# Patient Record
Sex: MALE | Race: WHITE | NOT HISPANIC OR LATINO | Employment: STUDENT | URBAN - METROPOLITAN AREA
[De-identification: names, ages, dates, MRNs, and addresses within clinical notes are randomized per-mention and may not be internally consistent; named-entity substitution may affect disease eponyms.]

---

## 2018-07-09 ENCOUNTER — TRANSFERRED RECORDS (OUTPATIENT)
Dept: HEALTH INFORMATION MANAGEMENT | Facility: CLINIC | Age: 23
End: 2018-07-09

## 2018-07-09 ENCOUNTER — OFFICE VISIT (OUTPATIENT)
Dept: URGENT CARE | Facility: URGENT CARE | Age: 23
End: 2018-07-09
Payer: COMMERCIAL

## 2018-07-09 VITALS
HEART RATE: 63 BPM | TEMPERATURE: 98.3 F | DIASTOLIC BLOOD PRESSURE: 56 MMHG | SYSTOLIC BLOOD PRESSURE: 108 MMHG | OXYGEN SATURATION: 98 %

## 2018-07-09 DIAGNOSIS — T30.0 FRICTION BURN OF SKIN: Primary | ICD-10-CM

## 2018-07-09 PROCEDURE — 99203 OFFICE O/P NEW LOW 30 MIN: CPT | Performed by: FAMILY MEDICINE

## 2018-07-09 NOTE — MR AVS SNAPSHOT
After Visit Summary   7/9/2018    Luther Wood    MRN: [id number]           Patient Information     Date Of Birth          1995        Visit Information        Provider Department      7/9/2018 7:20 PM Yanna Carr MD [facility]        Today's Diagnoses     Friction burn of skin    -  1       Follow-ups after your visit        Follow-up notes from your care team     Return if symptoms worsen or fail to improve.      Who to contact     If you have questions or need follow up information about today's clinic visit or your schedule please contact [facility] directly at [phone].  Normal or non-critical lab and imaging results will be communicated to you by MyChart, letter or phone within 4 business days after the clinic has received the results. If you do not hear from us within 7 days, please contact the clinic through MyChart or phone. If you have a critical or abnormal lab result, we will notify you by phone as soon as possible.  Submit refill requests through [employer] or call your pharmacy and they will forward the refill request to us. Please allow 3 business days for your refill to be completed.          Additional Information About Your Visit        Care EveryWhere ID     This is your Care EveryWhere ID. This could be used by other organizations to access your [place] medical records  [id number]        Your Vitals Were     Pulse Temperature Pulse Oximetry             63 98.3  F (36.8  C) (Oral) 98%          Blood Pressure from Last 3 Encounters:   07/09/18 108/56   01/17/13 102/60    Weight from Last 3 Encounters:   01/17/13 115 lb (52.2 kg) (27 %)*   10/21/07 73 lb (33.1 kg) (56 %)*     * Growth percentiles are based on CDC 2-20 Years data.              Today, you had the following     No orders found for display       Primary Care Provider Office Phone # Fax #    Sean Mercedes [phone]       KATIE Ohlman  PEDIATRICS [address]  [facility] [address]        Equal Access to Services     EVAJUS DAY : Hadii aad ku hadstevemicki Socasandra, wamerrittda luvladimiradaha, qaybta juanitayulissanatali mora, christine cruzfidencioyuridia tracy. So [facility] [phone].    ATENCIÓN: Si habla español, tiene a narayan disposición servicios gratuitos de asistencia lingüística. Llame al [phone].    We comply with applicable federal civil rights laws and [place] laws. We do not discriminate on the basis of race, color, national origin, age, disability, sex, sexual orientation, or gender identity.            Thank you!     Thank you for choosing [facility]  for your care. Our goal is always to provide you with excellent care. Hearing back from our patients is one way we can continue to improve our services. Please take a few minutes to complete the written survey that you may receive in the mail after your visit with us. Thank you!             Your Updated Medication List - Protect others around you: Learn how to safely use, store and throw away your medicines at www.disposemymeds.org.          This list is accurate as of 7/9/18  8:49 PM.  Always use your most recent med list.                   Brand Name Dispense Instructions for use Diagnosis    ADDERALL XR 10 MG per 24 hr capsule   Generic drug:  amphetamine-dextroamphetamine     30 capsule    Take 1 capsule by mouth daily.        cefuroxime 250 MG/5ML suspension    CEFTIN     Take by mouth 2 times daily

## 2018-07-10 NOTE — NURSING NOTE
Chief Complaint   Patient presents with     Urgent Care     Derm Problem     rash on both hands        S RYDER, CMA

## 2018-07-10 NOTE — PROGRESS NOTES
SUBJECTIVE:  Luther Wood is a 22 year old male who presents to the clinic today for a rash.  Onset of rash was today.  Rash is sudden onset.  Location of the rash: palms.  Symptoms are mild, red and rash seems to be worsening.  Previous history of a similar rash? No  Recent exposure history: was pulling weeds and mowing the lawn.    Associated symptoms include: nothing.  Not itching.  No pain.      No past medical history on file.  Current Outpatient Prescriptions   Medication Sig Dispense Refill     amphetamine-dextroamphetamine (ADDERALL XR) 10 MG per capsule Take 1 capsule by mouth daily. 30 capsule 0     cefuroxime (CEFTIN) 250 MG/5ML suspension Take by mouth 2 times daily       Social History   Substance Use Topics     Smoking status: Never Smoker     Smokeless tobacco: Never Used     Alcohol use Not on file       ROS:  Review of systems negative except as stated above.    EXAM:   /56 (Cuff Size: Adult Regular)  Pulse 63  Temp 98.3  F (36.8  C) (Oral)  SpO2 98%  GENERAL: alert, no acute distress.  SKIN: Rash description:    Distribution: localized  Location: palms    Color: red,  Lesion type: macular, maculopapular, linear, round with no other findings    ASSESSMENT:  Encounter Diagnosis   Name Primary?     Friction burn of skin Yes        PLAN:  1) Recommend hydrocortisone 1% TID.  Use gloves to protect the hands when weeding or working outside.    2) Follow-up with primary clinic if not improving   Yanna Bates MD

## 2018-08-20 ENCOUNTER — TRANSFERRED RECORDS (OUTPATIENT)
Dept: HEALTH INFORMATION MANAGEMENT | Facility: CLINIC | Age: 23
End: 2018-08-20

## 2019-04-21 ENCOUNTER — OFFICE VISIT (OUTPATIENT)
Dept: URGENT CARE | Facility: URGENT CARE | Age: 24
End: 2019-04-21
Payer: COMMERCIAL

## 2019-04-21 VITALS
OXYGEN SATURATION: 100 % | HEART RATE: 91 BPM | DIASTOLIC BLOOD PRESSURE: 74 MMHG | WEIGHT: 164 LBS | TEMPERATURE: 98.3 F | SYSTOLIC BLOOD PRESSURE: 110 MMHG

## 2019-04-21 DIAGNOSIS — R19.5 LOOSE STOOLS: Primary | ICD-10-CM

## 2019-04-21 PROCEDURE — 87506 IADNA-DNA/RNA PROBE TQ 6-11: CPT | Performed by: FAMILY MEDICINE

## 2019-04-21 PROCEDURE — 99214 OFFICE O/P EST MOD 30 MIN: CPT | Performed by: FAMILY MEDICINE

## 2019-04-21 RX ORDER — BUDESONIDE 0.5 MG/2ML
INHALANT ORAL
Refills: 0 | COMMUNITY
Start: 2018-09-26 | End: 2021-09-02

## 2019-04-21 NOTE — PROGRESS NOTES
Subjective:   Luther Wood is a 23 year old male who presents for   Chief Complaint   Patient presents with     Urgent Care     Abdominal Pain     stomach cramping and pain- began last Tuesday- loose priya, bad gas      Started Tuesday with stomach ache - continued into weds and had loose stools with bad gas. Thursday his symptoms came down although still felt bloated. Friday watery loose stools continue -had about 3 or 4 episodes. Saturday (yesterday) he developed a sore throat.   He denies any fevers. Approximately 3 loose stools although had 1 formed bowel movement in the morning.   No recent travel/camping. No antibiotics in the last 2 months. Roommate 1-2 weeks ago had flu-like symptoms (chills, stomach discomfort).     Cefuroxime he takes 250mg daily for his acne (has been taking for 5-6 years).     Medications taken for this illness: none    No current job right now - is currently in school    There are no active problems to display for this patient.      Current Outpatient Medications   Medication     amphetamine-dextroamphetamine (ADDERALL XR) 10 MG per capsule     cefuroxime (CEFTIN) 250 MG/5ML suspension     budesonide (PULMICORT) 0.5 MG/2ML neb solution     No current facility-administered medications for this visit.      ROS:  As above per HPI    Objective:   /74 (BP Location: Right arm)   Pulse 91   Temp 98.3  F (36.8  C) (Oral)   Wt 74.4 kg (164 lb)   SpO2 100% , There is no height or weight on file to calculate BMI.  Gen:  NAD, well-nourished, sitting in chair comfortably  HEENT: EOMI, sclera anicteric, Head normocephalic, ; nares patent; moist mucous membranes  Neck: trachea midline, no thyromegaly  CV:  Hemodynamically stable  Pulm:  no increased work of breathing  Extrem: no cyanosis, edema or clubbing  Skin: no obvious rashes or abnormalities  Gait: normal  Psych: Euthymic, linear thoughts, normal rate of speech      Assessment & Plan:   Luther Wood, 23 year old male who presents  with:    Loose stools  Patient on long term antibiotics which increases possibility of C.Diff - his foul smelling stools and ongoing symptoms for 5-6 days could also be from gastroenteritis. Appears well hydrated. Fortunately, no fevers. Stool testing will be done today as he can provide a sample. I've recommended loperamide as he is having non-bloody loose stools that are intermittent. No drinking of outdoor water to suggest giardia. No significant abdominal discomfort.   - Enteric Bacteria and Virus Panel by RENA Stool  - Clostridium difficile Toxin B PCR  - Enteric Bacteria and Virus Panel by RENA Stool      Anand Underwood MD   [facility]    The use of Dragon/[employer] dictation services may have been used to construct the content in this note; any grammatical or spelling errors are non-intentional. Please contact the author of this note directly if you are in need of any clarification.

## 2019-04-21 NOTE — PATIENT INSTRUCTIONS
If your symptoms get worse please call the clinic    We will call you in the next 2-3 days with your results    Drink 4-6 water bottles a day to stay hydrated    If your diarrhea comes on take imodium/loperamide one pill 15 minutes before a meal (up to 4 times a day for up to 1 week)

## 2019-04-23 ENCOUNTER — TRANSFERRED RECORDS (OUTPATIENT)
Dept: HEALTH INFORMATION MANAGEMENT | Facility: CLINIC | Age: 24
End: 2019-04-23
Payer: COMMERCIAL

## 2019-04-23 LAB
ALT SERPL-CCNC: 17 IU/L (ref 7–52)
AST SERPL-CCNC: 20 IU/L (ref 13–39)
CREATININE (EXTERNAL): 1.07 MG/DL (ref 0.7–1.3)
GLUCOSE (EXTERNAL): 74 MG/DL (ref 70–99)
POTASSIUM (EXTERNAL): 3.8 MEQ/L (ref 3.5–5.1)

## 2019-05-10 ENCOUNTER — TRANSFERRED RECORDS (OUTPATIENT)
Dept: HEALTH INFORMATION MANAGEMENT | Facility: CLINIC | Age: 24
End: 2019-05-10

## 2019-05-22 ENCOUNTER — TRANSFERRED RECORDS (OUTPATIENT)
Dept: HEALTH INFORMATION MANAGEMENT | Facility: CLINIC | Age: 24
End: 2019-05-22

## 2019-05-31 ENCOUNTER — TRANSFERRED RECORDS (OUTPATIENT)
Dept: HEALTH INFORMATION MANAGEMENT | Facility: CLINIC | Age: 24
End: 2019-05-31

## 2019-06-21 ENCOUNTER — TRANSFERRED RECORDS (OUTPATIENT)
Dept: HEALTH INFORMATION MANAGEMENT | Facility: CLINIC | Age: 24
End: 2019-06-21

## 2019-07-03 ENCOUNTER — OFFICE VISIT (OUTPATIENT)
Dept: FAMILY MEDICINE | Facility: CLINIC | Age: 24
End: 2019-07-03

## 2019-07-03 VITALS
BODY MASS INDEX: 22.08 KG/M2 | HEART RATE: 74 BPM | HEIGHT: 72 IN | DIASTOLIC BLOOD PRESSURE: 76 MMHG | OXYGEN SATURATION: 99 % | SYSTOLIC BLOOD PRESSURE: 120 MMHG | WEIGHT: 163 LBS

## 2019-07-03 DIAGNOSIS — L70.0 ACNE VULGARIS: ICD-10-CM

## 2019-07-03 DIAGNOSIS — F98.8 ATTENTION DEFICIT DISORDER, UNSPECIFIED HYPERACTIVITY PRESENCE: ICD-10-CM

## 2019-07-03 DIAGNOSIS — H10.32 ACUTE CONJUNCTIVITIS OF LEFT EYE, UNSPECIFIED ACUTE CONJUNCTIVITIS TYPE: Primary | ICD-10-CM

## 2019-07-03 DIAGNOSIS — S73.191S TEAR OF RIGHT ACETABULAR LABRUM, SEQUELA: ICD-10-CM

## 2019-07-03 PROCEDURE — 99203 OFFICE O/P NEW LOW 30 MIN: CPT | Performed by: FAMILY MEDICINE

## 2019-07-03 RX ORDER — POLYMYXIN B SULFATE AND TRIMETHOPRIM 1; 10000 MG/ML; [USP'U]/ML
1-2 SOLUTION OPHTHALMIC EVERY 6 HOURS
Qty: 10 ML | Refills: 0 | Status: SHIPPED | OUTPATIENT
Start: 2019-07-03 | End: 2019-07-03

## 2019-07-03 RX ORDER — VANCOMYCIN HYDROCHLORIDE 125 MG/1
CAPSULE ORAL
Refills: 0 | COMMUNITY
Start: 2019-05-24 | End: 2021-09-02

## 2019-07-03 RX ORDER — SULFACETAMIDE SODIUM 100 MG/ML
LOTION TOPICAL
Refills: 11 | COMMUNITY
Start: 2019-06-12 | End: 2021-09-02

## 2019-07-03 RX ORDER — POLYMYXIN B SULFATE AND TRIMETHOPRIM 1; 10000 MG/ML; [USP'U]/ML
1-2 SOLUTION OPHTHALMIC EVERY 6 HOURS
Qty: 10 ML | Refills: 0 | Status: SHIPPED | OUTPATIENT
Start: 2019-07-03 | End: 2021-09-02

## 2019-07-03 RX ORDER — MESALAMINE 1.2 G/1
TABLET, DELAYED RELEASE ORAL
Refills: 2 | COMMUNITY
Start: 2019-06-21 | End: 2021-09-02

## 2019-07-03 RX ORDER — PREDNISONE 10 MG/1
TABLET ORAL
Refills: 0 | Status: ON HOLD | COMMUNITY
Start: 2019-06-03 | End: 2021-09-09

## 2019-07-03 ASSESSMENT — ANXIETY QUESTIONNAIRES

## 2019-07-03 ASSESSMENT — PATIENT HEALTH QUESTIONNAIRE - PHQ9
5. POOR APPETITE OR OVEREATING: NOT AT ALL
SUM OF ALL RESPONSES TO PHQ QUESTIONS 1-9: 0

## 2019-07-03 ASSESSMENT — MIFFLIN-ST. JEOR: SCORE: 1782.36

## 2019-07-03 NOTE — NURSING NOTE
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 has never smoked.  Ok to leave detailed message on voice mail for today's visit only Yes, phone # [phone]

## 2019-07-03 NOTE — PROGRESS NOTES
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    Normal vision  No eye pain  No upper respiratory symptoms  No fever     Patient Active Problem List   Diagnosis     Acne vulgaris     Tear of right acetabular labrum     Attention deficit disorder     ? Inflammatory bowel disease- on prednisone    History reviewed. No pertinent surgical history.    Current Outpatient Medications   Medication     amphetamine-dextroamphetamine (ADDERALL XR) 10 MG per capsule     LIALDA 1.2 g EC tablet     predniSONE (DELTASONE) 10 MG tablet     sulfacetamide sodium, Acne, 10 % lotion     trimethoprim-polymyxin b (POLYTRIM) 08349-4.1 UNIT/ML-% ophthalmic solution     vancomycin (VANCOCIN) 125 MG capsule     budesonide (PULMICORT) 0.5 MG/2ML neb solution     No current facility-administered medications for this visit.      OBJECTIVE:  /76 (BP Location: Left arm, Patient Position: Sitting, Cuff Size: Adult Regular)   Pulse 74   Ht 1.829 m (6')   Wt 73.9 kg (163 lb)   SpO2 99%   BMI 22.11 kg/m    No acute distress  PERRL  Copious drainage left eye  Erythema of conjunctiva  EOM is full    External ears  and canals clear bilaterally. TM's normal bilaterally. Nose normal without lesions or discharge. Oropharynx normal. Neck supple without palpable adenopathy.    Assessment   (H10.32) Acute conjunctivitis of left eye, unspecified acute conjunctivitis type  (primary encounter diagnosis)  Comment:    Plan: trimethoprim-polymyxin b (POLYTRIM) 53842-3.1         UNIT/ML-% ophthalmic solution, DISCONTINUED:         trimethoprim-polymyxin b (POLYTRIM) 69206-1.1         UNIT/ML-% ophthalmic solution          Discussed contagious nature of his eye infection  Hand washing, no shared towels  Works with patients, consider  no patient care until returns from July 4 holiday (about five days from onset of symptoms)  Start eye drops  Call or return to clinic prn if these symtoms worsen, fail to improve as anticipated, or if new symptoms develop.

## 2019-07-04 ASSESSMENT — ANXIETY QUESTIONNAIRES: GAD7 TOTAL SCORE: 0

## 2019-07-07 PROBLEM — F98.8 ATTENTION DEFICIT DISORDER: Status: ACTIVE | Noted: 2019-07-07

## 2019-07-07 PROBLEM — S73.191A TEAR OF RIGHT ACETABULAR LABRUM: Status: ACTIVE | Noted: 2019-07-07

## 2019-07-07 PROBLEM — L70.0 ACNE VULGARIS: Status: ACTIVE | Noted: 2019-07-07

## 2019-10-31 ENCOUNTER — TELEPHONE (OUTPATIENT)
Dept: DERMATOLOGY | Facility: CLINIC | Age: 24
End: 2019-10-31

## 2019-10-31 NOTE — TELEPHONE ENCOUNTER
----- Message from Khanh Ribera sent at 10/31/2019 11:56 AM CDT -----  Regarding: Dr. Mercedes from [facility] trying to reach Dr. Bell  Is an  Needed: no  Callers Name: Trisha @ [facility]  Callers Phone Number: [phone]  Relationship to Patient: primary clinic   Best time of day to call: any  Is it ok to leave a detailed voicemail on this number: yes  Reason for Call: Trisha @ [facility] is trying to reach Dr. Bell on behalf of Dr. Mercedes in regards to this patient. She had tried the [id number] line several times and just kept getting routing to the call center. The provider has questions about inflammatory bowel disease and acne in relation to this patient. I explained that I didn't think we'd be able to assist since the patient is an adult and not established with the provider here. I offered to relay the message due to the difficulties she had trying to directly contact the provider. I informed her of Dr. Bell's private practice so that she might try to reach the provider there.

## 2019-10-31 NOTE — TELEPHONE ENCOUNTER
RN spoke with Shahrzad at [facility]. RN explained that because the patient is well over 18 years of age, is not seeing other pediatric specialists here, we would generally refer him to our adult clinic. RN explained that Dr. Bell is only at our clinic one time per month. RN provided other clinic location to which they could contact Dr. Bell. RN explained to Shahrzad that should Dr. Bell approve seeing this patient at the [facility] location, they should call back and RN will assist in scheduling. Shahrzad denies further needs.

## 2019-12-17 ENCOUNTER — TRANSFERRED RECORDS (OUTPATIENT)
Dept: HEALTH INFORMATION MANAGEMENT | Facility: CLINIC | Age: 24
End: 2019-12-17

## 2020-01-06 ENCOUNTER — TRANSFERRED RECORDS (OUTPATIENT)
Dept: HEALTH INFORMATION MANAGEMENT | Facility: CLINIC | Age: 25
End: 2020-01-06

## 2021-08-31 ENCOUNTER — TRANSFERRED RECORDS (OUTPATIENT)
Dept: HEALTH INFORMATION MANAGEMENT | Facility: CLINIC | Age: 26
End: 2021-08-31
Payer: COMMERCIAL

## 2021-08-31 LAB
ALT SERPL-CCNC: 20 IU/L (ref 0–44)
AST SERPL-CCNC: 19 IU/L (ref 0–40)

## 2021-09-02 ENCOUNTER — HOSPITAL ENCOUNTER (INPATIENT)
Facility: CLINIC | Age: 26
LOS: 7 days | Discharge: HOME | DRG: 386 | End: 2021-09-09
Attending: EMERGENCY MEDICINE | Admitting: INTERNAL MEDICINE
Payer: COMMERCIAL

## 2021-09-02 DIAGNOSIS — K51.919 ULCERATIVE COLITIS WITH COMPLICATION, UNSPECIFIED LOCATION (H): ICD-10-CM

## 2021-09-02 LAB
ALBUMIN SERPL-MCNC: 2.8 G/DL (ref 3.4–5)
ALP SERPL-CCNC: 74 U/L (ref 40–150)
ALT SERPL W P-5'-P-CCNC: 21 U/L (ref 0–70)
ANION GAP SERPL CALCULATED.3IONS-SCNC: 5 MMOL/L (ref 3–14)
AST SERPL W P-5'-P-CCNC: 11 U/L (ref 0–45)
BASOPHILS # BLD AUTO: 0.1 10E3/UL (ref 0–0.2)
BASOPHILS NFR BLD AUTO: 1 %
BILIRUB SERPL-MCNC: 0.5 MG/DL (ref 0.2–1.3)
BUN SERPL-MCNC: 8 MG/DL (ref 7–30)
CALCIUM SERPL-MCNC: 8.4 MG/DL (ref 8.5–10.1)
CHLORIDE BLD-SCNC: 102 MMOL/L (ref 94–109)
CO2 SERPL-SCNC: 30 MMOL/L (ref 20–32)
CREAT SERPL-MCNC: 1.12 MG/DL (ref 0.66–1.25)
EOSINOPHIL # BLD AUTO: 0.1 10E3/UL (ref 0–0.7)
EOSINOPHIL NFR BLD AUTO: 1 %
ERYTHROCYTE [DISTWIDTH] IN BLOOD BY AUTOMATED COUNT: 12.5 % (ref 10–15)
GFR SERPL CREATININE-BSD FRML MDRD: >90 ML/MIN/1.73M2
GLUCOSE BLD-MCNC: 85 MG/DL (ref 70–99)
HCT VFR BLD AUTO: 36.2 % (ref 40–53)
HGB BLD-MCNC: 11.7 G/DL (ref 13.3–17.7)
IMM GRANULOCYTES # BLD: 0.1 10E3/UL
IMM GRANULOCYTES NFR BLD: 1 %
LIPASE SERPL-CCNC: 79 U/L (ref 73–393)
LYMPHOCYTES # BLD AUTO: 1.9 10E3/UL (ref 0.8–5.3)
LYMPHOCYTES NFR BLD AUTO: 11 %
MCH RBC QN AUTO: 29.3 PG (ref 26.5–33)
MCHC RBC AUTO-ENTMCNC: 32.3 G/DL (ref 31.5–36.5)
MCV RBC AUTO: 91 FL (ref 78–100)
MONOCYTES # BLD AUTO: 1.9 10E3/UL (ref 0–1.3)
MONOCYTES NFR BLD AUTO: 11 %
NEUTROPHILS # BLD AUTO: 12.6 10E3/UL (ref 1.6–8.3)
NEUTROPHILS NFR BLD AUTO: 75 %
NRBC # BLD AUTO: 0 10E3/UL
NRBC BLD AUTO-RTO: 0 /100
PLATELET # BLD AUTO: 336 10E3/UL (ref 150–450)
POTASSIUM BLD-SCNC: 3.4 MMOL/L (ref 3.4–5.3)
PROT SERPL-MCNC: 6.2 G/DL (ref 6.8–8.8)
RBC # BLD AUTO: 3.99 10E6/UL (ref 4.4–5.9)
SARS-COV-2 RNA RESP QL NAA+PROBE: NEGATIVE
SODIUM SERPL-SCNC: 137 MMOL/L (ref 133–144)
WBC # BLD AUTO: 16.6 10E3/UL (ref 4–11)

## 2021-09-02 PROCEDURE — 85041 AUTOMATED RBC COUNT: CPT | Performed by: EMERGENCY MEDICINE

## 2021-09-02 PROCEDURE — 83690 ASSAY OF LIPASE: CPT | Performed by: EMERGENCY MEDICINE

## 2021-09-02 PROCEDURE — 99285 EMERGENCY DEPT VISIT HI MDM: CPT

## 2021-09-02 PROCEDURE — C9803 HOPD COVID-19 SPEC COLLECT: HCPCS

## 2021-09-02 PROCEDURE — 99223 1ST HOSP IP/OBS HIGH 75: CPT | Mod: AI | Performed by: PHYSICIAN ASSISTANT

## 2021-09-02 PROCEDURE — 87493 C DIFF AMPLIFIED PROBE: CPT | Performed by: PHYSICIAN ASSISTANT

## 2021-09-02 PROCEDURE — 80053 COMPREHEN METABOLIC PANEL: CPT | Performed by: EMERGENCY MEDICINE

## 2021-09-02 PROCEDURE — 120N000004 HC R&B MS OVERFLOW

## 2021-09-02 PROCEDURE — 87506 IADNA-DNA/RNA PROBE TQ 6-11: CPT | Performed by: PHYSICIAN ASSISTANT

## 2021-09-02 PROCEDURE — 258N000003 HC RX IP 258 OP 636: Performed by: EMERGENCY MEDICINE

## 2021-09-02 PROCEDURE — 96374 THER/PROPH/DIAG INJ IV PUSH: CPT

## 2021-09-02 PROCEDURE — 87635 SARS-COV-2 COVID-19 AMP PRB: CPT | Performed by: EMERGENCY MEDICINE

## 2021-09-02 PROCEDURE — 36415 COLL VENOUS BLD VENIPUNCTURE: CPT | Performed by: EMERGENCY MEDICINE

## 2021-09-02 PROCEDURE — 258N000003 HC RX IP 258 OP 636: Performed by: PHYSICIAN ASSISTANT

## 2021-09-02 PROCEDURE — 250N000011 HC RX IP 250 OP 636: Performed by: INTERNAL MEDICINE

## 2021-09-02 PROCEDURE — 96361 HYDRATE IV INFUSION ADD-ON: CPT

## 2021-09-02 PROCEDURE — 250N000011 HC RX IP 250 OP 636: Performed by: PHYSICIAN ASSISTANT

## 2021-09-02 PROCEDURE — 96376 TX/PRO/DX INJ SAME DRUG ADON: CPT

## 2021-09-02 RX ORDER — NALOXONE HYDROCHLORIDE 0.4 MG/ML
0.2 INJECTION, SOLUTION INTRAMUSCULAR; INTRAVENOUS; SUBCUTANEOUS
Status: DISCONTINUED | OUTPATIENT
Start: 2021-09-02 | End: 2021-09-09 | Stop reason: HOSPADM

## 2021-09-02 RX ORDER — ONDANSETRON 4 MG/1
4 TABLET, ORALLY DISINTEGRATING ORAL EVERY 6 HOURS PRN
Status: DISCONTINUED | OUTPATIENT
Start: 2021-09-02 | End: 2021-09-09 | Stop reason: HOSPADM

## 2021-09-02 RX ORDER — HYDROMORPHONE HCL IN WATER/PF 6 MG/30 ML
.1-.2 PATIENT CONTROLLED ANALGESIA SYRINGE INTRAVENOUS
Status: DISCONTINUED | OUTPATIENT
Start: 2021-09-02 | End: 2021-09-09 | Stop reason: HOSPADM

## 2021-09-02 RX ORDER — NALOXONE HYDROCHLORIDE 0.4 MG/ML
0.4 INJECTION, SOLUTION INTRAMUSCULAR; INTRAVENOUS; SUBCUTANEOUS
Status: DISCONTINUED | OUTPATIENT
Start: 2021-09-02 | End: 2021-09-09 | Stop reason: HOSPADM

## 2021-09-02 RX ORDER — LIDOCAINE 40 MG/G
CREAM TOPICAL
Status: DISCONTINUED | OUTPATIENT
Start: 2021-09-02 | End: 2021-09-09 | Stop reason: HOSPADM

## 2021-09-02 RX ORDER — ONDANSETRON 2 MG/ML
4 INJECTION INTRAMUSCULAR; INTRAVENOUS EVERY 6 HOURS PRN
Status: DISCONTINUED | OUTPATIENT
Start: 2021-09-02 | End: 2021-09-09 | Stop reason: HOSPADM

## 2021-09-02 RX ORDER — METHYLPREDNISOLONE SODIUM SUCCINATE 40 MG/ML
20 INJECTION, POWDER, LYOPHILIZED, FOR SOLUTION INTRAMUSCULAR; INTRAVENOUS EVERY 8 HOURS
Status: DISCONTINUED | OUTPATIENT
Start: 2021-09-02 | End: 2021-09-08

## 2021-09-02 RX ORDER — SODIUM CHLORIDE 9 MG/ML
INJECTION, SOLUTION INTRAVENOUS CONTINUOUS
Status: DISCONTINUED | OUTPATIENT
Start: 2021-09-02 | End: 2021-09-02

## 2021-09-02 RX ORDER — ISOTRETINOIN 20 MG/1
20 CAPSULE, LIQUID FILLED ORAL
Status: ON HOLD | COMMUNITY
Start: 2021-08-03 | End: 2021-09-24

## 2021-09-02 RX ORDER — BALSALAZIDE DISODIUM 750 MG/1
2250 CAPSULE ORAL 2 TIMES DAILY
Status: ON HOLD | COMMUNITY
End: 2021-09-24

## 2021-09-02 RX ORDER — SODIUM CHLORIDE 9 MG/ML
INJECTION, SOLUTION INTRAVENOUS CONTINUOUS
Status: DISCONTINUED | OUTPATIENT
Start: 2021-09-02 | End: 2021-09-03

## 2021-09-02 RX ORDER — ACETAMINOPHEN 500 MG
1000 TABLET ORAL EVERY 6 HOURS PRN
Status: DISCONTINUED | OUTPATIENT
Start: 2021-09-02 | End: 2021-09-09 | Stop reason: HOSPADM

## 2021-09-02 RX ADMIN — METHYLPREDNISOLONE SODIUM SUCCINATE 20 MG: 40 INJECTION, POWDER, FOR SOLUTION INTRAMUSCULAR; INTRAVENOUS at 14:23

## 2021-09-02 RX ADMIN — METHYLPREDNISOLONE SODIUM SUCCINATE 40 MG: 40 INJECTION, POWDER, FOR SOLUTION INTRAMUSCULAR; INTRAVENOUS at 21:41

## 2021-09-02 RX ADMIN — SODIUM CHLORIDE, PRESERVATIVE FREE: 5 INJECTION INTRAVENOUS at 16:03

## 2021-09-02 RX ADMIN — SODIUM CHLORIDE 1000 ML: 9 INJECTION, SOLUTION INTRAVENOUS at 11:51

## 2021-09-02 ASSESSMENT — ENCOUNTER SYMPTOMS
BLOOD IN STOOL: 1
ABDOMINAL PAIN: 1
DIARRHEA: 1

## 2021-09-02 NOTE — ED NOTES
[facility]  ED Nurse Handoff Report    Luther Wood is a 25 year old male   ED Chief complaint: No chief complaint on file.  . ED Diagnosis:   Final diagnoses:   Ulcerative colitis with complication, unspecified location (H)     Allergies: No Known Allergies    Code Status: Full Code  Activity level - Baseline/Home:  Independent. Activity Level - Current:   Independent. Lift room needed: No. Bariatric: No   Needed: No   Isolation: No. Infection: Not Applicable.     Vital Signs:   Vitals:    09/02/21 1049   BP: 129/75   Pulse: 95   Resp: 16   Temp: 98.1  F (36.7  C)   TempSrc: Temporal   SpO2: 100%       Cardiac Rhythm:  ,      Pain level:    Patient confused: No. Patient Falls Risk: Yes.   Elimination Status: Has voided   Patient Report - Initial Complaint: abdominal pain. Focused Assessment:   Pt having UC flare up and increased bleeding/stools despite being on steroid medication.    Tests Performed:   Labs Ordered and Resulted from Time of ED Arrival Up to the Time of Departure from the ED   COMPREHENSIVE METABOLIC PANEL - Abnormal; Notable for the following components:       Result Value    Calcium 8.4 (*)     Protein Total 6.2 (*)     Albumin 2.8 (*)     All other components within normal limits   CBC WITH PLATELETS AND DIFFERENTIAL - Abnormal; Notable for the following components:    WBC Count 16.6 (*)     RBC Count 3.99 (*)     Hemoglobin 11.7 (*)     Hematocrit 36.2 (*)     Absolute Neutrophils 12.6 (*)     Absolute Monocytes 1.9 (*)     Absolute Immature Granulocytes 0.1 (*)     All other components within normal limits   LIPASE - Normal   CBC WITH PLATELETS & DIFFERENTIAL    Narrative:     The following orders were created for panel order CBC with platelets differential.  Procedure                               Abnormality         Status                     ---------                               -----------         ------                     CBC with platelets and d...[[id number]]   Abnormal            Final result                 Please view results for these tests on the individual orders.   COVID-19 VIRUS (CORONAVIRUS) BY PCR   CARDIAC CONTINUOUS MONITORING   ENTERIC BACTERIA AND VIRUS PANEL BY RENA STOOL   CLOSTRIDIUM DIFFICILE TOXIN B     Treatments provided: monitoring, fluids  Family Comments: father present  OBS brochure/video discussed/provided to patient:  Yes  ED Medications:   Medications   0.9% sodium chloride BOLUS (0 mLs Intravenous Stopped 9/2/21 1252)     Followed by   sodium chloride 0.9% infusion (has no administration in time range)     Drips infusing:  No  For the majority of the shift, the patient's behavior Green. Interventions performed were rounding.    Sepsis treatment initiated: No     Patient tested for COVID 19 prior to admission: YES    ED Nurse Name/Phone Number: Lynn Alexandra RN,   1:29 PM    RECEIVING UNIT ED HANDOFF REVIEW    Above ED Nurse Handoff Report was reviewed: Yes  Reviewed by: Cece Fofana RN on September 2, 2021 at 2:12 PM

## 2021-09-02 NOTE — PLAN OF CARE
Vital signs: Stable; B/P: 114/63, Temp: 99.7, HR: 92, RR: 24  Pain Control: Rating pain 2/10. Declining pain medications at this time.   Diet: Tolerating Low fiber diet.  Output: Voiding adequately. Currently having loose bloody stools.   Activity: Up independently in room.   Updates: BS active. Abdominal tightness present. Stool samples sent and pending.   Plan: Monitor and assess VS/pain. Continue IV steroids as ordered.

## 2021-09-02 NOTE — ED TRIAGE NOTES
Pt has ulcerative colitis. Pt having a flare up. Pt has been on prednisone for 7 days with no releif. Continues to have bloody diarrhea. Was told he needs a colonoscopy and possibly IV steroids.

## 2021-09-02 NOTE — PHARMACY-ADMISSION MEDICATION HISTORY
Admission medication history interview status for this patient is complete. See [facility] admission navigator for allergy information, prior to admission medications and immunization status.     Medication history interview done, indicate source(s): Patient  Medication history resources (including written lists, pill bottles, clinic record):None  Pharmacy: [facility]/pharmacy #[id number] - [place], [place] - [address]    Changes made to PTA medication list:  Added: isotretinoin, balsalazide  Changed: prednisone taper (added current dose of 40 mg daily)  Reported as Not Taking: -  Removed: adderall, budesonide, lialda, sulfacetamide lotion, polytrim opth, PO vancomycin    Actions taken by pharmacist (provider contacted, etc):None   Additional medication history information:None  Medication reconciliation/reorder completed by provider prior to medication history?  no     Prior to Admission medications    Medication Sig Last Dose Taking? Auth Provider   balsalazide (COLAZAL) 750 MG capsule Take 2,250 mg by mouth 2 times daily 9/1/2021 at x 2 Yes Unknown, Entered By History   CLARAVIS 20 MG capsule Take 20 mg by mouth daily with food 9/1/2021 at am Yes Unknown, Entered By History   predniSONE (DELTASONE) 10 MG tablet 60 day taper; currently taking 40 mg daily 9/1/2021 at am Yes Reported, Patient

## 2021-09-02 NOTE — CONSULTS
GASTROENTEROLOGY CONSULTATION     Luther Wood  [address] [id number]  25 year old male    Admission Date/Time: 9/2/2021  Primary Care Provider: No Ref-Primary, Physician    We were asked to see the patient in consultation by Dr. Castellano for evaluation of UC flare.        HPI:  Luther Wood is a 25 year old male who was diagnosed with ulcerative pancolitis in 2019 and had been in remission on balsalazide 2250 gm BID until recently.  On July 27 of 2021 he called his GI provider  Dr. Krzysztof CALIXTO with flare symptoms of abdominal discomfort and loose stools.  A C. difficile was checked and that came back negative (he has a history of C. Difficile).  He continued to have symptoms, therefore more stool studies were ordered which included a fecal calprotectin.  These additional stool test included Salmonella, E. coli, Campylobacter, ova and parasite x1 which were all negative.  The fecal calprotectin came back elevated at 433.  The patient continued to have symptoms, and therefore on August 9 he was started on budesonide and was told to hold the balsalazide while he was doing the budesonide (this is confirmed on a telephone communication with Dr. Blankenship).  Unfortunately his symptoms continued despite being on the budesonide, and on August 24 another C. difficile was checked which returned negative and then he was switched from budesonide to prednisone 40 mg once a day.  He had a follow-up clinic visit with Melissa MAZARIEGOS at [facility] on August 31 for ongoing symptoms of his ulcerative colitis and not responding to oral prednisone.  He was advised at that time to restart the balsalazide and continue the prednisone with the hope that the prednisone would start working soon.  It was also discussed with him at that clinic, that he might need to step up his treatment to immunosuppressants.  In anticipation of this, extra blood work was drawn which included a hepatitis A antibody that came back  positive meaning he is immune to hep A.   Hepatitis B surface antibody was negative and hepatitis B core antibody was negative; He is not immune to hepatitis B and will need to have a vaccine series for that.  He also had TPMT and gold QuantiFERON checked which are currently pending.    The patient reports that he has continued to have his flare symptoms.  He can have anywhere from 3-6 bloody bowel movements per day.  He does get up about once at night to also have a bowel movement.  He has abdominal cramping.        ROS: A comprehensive ten point review of systems was negative aside from those in mentioned in the HPI.      MEDICATIONS: No current facility-administered medications on file prior to encounter.  amphetamine-dextroamphetamine (ADDERALL XR) 10 MG per capsule, Take 1 capsule by mouth daily.  budesonide (PULMICORT) 0.5 MG/2ML neb solution, INHALE ONE VIAL NEBULIZED 2 TIMES DAILY.  LIALDA 1.2 g EC tablet, TAKE 4 TABLETS BY MOUTH EVERY DAY WITH A MEAL  predniSONE (DELTASONE) 10 MG tablet, PLEASE SEE ATTACHED FOR DETAILED DIRECTIONS  sulfacetamide sodium, Acne, 10 % lotion, APPLY TOPICALLY TO FACE 2 TIMES DAILY.  trimethoprim-polymyxin b (POLYTRIM) 32855-4.1 UNIT/ML-% ophthalmic solution, Place 1-2 drops into both eyes every 6 hours  vancomycin (VANCOCIN) 125 MG capsule, TAKE 2 CAPSULE BY ORAL ROUTE EVERY 6 HOURS FOR TWO WEEKS, THEN TAPER AS INSTRUCTED.        ALLERGIES: No Known Allergies    Past Medical History:   Diagnosis Date     Colitis 2019       No past surgical history on file.      SOCIAL HISTORY:  Social History     Tobacco Use     Smoking status: Never Smoker     Smokeless tobacco: Never Used   Substance Use Topics     Alcohol use: Not on file     Drug use: Not on file       FAMILY HISTORY:  Reviewed in his chart.    PHYSICAL EXAM:   /75   Pulse 95   Temp 98.1  F (36.7  C) (Temporal)   Resp 16   SpO2 100%     Constitutional: NAD, comfortable  Cardiovascular: RRR  Respiratory:  CTAB  Psychiatric: mentation appears normal and affect normal/bright  Head: Normocephalic. Atraumatic.    Neck: Neck supple.   Eyes:  no icterus  ENT:  hearing adequate  Abdomen:   Diffuse tenderness to palpation, soft, normoactive bowel sounds.  NEURO: grossly negative  SKIN: no suspicious lesions or rashes  LYMPH:   anterior cervical: no adenopathy  posterior cervical: no adenopathy  supraclavicular: no adenopathy          ADDITIONAL COMMENTS:   I reviewed the patient's new clinical lab test results.   Recent Labs   Lab Test 09/02/21  1146   WBC 16.6*   HGB 11.7*   MCV 91        Recent Labs   Lab Test 09/02/21  1146      POTASSIUM 3.4   CHLORIDE 102   CO2 30   BUN 8   CR 1.12   ANIONGAP 5   MADIE 8.4*   GLC 85     Recent Labs   Lab Test 09/02/21  1146   ALBUMIN 2.8*   BILITOTAL 0.5   ALT 21   AST 11   ALKPHOS 74   LIPASE 79             .    CONSULTATION ASSESSMENT AND PLAN:    Ulcerative pancolitis, currently with a flare.  Has failed outpatient treatment with prednisone 40 mg once a day.  He has been on this dose since August 24, and prior to that he was on budesonide which did not help with his flare symptoms.  Fecal calprotectin was elevated at 433.  Stool cultures done as an outpatient have been negative including two C. difficile test with the most recent one done on August 24.    At this time he will be admitted for IV steroids.  We will give him methylprednisolone 20 mg every 8 hours.  We will also check another C. difficile (he has had C. difficile in the past).  He might need to step up his treatment to immunosuppressants, but this does not need to occur at this time unless he does not improve with IV steroids and we would then be discussing Remicade as an inpatient.  There is no need for a flexible sigmoidoscopy at this time, however if he is not responding to IV steroids that would need to be done as an inpatient.    We will continue to follow.  For now he can have a bland diet.    Nice  Desirae Vincent MD  [facility]

## 2021-09-02 NOTE — ED PROVIDER NOTES
History   Chief Complaint:  No chief complaint on file.       HPI   Luther Wood is a 25 year old male with history of ulcerative colitis (diagnosed in 2019) who presents with abdominal pain. The patient reports that he noticed a flair-up of his ulcerative colitis about 3-4 weeks ago. He noticed diarrhea and blood in his stool at this time. His care coordinator put him on budesonide at this time and mistakenly told him that he should stop his balsalazide disodium. Budesonide did not seem to help with symptoms, so he was put on a 40mg/day regimen of prednisone 7 days ago. This also did not seem to help with symptoms. He then met with his  GI physician two days ago, who noticed that he was not taking his balsalazide disodium. She recommended that he resume balsalazide disodium and continue prednisone. This morning, the patient woke up with cramping and decided that he did not want to fight through another day of work with his symptoms. Pain has not been improving since initial onset about a month ago, so he presents to the ED for evaluation. He states that pain is not severe, but it is highly irritating, especially considering that it has been ongoing for over a month.     Review of Systems   Gastrointestinal: Positive for abdominal pain, blood in stool and diarrhea.   All other systems reviewed and are negative.    Allergies:  No known allergies    Medications:  Adderall  Budesonide  Lialda  Prednisone  Vancomycin  Balsalazide disodium     Past Medical History:    Colitis  Acne vulgaris  ADHD  Tear of right acetabular labrum    Past Surgical History:    No known past surgical history    Family History:    Type I diabetes, father    Social History:  Arrives via car  Accompanied by father  Visits GI specialist at [facility]    Physical Exam     Patient Vitals for the past 24 hrs:   BP Temp Temp src Pulse Resp SpO2   09/02/21 1451 114/63 100  F (37.8  C) Oral 92 24 98 %   09/02/21 1441 120/63 -- -- -- 16 --   09/02/21  1049 129/75 98.1  F (36.7  C) Temporal 95 16 100 %       Physical Exam  VS: Reviewed per above  HENT: normal speech  EYES: sclera anicteric  CV: Rate as noted, regular rhythm.   RESP: Effort normal. Breath sounds are normal bilaterally.  GI: no tenderness/rebound/guarding, not distended.  NEURO: Alert, moving all extremities  MSK: No deformity of the extremities  SKIN: Warm and dry    Emergency Department Course     Laboratory:    CBC: WBC 16.6 (H), HGB 11.7 (L),     Lipase: 79    CMP: calcium 8.4 (L), protein total 6.2 (L), albumin 2.8 (L) o/w WNL (Creatinine 1.12)     Asymptomatic COVID19 Virus PCR by nasopharyngeal swab pending     Emergency Department Course:    Reviewed:  I reviewed nursing notes, vitals, past medical history and care everywhere    Assessments:  1119 I obtained history and examined the patient as noted above.    I rechecked the patient and explained findings.       Consults:   1214 I discussed the patient with Dr. Vincent of [facility], who will visit with the patient here in the ED.   1317 I discussed the patient with the hospitalist service     Interventions:  Normal Saline 1000 mL IV    Disposition:  The patient was admitted to the hospital under the care of Dr. Abraham DO.     Impression & Plan     Medical Decision Making:  Patient presents to the ER for evaluation of progressive bloody stool and abdominal cramping over the past 6 weeks.  On arrival vital signs are reassuring.  On exam patient does not have reproducible abdominal tenderness nor peritoneal signs.  He has leukocytosis of 16 but has also been on steroids for the past 5 days.  Based on reassuring abdominal exam, I have low suspicion for sinister intra-abdominal process such as perforated viscus, intra-abdominal abscess.  Stool studies were ordered but not obtained while patient was in the ER.  Hemoglobin is stable.  I spoke with [facility] recommend admission for IV steroids.  Patient remained stable in the ER  prior to admission.    Covid-19  Luther Wood was evaluated during a global COVID-19 pandemic, which necessitated consideration that the patient might be at risk for infection with the SARS-CoV-2 virus that causes COVID-19.   Applicable protocols for evaluation were followed during the patient's care.   COVID-19 was considered as part of the patient's evaluation. The plan for testing is:  a test was obtained during this visit.    Diagnosis:    ICD-10-CM    1. Ulcerative colitis with complication, unspecified location (H)  K51.919        Discharge Medications:  Current Discharge Medication List          Scribe Disclosure:  Zen GOMEZ, am serving as a scribe at 11:19 AM on 9/2/2021 to document services personally performed by Ever Castellano MD based on my observations and the provider's statements to me.              Ever Castellano MD  09/02/21 1535

## 2021-09-02 NOTE — H&P
Patient seen and examined.  Chart reviewed.  Case discussed with Barbara Barnes PA-C.  Please see her full admission H&P for details.    Briefly, 25-year-old male with ulcerative colitis flare.  Continue IV methylprednisolone.  Appreciate gastroenterology input.

## 2021-09-02 NOTE — H&P
[facility]    Hospitalist History and Physical    Name: Luther Wood    MRN: [id number]  YOB: 1995    Age: 25 year old  Date of Admission:  9/2/2021  Date of Service (when I saw the patient): 09/02/21    Assessment & Plan   Luther Wood is a 25 year old male with PMH significant for ulcerative pancolitis (diagnosed in 2019), clostridium difficile, and ADD presents to the ED on 9/2/21 for evaluation of abdominal pain.    ED workup reveals: VSS, BMP unremarkable except for calcium of 8.4, albumin of 2.8, LFTs WNL, leukocytosis of 16.6, hemoglobin of 11.7, and hematocrit of 36.2.    #Acute ulcerative colitis flare: about 6 weeks of looser stools 3-7 times per day with progression to blood with every stool for the last 2.5 weeks and lower abdominal cramping for the last 1.5-2 weeks. Initially seen on 7/27/2021 by his GI provider Dr. Blankenship at [facility] with symptoms of abdominal discomfort and loose stools. C. difficile was checked and negative at that time.  Patient had enteric stool work-up which was negative.  Fecal calprotectin was elevated at 433.  On 8/9 patient was started on budesonide and told to hold balsalazide.  Even with this intervention the patient continued to have worsening symptoms.  On 8/24 C. difficile was again checked and negative and the patient was switched from budesonide to prednisone 40 mg daily.  Patient was seen in clinic on 8/31 due to ongoing symptoms and not responding to PO prednisone.  At this time the patient was restarted on his balsalazide and to continue on his oral prednisone. It has been discussed with patient he may need immunosuppressants in the future to control his symptoms. Dr. Vincent of [facility] contacted in the ED and recommended admission for IV steroids.  -IV methylprednisolone 20 mg every 8 hours  -Check C. Difficile and enteric stool panel  -IVFs with NS at 100 ml/hour  -Diet: ADAT  -Monitor I&Os  -GI consulted and  following    #Leukocytosis: WBC of 16.6, likely 2/2 to taking oral steroids the last three days. Expect this to continue to be elevated if rechecked while receiving additional IV steroids at this time. Low suspicion for current infection at this time but will continue to monitor for fevers.   - recheck CBC in AM    #Anemia: mild with Hgb of 11.7, likely 2/2 reported blood in stools. Will continue to monitor.     #H/o C. Difficile: diagnosed in 2019 and treated with oral vancomycin course without recurrence.  No reported fevers with current symptoms and two negative C. difficile tesst on 7/27 and again on 8/24. Low suspicion for current infection     DVT Prophylaxis: Pneumatic Compression Devices and Ambulate every shift  Code Status: Full Code, discussed with patient   Disposition: Expected stay >2 midnights, will admit to inpatient     Primary Care Physician   Physician No Ref-Primary    Chief Complaint   Abdominal pain     History obtained from discussion with ED provider, Dr. Castellano, chart review, and interview with patient     History of Present Illness   Luther Wood is a 25 year old male who presents with abdominal pain. Patient states that he started noticing looser stools 7/23 or 7/24. A couple days later the patient started having abdominal pain that would be intermittent below his bellybutton. The patient contacted his GI provider Dr. Blankenship of [facility] where he had a C. difficile study checked and this came back negative. The patient states he was initially diagnosed with C. difficile in 2019 and completed a course of oral vancomycin but due to having persistent loose stools had a colonoscopy which revealed ulcerative colitis. The patient's GI provider also checked more stool studies as well as a fecal calprotectin. The remainder of the patient's stool studies including ova and parasite were all negative. The patient's fecal calprotectin came back elevated. The patient continued to have symptoms and to  do this on 8/9 he was started on budesonide and was told to hold his balsalazide. Despite being on the budesonide the patient continued to have symptoms. The patient states that for the last 1.5-2 weeks he has had more lower abdominal cramping below his bellybutton as well increased blood in the stools for the last 2.5 weeks. He states that he pretty much has blood in every bowel movement and will have at least 3 up to 7 episodes per day. The patient was again seen on 8/24 where he had another C. difficile check which returned negative and he was switched from budesonide to oral prednisone 40 mg once daily. He had a follow-up clinic visit on 8/31 with Melissa MAZARIEGOS at [facility] due to ongoing symptoms where he was instructed to restart his balsalazide and continue the oral prednisone. It was discussed that the patient may need treatment with immunosuppressants. Due to this the patient had hepatitis A antibody drawn which was positive as well hepatitis B surface antibody which was negative and hepatitis B core antibody that was negative. Before admission the patient reports that he has been having to get up about once a night to have a bowel movement. He denies any associated fever, chills, nausea, vomiting, chest pain, or shortness of breath. The patient is COVID vaccinated and otherwise healthy. He does state that with his symptoms he has had a decreased appetite of recent.    Past Medical History    Past Medical History:   Diagnosis Date     Colitis 2019     Past Surgical History   Surgical history reviewed and noncontributory.     Prior to Admission Medications   Prior to Admission Medications   Prescriptions Last Dose Informant Patient Reported? Taking?   LIALDA 1.2 g EC tablet   Yes No   Sig: TAKE 4 TABLETS BY MOUTH EVERY DAY WITH A MEAL   amphetamine-dextroamphetamine (ADDERALL XR) 10 MG per capsule   Yes No   Sig: Take 1 capsule by mouth daily.   budesonide (PULMICORT) 0.5 MG/2ML neb solution   Yes No   Sig:  INHALE ONE VIAL NEBULIZED 2 TIMES DAILY.   predniSONE (DELTASONE) 10 MG tablet   Yes No   Sig: PLEASE SEE ATTACHED FOR DETAILED DIRECTIONS   sulfacetamide sodium, Acne, 10 % lotion   Yes No   Sig: APPLY TOPICALLY TO FACE 2 TIMES DAILY.   trimethoprim-polymyxin b (POLYTRIM) 76063-4.1 UNIT/ML-% ophthalmic solution   No No   Sig: Place 1-2 drops into both eyes every 6 hours   vancomycin (VANCOCIN) 125 MG capsule   Yes No   Sig: TAKE 2 CAPSULE BY ORAL ROUTE EVERY 6 HOURS FOR TWO WEEKS, THEN TAPER AS INSTRUCTED.      Facility-Administered Medications: None     Allergies   No Known Allergies    Social History   Social History     Tobacco Use     Smoking status: Never Smoker     Smokeless tobacco: Never Used   Substance Use Topics     Alcohol use: Not on file     Social History     Social History Narrative     Not on file     Family History   Family history reviewed with patient and significant with mother with celiacs disease.     Review of Systems   A Comprehensive greater than 10 system review of systems was carried out.  Pertinent positives and negatives are noted above.  Otherwise negative for contributory information.    Physical Exam   Temp: 98.1  F (36.7  C) Temp src: Temporal BP: 129/75 Pulse: 95   Resp: 16 SpO2: 100 %      Vital Signs with Ranges  Temp:  [98.1  F (36.7  C)] 98.1  F (36.7  C)  Pulse:  [95] 95  Resp:  [16] 16  BP: (129)/(75) 129/75  SpO2:  [100 %] 100 %  0 lbs 0 oz    GEN:  Alert, oriented x 3, appears comfortable, no overt distress  HEENT:  Normocephalic/atraumatic, no scleral icterus, no nasal discharge, mouth moist.  CV:  Regular rate and rhythm, no murmur or JVD.  S1 + S2 noted, no S3 or S4.  LUNGS:  Clear to auscultation bilaterally without rales/rhonchi/wheezing/retractions.  Symmetric chest rise on inhalation noted.  ABD:  Active bowel sounds, soft, mild lower abdominal tenderness with palpation, non-distended.  No rebound/guarding/rigidity.  EXT:  No edema.  No cyanosis.  No acute joint  synovitis noted.  SKIN:  Dry to touch, no exanthems noted in the visualized areas.  NEURO:  Symmetric muscle strength, sensation to touch grossly intact.  Coordination symmetric on general exam.  No new focal deficits appreciated.    Data   Data reviewed today:  I personally reviewed all labs and imaging from this visit.     Results for orders placed or performed during the hospital encounter of 09/02/21   CBC with platelets differential     Status: Abnormal    Narrative    The following orders were created for panel order CBC with platelets differential.  Procedure                               Abnormality         Status                     ---------                               -----------         ------                     CBC with platelets and d...[[id number]]  Abnormal            Final result                 Please view results for these tests on the individual orders.   Comprehensive metabolic panel     Status: Abnormal   Result Value Ref Range    Sodium 137 133 - 144 mmol/L    Potassium 3.4 3.4 - 5.3 mmol/L    Chloride 102 94 - 109 mmol/L    Carbon Dioxide (CO2) 30 20 - 32 mmol/L    Anion Gap 5 3 - 14 mmol/L    Urea Nitrogen 8 7 - 30 mg/dL    Creatinine 1.12 0.66 - 1.25 mg/dL    Calcium 8.4 (L) 8.5 - 10.1 mg/dL    Glucose 85 70 - 99 mg/dL    Alkaline Phosphatase 74 40 - 150 U/L    AST 11 0 - 45 U/L    ALT 21 0 - 70 U/L    Protein Total 6.2 (L) 6.8 - 8.8 g/dL    Albumin 2.8 (L) 3.4 - 5.0 g/dL    Bilirubin Total 0.5 0.2 - 1.3 mg/dL    GFR Estimate >90 >60 mL/min/1.73m2   Lipase     Status: Normal   Result Value Ref Range    Lipase 79 73 - 393 U/L   CBC with platelets and differential     Status: Abnormal   Result Value Ref Range    WBC Count 16.6 (H) 4.0 - 11.0 10e3/uL    RBC Count 3.99 (L) 4.40 - 5.90 10e6/uL    Hemoglobin 11.7 (L) 13.3 - 17.7 g/dL    Hematocrit 36.2 (L) 40.0 - 53.0 %    MCV 91 78 - 100 fL    MCH 29.3 26.5 - 33.0 pg    MCHC 32.3 31.5 - 36.5 g/dL    RDW 12.5 10.0 - 15.0 %    Platelet Count 336  150 - 450 10e3/uL    % Neutrophils 75 %    % Lymphocytes 11 %    % Monocytes 11 %    % Eosinophils 1 %    % Basophils 1 %    % Immature Granulocytes 1 %    NRBCs per 100 WBC 0 <1 /100    Absolute Neutrophils 12.6 (H) 1.6 - 8.3 10e3/uL    Absolute Lymphocytes 1.9 0.8 - 5.3 10e3/uL    Absolute Monocytes 1.9 (H) 0.0 - 1.3 10e3/uL    Absolute Eosinophils 0.1 0.0 - 0.7 10e3/uL    Absolute Basophils 0.1 0.0 - 0.2 10e3/uL    Absolute Immature Granulocytes 0.1 (H) <=0.0 10e3/uL    Absolute NRBCs 0.0 10e3/uL     Barbara SILVEIRA I discussed the patient with Dr. Rosario and he agrees with the above plan.

## 2021-09-03 ENCOUNTER — APPOINTMENT (OUTPATIENT)
Dept: CT IMAGING | Facility: CLINIC | Age: 26
DRG: 386 | End: 2021-09-03
Attending: INTERNAL MEDICINE
Payer: COMMERCIAL

## 2021-09-03 LAB
ALBUMIN SERPL-MCNC: 2.7 G/DL (ref 3.4–5)
ALP SERPL-CCNC: 71 U/L (ref 40–150)
ALT SERPL W P-5'-P-CCNC: 18 U/L (ref 0–70)
ANION GAP SERPL CALCULATED.3IONS-SCNC: 3 MMOL/L (ref 3–14)
AST SERPL W P-5'-P-CCNC: 8 U/L (ref 0–45)
BASOPHILS # BLD AUTO: 0.1 10E3/UL (ref 0–0.2)
BASOPHILS NFR BLD AUTO: 0 %
BILIRUB SERPL-MCNC: 0.6 MG/DL (ref 0.2–1.3)
BUN SERPL-MCNC: 9 MG/DL (ref 7–30)
C COLI+JEJUNI+LARI FUSA STL QL NAA+PROBE: NOT DETECTED
C DIFF TOX B STL QL: NEGATIVE
CALCIUM SERPL-MCNC: 8.4 MG/DL (ref 8.5–10.1)
CHLORIDE BLD-SCNC: 107 MMOL/L (ref 94–109)
CO2 SERPL-SCNC: 29 MMOL/L (ref 20–32)
CREAT SERPL-MCNC: 1.04 MG/DL (ref 0.66–1.25)
EC STX1 GENE STL QL NAA+PROBE: NOT DETECTED
EC STX2 GENE STL QL NAA+PROBE: NOT DETECTED
EOSINOPHIL # BLD AUTO: 0 10E3/UL (ref 0–0.7)
EOSINOPHIL NFR BLD AUTO: 0 %
ERYTHROCYTE [DISTWIDTH] IN BLOOD BY AUTOMATED COUNT: 12.5 % (ref 10–15)
GFR SERPL CREATININE-BSD FRML MDRD: >90 ML/MIN/1.73M2
GLUCOSE BLD-MCNC: 104 MG/DL (ref 70–99)
HCT VFR BLD AUTO: 34.9 % (ref 40–53)
HGB BLD-MCNC: 11.2 G/DL (ref 13.3–17.7)
IMM GRANULOCYTES # BLD: 0.1 10E3/UL
IMM GRANULOCYTES NFR BLD: 1 %
LYMPHOCYTES # BLD AUTO: 1.2 10E3/UL (ref 0.8–5.3)
LYMPHOCYTES NFR BLD AUTO: 5 %
MCH RBC QN AUTO: 29.6 PG (ref 26.5–33)
MCHC RBC AUTO-ENTMCNC: 32.1 G/DL (ref 31.5–36.5)
MCV RBC AUTO: 92 FL (ref 78–100)
MONOCYTES # BLD AUTO: 1.7 10E3/UL (ref 0–1.3)
MONOCYTES NFR BLD AUTO: 8 %
NEUTROPHILS # BLD AUTO: 18.4 10E3/UL (ref 1.6–8.3)
NEUTROPHILS NFR BLD AUTO: 86 %
NOROV GI+II ORF1-ORF2 JNC STL QL NAA+PR: NOT DETECTED
NRBC # BLD AUTO: 0 10E3/UL
NRBC BLD AUTO-RTO: 0 /100
PLATELET # BLD AUTO: 387 10E3/UL (ref 150–450)
POTASSIUM BLD-SCNC: 4.5 MMOL/L (ref 3.4–5.3)
PROT SERPL-MCNC: 6.1 G/DL (ref 6.8–8.8)
RBC # BLD AUTO: 3.79 10E6/UL (ref 4.4–5.9)
RVA NSP5 STL QL NAA+PROBE: NOT DETECTED
SALMONELLA SP RPOD STL QL NAA+PROBE: NOT DETECTED
SHIGELLA SP+EIEC IPAH STL QL NAA+PROBE: NOT DETECTED
SODIUM SERPL-SCNC: 139 MMOL/L (ref 133–144)
V CHOL+PARA RFBL+TRKH+TNAA STL QL NAA+PR: NOT DETECTED
WBC # BLD AUTO: 21.4 10E3/UL (ref 4–11)
Y ENTERO RECN STL QL NAA+PROBE: NOT DETECTED

## 2021-09-03 PROCEDURE — 99232 SBSQ HOSP IP/OBS MODERATE 35: CPT | Performed by: INTERNAL MEDICINE

## 2021-09-03 PROCEDURE — 85004 AUTOMATED DIFF WBC COUNT: CPT | Performed by: PHYSICIAN ASSISTANT

## 2021-09-03 PROCEDURE — 250N000011 HC RX IP 250 OP 636: Performed by: PHYSICIAN ASSISTANT

## 2021-09-03 PROCEDURE — 258N000003 HC RX IP 258 OP 636: Performed by: PHYSICIAN ASSISTANT

## 2021-09-03 PROCEDURE — 74177 CT ABD & PELVIS W/CONTRAST: CPT

## 2021-09-03 PROCEDURE — 36415 COLL VENOUS BLD VENIPUNCTURE: CPT | Performed by: PHYSICIAN ASSISTANT

## 2021-09-03 PROCEDURE — 120N000004 HC R&B MS OVERFLOW

## 2021-09-03 PROCEDURE — 250N000011 HC RX IP 250 OP 636: Performed by: INTERNAL MEDICINE

## 2021-09-03 PROCEDURE — 250N000009 HC RX 250: Performed by: INTERNAL MEDICINE

## 2021-09-03 PROCEDURE — 82040 ASSAY OF SERUM ALBUMIN: CPT | Performed by: PHYSICIAN ASSISTANT

## 2021-09-03 RX ORDER — IOPAMIDOL 755 MG/ML
500 INJECTION, SOLUTION INTRAVASCULAR ONCE
Status: COMPLETED | OUTPATIENT
Start: 2021-09-03 | End: 2021-09-03

## 2021-09-03 RX ADMIN — METHYLPREDNISOLONE SODIUM SUCCINATE 20 MG: 40 INJECTION, POWDER, FOR SOLUTION INTRAMUSCULAR; INTRAVENOUS at 21:53

## 2021-09-03 RX ADMIN — METHYLPREDNISOLONE SODIUM SUCCINATE 20 MG: 40 INJECTION, POWDER, FOR SOLUTION INTRAMUSCULAR; INTRAVENOUS at 05:49

## 2021-09-03 RX ADMIN — METHYLPREDNISOLONE SODIUM SUCCINATE 20 MG: 40 INJECTION, POWDER, FOR SOLUTION INTRAMUSCULAR; INTRAVENOUS at 13:45

## 2021-09-03 RX ADMIN — SODIUM CHLORIDE 60 ML: 9 INJECTION, SOLUTION INTRAVENOUS at 16:43

## 2021-09-03 RX ADMIN — IOPAMIDOL 82 ML: 755 INJECTION, SOLUTION INTRAVENOUS at 16:42

## 2021-09-03 RX ADMIN — SODIUM CHLORIDE, PRESERVATIVE FREE: 5 INJECTION INTRAVENOUS at 10:58

## 2021-09-03 RX ADMIN — SODIUM CHLORIDE, PRESERVATIVE FREE: 5 INJECTION INTRAVENOUS at 02:17

## 2021-09-03 NOTE — PLAN OF CARE
VSS. PIV continues to infuse. Bowel sounds active, patient reports minimal abdominal pain and declines pain interventions. Reports bloody stool x1 this shift. Will continue to monitor.

## 2021-09-03 NOTE — PLAN OF CARE
Pt up ad freeman in room and to bathroom. /hr. Fair appetite. 3 loose dark stools today and some abdominal cramping. Heating pad given to try for relief of discomfort. Pleasant and cooperative. IV steroids. WIll monitor.

## 2021-09-03 NOTE — PROGRESS NOTES
Patient wheeled down, via wheelchair, to CT at 1630 for ordered CT of abdomen/pelvis. Patient returned to floor at 1700.

## 2021-09-03 NOTE — PROGRESS NOTES
[facility]    Medicine Progress Note - Hospitalist Service       Date of Admission:  9/2/2021    Assessment & Plan           Luther Wood is a 25 year old male with PMH significant for ulcerative pancolitis (diagnosed in 2019), clostridium difficile, and ADD.  He presented to the hospital with abdominal pain and diarrhea.  Having blood in his stools.  Diagnosed with acute ulcerative colitis flare.    1.  Acute ulcerative colitis flare.  Appreciate gastroenterology input.  Infectious work-up negative to this point.  Started on IV methylprednisolone.  -Continue IV methylprednisolone.  -IV fluids.    2.  Acute blood loss anemia.  Hemoglobin mildly lower today at 11.2.  Likely due to ongoing hematochezia from ulcerative colitis flare.  Gastroenterology following.  -Recheck CBC tomorrow.  -Treat ulcerative colitis as noted above.    3.  Leukocytosis.  Steroid-induced.       Diet: Low Fiber Diet    DVT Prophylaxis: Pneumatic compression devices.  Jacob Catheter: Not present  Central Lines: None  Code Status: Full Code          Stewart Rosario DO  Hospitalist Service  [facility]  Securely message with the Vocera Web Console (learn more here)  Text page via [employer] Paging/Directory      Clinically Significant Risk Factors Present on Admission               ______________________________________________________________________    Interval History   Continues to have some abdominal pain.  Loose stools with continued blood.  Denies chest pain, shortness of breath, fevers, chills, nausea, or vomiting.    Data reviewed today: I reviewed all medications, new labs and imaging results over the last 24 hours.    Physical Exam   Vital Signs: Temp: 98.4  F (36.9  C) Temp src: Oral BP: 106/66 Pulse: 74   Resp: 18 SpO2: 99 % O2 Device: None (Room air)    Weight: 0 lbs 0 oz  Gen:  NAD, A&Ox3.  Eyes:  PERRL, sclera anicteric.  OP:  MMM, no lesions.  Neck:  Supple.  CV:  Regular, no  murmurs.  Lung:  CTA b/l, normal effort.  Ab:  +BS, soft.  Skin:  Warm, dry to touch.  No rash.  Ext:  No pitting edema LE b/l.      Data   Recent Labs   Lab 09/03/21  0637 09/02/21  1146   WBC 21.4* 16.6*   HGB 11.2* 11.7*   MCV 92 91    336    137   POTASSIUM 4.5 3.4   CHLORIDE 107 102   CO2 29 30   BUN 9 8   CR 1.04 1.12   ANIONGAP 3 5   MADIE 8.4* 8.4*   * 85   ALBUMIN 2.7* 2.8*   PROTTOTAL 6.1* 6.2*   BILITOTAL 0.6 0.5   ALKPHOS 71 74   ALT 18 21   AST 8 11   LIPASE  --  79

## 2021-09-03 NOTE — PROGRESS NOTES
GASTROENTEROLOGY PROGRESS NOTE    SUBJECTIVE:  Having abd cramps and 3 bloody liquid BMs today.  Does not think steroids have made a big difference yet.    OBJECTIVE:    /66 (BP Location: Left arm)   Pulse 74   Temp 98.4  F (36.9  C) (Oral)   Resp 18   SpO2 99%   Temp (24hrs), Av.2  F (37.3  C), Min:98.4  F (36.9  C), Max:100  F (37.8  C)    No data found.    Intake/Output Summary (Last 24 hours) at 9/3/2021 1427  Last data filed at 9/3/2021 0217  Gross per 24 hour   Intake 1083.33 ml   Output --   Net 1083.33 ml         PHYSICAL EXAM    Constitutional: NAD, comfortable  Respiratory: non labored breathing  Abdomen: soft nondistended, mild tenderness to palpation, no rebound tenderness.        Additional Comments:  ROS, FH, SH: See initial GI consult for details.    I have reviewed the patient's new clinical lab results:    Recent Labs   Lab Test 21  0637 21  1146   WBC 21.4* 16.6*   HGB 11.2* 11.7*   MCV 92 91    336     Recent Labs   Lab Test 21  0637 21  1146    137   POTASSIUM 4.5 3.4   CHLORIDE 107 102   CO2 29 30   BUN 9 8   CR 1.04 1.12   ANIONGAP 3 5   MADIE 8.4* 8.4*     Recent Labs   Lab Test 21  0637 21  1146   ALBUMIN 2.7* 2.8*   BILITOTAL 0.6 0.5   ALT 18 21   AST 8 11   ALKPHOS 71 74   LIPASE  --  79         A/P:  Ulcerative pancolitis, admitted with flare.  Currently on IV methylprednisolone 20 mg q 8 hrs.  Patient does not think he has had improvement in symptoms yet, however is just under 24 hours of getting IV steroids.  His WBC is increasing, likely from steroids, but I am going to order CT abd/pelvis to exclude intraabdominal abscess or other process. C.diff is negative.  Also needs DVT prophylaxis, sent message to Dr. Rosario on this.    If no improvement might need flex sig and step up in treatment with remicade while in the hospital.  We will continue to follow.    MD DURGA Gonzaleziz, MD  [facility]  Office: [phone]

## 2021-09-04 LAB
ERYTHROCYTE [DISTWIDTH] IN BLOOD BY AUTOMATED COUNT: 12.4 % (ref 10–15)
HCT VFR BLD AUTO: 35.3 % (ref 40–53)
HGB BLD-MCNC: 11 G/DL (ref 13.3–17.7)
MCH RBC QN AUTO: 27.8 PG (ref 26.5–33)
MCHC RBC AUTO-ENTMCNC: 31.2 G/DL (ref 31.5–36.5)
MCV RBC AUTO: 89 FL (ref 78–100)
PLATELET # BLD AUTO: 433 10E3/UL (ref 150–450)
RBC # BLD AUTO: 3.95 10E6/UL (ref 4.4–5.9)
WBC # BLD AUTO: 18.5 10E3/UL (ref 4–11)

## 2021-09-04 PROCEDURE — 250N000011 HC RX IP 250 OP 636: Performed by: PHYSICIAN ASSISTANT

## 2021-09-04 PROCEDURE — 99207 PR CDG-MDM COMPONENT: MEETS MODERATE - UP CODED: CPT | Performed by: INTERNAL MEDICINE

## 2021-09-04 PROCEDURE — 99232 SBSQ HOSP IP/OBS MODERATE 35: CPT | Performed by: INTERNAL MEDICINE

## 2021-09-04 PROCEDURE — 250N000013 HC RX MED GY IP 250 OP 250 PS 637: Performed by: PHYSICIAN ASSISTANT

## 2021-09-04 PROCEDURE — 36415 COLL VENOUS BLD VENIPUNCTURE: CPT | Performed by: INTERNAL MEDICINE

## 2021-09-04 PROCEDURE — 120N000004 HC R&B MS OVERFLOW

## 2021-09-04 PROCEDURE — 85027 COMPLETE CBC AUTOMATED: CPT | Performed by: INTERNAL MEDICINE

## 2021-09-04 RX ADMIN — METHYLPREDNISOLONE SODIUM SUCCINATE 20 MG: 40 INJECTION, POWDER, FOR SOLUTION INTRAMUSCULAR; INTRAVENOUS at 21:30

## 2021-09-04 RX ADMIN — METHYLPREDNISOLONE SODIUM SUCCINATE 20 MG: 40 INJECTION, POWDER, FOR SOLUTION INTRAMUSCULAR; INTRAVENOUS at 06:20

## 2021-09-04 RX ADMIN — ACETAMINOPHEN 1000 MG: 500 TABLET, FILM COATED ORAL at 06:29

## 2021-09-04 RX ADMIN — ACETAMINOPHEN 1000 MG: 500 TABLET, FILM COATED ORAL at 20:38

## 2021-09-04 RX ADMIN — METHYLPREDNISOLONE SODIUM SUCCINATE 20 MG: 40 INJECTION, POWDER, FOR SOLUTION INTRAMUSCULAR; INTRAVENOUS at 14:08

## 2021-09-04 NOTE — PLAN OF CARE
"Shift Summary [id number]-  Patient vital sign stable and afebrile. Patient drinking in good amounts. Voiding. 1 loose stool this shift. Patient requested new PIV, as the PIV in right AC is bothering him. New PIV started in left hand, and saline locked between medications. Parents in room to visit from [id number]. Patient states abdomen remains \"tight\", but declines pain medication at this time. Please refer to flowsheets for further information/data. Plan to continue to monitor patients comfort level, intake and output, and provide interventions as needed.   "

## 2021-09-04 NOTE — PLAN OF CARE
Vital Signs: VSS. Afebrile.  Pain/Comfort: Denies pain  Assessment: Bowel sounds active.  Diet: Tolerating diet.  Output: Voiding well. Having 2 loose bloody stools  Activity/Ambulation: Up independently. Showered.  Social: Dad here to visit.

## 2021-09-04 NOTE — PROGRESS NOTES
GASTROENTEROLOGY PROGRESS NOTE     SUBJECTIVE:  He states his symptoms are unchanged.  He continues to have bloody diarrhea, 7-8 episodes yesterday, and 3 last night.  Still with mild abdominal pain.    GI ROS: Denies nausea, vomiting, or constipation.    OBJECTIVE:  /63 (BP Location: Left arm, Cuff Size: Adult Regular)   Pulse 73   Temp 98.7  F (37.1  C) (Oral)   Resp 16   SpO2 98%   Temp (24hrs), Av.7  F (37.1  C), Min:98.5  F (36.9  C), Max:99  F (37.2  C)    No data found.    Intake/Output Summary (Last 24 hours) at 2021 1018  Last data filed at 9/3/2021 2200  Gross per 24 hour   Intake 840 ml   Output --   Net 840 ml        General Appearance: alert, oriented x3, no acute distress.  Eyes: PERRL, sclera anicteric.  ENT: oropharynx clear, no thrush.  GI: Soft, NABS, mild diffuse tenderness without rebound.      Labs:     Recent Labs   Lab Test 21  0659 21  0637 21  1146   WBC 18.5* 21.4* 16.6*   HGB 11.0* 11.2* 11.7*   MCV 89 92 91    387 336     Recent Labs   Lab Test 21  0637 21  1146   POTASSIUM 4.5 3.4   CHLORIDE 107 102   CO2 29 30   BUN 9 8   ANIONGAP 3 5     Recent Labs   Lab Test 21  0637 21  1146   ALBUMIN 2.7* 2.8*   BILITOTAL 0.6 0.5   ALT 18 21   AST 8 11   LIPASE  --  79           Assessment and Plan: 25 year old male with ulcerative colitis, no change on day 2 of steroids.  CT scan with abscess or megacolon.  No changes to recommend at this time.  I did explain that if no improvement at day 5, would recommend remicade.  Prior to this, would recommend flex sig with biopsies to rule out CMV.  He stated understanding.    Micheal Hinojosa MD

## 2021-09-04 NOTE — PROGRESS NOTES
Parents called at this time, requesting an update on patients comfort level. This RN received verbal consent from patient to update parents. Parents verbalized concern that current dosing of IV steroids has not make any difference up to this point. Informed parents patient verbalized he will notify his nurse if he is feeling increasingly more uncomfortable and/or if discomfort is interfering with his ability to fall asleep. Parents verbalized understanding.

## 2021-09-04 NOTE — PROGRESS NOTES
[facility]    Medicine Progress Note - Hospitalist Service       Date of Admission:  9/2/2021    Assessment & Plan           Luther Wood is a 25 year old male with PMH significant for ulcerative pancolitis (diagnosed in 2019), clostridium difficile, and ADD.  He presented to the hospital with abdominal pain and diarrhea.  Having blood in his stools.  Diagnosed with acute ulcerative colitis flare.     1.  Acute ulcerative colitis flare.  Appreciate gastroenterology input.  Infectious work-up negative to this point.  Started on IV methylprednisolone.  -Continue IV methylprednisolone.  -IV fluids.     2.  Acute blood loss anemia.  Hemoglobin mildly lower today at 11.  Likely due to ongoing hematochezia from ulcerative colitis flare.  Gastroenterology following.  -Recheck CBC tomorrow.  -Treat ulcerative colitis as noted above.     3.  Leukocytosis.  Steroid-induced.       Diet: Low Fiber Diet    DVT Prophylaxis: Pneumatic Compression Devices  Jacob Catheter: Not present  Central Lines: None  Code Status: Full Code      Stewart Rosario DO  Hospitalist Service  [facility]  Securely message with the Vocera Web Console (learn more here)  Text page via [employer] Paging/Directory      Clinically Significant Risk Factors Present on Admission               ______________________________________________________________________    Interval History   Continues having diarrhea with blood. Some abdominal pain and cramping. Denies chest pain, shortness of breath, fevers, chills, nausea, or vomiting.    Data reviewed today: I reviewed all medications, new labs and imaging results over the last 24 hours.     Physical Exam   Vital Signs: Temp: 98.7  F (37.1  C) Temp src: Oral BP: 107/63 Pulse: 73   Resp: 16 SpO2: 98 % O2 Device: None (Room air)    Weight: 0 lbs 0 oz  Gen:  NAD, A&Ox3.  Eyes:  PERRL, sclera anicteric.  OP:  MMM, no lesions.  Neck:  Supple.  CV:  Regular, no  murmurs.  Lung:  CTA b/l, normal effort.  Ab:  +BS, soft.  Skin:  Warm, dry to touch.  No rash.  Ext:  No pitting edema LE b/l.      Data   Recent Labs   Lab 09/04/21  0659 09/03/21  0637 09/02/21  1146   WBC 18.5* 21.4* 16.6*   HGB 11.0* 11.2* 11.7*   MCV 89 92 91    387 336   NA  --  139 137   POTASSIUM  --  4.5 3.4   CHLORIDE  --  107 102   CO2  --  29 30   BUN  --  9 8   CR  --  1.04 1.12   ANIONGAP  --  3 5   MADIE  --  8.4* 8.4*   GLC  --  104* 85   ALBUMIN  --  2.7* 2.8*   PROTTOTAL  --  6.1* 6.2*   BILITOTAL  --  0.6 0.5   ALKPHOS  --  71 74   ALT  --  18 21   AST  --  8 11   LIPASE  --   --  79

## 2021-09-04 NOTE — PROGRESS NOTES
/58 (BP Location: Left arm)   Pulse 77   Temp 99  F (37.2  C) (Oral)   Resp 16   SpO2 97%   Patient alert and oriented. Up in the room independently. Patient denies pain at this time. Denies cramping. Using heat at this time.  Saline locked. Sleeping comfortably in between cares.3 small loose bloody BM's since 0330.  Tolerating low fiber diet. Will continue to monitor and provide supportive cares.

## 2021-09-04 NOTE — CONSULTS
"CLINICAL NUTRITION SERVICES  -  ASSESSMENT NOTE      MALNUTRITION:  % Weight Loss:  > 5% in 1 month (severe malnutrition) --> need to confirm accuracy of admit wt throughout admit  % Intake:  </= 75% for >/= 1 month (severe malnutrition) --> malabsorption component  Subcutaneous Fat Loss:  None observed   Muscle Loss:  Clavicle bone region mild depletion, Acromion bone region mild depletion, Anterior thigh region mild depletion and Posterior calf region mild depletion --> overall low reserves  Fluid Retention:  None noted     Malnutrition Diagnosis: Non-Severe malnutrition  In Context of:  Acute illness or injury with underlying chronic illness or disease  **will meet severe malnutrition criteria, when more wt trending obtained during admit to confirm admit wt of 141#**        REASON FOR ASSESSMENT  Luther Wood is a 25 year old male seen by Registered Dietitian for Admission Nutrition Risk Screen for positive.    PMH of: Ulcerative pancolitis, C. Diff.    Admit 2/2: UC flare.    NUTRITION HISTORY  - Information obtained from patient, dad in room, chart.  - Diet at home: Gluten free and mostly dairy free.  He lives with his parents and sister - mom and sister are strictly gluten and dairy free, so it is easier to eat this way as a household.  Patient also describes he prefers and feels better when \"eating clean\" and verbalizes multiple times this is very important to him.     - Usual intakes: Meals TID is normal, has been eating smaller/more frequent amounts (closer to 4 meals/day) over the past ~1 month.    - Barriers to PO intakes: Decreased appetite, overall feelings of fullness, diarrhea.    - Use of oral supplements: Has tried, no brand loyalty or consistency.  Chooses gluten free and dairy free.  - Allergies: NKFA.      CURRENT NUTRITION ORDERS  Diet Order:     Low fiber    Current Intake/Tolerance:  Advanced to above 9/02.  Still feeling full when eating, overall GI discomfort, diarrhea.  Is feeling hungry " d/t steroids.  Really wants to be eating and wants to know if family can bring in low fiber food as he feels limited by our menu (self-selecting dairy and gluten free options) and is not necessarily fond of the quality.      NUTRITION FOCUSED PHYSICAL ASSESSMENT FOR DIAGNOSING MALNUTRITION)  Yes    Obtained from Chart/Interdisciplinary Team:  - GI team following during admit --> currently on IV steroids with abdominal CT completed yesterday --> possible need for flex sig + Remicade, if no improvement  - No documentation of PI  - Stooling patterns reviewed    ANTHROPOMETRICS  Height: 6'  Weight: 141 lbs 15.62 oz  Body mass index is 19.26 kg/m .  Weight Status:  Unable to determine  Weight History:  Wt Readings from Last 10 Encounters:   07/03/19 73.9 kg (163 lb)   04/21/19 74.4 kg (164 lb)   01/17/13 52.2 kg (115 lb) (27 %, Z= -0.61)*   10/21/07 33.1 kg (73 lb) (56 %, Z= 0.14)*     * Growth percentiles are based on CDC (Boys, 2-20 Years) data.     - Patient reports UBW of 160-165# and feels he has lost 10# in the past month.  - Admit wt indicates 12% wt loss w/in that timeframe, if accurate.  Will need ongoing trends to determine.      LABS  Labs reviewed:  Electrolytes  Potassium (mmol/L)   Date Value   09/03/2021 4.5   09/02/2021 3.4   12/07/2009 3.6    Blood Glucose  Glucose (mg/dL)   Date Value   09/03/2021 104 (H)   09/02/2021 85   12/07/2009 97    Inflammatory Markers  WBC (10e9/L)   Date Value   12/07/2009 10.4     WBC Count (10e3/uL)   Date Value   09/04/2021 18.5 (H)   09/03/2021 21.4 (H)   09/02/2021 16.6 (H)     Albumin (g/dL)   Date Value   09/03/2021 2.7 (L)   09/02/2021 2.8 (L)      Sodium (mmol/L)   Date Value   09/03/2021 139   09/02/2021 137   12/07/2009 138    Renal  Urea Nitrogen (mg/dL)   Date Value   09/03/2021 9   09/02/2021 8   12/07/2009 14     Creatinine (mg/dL)   Date Value   09/03/2021 1.04   09/02/2021 1.12   12/07/2009 0.64     Additional  Ketones Urine (mg/dL)   Date Value   12/07/2009  Negative        B/P: 107/63, T: 98.7, P: 73, R: 16      MEDICATIONS  Medications reviewed:    methylPREDNISolone  20 mg Intravenous Q8H     sodium chloride (PF)  3 mL Intracatheter Q8H             ASSESSED NUTRITION NEEDS PER APPROVED PRACTICE GUIDELINES:    Dosing Weight 64 kg - accuracy?  Estimated Energy Needs: 30-35 Kcal/Kg  Justification: repletion, malabsorption  Estimated Protein Needs: 1.2-1.5 g pro/Kg  Justification: preservation of lean body mass, malabsorption  Estimated Fluid Needs: per MD      NUTRITION DIAGNOSIS:  Inadequate oral intake related to decreased appetite, early satiety, malabsorption with UC flare as evidenced by meeting <75% needs over the past month with e/o muscle loss leading to malnutrition coding, reported and admit wt indicating >5% wt loss.    NUTRITION INTERVENTIONS  Recommendations / Nutrition Prescription  Diet per GI team.  Self-selection of dairy and gluten free options.  Ok for food from home (based on low fiber handout provided), if approved by GI team.  Small/frequent meals as needed, self-selection of high protein as able.    Melissa Maritza at 10 am.      Admit wt ordered (and received, thanks).       Implementation  Nutrition education: Provided education on above.  Provided low fiber handout for home (per dad's request) and possibly self-selection of low fiber foods for parents to bring into hospital, if approved by MD/GI team.  Discussed scheduled [employer] and to have RN page RD if no longer wishes to receive.  Also discussed that RD follows direction of MD/GI team in regard to overall nutrition-related POC and based on clinical progression.      Medical Food Supplement: As above.     Collaboration and Referral of Nutrition care: Discussed POC with RN.    Nutrition Goals  Patient to consume at least 50-75% of meals or supplements TID minimum with <5 BMs daily.        MONITORING AND EVALUATION:  Progress towards goals will be monitored and evaluated per protocol and  Practice Guidelines          Rvaen Madrigal RDN, LD  Clinical Dietitian  3rd floor/ICU: [phone]  All other floors: [phone]  Weekend/holiday: [phone]

## 2021-09-05 LAB
ERYTHROCYTE [DISTWIDTH] IN BLOOD BY AUTOMATED COUNT: 12.5 % (ref 10–15)
HCT VFR BLD AUTO: 35.1 % (ref 40–53)
HGB BLD-MCNC: 11 G/DL (ref 13.3–17.7)
MCH RBC QN AUTO: 28.4 PG (ref 26.5–33)
MCHC RBC AUTO-ENTMCNC: 31.3 G/DL (ref 31.5–36.5)
MCV RBC AUTO: 91 FL (ref 78–100)
PLATELET # BLD AUTO: 442 10E3/UL (ref 150–450)
RBC # BLD AUTO: 3.87 10E6/UL (ref 4.4–5.9)
WBC # BLD AUTO: 22.7 10E3/UL (ref 4–11)

## 2021-09-05 PROCEDURE — 36415 COLL VENOUS BLD VENIPUNCTURE: CPT | Performed by: INTERNAL MEDICINE

## 2021-09-05 PROCEDURE — 250N000011 HC RX IP 250 OP 636: Performed by: PHYSICIAN ASSISTANT

## 2021-09-05 PROCEDURE — 99232 SBSQ HOSP IP/OBS MODERATE 35: CPT | Performed by: INTERNAL MEDICINE

## 2021-09-05 PROCEDURE — 120N000004 HC R&B MS OVERFLOW

## 2021-09-05 PROCEDURE — 85027 COMPLETE CBC AUTOMATED: CPT | Performed by: INTERNAL MEDICINE

## 2021-09-05 PROCEDURE — 250N000013 HC RX MED GY IP 250 OP 250 PS 637: Performed by: PHYSICIAN ASSISTANT

## 2021-09-05 RX ADMIN — ACETAMINOPHEN 1000 MG: 500 TABLET, FILM COATED ORAL at 23:58

## 2021-09-05 RX ADMIN — ACETAMINOPHEN 1000 MG: 500 TABLET, FILM COATED ORAL at 09:51

## 2021-09-05 RX ADMIN — ACETAMINOPHEN 1000 MG: 500 TABLET, FILM COATED ORAL at 03:06

## 2021-09-05 RX ADMIN — METHYLPREDNISOLONE SODIUM SUCCINATE 20 MG: 40 INJECTION, POWDER, FOR SOLUTION INTRAMUSCULAR; INTRAVENOUS at 14:19

## 2021-09-05 RX ADMIN — ACETAMINOPHEN 1000 MG: 500 TABLET, FILM COATED ORAL at 17:15

## 2021-09-05 RX ADMIN — METHYLPREDNISOLONE SODIUM SUCCINATE 20 MG: 40 INJECTION, POWDER, FOR SOLUTION INTRAMUSCULAR; INTRAVENOUS at 21:39

## 2021-09-05 RX ADMIN — METHYLPREDNISOLONE SODIUM SUCCINATE 20 MG: 40 INJECTION, POWDER, FOR SOLUTION INTRAMUSCULAR; INTRAVENOUS at 06:04

## 2021-09-05 NOTE — PLAN OF CARE
Shift Summary [id number]-  Patient vital sign stable and afebrile. Patient rating abdominal discomfort 2-4 (0-10 scale) this shift. Patient received tylenol x1 this shift. Patient reports one loose stool this shift. Please refer to flowsheets for further information/data. Plan to continue to monitor patients comfort level, intake and output, and provide intervention as needed.

## 2021-09-05 NOTE — PROGRESS NOTES
GASTROENTEROLOGY PROGRESS NOTE     SUBJECTIVE:  He notes some improvement today, no BM for past 4 hours.  He is eating more and having less pain.    GI ROS: Denies nausea, vomiting, abdominal pain, or constipation.     OBJECTIVE:  /64   Pulse 68   Temp 98.4  F (36.9  C) (Oral)   Resp 14   Wt 64.4 kg (141 lb 15.6 oz)   SpO2 99%   BMI 19.26 kg/m    Temp (24hrs), Av.5  F (36.9  C), Min:97.9  F (36.6  C), Max:99.1  F (37.3  C)    Patient Vitals for the past 72 hrs:   Weight   21 1400 64.4 kg (141 lb 15.6 oz)       Intake/Output Summary (Last 24 hours) at 2021 1306  Last data filed at 2021 0830  Gross per 24 hour   Intake 1280 ml   Output --   Net 1280 ml        General Appearance: alert, oriented x3, no acute distress.  Eyes: PERRL, sclera anicteric.  ENT: oropharynx clear, no thrush.  GI: Soft, NABS, mild diffuse tenderness without rebound.      Labs:     Recent Labs   Lab Test 21  0652 21  0659 21  0637   WBC 22.7* 18.5* 21.4*   HGB 11.0* 11.0* 11.2*   MCV 91 89 92    433 387     Recent Labs   Lab Test 21  0637 21  1146   POTASSIUM 4.5 3.4   CHLORIDE 107 102   CO2 29 30   BUN 9 8   ANIONGAP 3 5     Recent Labs   Lab Test 21  0637 21  1146   ALBUMIN 2.7* 2.8*   BILITOTAL 0.6 0.5   ALT 18 21   AST 8 11   LIPASE  --  79           Assessment and Plan: 25 year old male with ulcerative colitis, on day 3 of solumdrol with some improvement noted.  No changes to recommend at this time.  Will make NPO after midnight, in case worsening occurs can do flex sig with biopsies tomorrow. If no improvement at day 5, would recommend remicade.  Answered a number of questions from him and his father, he stated understanding.    Micheal Hinojosa MD

## 2021-09-05 NOTE — PLAN OF CARE
[id number]    Vital signs: Stable; afebrile.   Pain Control: Pain controlled with PO Tylenol x 2 and heating pad.   Diet: Low fiber diet; tolerated well; minimal cramping per patient.   Output: Voiding regularly. Last BM this AM at 0830. BM was loose, bloody stool.                Activity: Frequently walking throughout room and outside. Independent.  Updates: Patient initially reporting frustration with progress. After tolerating breakfast and walking with father, patient began to report feeling improvement in symptoms. Minimal reports of abdominal cramping. Improvement in frequency of stools. Patient's appetite increasing and tolerating more food today.   Plan: Continue with IV steroids. Patient NPO at midnight for possible Flex Sig tomorrow.     Patient in food spirits with minimal pain.    Will continue to monitor and provide for cares.

## 2021-09-05 NOTE — PLAN OF CARE
VSS. WOKE 3 TIMES TO USE TOILET. PATIENT STATES STILL HAVING LOOSE RED HUED STOOL RATED LOWER ABDOMINAL PAIN 2 OR 1 AND DESCRIBED IT CONSTANT CRAMPING. TOOK SIPS OF WATER OR GATORAIDE. VOICED  FRUSTRATION. IV IS SALINE LOCKED. GI FOLLOWING.

## 2021-09-05 NOTE — PROGRESS NOTES
[facility]    Medicine Progress Note - Hospitalist Service       Date of Admission:  9/2/2021    Assessment & Plan             Luther Wood is a 25 year old male with PMH significant for ulcerative pancolitis (diagnosed in 2019), clostridium difficile, and ADD.  He presented to the hospital with abdominal pain and diarrhea, blood in his stools.  He was diagnosed with acute ulcerative colitis flare. He was started on IV Solu-Medrol. Gastroenterology following     1.  Acute ulcerative colitis flare.    -Currently on  methylprednisolone 20 mg IV every 8 hours.  Gastroenterology recommending to keep him n.p.o after midnight, in case worsening symptoms to do flex sig with biopsies tomorrow.  Appreciate GI input.  -IV fluids.     2.  Acute blood loss anemia.  Likely due to ongoing hematochezia from ulcerative colitis flare.  -Hemoglobin mildly lower today at 11.0, unchanged from 9/4.     Gastroenterology following..  -Treat ulcerative colitis as noted above.  -We will monitor CBC     3.  Leukocytosis.  Steroid-induced.  No evidence of infection at this time.  Will monitor CBC     4. Non-Severe Malnutrition, POA: based on Weight loss;Reduced intake;Muscle loss (09/04/21 1423)   Dietitian following.  Appreciate input.  We will continue low fiber diet       Diet: Low Fiber Diet    DVT Prophylaxis: Pneumatic Compression Devices  Jacob Catheter: Not present  Central Lines: None  Code Status: Full Code      Chirag Turk MD, MD  Hospitalist Service  [facility]    ______________________________________________________________________    Interval History   Continues having diarrhea with blood. Some abdominal pain and cramping. Denies chest pain, shortness of breath, fevers, chills, nausea, or vomiting.    Data reviewed today: I reviewed all medications, new labs and imaging results over the last 24 hours.     Physical Exam   Vital Signs: Temp: 98.4  F (36.9  C) Temp src:  Oral BP: 112/64 Pulse: 68   Resp: 14 SpO2: 99 % O2 Device: None (Room air)    Weight: 141 lbs 15.62 oz  Gen:  NAD, A&Ox3.  Eyes:  PERRL, sclera anicteric.  OP:  MMM, no lesions.  Neck:  Supple.  CV:  Regular, no murmurs.  Lung:  CTA b/l, normal effort.  Ab:  +BS, soft.  Skin:  Warm, dry to touch.  No rash.  Ext:  No pitting edema LE b/l.      Data   Recent Labs   Lab 09/05/21  0652 09/04/21  0659 09/03/21  0637 09/02/21  1146   WBC 22.7* 18.5* 21.4* 16.6*   HGB 11.0* 11.0* 11.2* 11.7*   MCV 91 89 92 91    433 387 336   NA  --   --  139 137   POTASSIUM  --   --  4.5 3.4   CHLORIDE  --   --  107 102   CO2  --   --  29 30   BUN  --   --  9 8   CR  --   --  1.04 1.12   ANIONGAP  --   --  3 5   MADIE  --   --  8.4* 8.4*   GLC  --   --  104* 85   ALBUMIN  --   --  2.7* 2.8*   PROTTOTAL  --   --  6.1* 6.2*   BILITOTAL  --   --  0.6 0.5   ALKPHOS  --   --  71 74   ALT  --   --  18 21   AST  --   --  8 11   LIPASE  --   --   --  79

## 2021-09-06 LAB
ANION GAP SERPL CALCULATED.3IONS-SCNC: 1 MMOL/L (ref 3–14)
BASOPHILS # BLD AUTO: 0.1 10E3/UL (ref 0–0.2)
BASOPHILS NFR BLD AUTO: 0 %
BUN SERPL-MCNC: 16 MG/DL (ref 7–30)
CALCIUM SERPL-MCNC: 8.5 MG/DL (ref 8.5–10.1)
CHLORIDE BLD-SCNC: 101 MMOL/L (ref 94–109)
CO2 SERPL-SCNC: 33 MMOL/L (ref 20–32)
CREAT SERPL-MCNC: 1.04 MG/DL (ref 0.66–1.25)
EOSINOPHIL # BLD AUTO: 0 10E3/UL (ref 0–0.7)
EOSINOPHIL NFR BLD AUTO: 0 %
ERYTHROCYTE [DISTWIDTH] IN BLOOD BY AUTOMATED COUNT: 12.5 % (ref 10–15)
FLEXIBLE SIGMOIDOSCOPY: NORMAL
GFR SERPL CREATININE-BSD FRML MDRD: >90 ML/MIN/1.73M2
GLUCOSE BLD-MCNC: 124 MG/DL (ref 70–99)
HCT VFR BLD AUTO: 33.6 % (ref 40–53)
HGB BLD-MCNC: 10.5 G/DL (ref 13.3–17.7)
IMM GRANULOCYTES # BLD: 0.4 10E3/UL
IMM GRANULOCYTES NFR BLD: 2 %
LYMPHOCYTES # BLD AUTO: 1.4 10E3/UL (ref 0.8–5.3)
LYMPHOCYTES NFR BLD AUTO: 7 %
MCH RBC QN AUTO: 28.1 PG (ref 26.5–33)
MCHC RBC AUTO-ENTMCNC: 31.3 G/DL (ref 31.5–36.5)
MCV RBC AUTO: 90 FL (ref 78–100)
MONOCYTES # BLD AUTO: 2.4 10E3/UL (ref 0–1.3)
MONOCYTES NFR BLD AUTO: 12 %
NEUTROPHILS # BLD AUTO: 16.5 10E3/UL (ref 1.6–8.3)
NEUTROPHILS NFR BLD AUTO: 79 %
NRBC # BLD AUTO: 0 10E3/UL
NRBC BLD AUTO-RTO: 0 /100
PLATELET # BLD AUTO: 451 10E3/UL (ref 150–450)
POTASSIUM BLD-SCNC: 4 MMOL/L (ref 3.4–5.3)
RBC # BLD AUTO: 3.74 10E6/UL (ref 4.4–5.9)
SODIUM SERPL-SCNC: 135 MMOL/L (ref 133–144)
WBC # BLD AUTO: 20.6 10E3/UL (ref 4–11)

## 2021-09-06 PROCEDURE — 0DBN8ZX EXCISION OF SIGMOID COLON, VIA NATURAL OR ARTIFICIAL OPENING ENDOSCOPIC, DIAGNOSTIC: ICD-10-PCS | Performed by: INTERNAL MEDICINE

## 2021-09-06 PROCEDURE — 36415 COLL VENOUS BLD VENIPUNCTURE: CPT | Performed by: INTERNAL MEDICINE

## 2021-09-06 PROCEDURE — 250N000013 HC RX MED GY IP 250 OP 250 PS 637: Performed by: PHYSICIAN ASSISTANT

## 2021-09-06 PROCEDURE — 99232 SBSQ HOSP IP/OBS MODERATE 35: CPT | Performed by: INTERNAL MEDICINE

## 2021-09-06 PROCEDURE — 250N000011 HC RX IP 250 OP 636: Performed by: PHYSICIAN ASSISTANT

## 2021-09-06 PROCEDURE — 80048 BASIC METABOLIC PNL TOTAL CA: CPT | Performed by: INTERNAL MEDICINE

## 2021-09-06 PROCEDURE — 88342 IMHCHEM/IMCYTCHM 1ST ANTB: CPT | Mod: TC | Performed by: INTERNAL MEDICINE

## 2021-09-06 PROCEDURE — 88342 IMHCHEM/IMCYTCHM 1ST ANTB: CPT | Mod: 26 | Performed by: PATHOLOGY

## 2021-09-06 PROCEDURE — 99207 PR CDG-MDM COMPONENT: MEETS LOW - DOWN CODED: CPT | Performed by: INTERNAL MEDICINE

## 2021-09-06 PROCEDURE — 120N000004 HC R&B MS OVERFLOW

## 2021-09-06 PROCEDURE — 88365 INSITU HYBRIDIZATION (FISH): CPT | Mod: 26 | Performed by: PATHOLOGY

## 2021-09-06 PROCEDURE — 250N000013 HC RX MED GY IP 250 OP 250 PS 637: Performed by: INTERNAL MEDICINE

## 2021-09-06 PROCEDURE — 45331 SIGMOIDOSCOPY AND BIOPSY: CPT | Performed by: INTERNAL MEDICINE

## 2021-09-06 PROCEDURE — 250N000011 HC RX IP 250 OP 636: Performed by: INTERNAL MEDICINE

## 2021-09-06 PROCEDURE — 999N000099 HC STATISTIC MODERATE SEDATION < 10 MIN: Performed by: INTERNAL MEDICINE

## 2021-09-06 PROCEDURE — 88305 TISSUE EXAM BY PATHOLOGIST: CPT | Mod: 26 | Performed by: PATHOLOGY

## 2021-09-06 PROCEDURE — 258N000003 HC RX IP 258 OP 636: Performed by: INTERNAL MEDICINE

## 2021-09-06 PROCEDURE — 85025 COMPLETE CBC W/AUTO DIFF WBC: CPT | Performed by: INTERNAL MEDICINE

## 2021-09-06 PROCEDURE — 88341 IMHCHEM/IMCYTCHM EA ADD ANTB: CPT | Mod: 26 | Performed by: PATHOLOGY

## 2021-09-06 RX ORDER — ATROPINE SULFATE 0.4 MG/ML
0.4 AMPUL (ML) INJECTION
Status: DISCONTINUED | OUTPATIENT
Start: 2021-09-06 | End: 2021-09-06 | Stop reason: HOSPADM

## 2021-09-06 RX ORDER — LIDOCAINE 40 MG/G
CREAM TOPICAL
Status: DISCONTINUED | OUTPATIENT
Start: 2021-09-06 | End: 2021-09-06 | Stop reason: HOSPADM

## 2021-09-06 RX ORDER — FLUMAZENIL 0.1 MG/ML
0.2 INJECTION, SOLUTION INTRAVENOUS
Status: DISCONTINUED | OUTPATIENT
Start: 2021-09-06 | End: 2021-09-06 | Stop reason: HOSPADM

## 2021-09-06 RX ORDER — EPINEPHRINE 1 MG/ML
0.1 INJECTION, SOLUTION, CONCENTRATE INTRAVENOUS
Status: DISCONTINUED | OUTPATIENT
Start: 2021-09-06 | End: 2021-09-06 | Stop reason: HOSPADM

## 2021-09-06 RX ORDER — SODIUM CHLORIDE 9 MG/ML
INJECTION, SOLUTION INTRAVENOUS CONTINUOUS
Status: DISCONTINUED | OUTPATIENT
Start: 2021-09-06 | End: 2021-09-07

## 2021-09-06 RX ORDER — FLUMAZENIL 0.1 MG/ML
0.2 INJECTION, SOLUTION INTRAVENOUS
Status: ACTIVE | OUTPATIENT
Start: 2021-09-06 | End: 2021-09-06

## 2021-09-06 RX ORDER — FENTANYL CITRATE 50 UG/ML
50-100 INJECTION, SOLUTION INTRAMUSCULAR; INTRAVENOUS
Status: COMPLETED | OUTPATIENT
Start: 2021-09-06 | End: 2021-09-06

## 2021-09-06 RX ORDER — FENTANYL CITRATE 50 UG/ML
25-50 INJECTION, SOLUTION INTRAMUSCULAR; INTRAVENOUS
Status: DISCONTINUED | OUTPATIENT
Start: 2021-09-06 | End: 2021-09-06 | Stop reason: HOSPADM

## 2021-09-06 RX ORDER — SIMETHICONE 40MG/0.6ML
133 SUSPENSION, DROPS(FINAL DOSAGE FORM)(ML) ORAL
Status: DISCONTINUED | OUTPATIENT
Start: 2021-09-06 | End: 2021-09-06 | Stop reason: HOSPADM

## 2021-09-06 RX ADMIN — METHYLPREDNISOLONE SODIUM SUCCINATE 20 MG: 40 INJECTION, POWDER, FOR SOLUTION INTRAMUSCULAR; INTRAVENOUS at 06:04

## 2021-09-06 RX ADMIN — MIDAZOLAM 2 MG: 1 INJECTION INTRAMUSCULAR; INTRAVENOUS at 09:56

## 2021-09-06 RX ADMIN — FENTANYL CITRATE 100 MCG: 50 INJECTION, SOLUTION INTRAMUSCULAR; INTRAVENOUS at 09:56

## 2021-09-06 RX ADMIN — METHYLPREDNISOLONE SODIUM SUCCINATE 40 MG: 40 INJECTION, POWDER, FOR SOLUTION INTRAMUSCULAR; INTRAVENOUS at 13:57

## 2021-09-06 RX ADMIN — ACETAMINOPHEN 1000 MG: 500 TABLET, FILM COATED ORAL at 21:34

## 2021-09-06 RX ADMIN — METHYLPREDNISOLONE SODIUM SUCCINATE 20 MG: 40 INJECTION, POWDER, FOR SOLUTION INTRAMUSCULAR; INTRAVENOUS at 21:34

## 2021-09-06 RX ADMIN — SODIUM CHLORIDE: 9 INJECTION, SOLUTION INTRAVENOUS at 08:24

## 2021-09-06 RX ADMIN — SODIUM PHOSPHATE, DIBASIC AND SODIUM PHOSPHATE, MONOBASIC 1 ENEMA: 7; 19 ENEMA RECTAL at 07:52

## 2021-09-06 NOTE — PLAN OF CARE
Shift Summary [id number]-  Patient able to rest throughout shift. Vital sign stable and afebrile. Decreased appetite for supper, but tolerated a bit of low fiber diet.  Patient had 1 loose/bloody stool this shift. Patient received tyelnol dose x1 this shift for abdominal discomfort/tightness. Patient NPO at midnight for possible flex sigmoid procedure this AM. Parents in room to visit for a bit in the evening. Please refer to flowsheets for further information/data. Plan to continue to monitor patients comfort level, intake and output, and provide intervention as needed.

## 2021-09-06 NOTE — PLAN OF CARE
Loose stool x 1 post enema.  Down to endoscopy for flex sig at 0930 returning at 1045.  Marie reg diet.  VSS.  Denies pain.  IV saline locked between meds.  Plan to start Remicade tomorrow.  Cont with plan of care.

## 2021-09-06 NOTE — PROCEDURES
INPATIENT PRE-PROCEDURE NOTE    CHIEF COMPLAINT / REASON FOR PROCEDURE: colitis    Admission History and Physical Reviewed, including past medical history, social history family history, ROS, and interval progress notes: on chart-<30 days old; updated within 7 days.    PRE-SEDATION ASSESSMENT:  Lung Exam: normal  Heart Exam: normal  Airway Exam: Normal  Previous reaction to anesthesia/sedation: NO  Sedation plan based on assessment:Moderate (conscious) sedation  ASA Classification: 2 - Mild systemic disease       IMPRESSION: colitis    PLAN: Flex sig    Micheal Hinojosa MD

## 2021-09-06 NOTE — PROGRESS NOTES
[facility]    Medicine Progress Note - Hospitalist Service       Date of Admission:  9/2/2021    Assessment & Plan             Luther Wood is a 25 year old male with PMH significant for ulcerative pancolitis (diagnosed in 2019), clostridium difficile, and ADD.  He presented to the hospital with abdominal pain and diarrhea, blood in his stools.  He was diagnosed with acute ulcerative colitis flare. He was started on IV Solu-Medrol. Gastroenterology following     1.  Acute ulcerative colitis flare.    -Currently on  methylprednisolone 20 mg IV every 8 hours.   -underwent flex sig today and found to have severe ulcerative colitis which was biopsied.   -GI recommended to continue steroids for now.   -Appreciate GI input.  -Will continue IV fluids.     2.  Acute blood loss anemia. Likely due to ongoing hematochezia from ulcerative colitis flare.  -Hemoglobin mildly lower today at 11.0, unchanged from 9/4.   -Gastroenterology following..  -Treat ulcerative colitis as noted above.  -We will monitor CBC     3.  Leukocytosis. Steroid-induced.  No evidence of infection at this time.  Will monitor CBC     4. Non-Severe Malnutrition, POA: based on Weight loss;Reduced intake;Muscle loss (09/04/21 1423)   Dietitian following.  Appreciate input. Will continue low fiber diet     Diet: Advance Diet as Tolerated: Regular Diet Adult    DVT Prophylaxis: Pneumatic Compression Devices  Jacob Catheter: Not present  Central Lines: None  Code Status: Full Code      Chirag Turk MD, MD  Hospitalist Service  [facility]  ____________________________________________________________________    Interval History   Patient seen and examined. He stated that he is feeling a little better. He stated that he is feeling better.     Data reviewed today: I reviewed all medications, new labs and imaging results over the last 24 hours.     Physical Exam   Vital Signs: Temp: 97.9  F (36.6  C) Temp  src: Oral BP: 121/67 Pulse: 50   Resp: 20 SpO2: 100 % O2 Device: None (Room air)    Weight: 141 lbs 15.62 oz  Gen:  NAD, A&Ox3.  Eyes:  PERRL, sclera anicteric.  OP:  MMM, no lesions.  Neck:  Supple.  CV:  Regular, no murmurs.  Lung:  CTA b/l, normal effort.  Ab:  +BS, soft.  Skin:  Warm, dry to touch.  No rash.  Ext:  No pitting edema LE b/l.      Data   Recent Labs   Lab 09/06/21  0621 09/05/21  0652 09/04/21  0659 09/03/21  0637 09/02/21  1146   WBC 20.6* 22.7* 18.5* 21.4* 16.6*   HGB 10.5* 11.0* 11.0* 11.2* 11.7*   MCV 90 91 89 92 91   * 442 433 387 336     --   --  139 137   POTASSIUM 4.0  --   --  4.5 3.4   CHLORIDE 101  --   --  107 102   CO2 33*  --   --  29 30   BUN 16  --   --  9 8   CR 1.04  --   --  1.04 1.12   ANIONGAP 1*  --   --  3 5   MADIE 8.5  --   --  8.4* 8.4*   *  --   --  104* 85   ALBUMIN  --   --   --  2.7* 2.8*   PROTTOTAL  --   --   --  6.1* 6.2*   BILITOTAL  --   --   --  0.6 0.5   ALKPHOS  --   --   --  71 74   ALT  --   --   --  18 21   AST  --   --   --  8 11   LIPASE  --   --   --   --  79

## 2021-09-07 LAB — GLUCOSE BLDC GLUCOMTR-MCNC: 139 MG/DL (ref 70–99)

## 2021-09-07 PROCEDURE — 250N000011 HC RX IP 250 OP 636: Performed by: PHYSICIAN ASSISTANT

## 2021-09-07 PROCEDURE — 99232 SBSQ HOSP IP/OBS MODERATE 35: CPT | Performed by: INTERNAL MEDICINE

## 2021-09-07 PROCEDURE — 250N000013 HC RX MED GY IP 250 OP 250 PS 637: Performed by: PHYSICIAN ASSISTANT

## 2021-09-07 PROCEDURE — 120N000004 HC R&B MS OVERFLOW

## 2021-09-07 RX ADMIN — METHYLPREDNISOLONE SODIUM SUCCINATE 20 MG: 40 INJECTION, POWDER, FOR SOLUTION INTRAMUSCULAR; INTRAVENOUS at 22:07

## 2021-09-07 RX ADMIN — METHYLPREDNISOLONE SODIUM SUCCINATE 20 MG: 40 INJECTION, POWDER, FOR SOLUTION INTRAMUSCULAR; INTRAVENOUS at 06:28

## 2021-09-07 RX ADMIN — METHYLPREDNISOLONE SODIUM SUCCINATE 40 MG: 40 INJECTION, POWDER, FOR SOLUTION INTRAMUSCULAR; INTRAVENOUS at 15:08

## 2021-09-07 RX ADMIN — ACETAMINOPHEN 1000 MG: 500 TABLET, FILM COATED ORAL at 20:18

## 2021-09-07 NOTE — PROGRESS NOTES
"Care Management Follow Up    Length of Stay (days): 5    Expected Discharge Date: 09/07/2021     Concerns to be Addressed: care coordination/care conferences     Patient plan of care discussed at interdisciplinary rounds: Yes    Anticipated Discharge Disposition: Home     Anticipated Discharge Services: None  Anticipated Discharge DME: None    Referrals Placed by CM/SW: Outpatient Infusion  Private pay costs discussed: Not applicable    Additional Information:  CM consulted to assist with checking insurance coverage for Remicade.  Contacted  - Infusion Therapy Phone [phone] / [phone] via e-mail.  Received the following response regarding coverage:   \"The preferred product for his insurance is [employer].  It will still require a prior authorization.  Also,  based on his medical policy the medication administration must occur at a clinic office or home-infusion setting unless medically necessary.  But that would be determined when the prior authorization is done.  If you have any more questions let us know, or once an order is entered we can start a PA (prior authorization).\"  Provider would need to enter order under Discharge \"Therapy Plan/ Blood Plan Schedulable Orders.\"    This is for informational purposes only. This information has not been discussed with the patient at this time.     Sheri Soliz, RN      Sheri Soliz RN Case Manager  Inpatient Care Coordination  [facility]   [phone]    "

## 2021-09-07 NOTE — PROGRESS NOTES
[facility]    Medicine Progress Note - Hospitalist Service       Date of Admission:  9/2/2021    Assessment & Plan             Luther Wood is a 25 year old male with PMH significant for ulcerative pancolitis (diagnosed in 2019), clostridium difficile, and ADD.  He presented to the hospital with abdominal pain and diarrhea, blood in his stools.  He was diagnosed with acute ulcerative colitis flare. He was started on IV Solu-Medrol. Gastroenterology following.     1.  Acute ulcerative colitis flare.    -Currently on  methylprednisolone 20 mg IV every 8 hours.   -Underwent flex sig on 9/6  and found to have severe ulcerative colitis which was biopsied.  Pathology results pending  -We will continue IV Solu-Medrol per gastroenterology commendations     2.  Acute blood loss anemia. Likely due to ongoing hematochezia from ulcerative colitis flare.  -Hemoglobin 10.5 on 9/6  -Gastroenterology following.  -Treat ulcerative colitis as noted above.  -We will monitor CBC     3.  Leukocytosis. Steroid-induced.  No evidence of infection at this time.  Will monitor CBC     4. Non-Severe Malnutrition, POA: based on Weight loss;Reduced intake;Muscle loss (09/04/21 1423)   Dietitian following.  Appreciate input. Will continue low fiber diet     Diet: Regular Diet Adult    DVT Prophylaxis: Pneumatic Compression Devices  Jacob Catheter: Not present  Central Lines: None  Code Status: Full Code      Chirag Turk MD, MD  Hospitalist Service  [facility]  ____________________________________________________________________    Interval History   Patient seen and examined today. Abdominal pain slightly better.  He has no fever.  Also denies diarrhea, dysuria, urgency or frequency.  Data reviewed today: I reviewed all medications, new labs and imaging results over the last 24 hours.       Physical Exam   Vital Signs: Temp: 98.6  F (37  C) Temp src: Oral BP: 126/74 Pulse: 74   Resp: 18  SpO2: 100 % O2 Device: None (Room air)    Weight: 141 lbs 15.62 oz  Gen:  NAD, A&Ox3.  Eyes:  PERRL, sclera anicteric.  OP:  MMM, no lesions.  Neck:  Supple.  CV:  Regular, no murmurs.  Lung:  CTA b/l, normal effort.  Ab:  +BS, soft.  Skin:  Warm, dry to touch.  No rash.  Ext:  No pitting edema LE b/l.      Data   Recent Labs   Lab 09/06/21  0621 09/05/21  0652 09/04/21  0659 09/03/21  0637 09/02/21  1146   WBC 20.6* 22.7* 18.5* 21.4* 16.6*   HGB 10.5* 11.0* 11.0* 11.2* 11.7*   MCV 90 91 89 92 91   * 442 433 387 336     --   --  139 137   POTASSIUM 4.0  --   --  4.5 3.4   CHLORIDE 101  --   --  107 102   CO2 33*  --   --  29 30   BUN 16  --   --  9 8   CR 1.04  --   --  1.04 1.12   ANIONGAP 1*  --   --  3 5   MADIE 8.5  --   --  8.4* 8.4*   *  --   --  104* 85   ALBUMIN  --   --   --  2.7* 2.8*   PROTTOTAL  --   --   --  6.1* 6.2*   BILITOTAL  --   --   --  0.6 0.5   ALKPHOS  --   --   --  71 74   ALT  --   --   --  18 21   AST  --   --   --  8 11   LIPASE  --   --   --   --  79

## 2021-09-07 NOTE — PROGRESS NOTES
GASTROENTEROLOGY PROGRESS NOTE     SUBJECTIVE:  minimal improvement overall.  4 BMs yesterday (as opposed to 5 the day before) all with blood, still with significant pain at times.    GI ROS: Denies nausea, vomiting, or constipation.     OBJECTIVE:  /74 (BP Location: Left arm)   Pulse 74   Temp 98.6  F (37  C) (Oral)   Resp 18   Wt 64.4 kg (141 lb 15.6 oz)   SpO2 100%   BMI 19.26 kg/m    Temp (24hrs), Av.3  F (36.8  C), Min:98.2  F (36.8  C), Max:98.6  F (37  C)    Patient Vitals for the past 72 hrs:   Weight   21 1400 64.4 kg (141 lb 15.6 oz)       Intake/Output Summary (Last 24 hours) at 2021 1253  Last data filed at 2021 0955  Gross per 24 hour   Intake 990 ml   Output --   Net 990 ml        General Appearance: alert, oriented x3, no acute distress.  Eyes: PERRL, sclera anicteric.  ENT: oropharynx clear, no thrush.     Labs:     Recent Labs   Lab Test 21  0621 21  0652 21  0659   WBC 20.6* 22.7* 18.5*   HGB 10.5* 11.0* 11.0*   MCV 90 91 89   * 442 433     Recent Labs   Lab Test 21  0621 21  0637 21  1146   POTASSIUM 4.0 4.5 3.4   CHLORIDE 101 107 102   CO2 33* 29 30   BUN 16 9 8   ANIONGAP 1* 3 5     Recent Labs   Lab Test 21  0637 21  1146   ALBUMIN 2.7* 2.8*   BILITOTAL 0.6 0.5   ALT 18 21   AST 8 11   LIPASE  --  79           Assessment and Plan: 25 year old male with ulcerative colitis, on day 5 of IV steroids with incomplete response.  Discussed with Luther and his mother options in detail, including Remicade vs one more day of IV steroids.  Discussed risks of delaying treatment including possible colectomy. My recommendation is to start Remicade, discussed the risks and benefits in detail.  I am hoping to get pathology reading on biopsies today.    After discussion, Luther is interested in starting remicade today if possible.  I will follow up this afternoon, hopefully after pathology results have returned.    Micheal Hinojosa,  MD    Addendum: Spoke to pathologist, will not have H and E and immunostain slides until tomorrow morning.  Discussed with Luther and his parents.  I recommend holding on Remicade until we get the results tomorrow, they were in agreement.  Continue IV steroids for now.    Micheal Hinojosa MD

## 2021-09-07 NOTE — PLAN OF CARE
Shift Summary [id number]-  Patient has slept well throughout shift, waking only for assessments and for the bathroom. Vital sign stable and afebrile. Patient reports one loose/bloody stool this shift. Patient rating abdominal pain 1-2 (0-10 scale), but declines medication. Please refer to flowsheets for further information/data. Plan to continue to monitor patients comfort level, intake and output, and provide interventions as needed.

## 2021-09-07 NOTE — PROGRESS NOTES
Care Management Follow Up    Length of Stay (days): 5    Expected Discharge Date: 09/07/2021     Concerns to be Addressed: care coordination/care conferences       Additional Information:  CM met with patient at bedside. Noted no PCP listed in patient chart.  Patient plans to follow up with [facility] as recommended.   Patient declined offer of PCP resources or scheduling at this time.      Sheri Soliz, RN      Sheri Soliz RN Case Manager  Inpatient Care Coordination  [facility]   [phone]

## 2021-09-07 NOTE — CONSULTS
Discharge Pharmacy Test Claim    Consult received regarding remicade. Pharmacy liaison is unable to check coverage for intravenous drugs. Please contact [facility].    Sheri Sweeney  [facility] Pharmacy Liaison  Ph: [phone] Pager: [phone]

## 2021-09-07 NOTE — PLAN OF CARE
VSS, afebrile.  Mild abdominal pain reported, tylenol given.  Still reporting loose stool x2 this shift.  Ambulating in room and hallway.  Adequate intake, regular bland diet.  Mom and dad visiting.  Stable, will continue to monitor.

## 2021-09-07 NOTE — PLAN OF CARE
"Vital Signs: WDL  Pain/Comfort: slight abd pain, denies need for meds this shift.   Diet/po intake: taking liquids well, poor appetite for food.   Output: voiding adequate amounts  Activity/Ambulation: walks in halls independently  Pertinent assessments: 1 small bloody stool this morning. Did shower today. At 1455 pt was walking in halls & passed out, mom helped him to the ground. Pt states he was outside & got hot, came inside where it's cool & \"needed to sit down\". Assisted into wheelchair & pt able to get back to bed. VSS, cool washcloth applied to head. Dr Turk in to assess pt, talked with parents & patient.     Plan (Upcoming Events): solumedrol, GI wanting to start remicade after biopsy results back.   Discharge/Transfer Needs: pt will need to have decreased symptoms & medical clearance prior to discharge.       Will continue with POC.       "

## 2021-09-08 LAB
ERYTHROCYTE [DISTWIDTH] IN BLOOD BY AUTOMATED COUNT: 12.5 % (ref 10–15)
HCT VFR BLD AUTO: 29.3 % (ref 40–53)
HGB BLD-MCNC: 9.1 G/DL (ref 13.3–17.7)
MCH RBC QN AUTO: 27.7 PG (ref 26.5–33)
MCHC RBC AUTO-ENTMCNC: 31.1 G/DL (ref 31.5–36.5)
MCV RBC AUTO: 89 FL (ref 78–100)
PLATELET # BLD AUTO: 433 10E3/UL (ref 150–450)
RBC # BLD AUTO: 3.28 10E6/UL (ref 4.4–5.9)
WBC # BLD AUTO: 19.3 10E3/UL (ref 4–11)

## 2021-09-08 PROCEDURE — 120N000004 HC R&B MS OVERFLOW

## 2021-09-08 PROCEDURE — 258N000003 HC RX IP 258 OP 636: Performed by: INTERNAL MEDICINE

## 2021-09-08 PROCEDURE — 85027 COMPLETE CBC AUTOMATED: CPT | Performed by: INTERNAL MEDICINE

## 2021-09-08 PROCEDURE — 250N000012 HC RX MED GY IP 250 OP 636 PS 637: Performed by: INTERNAL MEDICINE

## 2021-09-08 PROCEDURE — 250N000013 HC RX MED GY IP 250 OP 250 PS 637: Performed by: PHYSICIAN ASSISTANT

## 2021-09-08 PROCEDURE — 36415 COLL VENOUS BLD VENIPUNCTURE: CPT | Performed by: INTERNAL MEDICINE

## 2021-09-08 PROCEDURE — 250N000011 HC RX IP 250 OP 636: Performed by: INTERNAL MEDICINE

## 2021-09-08 PROCEDURE — 99232 SBSQ HOSP IP/OBS MODERATE 35: CPT | Performed by: INTERNAL MEDICINE

## 2021-09-08 PROCEDURE — 250N000011 HC RX IP 250 OP 636: Performed by: PHYSICIAN ASSISTANT

## 2021-09-08 RX ORDER — PREDNISONE 20 MG/1
40 TABLET ORAL DAILY
Status: DISCONTINUED | OUTPATIENT
Start: 2021-09-08 | End: 2021-09-09 | Stop reason: HOSPADM

## 2021-09-08 RX ADMIN — METHYLPREDNISOLONE SODIUM SUCCINATE 20 MG: 40 INJECTION, POWDER, FOR SOLUTION INTRAMUSCULAR; INTRAVENOUS at 06:14

## 2021-09-08 RX ADMIN — ACETAMINOPHEN 1000 MG: 500 TABLET, FILM COATED ORAL at 06:15

## 2021-09-08 RX ADMIN — SODIUM CHLORIDE 300 MG: 9 INJECTION, SOLUTION INTRAVENOUS at 10:34

## 2021-09-08 RX ADMIN — PREDNISONE 40 MG: 20 TABLET ORAL at 14:22

## 2021-09-08 NOTE — PLAN OF CARE
"Shift Summary [id number]-  Vital sign stable and afebrile. Patient has been resting in bed majority of shift, ambulating to the bathroom x2 with standby assist. PCDs tolerated to bilateral legs. Tylenol given x1 for abdominal discomfort. Loose/bloody stools x2 this shift, though patient reports \"there was more brown than red on the toilet paper\" with last bowel movement. Parents at bedside until 2030. Pt tolerated small amounts of food (yogurt, white bread, ice cream), but otherwise verbalizes continued decreased appetite. Please refer to flowsheets for further information/data. Plan to continue to monitor patients comfort level, intake/output, and provide intervention as needed.     "

## 2021-09-08 NOTE — PLAN OF CARE
Tolerating low fiber diet - appetite is fair. First dose of Remicade given today without incident. Started on oral steroids. Weight 62.1 kg today (down 2.3 kg). Encouraging PO intake as tolerated.

## 2021-09-08 NOTE — PLAN OF CARE

## 2021-09-08 NOTE — PROGRESS NOTES
[facility]    Medicine Progress Note - Hospitalist Service       Date of Admission:  9/2/2021    Assessment & Plan             Luther Wood is a 25 year old male with PMH significant for ulcerative pancolitis (diagnosed in 2019), clostridium difficile, and ADD.  He presented to the hospital with abdominal pain and diarrhea, blood in his stools.  He was diagnosed with acute ulcerative colitis flare. He was started on IV Solu-Medrol. Gastroenterology following.     1.  Acute ulcerative colitis flare.    -Currently on  methylprednisolone 20 mg IV every 8 hours.   -Underwent flex sig on 9/6  and found to have severe ulcerative colitis which was biopsied.   -Started on Inflectra today per GI. Steroids changed to prednisone 40 mg daily.  -Appreciate GI input     2.  Acute blood loss anemia. Likely due to ongoing hematochezia from ulcerative colitis flare.  -Hemoglobin 9.1 on 9/6  -Gastroenterology following.  -Treat ulcerative colitis as noted above.  -We will monitor CBC     3.  Leukocytosis. Steroid-induced.  No evidence of infection at this time.  Will monitor CBC     4. Non-Severe Malnutrition, POA: based on Weight loss;Reduced intake;Muscle loss (09/04/21 1423)   Dietitian following.  Appreciate input. Will continue low fiber diet     Diet: Regular Diet Adult    DVT Prophylaxis: Pneumatic Compression Devices  Jacob Catheter: Not present  Central Lines: None  Code Status: Full Code      Chirag Turk MD, MD  Hospitalist Service  [facility]  ____________________________________________________________________    Interval History   Patient seen and examined today. He stated that he is feeling better. Denies nausea or vomiting. Abdominal pain improving. Has no fever. Also denies dizziness or lightheadedness.     Data reviewed today: I reviewed all medications, new labs and imaging results over the last 24 hours.       Physical Exam   Vital Signs: Temp: 98.4  F  (36.9  C) Temp src: Oral BP: 121/68 Pulse: 73   Resp: 20 SpO2: 97 % O2 Device: None (Room air)    Weight: 136 lbs 14.49 oz  Gen:  NAD, A&Ox3.  Eyes:  PERRL, sclera anicteric.  OP:  MMM, no lesions.  Neck:  Supple.  CV:  Regular, no murmurs.  Lung:  CTA b/l, normal effort.  Ab:  +BS, soft.  Skin:  Warm, dry to touch.  No rash.  Ext:  No pitting edema LE b/l.      Data   Recent Labs   Lab 09/08/21  0632 09/07/21  1505 09/06/21  0621 09/05/21  0652 09/03/21  0637 09/02/21  1146   WBC 19.3*  --  20.6* 22.7* 21.4* 16.6*   HGB 9.1*  --  10.5* 11.0* 11.2* 11.7*   MCV 89  --  90 91 92 91     --  451* 442 387 336   NA  --   --  135  --  139 137   POTASSIUM  --   --  4.0  --  4.5 3.4   CHLORIDE  --   --  101  --  107 102   CO2  --   --  33*  --  29 30   BUN  --   --  16  --  9 8   CR  --   --  1.04  --  1.04 1.12   ANIONGAP  --   --  1*  --  3 5   MADIE  --   --  8.5  --  8.4* 8.4*   GLC  --  139* 124*  --  104* 85   ALBUMIN  --   --   --   --  2.7* 2.8*   PROTTOTAL  --   --   --   --  6.1* 6.2*   BILITOTAL  --   --   --   --  0.6 0.5   ALKPHOS  --   --   --   --  71 74   ALT  --   --   --   --  18 21   AST  --   --   --   --  8 11   LIPASE  --   --   --   --   --  79

## 2021-09-08 NOTE — PROGRESS NOTES
"Care Management Follow Up    Length of Stay (days): 6    Expected Discharge Date: 09/09/2021     Concerns to be Addressed: care coordination/care conferences     Patient plan of care discussed at interdisciplinary rounds: Yes    Anticipated Discharge Disposition: Home     Anticipated Discharge Services: None  Anticipated Discharge DME: None    Referrals Placed by CM/SW: Outpatient Infusion 9/7  Private pay costs discussed: Not applicable    Additional Information:  Spoke with nursing regarding Remicade plan. Patient started Inflectra today 9/8. No new discharge medication orders noted from GI provider at this time. Per previous CC note, provider will need to enter order under discharge \"Therapy Plan/Blood Plan Schedulable Orders\". Inflectra will require  prior authorization. Notified Infusion therapy  to continue to follow for possible discharge need.    Will continue to monitor GI plan, watch for orders, f/u with patient and family. No outreach to patient at this time.    Dennis Nelson RN Case Manager  Inpatient Care Coordination   [facility]   [phone]      Dennis Nelson RN        "

## 2021-09-08 NOTE — PLAN OF CARE
Woke twice to have BM.  Patient stated it was loose  Dark red/brown colored.Gait steady with stand by assist. Passing gas. Tolerated sips of water. IV infusing at 125 ml/hr. Fall precautions stand by assist. Voiced concerns about potential surgery. Continues on Solumedrol. Declined PCD's.

## 2021-09-09 VITALS
DIASTOLIC BLOOD PRESSURE: 70 MMHG | SYSTOLIC BLOOD PRESSURE: 120 MMHG | WEIGHT: 136.91 LBS | BODY MASS INDEX: 18.57 KG/M2 | RESPIRATION RATE: 16 BRPM | HEART RATE: 86 BPM | OXYGEN SATURATION: 98 % | TEMPERATURE: 99 F

## 2021-09-09 LAB
ANION GAP SERPL CALCULATED.3IONS-SCNC: 2 MMOL/L (ref 3–14)
BUN SERPL-MCNC: 13 MG/DL (ref 7–30)
CALCIUM SERPL-MCNC: 8.1 MG/DL (ref 8.5–10.1)
CHLORIDE BLD-SCNC: 101 MMOL/L (ref 94–109)
CO2 SERPL-SCNC: 32 MMOL/L (ref 20–32)
CREAT SERPL-MCNC: 1.03 MG/DL (ref 0.66–1.25)
ERYTHROCYTE [DISTWIDTH] IN BLOOD BY AUTOMATED COUNT: 12.7 % (ref 10–15)
GFR SERPL CREATININE-BSD FRML MDRD: >90 ML/MIN/1.73M2
GLUCOSE BLD-MCNC: 88 MG/DL (ref 70–99)
HCT VFR BLD AUTO: 27.3 % (ref 40–53)
HGB BLD-MCNC: 8.7 G/DL (ref 13.3–17.7)
MCH RBC QN AUTO: 28.2 PG (ref 26.5–33)
MCHC RBC AUTO-ENTMCNC: 31.9 G/DL (ref 31.5–36.5)
MCV RBC AUTO: 89 FL (ref 78–100)
PATH REPORT.ADDENDUM SPEC: NORMAL
PATH REPORT.COMMENTS IMP SPEC: NORMAL
PATH REPORT.FINAL DX SPEC: NORMAL
PATH REPORT.GROSS SPEC: NORMAL
PATH REPORT.MICROSCOPIC SPEC OTHER STN: NORMAL
PATH REPORT.MICROSCOPIC SPEC OTHER STN: NORMAL
PATH REPORT.RELEVANT HX SPEC: NORMAL
PHOTO IMAGE: NORMAL
PLATELET # BLD AUTO: 436 10E3/UL (ref 150–450)
POTASSIUM BLD-SCNC: 3.6 MMOL/L (ref 3.4–5.3)
RBC # BLD AUTO: 3.08 10E6/UL (ref 4.4–5.9)
SODIUM SERPL-SCNC: 135 MMOL/L (ref 133–144)
WBC # BLD AUTO: 21.1 10E3/UL (ref 4–11)

## 2021-09-09 PROCEDURE — 85027 COMPLETE CBC AUTOMATED: CPT | Performed by: INTERNAL MEDICINE

## 2021-09-09 PROCEDURE — 250N000011 HC RX IP 250 OP 636: Performed by: INTERNAL MEDICINE

## 2021-09-09 PROCEDURE — 258N000003 HC RX IP 258 OP 636: Performed by: INTERNAL MEDICINE

## 2021-09-09 PROCEDURE — 250N000012 HC RX MED GY IP 250 OP 636 PS 637: Performed by: INTERNAL MEDICINE

## 2021-09-09 PROCEDURE — 80048 BASIC METABOLIC PNL TOTAL CA: CPT | Performed by: INTERNAL MEDICINE

## 2021-09-09 PROCEDURE — 36415 COLL VENOUS BLD VENIPUNCTURE: CPT | Performed by: INTERNAL MEDICINE

## 2021-09-09 PROCEDURE — 99238 HOSP IP/OBS DSCHRG MGMT 30/<: CPT | Performed by: INTERNAL MEDICINE

## 2021-09-09 RX ORDER — PREDNISONE 10 MG/1
TABLET ORAL
Qty: 255 TABLET | Refills: 0 | Status: ON HOLD | OUTPATIENT
Start: 2021-09-09 | End: 2021-09-24

## 2021-09-09 RX ORDER — METHYLPREDNISOLONE SODIUM SUCCINATE 125 MG/2ML
125 INJECTION, POWDER, LYOPHILIZED, FOR SOLUTION INTRAMUSCULAR; INTRAVENOUS
Status: DISCONTINUED | OUTPATIENT
Start: 2021-09-09 | End: 2021-09-09 | Stop reason: HOSPADM

## 2021-09-09 RX ORDER — DIPHENHYDRAMINE HYDROCHLORIDE 50 MG/ML
50 INJECTION INTRAMUSCULAR; INTRAVENOUS
Status: DISCONTINUED | OUTPATIENT
Start: 2021-09-09 | End: 2021-09-09 | Stop reason: HOSPADM

## 2021-09-09 RX ADMIN — PREDNISONE 40 MG: 20 TABLET ORAL at 07:50

## 2021-09-09 RX ADMIN — IRON SUCROSE 200 MG: 20 INJECTION, SOLUTION INTRAVENOUS at 15:08

## 2021-09-09 NOTE — PLAN OF CARE
VSS.  Stool x 1 this shift.  Marie eggs and sausage for dinner with mild cramping post eating but no stool after eating.  IV saline locked.  Cont with plan of care.

## 2021-09-09 NOTE — PROGRESS NOTES
GASTROENTEROLOGY PROGRESS NOTE     SUBJECTIVE:  He is doing much better.  He denies any abdominal pain.  The frequency of his BMs have improved, he is seeing minimal to no blood.  He is tolerating a diet, and interested in going home.    GI ROS: Denies nausea, vomiting, abdominal pain, constipation, melena, or rectal bleeding.     OBJECTIVE:  /64   Pulse 86   Temp 98.1  F (36.7  C) (Oral)   Resp 18   Wt 62.1 kg (136 lb 14.5 oz)   SpO2 98%   BMI 18.57 kg/m    Temp (24hrs), Av.2  F (36.8  C), Min:98.1  F (36.7  C), Max:98.2  F (36.8  C)    Patient Vitals for the past 72 hrs:   Weight   21 0842 62.1 kg (136 lb 14.5 oz)       Intake/Output Summary (Last 24 hours) at 2021 1413  Last data filed at 2021 0156  Gross per 24 hour   Intake 150 ml   Output --   Net 150 ml        General Appearance: alert, oriented x3, no acute distress.  Eyes: PERRL, sclera anicteric.  ENT: oropharynx clear, no thrush.  GI: Soft, NABS, NT/ND.      Labs:     Recent Labs   Lab Test 21  0630 21  0632 21  0621   WBC 21.1* 19.3* 20.6*   HGB 8.7* 9.1* 10.5*   MCV 89 89 90    433 451*     Recent Labs   Lab Test 21  0630 21  0621 21  0637   POTASSIUM 3.6 4.0 4.5   CHLORIDE 101 101 107   CO2 32 33* 29   BUN 13 16 9   ANIONGAP 2* 1* 3     Recent Labs   Lab Test 21  0637 21  1146   ALBUMIN 2.7* 2.8*   BILITOTAL 0.6 0.5   ALT 18 21   AST 8 11   LIPASE  --  79           Assessment and Plan: 25 year old male with severe ulcerative colitis, failed IV steroids, received his first dose of inflectra yesterday with significant improvement.  I am very pleased with how he is doing.  We discussed his hemoglobin, and while he does not meet criteria for transfusion, he would benefit from an iron infusion.  Discussed with Dr. Turk who was in agreement.      -Iron infusion today.  -OK with discharge today after infusion.  -Prednisone 40 mg per day for 2 weeks, then 20 mg per day for 2  weeks, then 15 mg per day for 2 weeks, then 10 mg per day for 2 weeks, then 5 mg per day for 2 weeks, then stop.  -Labs early next week, my office will schedule.  -Next inflectra infusion 9/22, my office will schedule.  -Clinic follow up in 1 month.    Micheal Hinojosa MD

## 2021-09-09 NOTE — PLAN OF CARE
Jb is in better spirits. Feels the Remicade is working. Said his stool is now brown with a hint of red. Stool remains loose. Passing gas. Using ointment from home to anal area. Drinking water and Gatorade. Iv saline locked. Encourage PO fluids and bland/ low fiber diet. Ambulate in halls with stand by assist.

## 2021-09-09 NOTE — PLAN OF CARE
VSS, afebrile.  Iron infusion completed prior to discharge.  All discharge info completed with patient and parents.  All questions answered.  PT verbalized understanding of teaching and follow up instructions.  PT discharged in wheelchair home with parents.

## 2021-09-09 NOTE — PROGRESS NOTES
CLINICAL NUTRITION SERVICES - REASSESSMENT NOTE    Recommendations Ordered by Registered Dietitian (RD):   Continue diet as ordered --> encouraged protein push with meals as well as small frequent meals (4-6) and oral nutritional supplements if needed to help maintain/gain weight and muscle mass     Continue Melissa BitPoster 1.0 as ordered    Malnutrition:   % Weight Loss:  > 5% in 1 month (severe malnutrition)   % Intake:  </= 75% for >/= 1 month (severe malnutrition) --> malabsorption component  Subcutaneous Fat Loss:  None observed   Muscle Loss:  Clavicle bone region mild depletion, Acromion bone region mild depletion, Anterior thigh region mild depletion and Posterior calf region mild depletion --> overall low reserves  Fluid Retention:  None noted      Malnutrition Diagnosis: Severe malnutrition  In Context of:  Acute illness or injury with underlying chronic illness or disease     EVALUATION OF PROGRESS TOWARD GOALS   Diet: Regular Diet     Intake/Tolerance: Upon review of the flowsheets, pt is consuming % of meals ordered. Ordering meals inconsistently throughout admit. Many missed meals. Pt feels as though his appetite is improving over the past couple of days. Family may be bringing in food items as well. Pt enjoys Lexy 1.0.     ASSESSED NUTRITION NEEDS:  Dosing Weight 64 kg - accuracy?  Estimated Energy Needs: 30-35 Kcal/Kg  Justification: repletion, malabsorption  Estimated Protein Needs: 1.2-1.5 g pro/Kg  Justification: preservation of lean body mass, malabsorption  Estimated Fluid Needs: per MD    NEW FINDINGS:   - GI is following   - pt underwent flexible sigmoidoscopy and biopsy   - weight:  Vitals:    09/04/21 1400 09/08/21 0842   Weight: 64.4 kg (141 lb 15.6 oz) 62.1 kg (136 lb 14.5 oz)     - labs:  Labs:  Electrolytes  Potassium (mmol/L)   Date Value   09/09/2021 3.6   09/06/2021 4.0   09/03/2021 4.5   12/07/2009 3.6    Blood Glucose  Glucose (mg/dL)   Date Value   09/09/2021 88   09/06/2021  124 (H)   09/03/2021 104 (H)   09/02/2021 85   12/07/2009 97     GLUCOSE BY METER POCT (mg/dL)   Date Value   09/07/2021 139 (H)    Inflammatory Markers  WBC (10e9/L)   Date Value   12/07/2009 10.4     WBC Count (10e3/uL)   Date Value   09/09/2021 21.1 (H)   09/08/2021 19.3 (H)   09/06/2021 20.6 (H)     Albumin (g/dL)   Date Value   09/03/2021 2.7 (L)   09/02/2021 2.8 (L)      Sodium (mmol/L)   Date Value   09/09/2021 135   09/06/2021 135   09/03/2021 139   12/07/2009 138    Renal  Urea Nitrogen (mg/dL)   Date Value   09/09/2021 13   09/06/2021 16   09/03/2021 9   12/07/2009 14     Creatinine (mg/dL)   Date Value   09/09/2021 1.03   09/06/2021 1.04   09/03/2021 1.04   12/07/2009 0.64     Additional  Ketones Urine (mg/dL)   Date Value   12/07/2009 Negative        - medications:    iron sucrose (VENOFER) intermittent infusion  200 mg Intravenous Once     predniSONE  40 mg Oral Daily     sodium chloride (PF)  3 mL Intracatheter Q8H         acetaminophen, HYDROmorphone, lidocaine 4%, lidocaine (buffered or not buffered), melatonin, naloxone **OR** naloxone **OR** naloxone **OR** naloxone, ondansetron **OR** ondansetron, oxyCODONE, sodium chloride (PF)     Previous Goals:   Patient to consume at least 50-75% of meals or supplements TID minimum with <5 BMs daily.    Evaluation: Not met    Previous Nutrition Diagnosis:   Inadequate oral intake related to decreased appetite, early satiety, malabsorption with UC flare as evidenced by meeting <75% needs over the past month with e/o muscle loss leading to malnutrition coding, reported and admit wt indicating >5% wt loss.  Evaluation: No change    MALNUTRITION  % Weight Loss:  > 5% in 1 month (severe malnutrition)   % Intake:  </= 75% for >/= 1 month (severe malnutrition) --> malabsorption component  Subcutaneous Fat Loss:  None observed   Muscle Loss:  Clavicle bone region mild depletion, Acromion bone region mild depletion, Anterior thigh region mild depletion and Posterior  calf region mild depletion --> overall low reserves  Fluid Retention:  None noted      Malnutrition Diagnosis: Severe malnutrition  In Context of:  Acute illness or injury with underlying chronic illness or disease    CURRENT NUTRITION DIAGNOSIS  Inadequate oral intake related to decreased appetite, early satiety, malabsorption with UC flare as evidenced by meeting <75% needs over the past month with e/o muscle loss leading to malnutrition coding, reported and admit wt indicating >5% wt loss.    INTERVENTIONS  Recommendations / Nutrition Prescription  Continue diet as ordered --> encouraged protein push with meals as well as small frequent meals (4-6) and oral nutritional supplements if needed to help maintain/gain weight and muscle mass   Continue [employer] 1.0 as ordered     Implementation  Medical Food Supplement: as above    Goals  Patient to consume at least 50-75% of meals or supplements TID minimum with <5 BMs daily.      MONITORING AND EVALUATION:  Progress towards goals will be monitored and evaluated per protocol and Practice Guidelines    Phoebe Rg RD, LD  Clinical Dietitian

## 2021-09-09 NOTE — PROGRESS NOTES
/69   Pulse 106   Temp 98.2  F (36.8  C)   Resp 16   Wt 62.1 kg (136 lb 14.5 oz)   SpO2 98%   BMI 18.57 kg/m    Patient alert and oriented. Patient up in the room independent. Stool x2 this shift. Voiding with out difficulty. Patient denies pain. Saline locked. Will continue with plan of care.

## 2021-09-09 NOTE — PLAN OF CARE
Patient feeling better. Tolerating low fiber diet. Still feeling fatigued and tired. Plan for iron infusion this afternoon and then discharge to home as long as GI symptoms do not return. Will see [facility] for outpatient follow up.

## 2021-09-11 ENCOUNTER — HOSPITAL ENCOUNTER (INPATIENT)
Facility: CLINIC | Age: 26
LOS: 12 days | Discharge: HOME HEALTH | DRG: 329 | End: 2021-09-24
Attending: EMERGENCY MEDICINE | Admitting: INTERNAL MEDICINE
Payer: COMMERCIAL

## 2021-09-11 DIAGNOSIS — K51.911 ULCERATIVE COLITIS WITH RECTAL BLEEDING, UNSPECIFIED LOCATION (H): ICD-10-CM

## 2021-09-11 DIAGNOSIS — Z93.2 ILEOSTOMY STATUS (H): ICD-10-CM

## 2021-09-11 DIAGNOSIS — D62 ANEMIA DUE TO BLOOD LOSS, ACUTE: ICD-10-CM

## 2021-09-11 DIAGNOSIS — E87.1 HYPONATREMIA: ICD-10-CM

## 2021-09-11 DIAGNOSIS — K51.919 ULCERATIVE COLITIS WITH COMPLICATION, UNSPECIFIED LOCATION (H): Primary | ICD-10-CM

## 2021-09-11 LAB
ABO/RH(D): NORMAL
ALBUMIN SERPL-MCNC: 1.9 G/DL (ref 3.4–5)
ALP SERPL-CCNC: 110 U/L (ref 40–150)
ALT SERPL W P-5'-P-CCNC: 60 U/L (ref 0–70)
ANION GAP SERPL CALCULATED.3IONS-SCNC: 5 MMOL/L (ref 3–14)
ANTIBODY SCREEN: NEGATIVE
AST SERPL W P-5'-P-CCNC: 23 U/L (ref 0–45)
BASOPHILS # BLD AUTO: 0 10E3/UL (ref 0–0.2)
BASOPHILS NFR BLD AUTO: 0 %
BILIRUB SERPL-MCNC: 0.4 MG/DL (ref 0.2–1.3)
BUN SERPL-MCNC: 10 MG/DL (ref 7–30)
CALCIUM SERPL-MCNC: 8.3 MG/DL (ref 8.5–10.1)
CHLORIDE BLD-SCNC: 94 MMOL/L (ref 94–109)
CO2 SERPL-SCNC: 31 MMOL/L (ref 20–32)
CREAT SERPL-MCNC: 1 MG/DL (ref 0.66–1.25)
CRP SERPL-MCNC: 253 MG/L (ref 0–8)
EOSINOPHIL # BLD AUTO: 0 10E3/UL (ref 0–0.7)
EOSINOPHIL NFR BLD AUTO: 0 %
ERYTHROCYTE [DISTWIDTH] IN BLOOD BY AUTOMATED COUNT: 12.8 % (ref 10–15)
ERYTHROCYTE [SEDIMENTATION RATE] IN BLOOD BY WESTERGREN METHOD: 68 MM/HR (ref 0–15)
GFR SERPL CREATININE-BSD FRML MDRD: >90 ML/MIN/1.73M2
GLUCOSE BLD-MCNC: 130 MG/DL (ref 70–99)
HCT VFR BLD AUTO: 24.9 % (ref 40–53)
HGB BLD-MCNC: 7.9 G/DL (ref 13.3–17.7)
IMM GRANULOCYTES # BLD: 0.4 10E3/UL
IMM GRANULOCYTES NFR BLD: 2 %
INR PPP: 1.34 (ref 0.85–1.15)
LIPASE SERPL-CCNC: 64 U/L (ref 73–393)
LYMPHOCYTES # BLD AUTO: 1.6 10E3/UL (ref 0.8–5.3)
LYMPHOCYTES NFR BLD AUTO: 9 %
MCH RBC QN AUTO: 27.3 PG (ref 26.5–33)
MCHC RBC AUTO-ENTMCNC: 31.7 G/DL (ref 31.5–36.5)
MCV RBC AUTO: 86 FL (ref 78–100)
MONOCYTES # BLD AUTO: 2.2 10E3/UL (ref 0–1.3)
MONOCYTES NFR BLD AUTO: 11 %
NEUTROPHILS # BLD AUTO: 14.8 10E3/UL (ref 1.6–8.3)
NEUTROPHILS NFR BLD AUTO: 78 %
NRBC # BLD AUTO: 0 10E3/UL
NRBC BLD AUTO-RTO: 0 /100
PLATELET # BLD AUTO: 529 10E3/UL (ref 150–450)
POTASSIUM BLD-SCNC: 4 MMOL/L (ref 3.4–5.3)
PROT SERPL-MCNC: 6.3 G/DL (ref 6.8–8.8)
RBC # BLD AUTO: 2.89 10E6/UL (ref 4.4–5.9)
SARS-COV-2 RNA RESP QL NAA+PROBE: NEGATIVE
SODIUM SERPL-SCNC: 130 MMOL/L (ref 133–144)
SPECIMEN EXPIRATION DATE: NORMAL
TSH SERPL DL<=0.005 MIU/L-ACNC: 1.08 MU/L (ref 0.4–4)
WBC # BLD AUTO: 19 10E3/UL (ref 4–11)

## 2021-09-11 PROCEDURE — G0378 HOSPITAL OBSERVATION PER HR: HCPCS

## 2021-09-11 PROCEDURE — 258N000003 HC RX IP 258 OP 636: Performed by: INTERNAL MEDICINE

## 2021-09-11 PROCEDURE — 93005 ELECTROCARDIOGRAM TRACING: CPT

## 2021-09-11 PROCEDURE — 96360 HYDRATION IV INFUSION INIT: CPT

## 2021-09-11 PROCEDURE — 83690 ASSAY OF LIPASE: CPT | Performed by: EMERGENCY MEDICINE

## 2021-09-11 PROCEDURE — 36415 COLL VENOUS BLD VENIPUNCTURE: CPT | Performed by: EMERGENCY MEDICINE

## 2021-09-11 PROCEDURE — C9803 HOPD COVID-19 SPEC COLLECT: HCPCS

## 2021-09-11 PROCEDURE — 86901 BLOOD TYPING SEROLOGIC RH(D): CPT | Performed by: EMERGENCY MEDICINE

## 2021-09-11 PROCEDURE — 99220 PR INITIAL OBSERVATION CARE,LEVEL III: CPT | Performed by: INTERNAL MEDICINE

## 2021-09-11 PROCEDURE — 80053 COMPREHEN METABOLIC PANEL: CPT | Performed by: EMERGENCY MEDICINE

## 2021-09-11 PROCEDURE — 84443 ASSAY THYROID STIM HORMONE: CPT | Performed by: EMERGENCY MEDICINE

## 2021-09-11 PROCEDURE — 85610 PROTHROMBIN TIME: CPT | Performed by: EMERGENCY MEDICINE

## 2021-09-11 PROCEDURE — 258N000003 HC RX IP 258 OP 636: Performed by: EMERGENCY MEDICINE

## 2021-09-11 PROCEDURE — 96361 HYDRATE IV INFUSION ADD-ON: CPT

## 2021-09-11 PROCEDURE — 86140 C-REACTIVE PROTEIN: CPT | Performed by: EMERGENCY MEDICINE

## 2021-09-11 PROCEDURE — 85652 RBC SED RATE AUTOMATED: CPT | Performed by: EMERGENCY MEDICINE

## 2021-09-11 PROCEDURE — 99285 EMERGENCY DEPT VISIT HI MDM: CPT | Mod: 25

## 2021-09-11 PROCEDURE — 87635 SARS-COV-2 COVID-19 AMP PRB: CPT | Performed by: EMERGENCY MEDICINE

## 2021-09-11 PROCEDURE — 36430 TRANSFUSION BLD/BLD COMPNT: CPT

## 2021-09-11 PROCEDURE — 85025 COMPLETE CBC W/AUTO DIFF WBC: CPT | Performed by: EMERGENCY MEDICINE

## 2021-09-11 RX ORDER — BALSALAZIDE DISODIUM 750 MG/1
2250 CAPSULE ORAL 2 TIMES DAILY
Status: DISCONTINUED | OUTPATIENT
Start: 2021-09-11 | End: 2021-09-11

## 2021-09-11 RX ORDER — NITROGLYCERIN 0.4 MG/1
0.4 TABLET SUBLINGUAL EVERY 5 MIN PRN
Status: DISCONTINUED | OUTPATIENT
Start: 2021-09-11 | End: 2021-09-24 | Stop reason: HOSPADM

## 2021-09-11 RX ORDER — ONDANSETRON 4 MG/1
4 TABLET, ORALLY DISINTEGRATING ORAL EVERY 6 HOURS PRN
Status: DISCONTINUED | OUTPATIENT
Start: 2021-09-11 | End: 2021-09-20

## 2021-09-11 RX ORDER — PROCHLORPERAZINE MALEATE 5 MG
10 TABLET ORAL EVERY 6 HOURS PRN
Status: DISCONTINUED | OUTPATIENT
Start: 2021-09-11 | End: 2021-09-20

## 2021-09-11 RX ORDER — PREDNISONE 20 MG/1
40 TABLET ORAL DAILY
Status: DISCONTINUED | OUTPATIENT
Start: 2021-09-12 | End: 2021-09-12

## 2021-09-11 RX ORDER — ONDANSETRON 2 MG/ML
4 INJECTION INTRAMUSCULAR; INTRAVENOUS EVERY 6 HOURS PRN
Status: DISCONTINUED | OUTPATIENT
Start: 2021-09-11 | End: 2021-09-20

## 2021-09-11 RX ORDER — SODIUM CHLORIDE 9 MG/ML
INJECTION, SOLUTION INTRAVENOUS CONTINUOUS
Status: DISCONTINUED | OUTPATIENT
Start: 2021-09-11 | End: 2021-09-15

## 2021-09-11 RX ORDER — ACETAMINOPHEN 650 MG/1
650 SUPPOSITORY RECTAL EVERY 6 HOURS PRN
Status: DISCONTINUED | OUTPATIENT
Start: 2021-09-11 | End: 2021-09-24 | Stop reason: HOSPADM

## 2021-09-11 RX ORDER — LIDOCAINE 40 MG/G
CREAM TOPICAL
Status: DISCONTINUED | OUTPATIENT
Start: 2021-09-11 | End: 2021-09-20

## 2021-09-11 RX ORDER — PROCHLORPERAZINE 25 MG
25 SUPPOSITORY, RECTAL RECTAL EVERY 12 HOURS PRN
Status: DISCONTINUED | OUTPATIENT
Start: 2021-09-11 | End: 2021-09-20

## 2021-09-11 RX ORDER — BUDESONIDE 3 MG/1
CAPSULE, COATED PELLETS ORAL
COMMUNITY
Start: 2021-08-10 | End: 2021-09-11

## 2021-09-11 RX ORDER — ACETAMINOPHEN 325 MG/1
650 TABLET ORAL EVERY 6 HOURS PRN
Status: DISCONTINUED | OUTPATIENT
Start: 2021-09-11 | End: 2021-09-20

## 2021-09-11 RX ORDER — SODIUM CHLORIDE 9 MG/ML
INJECTION, SOLUTION INTRAVENOUS CONTINUOUS
Status: DISCONTINUED | OUTPATIENT
Start: 2021-09-11 | End: 2021-09-11

## 2021-09-11 RX ORDER — IBUPROFEN 600 MG/1
600 TABLET, FILM COATED ORAL EVERY 6 HOURS PRN
Status: DISCONTINUED | OUTPATIENT
Start: 2021-09-11 | End: 2021-09-12

## 2021-09-11 RX ADMIN — SODIUM CHLORIDE 1000 ML: 9 INJECTION, SOLUTION INTRAVENOUS at 20:11

## 2021-09-11 RX ADMIN — SODIUM CHLORIDE: 9 INJECTION, SOLUTION INTRAVENOUS at 23:32

## 2021-09-11 RX ADMIN — SODIUM CHLORIDE 1000 ML: 9 INJECTION, SOLUTION INTRAVENOUS at 19:06

## 2021-09-11 ASSESSMENT — MIFFLIN-ST. JEOR: SCORE: 1661.24

## 2021-09-11 ASSESSMENT — ENCOUNTER SYMPTOMS
ABDOMINAL PAIN: 1
DIZZINESS: 1
DIARRHEA: 1
FEVER: 1
FATIGUE: 1

## 2021-09-11 NOTE — LETTER
Earlene RN [facility] [phone] new [facility] rn for new ostomy. Discharge 1-2 days . Colorectal will follow for orders as no primary.  They do this for us all the time, no issues to follow.

## 2021-09-11 NOTE — ED TRIAGE NOTES
Pt here recently for UC flare. Here today for fatigue, dehydration and frequent stools. Received iron infusion and concern for H+H being low. A+OX4, no acute signs of distress

## 2021-09-11 NOTE — LETTER
Earlene RN [facility] [phone] [facility] RN for new ostomy .  Lives with parents, supportive. No primary. CRS will follow for home care until he establishes with primary

## 2021-09-11 NOTE — LETTER
Earlene RN [facility] [phone].  NO Primary , BUT colorectal surgery who placed ileostomy will follow him for home care.  Dr Shahrzad Flaherty with :    Colorectal surgery   Address:  [address] [address], [address]   Phone:  [phone]  Fax:  [phone]

## 2021-09-11 NOTE — LETTER
Discharge today. New ostomy. Dr Flaherty will follow for home care orders.  Earlene SOMERS [facility] [phone]

## 2021-09-11 NOTE — ED PROVIDER NOTES
History   Chief Complaint:  Dehydration     HPI   Luther Wood is a 25 year old male with history of ulcerative colitis who presents with dehydration. The patient was recently admitted here 9/2-9/9 for an ulcerative colitis flare. He was discharged home 2 days ago and was started on Remicade. He has had increased fatigue since. Today he had dizziness while walking. Yesterday he had a brief episode of syncope. He has had intermittent fever since discharge. He had low hemoglobin while admitted and had an iron infusion. He reports increase in frequency of bowel movements with about 5-6 today. He has slight abdominal cramping which he reports as a 1/10. He denies any previous abdominal surgeries. He was diagnosed with UC 2 years ago.     Review of Systems   Constitutional: Positive for fatigue and fever.   Gastrointestinal: Positive for abdominal pain and diarrhea.   Neurological: Positive for dizziness and syncope.   All other systems reviewed and are negative.        Allergies:  No Known Drug Allergies     Medications:  Deltasone  Claravis  Colazal    Past Medical History:    Ulcerative colitis  ADD    Past Surgical History:    Denies abdominal surgeries    Family History:    Father- diabetes mellitus, type I    Social History:  Presents with his mother and father  Denies drug or alcohol use  In graduate school     Physical Exam     Patient Vitals for the past 24 hrs:   BP Temp Temp src Pulse Resp SpO2   09/11/21 2045 113/57 -- -- 93 15 100 %   09/11/21 2030 121/68 -- -- 99 12 100 %   09/11/21 2015 116/77 100.2  F (37.9  C) Oral 95 15 100 %   09/11/21 2000 118/72 -- -- 93 20 100 %   09/11/21 1945 123/79 -- -- 96 13 100 %   09/11/21 1930 121/74 -- -- 92 21 100 %   09/11/21 1915 128/83 -- -- 99 25 100 %   09/11/21 1900 109/71 -- -- 105 -- --   09/11/21 1845 -- -- -- 108 16 --   09/11/21 1750 99/63 98.6  F (37  C) Temporal (!) 128 16 99 %       Physical Exam  Constitutional: Well developed, nontox appearance  Head:  Atraumatic.   Mouth/Throat: Oropharynx is clear and tacky  Neck:  no stridor  Eyes: no scleral icterus  Cardiovascular: Regular tachycardia, 2+ bilat radial, DP pulses  Pulmonary/Chest: nml resp effort, Clear BS bilat  Abdominal: ND, soft, minimal diffuse tenderness, no rebound or guarding   Ext: Warm, well perfused, no edema  Neurological: A&O, symmetric facies, moves ext x4  Skin: Skin is warm and dry.   Psychiatric: Behavior is normal. Thought content normal.   Nursing note and vitals reviewed.    Emergency Department Course   ECG  ECG taken at 1840, ECG read at 1845  Dehydration   No prior EKG.  Rate 107 bpm. UT interval 146 ms. QRS duration 86 ms. QT/QTc 316/421 ms. P-R-T axes 81 85 81. Sinus tachycardia. Right atrial enlargement.      Laboratory:  CBC: WBC 19.0 (H), HGB 7.9 (L),  (H)  CMP: glucose 130 (H), sodium 130 (L), calcium 8.3 (L), protein total 6.3 (L), albumin 1.9 (L) o/w WNL (Creatinine 1.00)   Lipase: 64 (L)    CRP inflammation: 253 (H)    TSH: 1.08    ESR: 68 (H)    INR: 1.34 (H)    ABO/Rh type and screen: O+ (Antibody negative)    Emergency Department Course:    Reviewed:  I reviewed nursing notes, vitals, past medical history and care everywhere    Assessments:   I obtained history and examined the patient as noted above.    I rechecked the patient and explained findings.     Consults:    I spoke with Dr. Rosario of the hospitalist service.     Interventions:  : NS 1L IV Bolus    2011: NS 1L IV Bolus      Disposition:  The patient was admitted to the hospital under the care of Dr. Rosario.     Impression & Plan     CMS Diagnoses: None    Medical Decision Makin year old male presenting w/ loss of consciousness, fatigue     DDx includes vasovagal syncope, orthostatic syncope, electrolyte abnormality, anemia, syncope NOS, dysrhythmia.  EKG interp as noted above.  Doubt seizure, CVA given history and physical exam.  Overall the patient's presentation seems most  consistent with orthostatic syncope although it seems he has had episodes of possible vasovagal syncope near syncope over the last week. Labs significant for interval worsening of anemia, mild hyponatremia.  Imaging deferred given no evidence of trauma. Interventions as noted above with improvement in symptoms.    Ambulated to the bathroom without difficulty.  Given the patient's risk factors and comorbidities in context of his hemoglobin level, blood transfusion not indicated from the emergency department.  Given he is symptomatic, it may be reasonable to admit him for observation for serial hemoglobin checks as well as sodium correction.  Patient was subsequently admitted to the hospital service under observation status for serial hemoglobin levels and possible GI consultation.  He and his family were counseled on results, diagnosis and disposition prior to admission.  They are understanding and agreeable to plan.    Covid-19  Luther Wood was evaluated during a global COVID-19 pandemic, which necessitated consideration that the patient might be at risk for infection with the SARS-CoV-2 virus that causes COVID-19.   Applicable protocols for evaluation were followed during the patient's care.   COVID-19 was considered as part of the patient's evaluation. The plan for testing is:  a test was obtained during this visit.    Diagnosis:    ICD-10-CM    1. Anemia due to blood loss, acute  D62    2. Hyponatremia  E87.1    3. Ulcerative colitis with rectal bleeding, unspecified location (H)  K51.911        Discharge Medications:  New Prescriptions    No medications on file       Scribe Disclosure:  Nazia GOMEZ, am serving as a scribe at 6:35 PM on 9/11/2021 to document services personally performed by Domingo Atkinson MD based on my observations and the provider's statements to me.              Domingo Atkinson MD  09/12/21 0351

## 2021-09-11 NOTE — LETTER
Earlene RN -[phone] .  New ostomy. Needs RN support please.     Primary MD Dr Shahrzad Flaherty  [address] [place] [facility], [place] [id number]   ~12.2 mi  [phone]

## 2021-09-12 ENCOUNTER — APPOINTMENT (OUTPATIENT)
Dept: CARDIOLOGY | Facility: CLINIC | Age: 26
DRG: 329 | End: 2021-09-12
Attending: INTERNAL MEDICINE
Payer: COMMERCIAL

## 2021-09-12 LAB
ANION GAP SERPL CALCULATED.3IONS-SCNC: 5 MMOL/L (ref 3–14)
BLD PROD TYP BPU: NORMAL
BLOOD COMPONENT TYPE: NORMAL
BUN SERPL-MCNC: 9 MG/DL (ref 7–30)
C COLI+JEJUNI+LARI FUSA STL QL NAA+PROBE: NOT DETECTED
C DIFF TOX B STL QL: NEGATIVE
CALCIUM SERPL-MCNC: 7.5 MG/DL (ref 8.5–10.1)
CHLORIDE BLD-SCNC: 99 MMOL/L (ref 94–109)
CO2 SERPL-SCNC: 27 MMOL/L (ref 20–32)
CODING SYSTEM: NORMAL
CREAT SERPL-MCNC: 0.93 MG/DL (ref 0.66–1.25)
CROSSMATCH: NORMAL
EC STX1 GENE STL QL NAA+PROBE: NOT DETECTED
EC STX2 GENE STL QL NAA+PROBE: NOT DETECTED
ERYTHROCYTE [DISTWIDTH] IN BLOOD BY AUTOMATED COUNT: 13 % (ref 10–15)
GFR SERPL CREATININE-BSD FRML MDRD: >90 ML/MIN/1.73M2
GLUCOSE BLD-MCNC: 92 MG/DL (ref 70–99)
HCT VFR BLD AUTO: 21.9 % (ref 40–53)
HGB BLD-MCNC: 6.9 G/DL (ref 13.3–17.7)
HGB BLD-MCNC: 8.2 G/DL (ref 13.3–17.7)
HGB BLD-MCNC: 8.4 G/DL (ref 13.3–17.7)
ISSUE DATE AND TIME: NORMAL
LVEF ECHO: NORMAL
MCH RBC QN AUTO: 27.8 PG (ref 26.5–33)
MCHC RBC AUTO-ENTMCNC: 31.5 G/DL (ref 31.5–36.5)
MCV RBC AUTO: 88 FL (ref 78–100)
NOROV GI+II ORF1-ORF2 JNC STL QL NAA+PR: NOT DETECTED
PLATELET # BLD AUTO: 455 10E3/UL (ref 150–450)
POTASSIUM BLD-SCNC: 3.5 MMOL/L (ref 3.4–5.3)
RBC # BLD AUTO: 2.48 10E6/UL (ref 4.4–5.9)
RVA NSP5 STL QL NAA+PROBE: NOT DETECTED
SALMONELLA SP RPOD STL QL NAA+PROBE: NOT DETECTED
SHIGELLA SP+EIEC IPAH STL QL NAA+PROBE: NOT DETECTED
SODIUM SERPL-SCNC: 131 MMOL/L (ref 133–144)
UNIT ABO/RH: NORMAL
UNIT NUMBER: NORMAL
UNIT STATUS: NORMAL
UNIT TYPE ISBT: 5100
V CHOL+PARA RFBL+TRKH+TNAA STL QL NAA+PR: NOT DETECTED
WBC # BLD AUTO: 15.4 10E3/UL (ref 4–11)
Y ENTERO RECN STL QL NAA+PROBE: NOT DETECTED

## 2021-09-12 PROCEDURE — 93306 TTE W/DOPPLER COMPLETE: CPT | Mod: 26 | Performed by: INTERNAL MEDICINE

## 2021-09-12 PROCEDURE — G0378 HOSPITAL OBSERVATION PER HR: HCPCS

## 2021-09-12 PROCEDURE — 120N000004 HC R&B MS OVERFLOW

## 2021-09-12 PROCEDURE — 250N000011 HC RX IP 250 OP 636: Performed by: PHYSICIAN ASSISTANT

## 2021-09-12 PROCEDURE — 250N000013 HC RX MED GY IP 250 OP 250 PS 637: Performed by: INTERNAL MEDICINE

## 2021-09-12 PROCEDURE — 86923 COMPATIBILITY TEST ELECTRIC: CPT | Performed by: PHYSICIAN ASSISTANT

## 2021-09-12 PROCEDURE — 258N000003 HC RX IP 258 OP 636: Performed by: PHYSICIAN ASSISTANT

## 2021-09-12 PROCEDURE — 80048 BASIC METABOLIC PNL TOTAL CA: CPT | Performed by: INTERNAL MEDICINE

## 2021-09-12 PROCEDURE — 87493 C DIFF AMPLIFIED PROBE: CPT | Performed by: PHYSICIAN ASSISTANT

## 2021-09-12 PROCEDURE — 99233 SBSQ HOSP IP/OBS HIGH 50: CPT | Performed by: PHYSICIAN ASSISTANT

## 2021-09-12 PROCEDURE — 85027 COMPLETE CBC AUTOMATED: CPT | Performed by: INTERNAL MEDICINE

## 2021-09-12 PROCEDURE — 36415 COLL VENOUS BLD VENIPUNCTURE: CPT | Performed by: INTERNAL MEDICINE

## 2021-09-12 PROCEDURE — 250N000011 HC RX IP 250 OP 636: Performed by: INTERNAL MEDICINE

## 2021-09-12 PROCEDURE — 87506 IADNA-DNA/RNA PROBE TQ 6-11: CPT | Performed by: PHYSICIAN ASSISTANT

## 2021-09-12 PROCEDURE — P9016 RBC LEUKOCYTES REDUCED: HCPCS | Performed by: PHYSICIAN ASSISTANT

## 2021-09-12 PROCEDURE — 258N000003 HC RX IP 258 OP 636: Performed by: INTERNAL MEDICINE

## 2021-09-12 PROCEDURE — 250N000012 HC RX MED GY IP 250 OP 636 PS 637: Performed by: INTERNAL MEDICINE

## 2021-09-12 PROCEDURE — 87040 BLOOD CULTURE FOR BACTERIA: CPT | Performed by: PHYSICIAN ASSISTANT

## 2021-09-12 PROCEDURE — 85018 HEMOGLOBIN: CPT | Performed by: PHYSICIAN ASSISTANT

## 2021-09-12 PROCEDURE — 93306 TTE W/DOPPLER COMPLETE: CPT

## 2021-09-12 PROCEDURE — 36415 COLL VENOUS BLD VENIPUNCTURE: CPT | Performed by: PHYSICIAN ASSISTANT

## 2021-09-12 RX ORDER — METHYLPREDNISOLONE SODIUM SUCCINATE 40 MG/ML
20 INJECTION, POWDER, LYOPHILIZED, FOR SOLUTION INTRAMUSCULAR; INTRAVENOUS EVERY 8 HOURS
Status: DISCONTINUED | OUTPATIENT
Start: 2021-09-12 | End: 2021-09-18

## 2021-09-12 RX ORDER — DEXTROSE MONOHYDRATE, SODIUM CHLORIDE, AND POTASSIUM CHLORIDE 50; 1.49; 4.5 G/1000ML; G/1000ML; G/1000ML
INJECTION, SOLUTION INTRAVENOUS CONTINUOUS
Status: DISCONTINUED | OUTPATIENT
Start: 2021-09-12 | End: 2021-09-14

## 2021-09-12 RX ADMIN — ACETAMINOPHEN 650 MG: 325 TABLET, FILM COATED ORAL at 04:24

## 2021-09-12 RX ADMIN — POTASSIUM CHLORIDE, DEXTROSE MONOHYDRATE AND SODIUM CHLORIDE: 150; 5; 450 INJECTION, SOLUTION INTRAVENOUS at 13:01

## 2021-09-12 RX ADMIN — METHYLPREDNISOLONE SODIUM SUCCINATE 20 MG: 40 INJECTION, POWDER, FOR SOLUTION INTRAMUSCULAR; INTRAVENOUS at 21:09

## 2021-09-12 RX ADMIN — SODIUM CHLORIDE 300 MG: 9 INJECTION, SOLUTION INTRAVENOUS at 14:06

## 2021-09-12 RX ADMIN — ENOXAPARIN SODIUM 40 MG: 40 INJECTION SUBCUTANEOUS at 14:02

## 2021-09-12 RX ADMIN — PREDNISONE 40 MG: 20 TABLET ORAL at 08:48

## 2021-09-12 RX ADMIN — METHYLPREDNISOLONE SODIUM SUCCINATE 20 MG: 40 INJECTION, POWDER, FOR SOLUTION INTRAMUSCULAR; INTRAVENOUS at 12:59

## 2021-09-12 ASSESSMENT — ACTIVITIES OF DAILY LIVING (ADL)
DIFFICULTY_EATING/SWALLOWING: NO
TOILETING_ISSUES: NO
DOING_ERRANDS_INDEPENDENTLY_DIFFICULTY: NO
WALKING_OR_CLIMBING_STAIRS_DIFFICULTY: NO
CONCENTRATING,_REMEMBERING_OR_MAKING_DECISIONS_DIFFICULTY: NO
FALL_HISTORY_WITHIN_LAST_SIX_MONTHS: NO
DIFFICULTY_COMMUNICATING: NO
DRESSING/BATHING_DIFFICULTY: NO

## 2021-09-12 NOTE — PROGRESS NOTES
[facility]  Hospitalist Progress Note  Caroline Galvan PA-C 09/12/2021    Reason for Stay (Diagnosis): Diarrhea and hematochezia         Assessment and Plan:      Summary of Stay: Luther Wood is a 25 year old male with history of ulcerative colitis admitted on 9/11/2021 with frequent loose stools including blood, fatigue and syncope.  On admission he was afebrile with heart rate of 128, pressure 99/63 and breathing comfortably on room air without hypoxia.  Lab work was remarkable for sodium 130, normal electrolytes, normal LFTs, , lipase 64, TSH 1.08, glucose 130, WBC 19 and hemoglobin 7.9 with platelets 529.  He was treated with IV fluids and did become febrile overnight with T-max of 101.5.  Telemetry monitoring has shown a sinus rhythm.  GI was consulted and echocardiogram was performed this morning.    #Profuse diarrhea with hematochezia  #Known history of ulcerative colitis with current flare  Patient presents with frequent loose stools including blood along with dizziness, lightheadedness and fatigue.  He was admitted 9/2 until 9/9 for acute ulcerative colitis flare received methylprednisolone 20 mg every 8 hours with flex sig performed on 9/6 showing severe ulcerative colitis and biopsies were taken.  He was started on Inflectra by GI with transition of steroids to prednisone 40 mg daily and discharged on 9/9.  C. difficile testing is negative and enteric panel is pending but low suspicion for this given no abdominal cramping.  Likely continued diarrhea and bleeding is related to flare.  -GI consulted and will give and 2nd dose of Inflectra today  -GI recommends switching Prednisone to Solumedrol  -C diff negative, enteric pending  -GI recommended anticoagulation due to high risk of blood clots  -GI said not to use Imodium or Lomitol    #Acute on chronic anemia due to blood loss  Patient complains of dizziness, lightheadedness and fatigue with hemoglobin of 6.9 this morning.   This is in the setting of bloody stool related to ulcerative colitis flare.  He is ideally stable and agreeable to blood products this morning.  -1 unit of blood ordered  -Recheck hemoglobin every 6 hours  -Conditional blood ordered <7  -Avoid anticoagulants  -D5 in half-normal saline with 20 of K at 100 mL/h    #Fever and leukocytosis  Tmax 101.5 and WBC elevated to 15.4 (down from 19). No focal infectious symptoms other than diarrhea with blood. Likely fever related to colitis  -No antibiotics indicated  -Blood cultures x 2 drawn  -Continue to monitor    #Syncope  Patient had episode of syncope while brushing his teeth.  He recalls feeling funny at the time and subsequently passed out.  His father caught him before he hit the ground and woke up after 5 seconds.  Suspect this is related to hypovolemia and anemia, likely not to be cardiac etiology.  He was monitored on telemetry overnight with no arrhythmia and echocardiogram shows no structural disease of his heart.  -Discontinue telemetry    #Hyponatremia  Sodium 131 this morning.  Given poor oral intake I am ordering D5 with half-normal saline and 20 of potassium 100 mL/h.  -Check in a.m. and adjust fluids if needed    #Nonsevere malnutrition  Was seen by nutrition on his last admission with recommendation for pushing protein with meals and to have small frequent meals with oral nutritional supplements.          DVT Prophylaxis: Lovenox 40 mg (recommended by GI)  Code Status: Full Code  Discharge Dispo: Anticipate 2-3 day stay          Interval History (Subjective):      Continues to have bloody loose stool in addition to multiple watery bowel movements occurring every hour overnight.  Did feel warm when he had the fever but no chills.  No abdominal pain, urinary symptoms, shortness of breath or cough.                  Physical Exam:      Last Vital Signs:  /62 (BP Location: Left arm)   Pulse 110   Temp 100  F (37.8  C) (Oral)   Resp 16   Ht 1.854 m (6'  "1\")   Wt 61.2 kg (135 lb)   SpO2 94%   BMI 17.81 kg/m      No intake or output data in the 24 hours ending 21 1219    Constitutional: Awake, alert, cooperative, no apparent distress   Respiratory: Clear to auscultation bilaterally, no crackles or wheezing   Cardiovascular: Regular rate and rhythm, normal S1 and S2, and no murmur noted   Abdomen: Hypoactive bowel sounds, non distended, diffuse mild tenderness   Skin: No rashes, no cyanosis, dry to touch   Neuro: Alert and oriented x3, no weakness, numbness, memory loss   Extremities: No edema, normal range of motion   Other(s):        All other systems: Negative          Medications:      All current medications were reviewed with changes reflected in problem list.         Data:      All new lab and imaging data was reviewed.   Labs:  Recent Labs   Lab 21  0603   *   POTASSIUM 3.5   CHLORIDE 99   CO2 27   ANIONGAP 5   GLC 92   BUN 9   CR 0.93   GFRESTIMATED >90   MADIE 7.5*     Recent Labs   Lab 21  0603   WBC 15.4*   HGB 6.9*   HCT 21.9*   MCV 88   *      Imaging:   Recent Results (from the past 24 hour(s))   Echocardiogram Complete   Result Value    LVEF  65-70%    Narrative    [id number]  [id number]  [id number]  [id number]^KATIANA^CHELLE^CLYDE     [facility]  Echocardiography Laboratory  [address] [place] [address]     Name: RENNY AN  MRN: [id number]  : 1995  Study Date: 2021 07:40 AM  Age: 25 yrs  Gender: Male  Patient Location: [facility]  Reason For Study: Syncope  Ordering Physician: CHELLE SAAB  Performed By: Nagi Narayan RDCS     BSA: 1.8 m2  Height: 73 in  Weight: 135 lb  HR: 134  BP: 116/66 mmHg  ______________________________________________________________________________  Procedure  Complete Echo Adult.  ______________________________________________________________________________  Interpretation Summary     Normal transthoracic echocardiogram     No prior study available for " comparison.  ______________________________________________________________________________  Left Ventricle  The left ventricle is normal in size. There is normal left ventricular wall  thickness. Hyperdynamic left ventricular function. The visual ejection  fraction is 65-70%. Left ventricular diastolic function is normal. Normal left  ventricular wall motion.     Right Ventricle  The right ventricle is normal size. The right ventricular systolic function is  normal.     Atria  Normal left atrial size. Right atrial size is normal. There is no color  Doppler evidence of an atrial shunt.     Mitral Valve  The mitral valve leaflets appear normal. There is no evidence of stenosis,  fluttering, or prolapse. There is physiologic mitral regurgitation. There is  no mitral valve stenosis.     Tricuspid Valve  Normal tricuspid valve. There is trace tricuspid regurgitation. The right  ventricular systolic pressure is approximated at 24.1 mmHg plus the right  atrial pressure. IVC diameter <2.1 cm collapsing >50% with sniff suggests a  normal RA pressure of 3 mmHg.     Aortic Valve  The aortic valve is not well visualized. No aortic regurgitation is present.  No aortic stenosis is present.     Pulmonic Valve  The pulmonic valve is not well visualized.     Vessels  The aortic root is normal size.     Pericardium  There is no pericardial effusion.     ______________________________________________________________________________  MMode/2D Measurements & Calculations  IVSd: 0.72 cm  LVIDd: 4.6 cm  LVIDs: 2.9 cm  LVPWd: 0.90 cm  FS: 36.2 %     LV mass(C)d: 121.1 grams  LV mass(C)dI: 66.5 grams/m2  Ao root diam: 2.8 cm  LA dimension: 3.0 cm  LA/Ao: 1.1  LA Volume (BP): 20.3 ml  LA Volume Index (BP): 11.2 ml/m2  RWT: 0.39     Doppler Measurements & Calculations  MV E max jayy: 102.8 cm/sec  MV A max jayy: 62.6 cm/sec  MV E/A: 1.6  MV dec time: 0.20 sec     TR max jayy: 245.7 cm/sec  TR max P.1 mmHg  E/E' av.6  Lateral E/e':  5.0  [facility] E/e': 6.3     ______________________________________________________________________________  Report approved by: MD Chadwick Santos 09/12/2021 09:40 AM

## 2021-09-12 NOTE — DISCHARGE SUMMARY
[facility]    Discharge Summary  Hospitalist    Date of Admission:  9/2/2021  Date of Discharge:  9/9/2021  5:50 PM  Discharging Provider: Chirag Turk MD  Date of Service (when I saw the patient): 9/9/2021    Discharge Diagnoses      1.  Acute ulcerative colitis flare.       2.  Acute blood loss anemia. Likely due to ongoing hematochezia from ulcerative colitis flare.    3.  Leukocytosis. Steroid-induced.  No evidence of infection at this time.  Will monitor CBC      4. Non-Severe Malnutrition, POA: based on Weight loss;Reduced intake;Muscle loss (09/04/21 8703)     History of Present Illness   Luther Wood is an 25 year old male who presented with abdominal pain.    Hospital Course   Luther Wood is a 25 year old male with PMH significant for ulcerative pancolitis (diagnosed in 2019), clostridium difficile, and ADD.  He presented to the hospital with abdominal pain and diarrhea, blood in his stools.  He was diagnosed with acute ulcerative colitis flare. He was started on IV Solu-Medrol. Gastroenterology following.     1.  Acute ulcerative colitis flare.    -Currently on  methylprednisolone 20 mg IV every 8 hours.   -Underwent flex sig on 9/6  and found to have severe ulcerative colitis which was biopsied.   -Started on Inflectra today per GI. Steroids changed to prednisone 40 mg daily.  Gastroenterology recommended to discharge the patient on prednisone taper.  Patient had significant improvement of his symptoms.  He had no pain at the time of discharge.  He wanted to use nonnarcotic pain medications for his pain.  Gastroenterology recommended to discharge patient home with a plan to follow-up as outpatient.    2.  Acute blood loss anemia. Likely due to ongoing hematochezia from ulcerative colitis flare.  -Hemoglobin 9.1 on 9/6.  Hemoglobin was down to 8.7 on 9/9.  He was given a dose of IV Venofer.  He was advised to follow-up as outpatient.    3.  Leukocytosis.  Steroid-induced.  No evidence of infection at this time.       4. Non-Severe Malnutrition, POA: based on Weight loss;Reduced intake;Muscle loss (09/04/21 1423)     Patient remained stable during hospital course.  He was discharged home in a stable condition.    Significant Results and Procedures   Results for orders placed or performed during the hospital encounter of 09/02/21   CT Abdomen Pelvis w Contrast    Narrative    CT ABDOMEN PELVIS W CONTRAST 9/3/2021 4:52 PM    CLINICAL HISTORY: History of ulcerative colitis and C. difficile  colitis. Abdominal pain and  blood in stools. Abdominal pain, acute  (Ped 0-18y)    TECHNIQUE: CT scan of the abdomen and pelvis was performed following  injection of IV contrast. Multiplanar reformats were obtained. Dose  reduction techniques were used.  CONTRAST: 82mL Isovue-370    COMPARISON: None available.    FINDINGS:   LOWER CHEST: Normal.    HEPATOBILIARY: Normal.    PANCREAS: Normal.    SPLEEN: Normal.    ADRENAL GLANDS: Normal.    KIDNEYS/BLADDER: Normal.    BOWEL: Mural hyperenhancement predominantly involving the upper rectum  and sigmoid colon with mild wall thickening of the sigmoid colon. The  descending colon decompressed which limits its evaluation. The  remainder of the colon does not demonstrate significant wall  thickening. Moderate amount of formed stool in the cecum and ascending  colon. No small bowel or colonic obstruction. Normal appendix. No  abscess or free air.    PELVIC ORGANS: Normal.    ADDITIONAL FINDINGS: No lymphadenopathy. Small amount of free fluid in  the pelvis.    MUSCULOSKELETAL: Small sclerotic focus in the right posterior iliac  bone (series 3, image 125), likely a benign bone island. Otherwise, as  well as bones are normal.      Impression    IMPRESSION:   1.  Mild inflammatory changes involving the sigmoid colon and rectum  compatible with proctocolitis. No abscess or free air. Trace free  fluid in the pelvis.    ESTHER BENNETT MD          SYSTEM ID:  [id number]         Pending Results   None  Code Status   Full Code       Primary Care Physician   Physician No Ref-Primary    Discharge Disposition   Discharged to home  Condition at discharge: Stable    Consultations This Hospital Stay   GASTROENTEROLOGY IP CONSULT  CARE MANAGEMENT / SOCIAL WORK IP CONSULT  PHARMACY LIAISON FOR MEDICATION COVERAGE CONSULT    Time Spent on this Encounter   Chirag GOMEZ MD, MD, personally saw the patient today and spent less than or equal to 30 minutes discharging this patient.    Discharge Orders      Reason for your hospital stay    Ulcerative colitis flare     Activity    Your activity upon discharge: activity as tolerated     Follow-up and recommended labs and tests     Follow up with primary care provider, Physician No Ref-Primary, within 7 days  Outpatient CBC  Follow up with gastroenterology as scheduled     Diet    Follow this diet upon discharge: Orders Placed This Encounter      Regular Diet Adult     Discharge Medications   Discharge Medication List as of 9/9/2021  4:42 PM      CONTINUE these medications which have CHANGED    Details   predniSONE (DELTASONE) 10 MG tablet Prednisone 40 mg per day for 2 weeks, then 20 mg per day for 2 weeks, then 15 mg per day for 2 weeks, then 10 mg per day for 2 weeks, then 5 mg per day for 2 weeks, then stop, Disp-255 tablet, R-0, E-Prescribe         CONTINUE these medications which have NOT CHANGED    Details   balsalazide (COLAZAL) 750 MG capsule Take 2,250 mg by mouth 2 times daily, Historical      CLARAVIS 20 MG capsule Take 20 mg by mouth daily with food, JOSE, Historical             Allergies   No Known Allergies  Data   Most Recent 3 CBC's:Recent Labs   Lab Test 09/09/21  0630 09/08/21  0632 09/06/21  0621   WBC 21.1* 19.3* 20.6*   HGB 8.7* 9.1* 10.5*   MCV 89 89 90    433 451*      Most Recent 3 BMP's:  Recent Labs   Lab Test 09/11/21  1850 09/09/21  0630 09/07/21  1505 09/06/21  0621   * 135   --  135   POTASSIUM 4.0 3.6  --  4.0   CHLORIDE 94 101  --  101   CO2 31 32  --  33*   BUN 10 13  --  16   CR 1.00 1.03  --  1.04   ANIONGAP 5 2*  --  1*   MADIE 8.3* 8.1*  --  8.5   * 88 139* 124*     Most Recent 2 LFT's:  Recent Labs   Lab Test 09/11/21  1850 09/03/21  0637   AST 23 8   ALT 60 18   ALKPHOS 110 71   BILITOTAL 0.4 0.6     Most Recent INR's and Anticoagulation Dosing History:  Anticoagulation Dose History    There is no flowsheet data to display.       Most Recent 3 Troponin's:No lab results found.  Most Recent Cholesterol Panel:No lab results found.  Most Recent 6 Bacteria Isolates From Any Culture (See EPIC Reports for Culture Details):No lab results found.  Most Recent TSH, T4 and A1c Labs:No lab results found.

## 2021-09-12 NOTE — H&P
[facility]    History and Physical - Hospitalist Service       Date of Admission:  9/11/2021    Assessment & Plan      Luther Wood is a 25 year old male admitted on 9/11/2021. He has a past medical history significant for ulcerative colitis with recent ulcerative colitis flare requiring hospitalization from 9/2 until 9/9/2021.  He returned to the emergency room today due to fatigue.  Noted to have worsening anemia.    1.  Ulcerative colitis.  With recent flare.  Hospitalized from 9/2 until 9/9.  Had been on IV methylprednisolone for several days.  Did receive Remicade during hospital stay.  Had also had a slowly worsening anemia during his recent hospital stay.  Did have an IV iron sucrose infusion.  -Continue prednisone 40 mg a day.  -IV fluids.  -Gastroenterology consult.    2.  Acute blood loss anemia.  Slowly worsening over the past 10 days.  Hemoglobin 7.9 today.  Bleeding in stools is actually decreased the last couple of days.  -Recheck CBC in the morning.    3.  Hyponatremia.  Mild.  Sodium 130.  -Start continuous IV fluids.  -Recheck metabolic panel the morning.    4.  Syncopal episode.  Suspect vasovagal.  -Monitor on telemetry.  -Check echocardiogram.     Diet:  Regular diet.  DVT Prophylaxis: Pneumatic Compression Devices  Jacob Catheter: Not present  Central Lines: None  Code Status:  Full code.    Clinically Significant Risk Factors Present on Admission         # Hyponatremia: Na = 130 mmol/L (Ref range: 133 - 144 mmol/L) on admission, will monitor as appropriate     # Coagulation Defect: INR = 1.34 (Ref range: 0.85 - 1.15) and/or PTT = N/A on admission, will monitor for bleeding           Stewart Rosario DO  [facility]  Securely message with the Vocera Web Console (learn more here)  Text page via Veot[employer] Paging/Directory      ______________________________________________________________________    Chief Complaint   Fatigue.    History is obtained  from the patient    History of Present Illness   Luther Wood is a 25 year old male who has a past medical history significant for ulcerative colitis with recent ulcerative colitis flare requiring hospitalization from 9/2 until 9/9/2021.  He has noticed worsening fatigue since discharging from the hospital.  He does continue to have some blood in his stools.  Stools are more formed than they had been during his hospital stay.  Blood is less than it had been during hospital stay.  However, he has been feeling dizzy and lightheaded at times.  Feels that he has no energy.  Yesterday, had an episode where he was brushing his teeth.  His father was standing near him at the time.  He mentioned that he was feeling funny.  Then seemed to pass out.  Started to fall forward.  His father caught him before he fell.  Seem to wake up after about 5 seconds.  No trauma.  Did have 1 similar episode during his recent hospital stay.  No chest pain.  No palpitations.  Did feel hot today.  Has been noted to have a low-grade temperature elevation.  No cough.  No other acute complaints.    Review of Systems    The 10 point Review of Systems is negative other than noted in the HPI     Past Medical History    I have reviewed this patient's medical history and updated it with pertinent information if needed.   Past Medical History:   Diagnosis Date     Colitis 2019       Past Surgical History   I have reviewed this patient's surgical history and updated it with pertinent information if needed.  History reviewed. No pertinent surgical history.    Social History   I have reviewed this patient's social history and updated it with pertinent information if needed.  Social History     Tobacco Use     Smoking status: Never Smoker     Smokeless tobacco: Never Used   Substance Use Topics     Alcohol use: None     Drug use: None       Family History   I have reviewed this patient's family history and updated it with pertinent information if  needed.  Family History   Problem Relation Age of Onset     Diabetes Father         type I       Prior to Admission Medications   Prior to Admission Medications   Prescriptions Last Dose Informant Patient Reported? Taking?   CLARAVIS 20 MG capsule 9/1/2021 at ON HOLD  Yes No   Sig: Take 20 mg by mouth daily with food   balsalazide (COLAZAL) 750 MG capsule 9/1/2021 at ON HOLD  Yes No   Sig: Take 2,250 mg by mouth 2 times daily   predniSONE (DELTASONE) 10 MG tablet 9/11/2021 at am  No Yes   Sig: Prednisone 40 mg per day for 2 weeks, then 20 mg per day for 2 weeks, then 15 mg per day for 2 weeks, then 10 mg per day for 2 weeks, then 5 mg per day for 2 weeks, then stop      Facility-Administered Medications: None     Allergies   No Known Allergies    Physical Exam   Vital Signs: Temp: 100.2  F (37.9  C) Temp src: Oral BP: 116/66 Pulse: 96   Resp: 22 SpO2: 99 % O2 Device: None (Room air)    Weight: 0 lbs 0 oz    Gen:  NAD, A&Ox3.  Eyes:  PERRL, sclera anicteric.  OP:  MMM, no lesions.  Neck:  Supple.  CV:  Regular, no murmurs.  Lung:  CTA b/l, normal effort.  Ab:  +BS, soft.  Skin:  Warm, dry to touch.  No rash.  Ext:  No pitting edema LE b/l.      Data   Data reviewed today: I reviewed all medications, new labs and imaging results over the last 24 hours.    Recent Labs   Lab 09/11/21  1918 09/11/21  1850 09/09/21  0630 09/08/21  0632 09/07/21  1505 09/06/21  0621   WBC 19.0*  --  21.1* 19.3*  --  20.6*   HGB 7.9*  --  8.7* 9.1*  --  10.5*   MCV 86  --  89 89  --  90   *  --  436 433  --  451*   INR 1.34*  --   --   --   --   --    NA  --  130* 135  --   --  135   POTASSIUM  --  4.0 3.6  --   --  4.0   CHLORIDE  --  94 101  --   --  101   CO2  --  31 32  --   --  33*   BUN  --  10 13  --   --  16   CR  --  1.00 1.03  --   --  1.04   ANIONGAP  --  5 2*  --   --  1*   MADIE  --  8.3* 8.1*  --   --  8.5   GLC  --  130* 88  --  139* 124*   ALBUMIN  --  1.9*  --   --   --   --    PROTTOTAL  --  6.3*  --   --   --   --     BILITOTAL  --  0.4  --   --   --   --    ALKPHOS  --  110  --   --   --   --    ALT  --  60  --   --   --   --    AST  --  23  --   --   --   --    LIPASE  --  64*  --   --   --   --

## 2021-09-12 NOTE — PHARMACY-ADMISSION MEDICATION HISTORY
Admission medication history interview status for this patient is complete. See [facility] admission navigator for allergy information, prior to admission medications and immunization status.     Medication history interview done, indicate source(s): Patient  Medication history resources (including written lists, pill bottles, clinic record): SureScripts and Care Everywhere  Pharmacy: [facility] [place]    Changes made to PTA medication list:  Added: none  Changed: none  Reported as Not Taking: none  Removed: budesonide    Actions taken by pharmacist (provider contacted, etc): Spoke with patient about home med list     Additional medication history information: balsalazide and Claravis on hold due to colitis flare. Currently on prednisone 40mg (patient said he started 9/10/21 in the morning, but was also taking 40mg when admitted on 9/1/21).    Medication reconciliation/reorder completed by provider prior to medication history?  N   (Y/N)       Prior to Admission medications    Medication Sig Last Dose Taking? Auth Provider   predniSONE (DELTASONE) 10 MG tablet Prednisone 40 mg per day for 2 weeks, then 20 mg per day for 2 weeks, then 15 mg per day for 2 weeks, then 10 mg per day for 2 weeks, then 5 mg per day for 2 weeks, then stop 9/11/2021 at am Yes Chirag Turk MD   balsalazide (COLAZAL) 750 MG capsule Take 2,250 mg by mouth 2 times daily 9/1/2021 at ON HOLD  Unknown, Entered By History   CLARAVIS 20 MG capsule Take 20 mg by mouth daily with food 9/1/2021 at ON HOLD  Unknown, Entered By History

## 2021-09-12 NOTE — ED NOTES
[facility]  ED Nurse Handoff Report    Luther Wood is a 25 year old male   ED Chief complaint: Dehydration  . ED Diagnosis:   Final diagnoses:   Anemia due to blood loss, acute   Hyponatremia   Ulcerative colitis with rectal bleeding, unspecified location (H)     Allergies: No Known Allergies    Code Status: Full Code  Activity level - Baseline/Home:  Independent. Activity Level - Current:   Stand by Assist. Lift room needed: No. Bariatric: No   Needed: No   Isolation: No. Infection: Not Applicable.     Vital Signs:   Vitals:    09/11/21 2000 09/11/21 2015 09/11/21 2030 09/11/21 2045   BP: 118/72 116/77 121/68 113/57   Pulse: 93 95 99 93   Resp: 20 15 12 15   Temp:  100.2  F (37.9  C)     TempSrc:  Oral     SpO2: 100% 100% 100% 100%       Cardiac Rhythm:  ,   Cardiac  Cardiac Rhythm: Sinus tachycardia  Pain level:    Patient confused: No. Patient Falls Risk: Yes.   Elimination Status: Has voided   Patient Report - Initial Complaint: Dehydration. Focused Assessment:   19:11 Cardiac Cardiac - Cardiac WDL: WDL   Cardiac Monitoring - EKG Monitoring: Yes  Cardiac Regularity: Regular  Cardiac Rhythm: ST  EB      19:11 Neurological Cognitive - Cognitive/Neuro/Behavioral WDL: WDL   [place] Coma Scale - Best Eye Response: 4-->(E4) spontaneous  Best Motor Response: 6-->(M6) obeys commands  Best Verbal Response: 5-->(V5) oriented  [place] Coma Scale Score: 15  EB     19:11 Musculoskeletal Musculoskeletal - Musculoskeletal WDL: .WDL except  General Mobility: generalized weakness (Generalized weakness and fatigue since d/c from hospital 2 days ago for UC flare.) EB     19:11 Respiratory Respiratory - Respiratory WDL: WDL  Breath Sounds: All Fields   Head To Toe Assessment - All Lung Fields Breath Sounds: Anterior:; Posterior:; clear; equal bilaterally           Tests Performed: Labs. Abnormal Results:   Labs Ordered and Resulted from Time of ED Arrival Up to the Time of Departure from the ED    COMPREHENSIVE METABOLIC PANEL - Abnormal; Notable for the following components:       Result Value    Sodium 130 (*)     Calcium 8.3 (*)     Glucose 130 (*)     Protein Total 6.3 (*)     Albumin 1.9 (*)     All other components within normal limits   LIPASE - Abnormal; Notable for the following components:    Lipase 64 (*)     All other components within normal limits   CRP INFLAMMATION - Abnormal; Notable for the following components:    CRP Inflammation 253.0 (*)     All other components within normal limits   ERYTHROCYTE SEDIMENTATION RATE AUTO - Abnormal; Notable for the following components:    Erythrocyte Sedimentation Rate 68 (*)     All other components within normal limits   INR - Abnormal; Notable for the following components:    INR 1.34 (*)     All other components within normal limits   CBC WITH PLATELETS AND DIFFERENTIAL - Abnormal; Notable for the following components:    WBC Count 19.0 (*)     RBC Count 2.89 (*)     Hemoglobin 7.9 (*)     Hematocrit 24.9 (*)     Platelet Count 529 (*)     Absolute Neutrophils 14.8 (*)     Absolute Monocytes 2.2 (*)     Absolute Immature Granulocytes 0.4 (*)     All other components within normal limits   TSH WITH FREE T4 REFLEX - Normal   CBC WITH PLATELETS & DIFFERENTIAL    Narrative:     The following orders were created for panel order CBC with platelets differential.  Procedure                               Abnormality         Status                     ---------                               -----------         ------                     CBC with platelets and d...[[id number]]  Abnormal            Final result                 Please view results for these tests on the individual orders.   COVID-19 VIRUS (CORONAVIRUS) BY PCR   TYPE AND SCREEN, ADULT   ABO/RH TYPE AND SCREEN    Narrative:     The following orders were created for panel order ABO/Rh type and screen.  Procedure                               Abnormality         Status                     ---------                                -----------         ------                     Adult Type and Screen[[id number]]                            Edited Result - FINAL        Please view results for these tests on the individual orders.      Treatments provided: IVF  Family Comments: Parents at bedside.  OBS brochure/video discussed/provided to patient:  Yes  ED Medications:   Medications   0.9% sodium chloride BOLUS (0 mLs Intravenous Stopped 9/11/21 2001)     Followed by   sodium chloride 0.9% infusion (has no administration in time range)   0.9% sodium chloride BOLUS (1,000 mLs Intravenous New Bag 9/11/21 2011)     Drips infusing:  No  For the majority of the shift, the patient's behavior Green. Interventions performed were N/A.    Sepsis treatment initiated: No     Patient tested for COVID 19 prior to admission: YES    ED Nurse Name/Phone Number: Kay Arthur RN,   9:59 PM      RECEIVING UNIT ED HANDOFF REVIEW    Above ED Nurse Handoff Report was reviewed: Yes  Reviewed by: Barbara Lucio RN on September 11, 2021 at 10:22 PM

## 2021-09-12 NOTE — PROGRESS NOTES
ROOM # 221    Living Situation (if not independent, order SW consult): Home with parents  Facility name: N/A  : Kannan (Father) [phone]    Activity level at baseline: Independent  Activity level on admit: Independent      Patient registered to observation; given Patient Bill of Rights; given the opportunity to ask questions about observation status and their plan of care.  Patient has been oriented to the observation room, bathroom and call light is in place.    Discussed discharge goals and expectations with patient/family.

## 2021-09-12 NOTE — PLAN OF CARE
PRIMARY DIAGNOSIS: Hyponatremia    OUTPATIENT/OBSERVATION GOALS TO BE MET BEFORE DISCHARGE  1. Orthostatic performed: No    2. Tolerating PO fluid and/or antibiotics (if applicable): Yes    3. Nausea/Vomiting/Diarrhea symptoms improved: No,  watery stools present    4. Pain status: Pain free.    5. Return to near baseline physical activity: Yes    Discharge Planner Nurse   Safe discharge environment identified: Yes  Barriers to discharge: No    Please review provider order for any additional goals.   Nurse to notify provider when observation goals have been met and patient is ready for discharge.    Tmax 100.3, HR 1teens. Denies pain. Continues to have watery stools (brown/red) but seems less frequent than overnight. Cdiff neg, enteric panel pending. On PO prednisone. Hgb 6.9, blood transfusion x1. Tolerating PO intake. Tele - ST . GI consulted. Will continue to monitor.

## 2021-09-12 NOTE — PLAN OF CARE
PRIMARY DIAGNOSIS: UC FLARE / FATIGUE / FAINTING SPELL  OUTPATIENT/OBSERVATION GOALS TO BE MET BEFORE DISCHARGE:  1. Orthostatic performed: N/A    2. Diagnostic testing complete & at baseline neurologic testing: No    3. Cleared by consultants (if involved): No    4. Interpretation of cardiac rhythm per telemetry tech: NSR with HR 97    5. Tolerating adequate PO diet and medications: Yes    6. Return to near baseline physical activity or neurologic status: Yes     Vitals are Temp: 100.1  F (37.8  C) Temp src: Oral BP: 118/60 Pulse: 91   Resp: 20 SpO2: 100 %.    Patient is Alert and Oriented x4. They are SBA with no assistive devices. Pt has regular diet ordered. But patient states he is on a low fiber / gluten free / dairy free diet due to his UC. They are denying pain. Patient has Normal Saline 0.9% running at 100 mL per hour. Plan for echo in AM, monitor stools overnight, and recheck labs.      Discharge Planner Nurse   Safe discharge environment identified: Yes  Barriers to discharge: Yes       Entered by: Barbara Lucio 09/12/2021 1:13 AM     Please review provider order for any additional goals.   Nurse to notify provider when observation goals have been met and patient is ready for discharge.

## 2021-09-12 NOTE — PLAN OF CARE
PRIMARY DIAGNOSIS: Hyponatremia    OUTPATIENT/OBSERVATION GOALS TO BE MET BEFORE DISCHARGE  1. Orthostatic performed: No    2. Tolerating PO fluid and/or antibiotics (if applicable): Yes    3. Nausea/Vomiting/Diarrhea symptoms improved: No,  watery stools present    4. Pain status: Pain free.    5. Return to near baseline physical activity: Yes    Discharge Planner Nurse   Safe discharge environment identified: Yes  Barriers to discharge: No       Entered by: Dorcas Victoria 09/12/2021 12:26 PM     Please review provider order for any additional goals.   Nurse to notify provider when observation goals have been met and patient is ready for discharge.    Tmax 100.3, HR 1teens. Denies pain. Continues to have watery stools (brown/red) but seems less frequent than overnight. Cdiff neg, enteric panel pending. On PO prednisone. Hgb 6.9, blood transfusion x1. Tolerating PO intake. Tele - ST . GI consulted. Will continue to monitor.      Addendum: Hgb recheck 8.2. Started on IV solumedrol, inflectra, and lovenox. Cdiff negative, enteric panel pending. Tele discontinued.

## 2021-09-13 LAB
ALBUMIN SERPL-MCNC: 1.5 G/DL (ref 3.4–5)
ALP SERPL-CCNC: 89 U/L (ref 40–150)
ALT SERPL W P-5'-P-CCNC: 43 U/L (ref 0–70)
ANION GAP SERPL CALCULATED.3IONS-SCNC: 3 MMOL/L (ref 3–14)
AST SERPL W P-5'-P-CCNC: 16 U/L (ref 0–45)
ATRIAL RATE - MUSE: 107 BPM
BILIRUB SERPL-MCNC: 0.4 MG/DL (ref 0.2–1.3)
BUN SERPL-MCNC: 8 MG/DL (ref 7–30)
CALCIUM SERPL-MCNC: 7.8 MG/DL (ref 8.5–10.1)
CHLORIDE BLD-SCNC: 100 MMOL/L (ref 94–109)
CO2 SERPL-SCNC: 30 MMOL/L (ref 20–32)
CREAT SERPL-MCNC: 0.81 MG/DL (ref 0.66–1.25)
DIASTOLIC BLOOD PRESSURE - MUSE: NORMAL MMHG
ERYTHROCYTE [DISTWIDTH] IN BLOOD BY AUTOMATED COUNT: 13.2 % (ref 10–15)
GFR SERPL CREATININE-BSD FRML MDRD: >90 ML/MIN/1.73M2
GLUCOSE BLD-MCNC: 134 MG/DL (ref 70–99)
HCT VFR BLD AUTO: 24.3 % (ref 40–53)
HGB BLD-MCNC: 7.7 G/DL (ref 13.3–17.7)
HGB BLD-MCNC: 7.7 G/DL (ref 13.3–17.7)
HGB BLD-MCNC: 7.8 G/DL (ref 13.3–17.7)
HGB BLD-MCNC: 7.9 G/DL (ref 13.3–17.7)
HGB BLD-MCNC: 7.9 G/DL (ref 13.3–17.7)
INTERPRETATION ECG - MUSE: NORMAL
MCH RBC QN AUTO: 27.6 PG (ref 26.5–33)
MCHC RBC AUTO-ENTMCNC: 31.7 G/DL (ref 31.5–36.5)
MCV RBC AUTO: 87 FL (ref 78–100)
P AXIS - MUSE: 81 DEGREES
PLATELET # BLD AUTO: 471 10E3/UL (ref 150–450)
POTASSIUM BLD-SCNC: 4 MMOL/L (ref 3.4–5.3)
PR INTERVAL - MUSE: 146 MS
PROT SERPL-MCNC: 5.4 G/DL (ref 6.8–8.8)
QRS DURATION - MUSE: 86 MS
QT - MUSE: 316 MS
QTC - MUSE: 421 MS
R AXIS - MUSE: 85 DEGREES
RBC # BLD AUTO: 2.79 10E6/UL (ref 4.4–5.9)
SODIUM SERPL-SCNC: 133 MMOL/L (ref 133–144)
SYSTOLIC BLOOD PRESSURE - MUSE: NORMAL MMHG
T AXIS - MUSE: 81 DEGREES
VENTRICULAR RATE- MUSE: 107 BPM
WBC # BLD AUTO: 14.5 10E3/UL (ref 4–11)

## 2021-09-13 PROCEDURE — 80053 COMPREHEN METABOLIC PANEL: CPT | Performed by: INTERNAL MEDICINE

## 2021-09-13 PROCEDURE — 85018 HEMOGLOBIN: CPT | Performed by: INTERNAL MEDICINE

## 2021-09-13 PROCEDURE — 250N000011 HC RX IP 250 OP 636: Performed by: INTERNAL MEDICINE

## 2021-09-13 PROCEDURE — 85018 HEMOGLOBIN: CPT | Performed by: PHYSICIAN ASSISTANT

## 2021-09-13 PROCEDURE — 36415 COLL VENOUS BLD VENIPUNCTURE: CPT | Performed by: PHYSICIAN ASSISTANT

## 2021-09-13 PROCEDURE — 99233 SBSQ HOSP IP/OBS HIGH 50: CPT | Performed by: PHYSICIAN ASSISTANT

## 2021-09-13 PROCEDURE — 36415 COLL VENOUS BLD VENIPUNCTURE: CPT | Performed by: INTERNAL MEDICINE

## 2021-09-13 PROCEDURE — 120N000004 HC R&B MS OVERFLOW

## 2021-09-13 PROCEDURE — 258N000003 HC RX IP 258 OP 636: Performed by: PHYSICIAN ASSISTANT

## 2021-09-13 PROCEDURE — 250N000011 HC RX IP 250 OP 636: Performed by: PHYSICIAN ASSISTANT

## 2021-09-13 RX ADMIN — POTASSIUM CHLORIDE, DEXTROSE MONOHYDRATE AND SODIUM CHLORIDE: 150; 5; 450 INJECTION, SOLUTION INTRAVENOUS at 01:33

## 2021-09-13 RX ADMIN — POTASSIUM CHLORIDE, DEXTROSE MONOHYDRATE AND SODIUM CHLORIDE: 150; 5; 450 INJECTION, SOLUTION INTRAVENOUS at 15:32

## 2021-09-13 RX ADMIN — ENOXAPARIN SODIUM 40 MG: 40 INJECTION SUBCUTANEOUS at 13:37

## 2021-09-13 RX ADMIN — METHYLPREDNISOLONE SODIUM SUCCINATE 20 MG: 40 INJECTION, POWDER, FOR SOLUTION INTRAMUSCULAR; INTRAVENOUS at 13:38

## 2021-09-13 RX ADMIN — METHYLPREDNISOLONE SODIUM SUCCINATE 20 MG: 40 INJECTION, POWDER, FOR SOLUTION INTRAMUSCULAR; INTRAVENOUS at 21:17

## 2021-09-13 RX ADMIN — METHYLPREDNISOLONE SODIUM SUCCINATE 20 MG: 40 INJECTION, POWDER, FOR SOLUTION INTRAMUSCULAR; INTRAVENOUS at 05:23

## 2021-09-13 NOTE — PROGRESS NOTES
[facility]  Hospitalist Progress Note  Caroline Galvan PA-C 09/13/2021    Reason for Stay (Diagnosis): Diarrhea and hematochezia         Assessment and Plan:      Summary of Stay: Luther Wood is a 25 year old male with history of ulcerative colitis admitted on 9/11/2021 with frequent loose stools including blood, fatigue and syncope.  On admission he was afebrile with heart rate of 128, pressure 99/63 and breathing comfortably on room air without hypoxia.  Lab work was remarkable for sodium 130, normal electrolytes, normal LFTs, , lipase 64, TSH 1.08, glucose 130, WBC 19 and hemoglobin 7.9 with platelets 529.  He was treated with IV fluids and did become febrile overnight with T-max of 101.5.  Telemetry monitoring has shown a sinus rhythm.  GI was consulted with transition back to solumedrol and infliximab with improvement. 1 unit of blood given on 9/12 for Hemoglobin of 6.6. Echocardiogram was performed showing no structural disease     #Profuse diarrhea with hematochezia  #Known history of ulcerative colitis with current flare  Patient presents with frequent loose stools (every hour) including blood along with dizziness, lightheadedness and fatigue.  He was admitted 9/2 through 9/9 for acute ulcerative colitis flare received methylprednisolone 20 mg IV every 8 hours with flex sig performed on 9/6 showing severe ulcerative colitis and biopsies were taken.  He was started on Inflectra by GI with transition of steroids to prednisone 40 mg daily and discharged on 9/9.  Repeat C. difficile and enteric panel is negative. GI following with improvement of frequency of loose stools (7 in last 24 hours).   -2nd dose of Infliximab given 9/12  -Solumedrol 20 mg IV every 8 hours  -C diff negative, enteric pending  -GI recommended anticoagulation due to high risk of blood clots  -DO NOT use Imodium or Lomitol  -Regular diet     #Acute on chronic anemia due to blood loss  Symptomatic hemoglobin of  "6.9 on 9/12 in the setting of bloody stool related to ulcerative colitis flare.  Received 1 unit of blood on 9/12 with improvement to 8.4>>7.7   -Recheck hemoglobin every 12 hours given decreased frequency of bleeding  -Conditional blood ordered <7  -D5 in half-normal saline with 20 of K at 100 mL/h     #Fever and leukocytosis  Tmax 101.5 on 9/11 with WBC elevation (14.5) thought to be steroid induced. No focal infectious symptoms other than diarrhea with blood. Likely fever related to colitis  -No antibiotics indicated  -Blood cultures x 2 on 9/12 negative to date  -Continue to monitor     #Syncope  Patient had episode of syncope while brushing his teeth.  He recalls feeling funny at the time and subsequently passed out.  His father caught him before he hit the ground and woke up after 5 seconds.  Suspect this is related to hypovolemia and anemia, likely not to be cardiac etiology.  He was monitored on telemetry overnight with no arrhythmia and echocardiogram shows no structural disease of his heart.  -Discontinue telemetry     #Hyponatremia--resolved     #Nonsevere malnutrition  Was seen by nutrition on his last admission with recommendation for pushing protein with meals and to have small frequent meals with oral nutritional supplements.        DVT Prophylaxis: Lovenox 40 mg (recommended by GI)  Code Status: Full Code  Discharge Dispo: Anticipate 2-3 day stay        Interval History (Subjective):      Reports overall improvement of stool frequency. No fevers. No respiratory symptoms.                   Physical Exam:      Last Vital Signs:  /70 (BP Location: Left arm)   Pulse 87   Temp 98.5  F (36.9  C) (Oral)   Resp 16   Ht 1.854 m (6' 1\")   Wt 61.2 kg (135 lb)   SpO2 98%   BMI 17.81 kg/m      No intake or output data in the 24 hours ending 09/13/21 1319    Constitutional: Awake, alert, cooperative, no apparent distress   Respiratory: Clear to auscultation bilaterally, no crackles or wheezing "   Cardiovascular: Regular rate and rhythm, normal S1 and S2, and no murmur noted   Abdomen: Normal bowel sounds, soft, non-distended, mild tenderness   Skin: No rashes, no cyanosis, dry to touch   Neuro: Alert and oriented x3, no weakness, numbness, memory loss   Extremities: No edema, normal range of motion   Other(s):        All other systems: Negative          Medications:      All current medications were reviewed with changes reflected in problem list.         Data:      All new lab and imaging data was reviewed.   Labs:  Recent Labs   Lab 09/13/21  0623      POTASSIUM 4.0   CHLORIDE 100   CO2 30   ANIONGAP 3   *   BUN 8   CR 0.81   GFRESTIMATED >90   MADIE 7.8*     Recent Labs   Lab 09/13/21  1133 09/13/21  0623   WBC  --  14.5*   HGB 7.8* 7.7*  7.7*   HCT  --  24.3*   MCV  --  87   PLT  --  471*      Imaging:   No results found for this or any previous visit (from the past 24 hour(s)).

## 2021-09-13 NOTE — PROGRESS NOTES
"[facility] - Gastroenterology Progress Note    Subjective:  Subjectively feeling better after infliximab infusion (21).  Slept better overnight with only 3 trips to pass stool during the evening.  Reports at total of 7 BMs over past 24 hours (3 of which were primarily only gas with little stool).    Objective:  Physical Exam:  /70 (BP Location: Left arm)   Pulse 87   Temp 98.5  F (36.9  C) (Oral)   Resp 16   Ht 1.854 m (6' 1\")   Wt 61.2 kg (135 lb)   SpO2 98%   BMI 17.81 kg/m    Temp (24hrs), Av.1  F (37.3  C), Min:98.5  F (36.9  C), Max:100.2  F (37.9  C)    Patient Vitals for the past 72 hrs:   Weight   21 2244 61.2 kg (135 lb)       Intake/Output Summary (Last 24 hours) at 2021 1205  Last data filed at 2021 1300  Gross per 24 hour   Intake 290 ml   Output --   Net 290 ml     General Appearance: Comfortable, laying in bed  Abdomen: Normal    Labs / Imaging:  Recent Labs   Lab Test 21  1133 21  0623 21  0000 21  1846 21  1259 21  0603 21  1918 21  0630 21  0632 21  0621 21  0652   WBC  --  14.5*  --   --   --  15.4* 19.0* 21.1* 19.3* 20.6* 22.7*   HGB 7.8* 7.7*  7.7* 7.9* 8.4* 8.2* 6.9* 7.9* 8.7* 9.1* 10.5* 11.0*   MCV  --  87  --   --   --  88 86 89 89 90 91   PLT  --  471*  --   --   --  455* 529* 436 433 451* 442   INR  --   --   --   --   --   --  1.34*  --   --   --   --      No lab results found.    Invalid input(s): FERRITIN  Recent Labs   Lab Test 21  0623 21  0603 21  18521  0630 21  0621  0637 21  1146   POTASSIUM 4.0 3.5 4.0 3.6 4.0 4.5 3.4   CHLORIDE 100 99 94 101 101 107 102   BUN 8 9 10 13 16 9 8     Recent Labs   Lab Test 21  0621  1146   ALBUMIN 1.5* 1.9* 2.7* 2.8*   BILITOTAL 0.4 0.4 0.6 0.5   AST 16 23 8 11   ALT 43 60 18 21   LIPASE  --  64*  --  79       Assessment / Plan:  1.) Ulcerative " Colitis:  26 y/o admitted with flare of ulcerative pancolitis, having recently been hospitalized (9/2-9/9) when he underwent flexible sigmoidoscopy (9/6/21) with severe colitis noted and subsequently received first dose of infliximab (5mg/kg) on (9/8/21).  Readmitted (9/11/21) with on going diarrhea and syncopal event at home and was found to have anemia (hgb=6.9), elevated CRP (253) and fever (T=101).  C.difficile stool negative.  Received addition infliximab (5mg/kh) on (9/12/21).    Overall feeling better since most recent infliximab yesterday which is encouraging.  Primary objective measure is going to be stool output, looking for signs of day-over-day improvement.  Discussed his care with the patient and his father at the bedside.    Recommendations:  - monitor stool output frequency, quantity, bleeding, etc  - if symptoms not appreciably improving over the next 24-48 hours then consult with CRS  - continue methylprednisolone 20mg IV Q8hrs  - regular diet as tolerated  - Lovenox for DVT prophylaxis, continue to encourage ambulation      Anderson Cardona MD    Office: [phone]

## 2021-09-13 NOTE — PLAN OF CARE
Shift Report 3p-7pm     Temp: 99.1  F (37.3  C)  Temp src: Oral  BP: 106/69  Pulse: 103  Resp: 16  SpO2: 99 %  O2 Device: None (Room air)          Isolation Precautions:   Patient is on Enteric precuations for Enteric r/o.    Neuro: Alert and Oriented x4  Activity: are SBA with no assistive devices   Telemetry Monitoring: No  Pain: denying pain.  Labs / Tests: Hbg 8.2, 1800 draw pending  GI:continues to have small frequent liquid stools, no obvious blood seen  :WNL  Medications:Finished Remacaide infusion at 1600  Misc: IS given/educated on use. T Max 99.1  LDA's: Peripheral  Fluids: has dextrose 5% and 0.45% NaCl + KCl 20 mEq/L infusion running at 100   mL per hour.  Diet: Regular but Gluten Free, Dairy free  Consults: GI  Discharge Disposition: Home with Self care        Sheri Bland RN on 9/12/2021 at 7:01 PM

## 2021-09-13 NOTE — PLAN OF CARE
"/66 (BP Location: Left arm)   Pulse 82   Temp 98.9  F (37.2  C) (Oral)   Resp 16   Ht 1.854 m (6' 1\")   Wt 61.2 kg (135 lb)   SpO2 97%   BMI 17.81 kg/m      Pt A&Ox4. VSS. Denies pain & N/V. Continues with loose stools, not as frequent. Cdiff neg. Enteric panel pending. IV steroids. GI following. Up SBA. Will cont POC.     Shefali Cassidy RN    "

## 2021-09-13 NOTE — PROGRESS NOTES
CLINICAL NUTRITION SERVICES  -  ASSESSMENT NOTE      Recommendations Ordered by Registered Dietitian (RD):   [employer] 1.0 PRN   Future/Additional Recommendations:   Pending LOS, consider ordering MVI+M given malabsorptive states with UC   Malnutrition:   % Weight Loss:  > 5% in 1 month (severe malnutrition)  % Intake:  <75% for >/= 1 month (non-severe malnutrition) --> malabsorption component   Subcutaneous Fat Loss:  None observed  Muscle Loss:  Temporal region mild depletion, Clavicle bone region moderate depletion, Acromion bone region moderate depletion, Patellar region moderate depletion, Anterior thigh region moderate depletion and Posterior calf region moderate depletion  Fluid Retention:  None noted    Malnutrition Diagnosis: Severe malnutrition  In Context of: Acute on Chronic illness or disease       REASON FOR ASSESSMENT  Luther Wood is a 25 year old male seen by Registered Dietitian for positive Admission Nutrition Risk Screen.  Malnutrition Score: 4 (09/12/21 1413)  Have you recently lost weight without trying? Yes (24-33 lb)  Have you been eating poorly because of a decreased appetite? Yes    Per chart review, PMH significant for ulcerative colitis with recent flare requiring admission 9/2-9/9 and C diff.  Admitted 2/2 fatigue in setting of worsening anemia.       NUTRITION HISTORY  - Information obtained from chart review and patient at bedside.   - Pt was seen by RD during last admission a week ago. Pt met malnutrition coding criteria for severe malnutrition. Pt reported following a gluten free and mostly dairy free diet at this time. Eating smaller, more frequent meals (~4 meals/day). Resides with his parents and sister. Barriers to PO intake reported at this time were decreased appetite, early satiety and diarrhea. Tried [employer] 1.0 supplement during this admission and seemed to like it.   - Pt follows a dairy free, gluten free diet at home.   - Typical food/fluid intake: Pt endorses eating  "small, frequent meals due to pain with inflammation. Drinking [employer] supplement at home and though wishes he could eat more solid food, is drinking at least 2 of these every day to help meet his nutrition needs during this time.   - Food allergies: None noted       CURRENT NUTRITION ORDERS  Diet Order:     Regular     Current Intake/Tolerance:  No intake documented. Pt ordered 2 [employer] supplements yesterday + dinner meal. Ordered one meal so far today of scrambled eggs, turkey sausage, lemon fruit ice and [employer] supplement.       NUTRITION FOCUSED PHYSICAL ASSESSMENT FOR DIAGNOSING MALNUTRITION)  Yes            Observed:    Muscle wasting (refer to documentation in Malnutrition section) --> general low reserves     Obtained from Chart/Interdisciplinary Team:  Diarrhea noted, BM x 5 yesterday and x 2 so far today     ANTHROPOMETRICS  Height: 6' 1\"  Weight: 135 lbs 0 oz  Body mass index is 17.81 kg/m .  Weight Status:  Underweight BMI <18.5  Weight History: Per RD note on 9/4/21, pt's UBW was reported to be 160-165# and pt felt he had lost weight from this baseline over a period of 1 month PTA.   Wt Readings from Last 10 Encounters:   09/11/21 61.2 kg (135 lb)   09/08/21 62.1 kg (136 lb 14.5 oz)   07/03/19 73.9 kg (163 lb)   04/21/19 74.4 kg (164 lb)   01/17/13 52.2 kg (115 lb) (27 %, Z= -0.61)*   10/21/07 33.1 kg (73 lb) (56 %, Z= 0.14)*     * Growth percentiles are based on CDC (Boys, 2-20 Years) data.       LABS  Labs reviewed    MEDICATIONS  Medications reviewed  - D5 IVF at 100 ml/hr   - Remicade infusion 9/12      ASSESSED NUTRITION NEEDS PER APPROVED PRACTICE GUIDELINES:    Dosing Weight 61.2 kg (actual wt on 9/11)  Estimated Energy Needs: 6736-0484+ kcals (30-35 Kcal/Kg)  Justification: repletion and malabsorption   Estimated Protein Needs: 75-95 grams protein (1.2-1.5 g pro/Kg)  Justification: preservation of lean body mass and malabsorption   Estimated Fluid Needs: (1 mL/Kcal)  Justification: " maintenance or per provider pending fluid status      NUTRITION DIAGNOSIS:  Inadequate oral intake related to decreased appetite, abdominal pain, early satiety, malabsorption with UC as evidenced by pt meeting < 75% of nutrition needs over the past month, muscle loss with generally low reserves and > 5% wt loss over the past month       NUTRITION INTERVENTIONS  Recommendations / Nutrition Prescription  [employer] supplement PRN --> pt to order 2x/day   Continue regular diet as tolerated and encourage small, frequent meals       Implementation  Nutrition education: Introduced RD and reviewed prior nutrition hx. Encouraged small, frequent meals as tolerated. Offered to schedule [employer] supplement 2x/day for pt, but pt stated he is OK with ordering as needed/wanted with meals and is planning to drink at least 2 bottles daily.   Medical Food Supplement       Nutrition Goals  Pt will consume >/= 75% of small meals + 1-2 [employer] supplements daily       MONITORING AND EVALUATION:  Progress towards goals will be monitored and evaluated per protocol and Practice Guidelines      Zulma Wade RD, LD  Unit RD Pager: [phone]

## 2021-09-13 NOTE — PLAN OF CARE
Assumed care of pt [id number], a/o x4, denies pain.  No stools on this shift.  Hgb 8.4.  IVF running.  Pt can make needs known, will continue POC.     [Annual] : an annual visit.

## 2021-09-13 NOTE — PLAN OF CARE
Care from 0700 - 1900:    Pt A&Ox4, VSS on RA ex temp 100.3 at 1600. Pt denies pain. Denies light-headedness or dizziness with ambulating. IVF infusing. GI following. Plan: continue to monitor stools and serial hgb, continue IV solu-medrol q8h.

## 2021-09-14 LAB
ANION GAP SERPL CALCULATED.3IONS-SCNC: 2 MMOL/L (ref 3–14)
BUN SERPL-MCNC: 9 MG/DL (ref 7–30)
CALCIUM SERPL-MCNC: 8.1 MG/DL (ref 8.5–10.1)
CHLORIDE BLD-SCNC: 98 MMOL/L (ref 94–109)
CO2 SERPL-SCNC: 32 MMOL/L (ref 20–32)
CREAT SERPL-MCNC: 0.81 MG/DL (ref 0.66–1.25)
ERYTHROCYTE [DISTWIDTH] IN BLOOD BY AUTOMATED COUNT: 13.3 % (ref 10–15)
GFR SERPL CREATININE-BSD FRML MDRD: >90 ML/MIN/1.73M2
GLUCOSE BLD-MCNC: 138 MG/DL (ref 70–99)
HCT VFR BLD AUTO: 26 % (ref 40–53)
HGB BLD-MCNC: 8.1 G/DL (ref 13.3–17.7)
MCH RBC QN AUTO: 27.9 PG (ref 26.5–33)
MCHC RBC AUTO-ENTMCNC: 31.2 G/DL (ref 31.5–36.5)
MCV RBC AUTO: 90 FL (ref 78–100)
PLATELET # BLD AUTO: 611 10E3/UL (ref 150–450)
POTASSIUM BLD-SCNC: 3.9 MMOL/L (ref 3.4–5.3)
RBC # BLD AUTO: 2.9 10E6/UL (ref 4.4–5.9)
SODIUM SERPL-SCNC: 132 MMOL/L (ref 133–144)
WBC # BLD AUTO: 15 10E3/UL (ref 4–11)

## 2021-09-14 PROCEDURE — 250N000011 HC RX IP 250 OP 636: Performed by: PHYSICIAN ASSISTANT

## 2021-09-14 PROCEDURE — 80048 BASIC METABOLIC PNL TOTAL CA: CPT | Performed by: PHYSICIAN ASSISTANT

## 2021-09-14 PROCEDURE — 85027 COMPLETE CBC AUTOMATED: CPT | Performed by: PHYSICIAN ASSISTANT

## 2021-09-14 PROCEDURE — 99233 SBSQ HOSP IP/OBS HIGH 50: CPT | Performed by: PHYSICIAN ASSISTANT

## 2021-09-14 PROCEDURE — 250N000011 HC RX IP 250 OP 636: Performed by: INTERNAL MEDICINE

## 2021-09-14 PROCEDURE — 120N000004 HC R&B MS OVERFLOW

## 2021-09-14 PROCEDURE — 258N000003 HC RX IP 258 OP 636: Performed by: PHYSICIAN ASSISTANT

## 2021-09-14 PROCEDURE — 36415 COLL VENOUS BLD VENIPUNCTURE: CPT | Performed by: PHYSICIAN ASSISTANT

## 2021-09-14 RX ORDER — SODIUM CHLORIDE AND POTASSIUM CHLORIDE 150; 900 MG/100ML; MG/100ML
INJECTION, SOLUTION INTRAVENOUS CONTINUOUS
Status: DISCONTINUED | OUTPATIENT
Start: 2021-09-14 | End: 2021-09-15

## 2021-09-14 RX ADMIN — POTASSIUM CHLORIDE AND SODIUM CHLORIDE: 900; 150 INJECTION, SOLUTION INTRAVENOUS at 08:36

## 2021-09-14 RX ADMIN — POTASSIUM CHLORIDE AND SODIUM CHLORIDE: 900; 150 INJECTION, SOLUTION INTRAVENOUS at 19:29

## 2021-09-14 RX ADMIN — POTASSIUM CHLORIDE, DEXTROSE MONOHYDRATE AND SODIUM CHLORIDE: 150; 5; 450 INJECTION, SOLUTION INTRAVENOUS at 01:40

## 2021-09-14 RX ADMIN — ENOXAPARIN SODIUM 40 MG: 40 INJECTION SUBCUTANEOUS at 12:57

## 2021-09-14 RX ADMIN — METHYLPREDNISOLONE SODIUM SUCCINATE 20 MG: 40 INJECTION, POWDER, FOR SOLUTION INTRAMUSCULAR; INTRAVENOUS at 05:14

## 2021-09-14 RX ADMIN — METHYLPREDNISOLONE SODIUM SUCCINATE 20 MG: 40 INJECTION, POWDER, FOR SOLUTION INTRAMUSCULAR; INTRAVENOUS at 20:54

## 2021-09-14 RX ADMIN — METHYLPREDNISOLONE SODIUM SUCCINATE 20 MG: 40 INJECTION, POWDER, FOR SOLUTION INTRAMUSCULAR; INTRAVENOUS at 12:56

## 2021-09-14 NOTE — PROGRESS NOTES
[facility]  Hospitalist Progress Note  Caroline Galvan PA-C 09/14/2021    Reason for Stay (Diagnosis): Ulcerative colitis flair         Assessment and Plan:      Summary of Stay: Luther Wood is a 25 year old male with history of ulcerative colitis admitted on 9/11/2021 with frequent loose stools including blood, fatigue and syncope.  On admission he was afebrile with heart rate of 128, pressure 99/63 and breathing comfortably on room air without hypoxia.  Lab work was remarkable for sodium 130, normal electrolytes, normal LFTs, , lipase 64, TSH 1.08, glucose 130, WBC 19 and hemoglobin 7.9 with platelets 529.  He was treated with IV fluids and did become febrile overnight with T-max of 101.5.  Telemetry monitoring has shown a sinus rhythm.  GI was consulted with transition back to solumedrol and infliximab with improvement. 1 unit of blood given on 9/12 for Hemoglobin of 6.9. Echocardiogram was performed showing no structural disease. Gradual improvement daily.     #Profuse diarrhea with hematochezia  #Known history of ulcerative colitis with current flare  Patient presented with frequent loose stools (every hour) with bright red blood along with dizziness, lightheadedness and fatigue.  He was admitted 9/2 through 9/9 for acute ulcerative colitis flare received methylprednisolone 20 mg IV every 8 hours with flex sig performed on 9/6 showing severe ulcerative colitis and biopsies confirming diagnosis.  He was started on Inflectra by GI with transition of steroids to prednisone 40 mg daily and discharged on 9/9.  Repeat C. difficile and enteric panel is negative. GI following with improvement of frequency of loose stools (6 in last 24 hours).   -C diff negative, enteric negative  -2nd dose of Infliximab given 9/12  -Solumedrol 20 mg IV every 8 hours  -Fluid support with normal saline but likely can stop soon  -GI recommended Lovenox 40 mg IV due to high risk of blood clots  -DO NOT use  "Imodium or Lomitol  -Regular diet with improving intake, nutrition recommends high protein     #Acute on chronic anemia due to blood loss  Symptomatic hemoglobin of 6.9 on 9/12 in the setting of hematochezia related to ulcerative colitis flare.  Received 1 unit of blood. Hemoglobin stable now.  -Recheck hemoglobin daily  -Conditional blood ordered <7     #Fever and leukocytosis  Tmax 101.5 on 9/11 with WBC elevation (14.5) thought to be steroid induced. No focal infectious symptoms other than diarrhea with blood. Likely fever related to colitis. Blood cultures from 9/12 negative.   -Fever curve improving  -No antibiotics indicated       #Syncope  Patient had episode of syncope while brushing his teeth.  He recalls feeling funny at the time and subsequently passed out.  His father caught him before he hit the ground and woke up after 5 seconds. He was monitored on telemetry overnight with no arrhythmia and echocardiogram shows no structural disease of his heart.  - Suspect this is related to hypovolemia and anemia    -Discontinue telemetry     #Hyponatremia  Mild at 132  -NS ordered     #Nonsevere malnutrition  Was seen by nutrition on his last admission with recommendation for pushing protein with meals and to have small frequent meals with oral nutritional supplements.        DVT Prophylaxis: Lovenox 40 mg (recommended by GI)  Code Status: Full Code  Discharge Dispo: Anticipate 1-2 more days           Interval History (Subjective):      Reports 6 bowel movements in 24 hours mostly just gas or small amounts. No blood. Abdominal pain improving. No lightheadedness/dizziness. Appetite/intake improving                  Physical Exam:      Last Vital Signs:  /70 (BP Location: Right arm)   Pulse 82   Temp 98.4  F (36.9  C) (Oral)   Resp 16   Ht 1.854 m (6' 1\")   Wt 61.2 kg (135 lb)   SpO2 96%   BMI 17.81 kg/m        Intake/Output Summary (Last 24 hours) at 9/14/2021 1013  Last data filed at 9/14/2021 " 0836  Gross per 24 hour   Intake 3866 ml   Output --   Net 3866 ml       Constitutional: Awake, alert, cooperative, no apparent distress   Respiratory: Clear to auscultation bilaterally, no crackles or wheezing   Cardiovascular: Regular rate and rhythm, normal S1 and S2, and no murmur noted   Abdomen: Hypoactive bowel sounds, soft, non-distended, non-tender   Skin: No rashes, no cyanosis, dry to touch   Neuro: Alert and oriented x3, no weakness, numbness, memory loss   Extremities: No edema, normal range of motion   Other(s):        All other systems: Negative          Medications:      All current medications were reviewed with changes reflected in problem list.         Data:      All new lab and imaging data was reviewed.   Labs:  Recent Labs   Lab 21  0514   *   POTASSIUM 3.9   CHLORIDE 98   CO2 32   ANIONGAP 2*   *   BUN 9   CR 0.81   GFRESTIMATED >90   MADIE 8.1*     Recent Labs   Lab 21  0514   WBC 15.0*   HGB 8.1*   HCT 26.0*   MCV 90   *      Imaging:   Results for orders placed or performed during the hospital encounter of 21   Echocardiogram Complete     Value    LVEF  65-70%    Narrative    [id number]  [id number]  [id number]  [id number]^KATIANA^CHELLE^CLYDE     [facility]  Echocardiography Laboratory  [address] [place] [address]     Name: RENNY AN  MRN: [id number]  : 1995  Study Date: 2021 07:40 AM  Age: 25 yrs  Gender: Male  Patient Location: [facility], [place]  Reason For Study: Syncope  Ordering Physician: CHELLE SAAB  Performed By: Nagi Narayan RDCS     BSA: 1.8 m2  Height: 73 in  Weight: 135 lb  HR: 134  BP: 116/66 mmHg  ______________________________________________________________________________  Procedure  Complete Echo Adult.  ______________________________________________________________________________  Interpretation Summary     Normal transthoracic echocardiogram     No prior study available for  comparison.  ______________________________________________________________________________  Left Ventricle  The left ventricle is normal in size. There is normal left ventricular wall  thickness. Hyperdynamic left ventricular function. The visual ejection  fraction is 65-70%. Left ventricular diastolic function is normal. Normal left  ventricular wall motion.     Right Ventricle  The right ventricle is normal size. The right ventricular systolic function is  normal.     Atria  Normal left atrial size. Right atrial size is normal. There is no color  Doppler evidence of an atrial shunt.     Mitral Valve  The mitral valve leaflets appear normal. There is no evidence of stenosis,  fluttering, or prolapse. There is physiologic mitral regurgitation. There is  no mitral valve stenosis.     Tricuspid Valve  Normal tricuspid valve. There is trace tricuspid regurgitation. The right  ventricular systolic pressure is approximated at 24.1 mmHg plus the right  atrial pressure. IVC diameter <2.1 cm collapsing >50% with sniff suggests a  normal RA pressure of 3 mmHg.     Aortic Valve  The aortic valve is not well visualized. No aortic regurgitation is present.  No aortic stenosis is present.     Pulmonic Valve  The pulmonic valve is not well visualized.     Vessels  The aortic root is normal size.     Pericardium  There is no pericardial effusion.     ______________________________________________________________________________  MMode/2D Measurements & Calculations  IVSd: 0.72 cm  LVIDd: 4.6 cm  LVIDs: 2.9 cm  LVPWd: 0.90 cm  FS: 36.2 %     LV mass(C)d: 121.1 grams  LV mass(C)dI: 66.5 grams/m2  Ao root diam: 2.8 cm  LA dimension: 3.0 cm  LA/Ao: 1.1  LA Volume (BP): 20.3 ml  LA Volume Index (BP): 11.2 ml/m2  RWT: 0.39     Doppler Measurements & Calculations  MV E max jayy: 102.8 cm/sec  MV A max jayy: 62.6 cm/sec  MV E/A: 1.6  MV dec time: 0.20 sec     TR max jayy: 245.7 cm/sec  TR max P.1 mmHg  E/E' av.6  Lateral E/e':  5.0  [facility] E/e': 6.3     ______________________________________________________________________________  Report approved by: MD Chadwick Santos 09/12/2021 09:40 AM

## 2021-09-14 NOTE — CONSULTS
[facility]  Colon and Rectal Surgery Consult Note  Name: Luther Wood    MRN: [id number]  YOB: 1995    Age: 25 year old  Date of admission: 9/11/2021  Primary care provider: No Ref-Primary, Physician     Requesting Physician:  Dr. Cardona  Reason for consult:  Ulcerative pancolitis           History of Present Illness:   Luther Wood is a 25 year old male with a history of Ulcerative Colitis, seen at the request of Dr. Cardona, who presents with Ulcerative Colitis.    Patient was recently admitted for ulcerative colitis flare from 9/2 - 9/9/21.  Since discharge he has had worsening fatigue and he continued to see blood in his stool, though it was less compared to his recent admission.  He was also having symptoms of lightheadedness and dizziness.  He had a syncopal episode at home which prompted him to return to the Emergency Department on 9/11/21.  Since admission he has been started on methylprednisone and did get an infusion of Remicade.  He has noticed an improvement in his bowel movements and is also no longer having any rectal bleeding.  His appetite has improved.  He is only complaining of some mild suprapubic discomfort.    Colonoscopy History: Colonoscopy in May 2019-continuous inflammation from the rectum to the transverse colon with patchy inflammation in the cecum and ascending colon.  Flexible sigmoidoscopy in September 6, 2021-severe Alvarenga score 3 disease consistent with ulcerative colitis to the splenic flexure.    Surgical History: None            Past Medical History:     Past Medical History:   Diagnosis Date     Colitis 2019             Past Surgical History:   History reviewed. No pertinent surgical history.            Social History:     Social History     Tobacco Use     Smoking status: Never Smoker     Smokeless tobacco: Never Used   Substance Use Topics     Alcohol use: Not on file             Family History:     Family History   Problem Relation Age of Onset  "    Diabetes Father         type I             Allergies:   No Known Allergies          Medications:       enoxaparin ANTICOAGULANT  40 mg Subcutaneous Q24H     methylPREDNISolone  20 mg Intravenous Q8H     sodium chloride (PF)  3 mL Intracatheter Q8H             Review of Systems:   A comprehensive greater than 10 system review of systems was carried out.  Pertinent positives and negatives are noted above.  Otherwise negative for contributory info.            Physical Exam:     Blood pressure 119/63, pulse 84, temperature 99.7  F (37.6  C), temperature source Oral, resp. rate 16, height 1.854 m (6' 1\"), weight 61.2 kg (135 lb), SpO2 100 %.    Intake/Output Summary (Last 24 hours) at 2021 1550  Last data filed at 2021 0836  Gross per 24 hour   Intake 1680 ml   Output --   Net 1680 ml     EXAM:  GEN: Awake alert and oriented, appears stated age  PULM: Non-labored breathing with normal respiratory effort  CVS: reg rate and rhythm, no peripheral edema  ABD: Soft, non-tender, non-distended.  No rebound or guarding   RECTAL: Rectal exam was deferred.  NEURO: CN II-XII grossly intact  MSK: extremeties with no clubbing, cyanosis or edema; able to ambulate  PSYCH: responsive, alert, cooperative; oriented x3; appropriate mood and affect  EXT/SKIN: inspection reveals no rashes, lesions or ulcers, normal coloring         Data Reviewed:     Results for orders placed or performed during the hospital encounter of 21   Echocardiogram Complete     Value    LVEF  65-70%    Narrative    [id number]  [id number]  [id number]  [id number]^KATIANA^CHELLE^CLYDE     [facility]  Echocardiography Laboratory  [address] [place] [address]     Name: RENNY AN  MRN: [id number]  : 1995  Study Date: 2021 07:40 AM  Age: 25 yrs  Gender: Male  Patient Location: [facility]  Reason For Study: Syncope  Ordering Physician: CHELLE SAAB  Performed By: Nagi Narayan RDCS     BSA: 1.8 m2  Height: 73 " in  Weight: 135 lb  HR: 134  BP: 116/66 mmHg  ______________________________________________________________________________  Procedure  Complete Echo Adult.  ______________________________________________________________________________  Interpretation Summary     Normal transthoracic echocardiogram     No prior study available for comparison.  ______________________________________________________________________________  Left Ventricle  The left ventricle is normal in size. There is normal left ventricular wall  thickness. Hyperdynamic left ventricular function. The visual ejection  fraction is 65-70%. Left ventricular diastolic function is normal. Normal left  ventricular wall motion.     Right Ventricle  The right ventricle is normal size. The right ventricular systolic function is  normal.     Atria  Normal left atrial size. Right atrial size is normal. There is no color  Doppler evidence of an atrial shunt.     Mitral Valve  The mitral valve leaflets appear normal. There is no evidence of stenosis,  fluttering, or prolapse. There is physiologic mitral regurgitation. There is  no mitral valve stenosis.     Tricuspid Valve  Normal tricuspid valve. There is trace tricuspid regurgitation. The right  ventricular systolic pressure is approximated at 24.1 mmHg plus the right  atrial pressure. IVC diameter <2.1 cm collapsing >50% with sniff suggests a  normal RA pressure of 3 mmHg.     Aortic Valve  The aortic valve is not well visualized. No aortic regurgitation is present.  No aortic stenosis is present.     Pulmonic Valve  The pulmonic valve is not well visualized.     Vessels  The aortic root is normal size.     Pericardium  There is no pericardial effusion.     ______________________________________________________________________________  MMode/2D Measurements & Calculations  IVSd: 0.72 cm  LVIDd: 4.6 cm  LVIDs: 2.9 cm  LVPWd: 0.90 cm  FS: 36.2 %     LV mass(C)d: 121.1 grams  LV mass(C)dI: 66.5 grams/m2  Ao  root diam: 2.8 cm  LA dimension: 3.0 cm  LA/Ao: 1.1  LA Volume (BP): 20.3 ml  LA Volume Index (BP): 11.2 ml/m2  RWT: 0.39     Doppler Measurements & Calculations  MV E max jayy: 102.8 cm/sec  MV A max jayy: 62.6 cm/sec  MV E/A: 1.6  MV dec time: 0.20 sec     TR max jayy: 245.7 cm/sec  TR max P.1 mmHg  E/E' av.6  Lateral E/e': 5.0  Medial E/e': 6.3     ______________________________________________________________________________  Report approved by: MD Chadwick Santos 2021 09:40 AM               Recent Labs   Lab 21  17521  1133 21  0621  0603   WBC 15.0*  --   --  14.5* 15.4*   HGB 8.1* 7.9* 7.8* 7.7*  7.7* 6.9*   HCT 26.0*  --   --  24.3* 21.9*   MCV 90  --   --  87 88   *  --   --  471* 455*     Recent Labs   Lab 21  0621  0603 21  1850   * 133 131* 130*   POTASSIUM 3.9 4.0 3.5 4.0   CHLORIDE 98 100 99 94   CO2 32 30 27 31   ANIONGAP 2* 3 5 5   * 134* 92 130*   BUN 9 8 9 10   CR 0.81 0.81 0.93 1.00   GFRESTIMATED >90 >90 >90 >90   MADIE 8.1* 7.8* 7.5* 8.3*   PROTTOTAL  --  5.4*  --  6.3*   ALBUMIN  --  1.5*  --  1.9*   BILITOTAL  --  0.4  --  0.4   ALKPHOS  --  89  --  110   AST  --  16  --  23   ALT  --  43  --  60     Recent Labs   Lab 21   INR 1.34*         Assessment and Plan:   This is a 25-year-old male with a ulcerative colitis admitted with flare having recently been hospitalized last week.  He recently had infusion of Remicade yesterday.  He is also on IV steroids.  He reports slight clinical improvement.  He does continue to have 7-8 bowel movements a day.  At this time no acute surgical intervention is indicated.  It does appear that he he is clinically improving.  We did briefly discuss surgical management of ulcerative colitis, and indications for surgery.  I did review the differences of a 2 versus 3 stage procedure with the patient and his father at the bedside  today.    Plan:  1. Surgery: No emergent surgery indicated.  2. Diet: Per medicine team  3. IV Fluids: Per hospitalist  4. Antibiotics: Per medicine and GI  5. Medications: Home meds per hospitalist  6. I&O s:  strict I&O s  7. Labs:   - Reviewed  - Ordered: none   8. Imaging:   - Dr. Flaherty and myself have personally viewed: CT abd/pelvis  - Ordered:  none  9. Activity: ambulate as tolerated, encourage OOB  10. DVT prophylaxis: SCD s  11. This plan has been discussed with Dr. Flaherty    Patient specific identified risk factors considered as part of today s evaluation include: Ulcerative colitis flare, IV steroids, biologic medications (Blood thinners,Smoker, Diabetic etc. etc.)      Additional history obtained from patient and chart.  Time spent on consultation: 35 minutes, greater than 50 percent of the total encounter time is spent in counseling and/or coordination of care.          Shahrzad Flaherty MD  [facility]  Phone:  [phone]

## 2021-09-14 NOTE — PLAN OF CARE
"[id number]    Inpatient Progress Note:    /67 (BP Location: Left arm)   Pulse 65   Temp 98.3  F (36.8  C) (Oral)   Resp 14   Ht 1.854 m (6' 1\")   Wt 61.2 kg (135 lb)   SpO2 99%   BMI 17.81 kg/m       Orientation: alert and oriented x4  Pain status: denies  Activity: up SBA with family or staff, denies dizziness/lightheadedness  Peripheral edema: none  Resp: WDL  Cardiac: WDL  GI: 6 stools since 1135 yesterday per patient's record, watery, yellow/brown/pink, denies nausea, BS active in all quadrants  : WDL    Skin: WDL  LDA: peripheral IV  Infusions: D5 1/2 NS + 20K @ 100 mL/hr  Pertinent Labs: Hgb 8.1, Na 132, WBC 15.0, C diff & enteric panel (-)  Diet: low fiber, gluten free, dairy free diet with oral supplement between meals  Consults: GI, nutrition  Discharge Plan: monitor stool output, continue methylprednisolone Q8H, and continue lovenox for DVT prophylaxis per GI, transfuse PRBCs for Hgb < 7.0 -- 8.1 this AM, T max 99.8 F overnight    Will continue to monitor and provide cares.     Katheryn Hollingsworth, RN         "

## 2021-09-14 NOTE — PROGRESS NOTES
"Patient is alert and oriented x4. VS WNL and documented on the FS. Lung sounds clear in all lobes and patient is on RA. Denies SOB. Active bowel sounds with LBM last night. Patient states the stools have been slowing down. Patient voiding spontaneously. Denies pain. IV fluids infusing at 100 ml/hr. Regular diet (low fiber, gluten free, dairy free).     Hemoglobin at 8.1  WBC at 15.0  Na at 132    Patient is independent in room when family present.     /70 (BP Location: Right arm)   Pulse 82   Temp 98.4  F (36.9  C) (Oral)   Resp 16   Ht 1.854 m (6' 1\")   Wt 61.2 kg (135 lb)   SpO2 96%   BMI 17.81 kg/m      "

## 2021-09-14 NOTE — PROGRESS NOTES
"Patient is alert and oriented x4. VS WNL and documented on the FS. Lung sounds clear in all lobes and patient is on RA. Denies SOB. Active bowel sounds with LBM at 1400. Patient has had 1 BM since 1145. Patient states the stools have been slowing down. Patient voiding spontaneously. Denies pain. IV fluids infusing at 100 ml/hr. Regular diet (low fiber, gluten free, dairy free).      Hemoglobin at 8.1  WBC at 15.0  Na at 132     Patient is independent in room when family present.    Plan:   - Continue to monitor stool output frequency, quantity, bleeding, etc  - Colon & Rectal Surgery consultation   - Methylprednisolone 20mg IV Q8hrs  - regular diet as tolerated  - Lovenox for DVT prophylaxis, continue to encourage ambulation  - CRP for tomorrow  - Possible limited flexible sigmoidoscopy later this week to compare endoscopic appearance to that on (9/6/21) prior to infliximab  - Family is working to coordinate second opinion with [facility]  - Next infliximab infusion would be due (9/22/21) at 10mg/kg.    /63 (BP Location: Right arm)   Pulse 84   Temp 99.2  F (37.3  C) (Oral)   Resp 16   Ht 1.854 m (6' 1\")   Wt 61.2 kg (135 lb)   SpO2 99%   BMI 17.81 kg/m      "

## 2021-09-14 NOTE — PROGRESS NOTES
"[facility] - Gastroenterology Progress Note    Subjective:  Continues to see some signs of improvement.  Notably much less blood in stool from last week.  Now passing some gas w/o stool.  Still having generally liquid stools, although non-bloody, with a total of 6 BMs last 24 hours.  Having trouble with abdominal cramping, worse after eating.      Objective:  Physical Exam:  /63 (BP Location: Right arm)   Pulse 84   Temp 99.2  F (37.3  C) (Oral)   Resp 16   Ht 1.854 m (6' 1\")   Wt 61.2 kg (135 lb)   SpO2 99%   BMI 17.81 kg/m    Temp (24hrs), Av.1  F (37.3  C), Min:98.5  F (36.9  C), Max:100.2  F (37.9  C)    Patient Vitals for the past 72 hrs:   Weight   21 2244 61.2 kg (135 lb)       Intake/Output Summary (Last 24 hours) at 2021 1205  Last data filed at 2021 1300  Gross per 24 hour   Intake 290 ml   Output --   Net 290 ml     General Appearance: Comfortable, laying in bed  Abdomen: Normal    Labs / Imaging:  Recent Labs   Lab Test 21  0514 21  1758 21  1133 21  0623 21  0000 21  1846 21  1259 21  0603 21  1918 21  0630 21  0632 21  0621   WBC 15.0*  --   --  14.5*  --   --   --  15.4* 19.0* 21.1* 19.3* 20.6*   HGB 8.1* 7.9* 7.8* 7.7*  7.7* 7.9* 8.4* 8.2* 6.9* 7.9* 8.7* 9.1* 10.5*   MCV 90  --   --  87  --   --   --  88 86 89 89 90   *  --   --  471*  --   --   --  455* 529* 436 433 451*   INR  --   --   --   --   --   --   --   --  1.34*  --   --   --      No lab results found.    Invalid input(s): FERRITIN  Recent Labs   Lab Test 21  0514 21  0623 21  0603 21  18521  0630 21  0621 21  0637   POTASSIUM 3.9 4.0 3.5 4.0 3.6 4.0 4.5   CHLORIDE 98 100 99 94 101 101 107   BUN 9 8 9 10 13 16 9     Recent Labs   Lab Test 21  0621  18521  0637 21  1146   ALBUMIN 1.5* 1.9* 2.7* 2.8*   BILITOTAL 0.4 0.4 0.6 0.5   AST 16 23 8 11 "   ALT 43 60 18 21   LIPASE  --  64*  --  79       Assessment / Plan:  1.) Ulcerative Colitis:  26 y/o admitted with flare of ulcerative pancolitis, having recently been hospitalized (9/2-9/9) when he underwent flexible sigmoidoscopy (9/6/21) with severe colitis noted and subsequently received first dose of infliximab (5mg/kg) on (9/8/21).  Readmitted (9/11/21) with on going diarrhea and syncopal event at home and was found to have anemia (hgb=6.9), elevated CRP (253) and fever (T=101).  C.difficile stool negative.  Received addition infliximab (5mg/kh) on (9/12/21).    Overall feeling better since most recent infliximab yesterday which is encouraging.  Stool output with slight improvements, notably no further blood even in the setting of Lovenox for DVT prophylaxis.  Fever curve seems to be trending downwards.  WBC slightly decreased (WBC=21.1-->15) since admission.    Recommendations:  - continue to monitor stool output frequency, quantity, bleeding, etc  - will request non-urgent Colon & Rectal Surgery consultation for more complete discussion regarding surgical plan should this become necessary  - continue methylprednisolone 20mg IV Q8hrs  - regular diet as tolerated  - Lovenox for DVT prophylaxis, continue to encourage ambulation  - will re-check CRP for tomorrow  - if lagging symptoms may pursue limited flexible sigmoidoscopy later this week to compare endoscopic appearance to that on (9/6/21) prior to infliximab  - family is working to coordinate second opinion with [facility]  - next infliximab infusion would be due (9/22/21) at 10mg/kg      Anderson Cardona MD    Office: [phone]

## 2021-09-15 LAB
ANION GAP SERPL CALCULATED.3IONS-SCNC: 1 MMOL/L (ref 3–14)
BUN SERPL-MCNC: 13 MG/DL (ref 7–30)
CALCIUM SERPL-MCNC: 7.8 MG/DL (ref 8.5–10.1)
CHLORIDE BLD-SCNC: 101 MMOL/L (ref 94–109)
CO2 SERPL-SCNC: 31 MMOL/L (ref 20–32)
CREAT SERPL-MCNC: 0.77 MG/DL (ref 0.66–1.25)
CRP SERPL-MCNC: 159 MG/L (ref 0–8)
ERYTHROCYTE [DISTWIDTH] IN BLOOD BY AUTOMATED COUNT: 13.3 % (ref 10–15)
GFR SERPL CREATININE-BSD FRML MDRD: >90 ML/MIN/1.73M2
GLUCOSE BLD-MCNC: 112 MG/DL (ref 70–99)
HCT VFR BLD AUTO: 24.7 % (ref 40–53)
HGB BLD-MCNC: 7.6 G/DL (ref 13.3–17.7)
MCH RBC QN AUTO: 28 PG (ref 26.5–33)
MCHC RBC AUTO-ENTMCNC: 30.8 G/DL (ref 31.5–36.5)
MCV RBC AUTO: 91 FL (ref 78–100)
PLATELET # BLD AUTO: 505 10E3/UL (ref 150–450)
POTASSIUM BLD-SCNC: 4.4 MMOL/L (ref 3.4–5.3)
RBC # BLD AUTO: 2.71 10E6/UL (ref 4.4–5.9)
SODIUM SERPL-SCNC: 133 MMOL/L (ref 133–144)
WBC # BLD AUTO: 10.4 10E3/UL (ref 4–11)

## 2021-09-15 PROCEDURE — 85027 COMPLETE CBC AUTOMATED: CPT | Performed by: PHYSICIAN ASSISTANT

## 2021-09-15 PROCEDURE — 86140 C-REACTIVE PROTEIN: CPT | Performed by: INTERNAL MEDICINE

## 2021-09-15 PROCEDURE — 80048 BASIC METABOLIC PNL TOTAL CA: CPT | Performed by: PHYSICIAN ASSISTANT

## 2021-09-15 PROCEDURE — 250N000011 HC RX IP 250 OP 636: Performed by: PHYSICIAN ASSISTANT

## 2021-09-15 PROCEDURE — 36415 COLL VENOUS BLD VENIPUNCTURE: CPT | Performed by: PHYSICIAN ASSISTANT

## 2021-09-15 PROCEDURE — 99233 SBSQ HOSP IP/OBS HIGH 50: CPT | Performed by: HOSPITALIST

## 2021-09-15 PROCEDURE — 120N000004 HC R&B MS OVERFLOW

## 2021-09-15 PROCEDURE — 250N000011 HC RX IP 250 OP 636: Performed by: INTERNAL MEDICINE

## 2021-09-15 RX ORDER — ATROPINE SULFATE 0.4 MG/ML
0.4 AMPUL (ML) INJECTION
Status: DISCONTINUED | OUTPATIENT
Start: 2021-09-15 | End: 2021-09-16

## 2021-09-15 RX ORDER — FENTANYL CITRATE 50 UG/ML
25-50 INJECTION, SOLUTION INTRAMUSCULAR; INTRAVENOUS
Status: ACTIVE | OUTPATIENT
Start: 2021-09-15 | End: 2021-09-16

## 2021-09-15 RX ORDER — NALOXONE HYDROCHLORIDE 0.4 MG/ML
0.4 INJECTION, SOLUTION INTRAMUSCULAR; INTRAVENOUS; SUBCUTANEOUS
Status: ACTIVE | OUTPATIENT
Start: 2021-09-15 | End: 2021-09-17

## 2021-09-15 RX ORDER — FENTANYL CITRATE 50 UG/ML
50 INJECTION, SOLUTION INTRAMUSCULAR; INTRAVENOUS
Status: ACTIVE | OUTPATIENT
Start: 2021-09-15 | End: 2021-09-16

## 2021-09-15 RX ORDER — FENTANYL CITRATE 50 UG/ML
50-100 INJECTION, SOLUTION INTRAMUSCULAR; INTRAVENOUS
Status: COMPLETED | OUTPATIENT
Start: 2021-09-15 | End: 2021-09-16

## 2021-09-15 RX ORDER — SIMETHICONE 40MG/0.6ML
133 SUSPENSION, DROPS(FINAL DOSAGE FORM)(ML) ORAL
Status: DISCONTINUED | OUTPATIENT
Start: 2021-09-15 | End: 2021-09-16

## 2021-09-15 RX ORDER — FLUMAZENIL 0.1 MG/ML
0.2 INJECTION, SOLUTION INTRAVENOUS
Status: DISCONTINUED | OUTPATIENT
Start: 2021-09-15 | End: 2021-09-16

## 2021-09-15 RX ORDER — NALOXONE HYDROCHLORIDE 0.4 MG/ML
0.2 INJECTION, SOLUTION INTRAMUSCULAR; INTRAVENOUS; SUBCUTANEOUS
Status: ACTIVE | OUTPATIENT
Start: 2021-09-15 | End: 2021-09-17

## 2021-09-15 RX ORDER — EPINEPHRINE 1 MG/ML
0.1 INJECTION, SOLUTION, CONCENTRATE INTRAVENOUS
Status: DISCONTINUED | OUTPATIENT
Start: 2021-09-15 | End: 2021-09-16

## 2021-09-15 RX ORDER — FENTANYL CITRATE 50 UG/ML
25 INJECTION, SOLUTION INTRAMUSCULAR; INTRAVENOUS EVERY 5 MIN PRN
Status: ACTIVE | OUTPATIENT
Start: 2021-09-15 | End: 2021-09-16

## 2021-09-15 RX ORDER — FLUMAZENIL 0.1 MG/ML
0.2 INJECTION, SOLUTION INTRAVENOUS
Status: ACTIVE | OUTPATIENT
Start: 2021-09-15 | End: 2021-09-17

## 2021-09-15 RX ORDER — SODIUM CHLORIDE 9 MG/ML
INJECTION, SOLUTION INTRAVENOUS CONTINUOUS
Status: DISCONTINUED | OUTPATIENT
Start: 2021-09-15 | End: 2021-09-18

## 2021-09-15 RX ADMIN — METHYLPREDNISOLONE SODIUM SUCCINATE 20 MG: 40 INJECTION, POWDER, FOR SOLUTION INTRAMUSCULAR; INTRAVENOUS at 04:54

## 2021-09-15 RX ADMIN — POTASSIUM CHLORIDE AND SODIUM CHLORIDE: 900; 150 INJECTION, SOLUTION INTRAVENOUS at 04:57

## 2021-09-15 RX ADMIN — METHYLPREDNISOLONE SODIUM SUCCINATE 20 MG: 40 INJECTION, POWDER, FOR SOLUTION INTRAMUSCULAR; INTRAVENOUS at 21:16

## 2021-09-15 RX ADMIN — ENOXAPARIN SODIUM 40 MG: 40 INJECTION SUBCUTANEOUS at 13:15

## 2021-09-15 RX ADMIN — METHYLPREDNISOLONE SODIUM SUCCINATE 20 MG: 40 INJECTION, POWDER, FOR SOLUTION INTRAMUSCULAR; INTRAVENOUS at 13:13

## 2021-09-15 NOTE — PLAN OF CARE
[id number]: Pt resting comfortably. VSS ex tmax 100.3. A&O. Heating pad for pain, declines tylenol. Transfers ind in room. Family at bedside. Will be NPO overnight for procedure tomorrow. No bm this shift.

## 2021-09-15 NOTE — PROGRESS NOTES
"Patient is alert and oriented x4. VS WNL and documented on the FS. Lung sounds clear in all lobes and patient is on RA. Denies SOB. Active bowel sounds in all 4 quadrants. Patient has had 6 BM's in a 24 hr period. Patient states the stools have been slowing down. Patient voiding spontaneously. Denies pain. IV fluids infusing at 100 ml/hr. Regular diet (low fiber, gluten free, dairy free). Patient states he has more of an appetite today. Will order food.      Hemoglobin at 7.8  WBC at 10.4  Na at 133     Patient is independent in room when family present.     Plan:   - Continue to monitor stool output frequency, quantity, bleeding, etc  - Methylprednisolone 20mg IV Q8hrs  - regular diet as tolerated  - Lovenox for DVT prophylaxis, continue to encourage ambulation  -Possible limited flexible sigmoidoscopy later this week to compare endoscopic appearance to that on (9/6/21) prior to infliximab  - no plan for surgery at this time.     /65 (BP Location: Right arm)   Pulse 69   Temp 98.7  F (37.1  C) (Oral)   Resp 16   Ht 1.854 m (6' 1\")   Wt 61.2 kg (135 lb)   SpO2 98%   BMI 17.81 kg/m      "

## 2021-09-15 NOTE — PROGRESS NOTES
[facility]  Hospitalist Progress Note  Romie Coello MD 09/15/2021    Reason for Stay (Diagnosis): Diarrhea and hematochezia due to UC flare         Assessment and Plan:      Summary of Stay: Luther Wood is a 25 year old male with history of ulcerative colitis admitted on 9/11/2021 with frequent loose stools, hematochezia, fatigue and syncope. Received 1uPRBC and being treated for UC flare     Profuse diarrhea with hematochezia  Known history of ulcerative colitis with current flare    - had been admitted 9/2 through 9/9 for acute ulcerative colitis flare received methylprednisolone 20 mg IV every 8 hours with flex sig performed on 9/6 showing severe ulcerative colitis and biopsies were taken    - was started on Inflectra by GI with transition of steroids to prednisone 40 mg daily and discharged on 9/9    - repeat C. difficile and enteric panel is negative    - GI following with improvement of frequency of loose stools (7 in last 24 hours and decreased volume)    - 2nd dose of Infliximab given 9/12    - solumedrol 20 mg IV every 8 hours: GI to convert to oral when appropriate    - DO NOT use Imodium or Lomitol    - tolerating regular diet (gluten and lactose free)    - I personally spoke with Dr. Cardona: likely plan will be to try and transition home in the next 1-3 days     Acute on chronic anemia due to blood loss    - symptomatic hemoglobin of 6.9 on 9/12 in the setting of bloody stool related to ulcerative colitis flare    - received 1 unit of blood on 9/12 with improvement to 8.4>>7.7     - Hb today is 7.6    - still has some small amount blood in stool (water is pink)    - will re-check Hb in am, transfuse if <7    - ok to stop IVF     Fever and leukocytosis    - Tmax 101.5 on 9/11 with WBC elevation (14.5) thought to be steroid induced    - no focal infectious symptoms other than diarrhea with blood    - likely fever related to colitis    - no antibiotics indicated    - blood cultures x 2  "on 9/12 negative to date    - now afebrile     Syncope    - patient had episode of syncope while brushing his teeth    - father caught him before he hit the ground and woke up after 5 seconds    - suspect this is related to hypovolemia and anemia, likely not to be cardiac etiology    - was monitored on telemetry overnight with no arrhythmia and echocardiogram shows no structural disease of his heart    - discontinued telemetry     Hyponatremia   - resolved     Nonsevere malnutrition    - was seen by nutrition on his last admission with recommendation for pushing protein with meals and to have small frequent meals with oral nutritional supplements.     Father in room and updated     DVT Prophylaxis: Lovenox 40 mg (recommended by GI)  Code Status: Full Code  Discharge Dispo: Anticipate 2-3 day stay        Interval History (Subjective):      Reports overall improvement of stool frequency and volume. No fevers. No respiratory symptoms. No pain                  Physical Exam:      Last Vital Signs:  BP 99/55 (BP Location: Left arm)   Pulse 83   Temp 98.9  F (37.2  C) (Oral)   Resp 16   Ht 1.854 m (6' 1\")   Wt 61.2 kg (135 lb)   SpO2 98%   BMI 17.81 kg/m      No intake or output data in the 24 hours ending 09/13/21 1319    Constitutional: Awake, alert, cooperative, no apparent distress   Respiratory: Clear to auscultation bilaterally, no crackles or wheezing   Cardiovascular: Regular rate and rhythm, normal S1 and S2, and no murmur noted   Abdomen: Normal bowel sounds, soft, non-distended, mild tenderness   Skin: No rashes, no cyanosis, dry to touch   Neuro: Alert and oriented x3, no weakness, numbness, memory loss   Extremities: No edema, normal range of motion   Other(s):        All other systems: Negative          Medications:      All current medications were reviewed with changes reflected in problem list.         Data:      All new lab and imaging data was reviewed.   Labs:  Recent Labs   Lab 09/15/21  0537 "      POTASSIUM 4.4   CHLORIDE 101   CO2 31   ANIONGAP 1*   *   BUN 13   CR 0.77   GFRESTIMATED >90   MADIE 7.8*     Recent Labs   Lab 09/15/21  0537   WBC 10.4   HGB 7.6*   HCT 24.7*   MCV 91   *      Imaging:   No results found for this or any previous visit (from the past 24 hour(s)).

## 2021-09-15 NOTE — PLAN OF CARE
Pt resting comfortably with family at bedside. A&O. VSS ex tmax 99.7. IVF infusing. Denies pain. No bm this shift. AUO.

## 2021-09-15 NOTE — PROGRESS NOTES
"Patient is alert and oriented x4. VS WNL and documented on the FS. Lung sounds clear in all lobes and patient is on RA. Denies SOB. Active bowel sounds in all 4 quadrants. Patient has had 6 BM's in a 24 hr period. Patient states the stools have been slowing down. Patient voiding spontaneously. Denies pain. IV fluids infusing at 100 ml/hr. Regular diet (low fiber, gluten free, dairy free). Patient states he has more of an appetite today. Will order food.      Hemoglobin at 7.8  WBC at 10.4  Na at 133     Patient is independent in room when family present.     Plan: Flex sig tomorrow, NPO at 0400, and IV fluids restarting at 0000.    BP 99/55 (BP Location: Left arm)   Pulse 83   Temp 98.9  F (37.2  C) (Oral)   Resp 16   Ht 1.854 m (6' 1\")   Wt 61.2 kg (135 lb)   SpO2 98%   BMI 17.81 kg/m          "

## 2021-09-15 NOTE — PROGRESS NOTES
"[facility] Digestive Health - Gastroenterology Progress Note    Subjective:  Increasing oral intake with mild, intermittent abdominal cramping.  Feels as if stools may have more form, but mainly loose, some blood and frequency.    Objective:  Physical Exam:  /73 (BP Location: Left arm)   Pulse 90   Temp 100.3  F (37.9  C) (Oral)   Resp 16   Ht 1.854 m (6' 1\")   Wt 61.2 kg (135 lb)   SpO2 99%   BMI 17.81 kg/m    Temp (24hrs), Av.1  F (37.3  C), Min:98.5  F (36.9  C), Max:100.2  F (37.9  C)    No data found.    Intake/Output Summary (Last 24 hours) at 2021 1205  Last data filed at 2021 1300  Gross per 24 hour   Intake 290 ml   Output --   Net 290 ml     General Appearance: Comfortable, laying in bed  Abdomen: Normal    Labs / Imaging:  Recent Labs   Lab Test 09/15/21  0537 21  0514 21  1758 21  1133 21  0623 21  0000 21  1846 21  1259 21  0603 21  1918 21  1918 21  0630 21  0630 21  0632 21  0632   WBC 10.4 15.0*  --   --  14.5*  --   --   --  15.4*  --  19.0*  --  21.1*  --  19.3*   HGB 7.6* 8.1* 7.9* 7.8* 7.7*  7.7* 7.9* 8.4*   < > 6.9*   < > 7.9*   < > 8.7*   < > 9.1*   MCV 91 90  --   --  87  --   --   --  88  --  86  --  89  --  89   * 611*  --   --  471*  --   --   --  455*  --  529*  --  436  --  433   INR  --   --   --   --   --   --   --   --   --   --  1.34*  --   --   --   --     < > = values in this interval not displayed.     No lab results found.    Invalid input(s): FERRITIN  Recent Labs   Lab Test 09/15/21  0537 21  0514 21  0623 21  0603 21  1850 21  0630 21  0621   POTASSIUM 4.4 3.9 4.0 3.5 4.0 3.6 4.0   CHLORIDE 101 98 100 99 94 101 101   BUN 13 9 8 9 10 13 16     Recent Labs   Lab Test 21  0623 21  1850 21  0637 21  1146   ALBUMIN 1.5* 1.9* 2.7* 2.8*   BILITOTAL 0.4 0.4 0.6 0.5   AST 16 23 8 11   ALT 43 60 18 21   LIPASE  --  " 64*  --  79       Assessment / Plan:  1.) Ulcerative Colitis:  24 y/o admitted with flare of ulcerative pancolitis, having recently been hospitalized (9/2-9/9) when he underwent flexible sigmoidoscopy (9/6/21) with severe colitis noted and subsequently received first dose of infliximab (5mg/kg) on (9/8/21).  Readmitted (9/11/21) with on going diarrhea and syncopal event at home and was found to have anemia (hgb=6.9), elevated CRP (253) and fever (T=101).  C.difficile stool negative.  Received addition infliximab (5mg/kh) on (9/12/21).    Overall feeling better since most recent infliximab yesterday which is encouraging.  Stool output with slight improvements, notably no further blood even in the setting of Lovenox for DVT prophylaxis.  Fever curve seems to be trending downwards.  WBC slightly decreased (WBC=21.1-->15) since admission.    Recommendations:  - continue to monitor stool output frequency, quantity, bleeding, etc  - will pursue flexible sigmoidoscopy tomorrow (tentatively planned for 8:00am)  - continue methylprednisolone 20mg IV Q8hrs, discussed switching to oral prednisone however we decided to wait at least another day and seeing what flexible sigmoidoscopy shows  - regular diet as tolerated  - Lovenox for DVT prophylaxis, continue to encourage ambulation  - family is working to coordinate second opinion with [facility]  - next infliximab infusion would be due (9/22/21) at 10mg/kg      Anderson Cardona MD    Office: [phone]

## 2021-09-15 NOTE — PROGRESS NOTES
COLON & RECTAL SURGERY  PROGRESS NOTE    September 15, 2021    SUBJECTIVE:  Patient reports he continues to improve.  No abdominal pain.  Tolerating regular diet without nausea or vomiting.  Continues to have 7-8 BMs per 24 hrs.  Very minimal blood seen only on toilet paper which patient attributes to frequent wiping/irritation.      OBJECTIVE:  Temp:  [98.7  F (37.1  C)-99.7  F (37.6  C)] 98.7  F (37.1  C)  Pulse:  [65-94] 69  Resp:  [16] 16  BP: (111-119)/(63-70) 115/65  SpO2:  [97 %-100 %] 98 %  No intake or output data in the 24 hours ending 09/15/21 0849    GENERAL:  Awake, alert, no acute distress, lying in bed.  HEAD: Nomocephalic atraumatic  SCLERA: anicteric  EXTREMITIES: warm and well perfused  ABDOMEN:  Soft, non tender, non-distended, no rebound or guarding, no peritoneal signs.    LABS:  Lab Results   Component Value Date    WBC 10.4 09/15/2021    WBC 10.4 12/07/2009     Lab Results   Component Value Date    HGB 7.6 09/15/2021    HGB 13.7 12/07/2009     Lab Results   Component Value Date    HCT 24.7 09/15/2021    HCT 40.7 12/07/2009     Lab Results   Component Value Date     09/15/2021     12/07/2009     Last Basic Metabolic Panel:  Lab Results   Component Value Date     09/15/2021     12/07/2009      Lab Results   Component Value Date    POTASSIUM 4.4 09/15/2021    POTASSIUM 3.6 12/07/2009     Lab Results   Component Value Date    CHLORIDE 101 09/15/2021    CHLORIDE 103 12/07/2009     Lab Results   Component Value Date    MADIE 7.8 09/15/2021    MADIE 9.2 12/07/2009     Lab Results   Component Value Date    CO2 31 09/15/2021    CO2 30 12/07/2009     Lab Results   Component Value Date    BUN 13 09/15/2021    BUN 14 12/07/2009     Lab Results   Component Value Date    CR 0.77 09/15/2021    CR 0.64 12/07/2009     Lab Results   Component Value Date     09/15/2021    GLC 97 12/07/2009       ASSESSMENT/PLAN: 25 year old man admitted with Ulcerative Colitis flare.  Improving with  IV steroids and Remicade.  Afebrile, vitals stable.  Hgb 7.6.  WBC 10.4.    - Diet per medicine team/GI  - Medical management of Ulcerative Colitis per GI  - Pain management as needed  - Encourage ambulation  - Monitor stool output  - Lovenox for ppx  - No plans for surgery      For questions/paging, please contact the CRS office at [phone].    Maria C Bhandari PA-C  [facility]  Phone: [phone]

## 2021-09-15 NOTE — PLAN OF CARE
"[id number]    Inpatient Progress Note:    /64 (BP Location: Left arm)   Pulse 71   Temp 98.7  F (37.1  C) (Oral)   Resp 16   Ht 1.854 m (6' 1\")   Wt 61.2 kg (135 lb)   SpO2 98%   BMI 17.81 kg/m       Orientation: alert and oriented x4  Pain status: denies  Activity: up SBA with family or staff, denies dizziness/lightheadedness  Peripheral edema: none   Resp: WDL, LS clear and equal bilaterally  Cardiac: WDL  GI: 3 stools since 1135 yesterday per patient's record, watery, yellow/brown, denies nausea, BS active in all quadrants  :  WDL  Skin: WDL  LDA: peripheral IV  Infusions: NS + 20K @ 100 mL/hr  Pertinent Labs: Hgb 7.6, Na 133, WBC 15.0--10.4, C diff & enteric panel (-)  Diet: diet as tolerated - low fiber, gluten free, dairy free diet with oral supplement between meals; better appetite during day (9/14) than day before  Consults: GI, colorectal surgery, nutrition  Discharge Plan: continue to monitor stool output, continue methylprednisolone Q8H, and continue lovenox for DVT prophylaxis per GI, transfuse PRBCs for Hgb < 7.0 -- 7.6 this AM, T max 99.0 F overnight, CRS - no emergent surgery, family looking into second opinion from [facility]    Will continue to monitor and provide cares.     Katheryn Hollingsworth, RN         "

## 2021-09-16 LAB
ANION GAP SERPL CALCULATED.3IONS-SCNC: 2 MMOL/L (ref 3–14)
BASOPHILS # BLD MANUAL: 0 10E3/UL (ref 0–0.2)
BASOPHILS NFR BLD MANUAL: 0 %
BUN SERPL-MCNC: 12 MG/DL (ref 7–30)
CALCIUM SERPL-MCNC: 7.6 MG/DL (ref 8.5–10.1)
CHLORIDE BLD-SCNC: 98 MMOL/L (ref 94–109)
CO2 SERPL-SCNC: 32 MMOL/L (ref 20–32)
CREAT SERPL-MCNC: 0.79 MG/DL (ref 0.66–1.25)
EOSINOPHIL # BLD MANUAL: 0.2 10E3/UL (ref 0–0.7)
EOSINOPHIL NFR BLD MANUAL: 2 %
ERYTHROCYTE [DISTWIDTH] IN BLOOD BY AUTOMATED COUNT: 13.3 % (ref 10–15)
FLEXIBLE SIGMOIDOSCOPY: NORMAL
GFR SERPL CREATININE-BSD FRML MDRD: >90 ML/MIN/1.73M2
GLUCOSE BLD-MCNC: 114 MG/DL (ref 70–99)
HCT VFR BLD AUTO: 25.8 % (ref 40–53)
HGB BLD-MCNC: 8.1 G/DL (ref 13.3–17.7)
LYMPHOCYTES # BLD MANUAL: 1.3 10E3/UL (ref 0.8–5.3)
LYMPHOCYTES NFR BLD MANUAL: 11 %
MCH RBC QN AUTO: 27.8 PG (ref 26.5–33)
MCHC RBC AUTO-ENTMCNC: 31.4 G/DL (ref 31.5–36.5)
MCV RBC AUTO: 89 FL (ref 78–100)
METAMYELOCYTES # BLD MANUAL: 1.7 10E3/UL
METAMYELOCYTES NFR BLD MANUAL: 14 %
MONOCYTES # BLD MANUAL: 1 10E3/UL (ref 0–1.3)
MONOCYTES NFR BLD MANUAL: 8 %
NEUTROPHILS # BLD MANUAL: 7.9 10E3/UL (ref 1.6–8.3)
NEUTROPHILS NFR BLD MANUAL: 65 %
PLAT MORPH BLD: ABNORMAL
PLATELET # BLD AUTO: 558 10E3/UL (ref 150–450)
POTASSIUM BLD-SCNC: 4.2 MMOL/L (ref 3.4–5.3)
RBC # BLD AUTO: 2.91 10E6/UL (ref 4.4–5.9)
RBC MORPH BLD: ABNORMAL
SODIUM SERPL-SCNC: 132 MMOL/L (ref 133–144)
WBC # BLD AUTO: 12.1 10E3/UL (ref 4–11)

## 2021-09-16 PROCEDURE — 999N000099 HC STATISTIC MODERATE SEDATION < 10 MIN: Performed by: INTERNAL MEDICINE

## 2021-09-16 PROCEDURE — 250N000011 HC RX IP 250 OP 636: Performed by: INTERNAL MEDICINE

## 2021-09-16 PROCEDURE — 85027 COMPLETE CBC AUTOMATED: CPT | Performed by: HOSPITALIST

## 2021-09-16 PROCEDURE — 120N000004 HC R&B MS OVERFLOW

## 2021-09-16 PROCEDURE — 258N000003 HC RX IP 258 OP 636: Performed by: HOSPITALIST

## 2021-09-16 PROCEDURE — 88342 IMHCHEM/IMCYTCHM 1ST ANTB: CPT | Mod: TC | Performed by: INTERNAL MEDICINE

## 2021-09-16 PROCEDURE — 80048 BASIC METABOLIC PNL TOTAL CA: CPT | Performed by: HOSPITALIST

## 2021-09-16 PROCEDURE — 36415 COLL VENOUS BLD VENIPUNCTURE: CPT | Performed by: HOSPITALIST

## 2021-09-16 PROCEDURE — 99233 SBSQ HOSP IP/OBS HIGH 50: CPT | Performed by: HOSPITALIST

## 2021-09-16 PROCEDURE — 45330 DIAGNOSTIC SIGMOIDOSCOPY: CPT | Performed by: INTERNAL MEDICINE

## 2021-09-16 PROCEDURE — 250N000013 HC RX MED GY IP 250 OP 250 PS 637: Performed by: PHYSICIAN ASSISTANT

## 2021-09-16 RX ORDER — METRONIDAZOLE 250 MG/1
250 TABLET ORAL
Status: COMPLETED | OUTPATIENT
Start: 2021-09-16 | End: 2021-09-16

## 2021-09-16 RX ORDER — NEOMYCIN SULFATE 500 MG/1
1000 TABLET ORAL
Status: COMPLETED | OUTPATIENT
Start: 2021-09-16 | End: 2021-09-16

## 2021-09-16 RX ORDER — LIDOCAINE 40 MG/G
CREAM TOPICAL
Status: DISCONTINUED | OUTPATIENT
Start: 2021-09-16 | End: 2021-09-16

## 2021-09-16 RX ORDER — SODIUM CHLORIDE 9 MG/ML
INJECTION, SOLUTION INTRAVENOUS CONTINUOUS
Status: DISCONTINUED | OUTPATIENT
Start: 2021-09-16 | End: 2021-09-18

## 2021-09-16 RX ORDER — POLYETHYLENE GLYCOL 3350 17 G/17G
238 POWDER, FOR SOLUTION ORAL ONCE
Status: COMPLETED | OUTPATIENT
Start: 2021-09-16 | End: 2021-09-16

## 2021-09-16 RX ORDER — FLUMAZENIL 0.1 MG/ML
0.2 INJECTION, SOLUTION INTRAVENOUS
Status: ACTIVE | OUTPATIENT
Start: 2021-09-16 | End: 2021-09-16

## 2021-09-16 RX ADMIN — SODIUM CHLORIDE: 9 INJECTION, SOLUTION INTRAVENOUS at 10:09

## 2021-09-16 RX ADMIN — POLYETHYLENE GLYCOL 3350 238 G: 17 POWDER, FOR SOLUTION ORAL at 19:56

## 2021-09-16 RX ADMIN — SODIUM CHLORIDE: 9 INJECTION, SOLUTION INTRAVENOUS at 19:56

## 2021-09-16 RX ADMIN — FENTANYL CITRATE 100 MCG: 50 INJECTION, SOLUTION INTRAMUSCULAR; INTRAVENOUS at 08:06

## 2021-09-16 RX ADMIN — METRONIDAZOLE 250 MG: 250 TABLET ORAL at 21:41

## 2021-09-16 RX ADMIN — METRONIDAZOLE 250 MG: 250 TABLET ORAL at 23:24

## 2021-09-16 RX ADMIN — NEOMYCIN SULFATE 1000 MG: 500 TABLET ORAL at 21:40

## 2021-09-16 RX ADMIN — SODIUM CHLORIDE: 9 INJECTION, SOLUTION INTRAVENOUS at 00:05

## 2021-09-16 RX ADMIN — METHYLPREDNISOLONE SODIUM SUCCINATE 20 MG: 40 INJECTION, POWDER, FOR SOLUTION INTRAMUSCULAR; INTRAVENOUS at 21:40

## 2021-09-16 RX ADMIN — NEOMYCIN SULFATE 1000 MG: 500 TABLET ORAL at 23:23

## 2021-09-16 RX ADMIN — MIDAZOLAM 2 MG: 1 INJECTION INTRAMUSCULAR; INTRAVENOUS at 08:05

## 2021-09-16 RX ADMIN — METHYLPREDNISOLONE SODIUM SUCCINATE 20 MG: 40 INJECTION, POWDER, FOR SOLUTION INTRAMUSCULAR; INTRAVENOUS at 12:42

## 2021-09-16 RX ADMIN — NEOMYCIN SULFATE 1000 MG: 500 TABLET ORAL at 18:34

## 2021-09-16 RX ADMIN — ENOXAPARIN SODIUM 40 MG: 40 INJECTION SUBCUTANEOUS at 12:42

## 2021-09-16 RX ADMIN — METHYLPREDNISOLONE SODIUM SUCCINATE 20 MG: 40 INJECTION, POWDER, FOR SOLUTION INTRAMUSCULAR; INTRAVENOUS at 05:29

## 2021-09-16 RX ADMIN — METRONIDAZOLE 250 MG: 250 TABLET ORAL at 18:35

## 2021-09-16 ASSESSMENT — MIFFLIN-ST. JEOR: SCORE: 1661.24

## 2021-09-16 NOTE — PROGRESS NOTES
[facility]  Hospitalist Progress Note  Romie Coello MD 09/16/2021    Reason for Stay (Diagnosis): Diarrhea and hematochezia due to UC flare         Assessment and Plan:      Summary of Stay: Luther Wood is a 25 year old male with history of ulcerative colitis admitted on 9/11/2021 with frequent loose stools, hematochezia, fatigue and syncope. Received 1uPRBC and being treated for UC flare     Profuse diarrhea with hematochezia  Known history of ulcerative colitis with current flare    - had been admitted 9/2 through 9/9 for acute ulcerative colitis flare received methylprednisolone 20 mg IV every 8 hours with flex sig performed on 9/6 showing severe ulcerative colitis and biopsies were taken    - was started on Inflectra by GI with transition of steroids to prednisone 40 mg daily and discharged on 9/9    - repeat C. difficile and enteric panel is negative    - GI following with improvement of frequency of loose stools (7 in last 24 hours and decreased volume)    - 2nd dose of Infliximab given 9/12    - solumedrol 20 mg IV every 8 hours: GI to convert to oral when appropriate    - DO NOT use Imodium or Lomitol    - tolerating regular diet (gluten and lactose free)    - flex sig done today shows worsening ulcerations    - Colorectal to offer surgery: I personally spoke with Colorectal    - I have a page out to Dr. Cardona to discuss family request to reach out to [facility] and/or possible transfer    - I did tell patient and mother that likely [facility] would not accept a transfer if a higher level of care was not needed and if we could provide the same treatment     - patient requesting to cont IVF    Acute on chronic anemia due to blood loss    - symptomatic hemoglobin of 6.9 on 9/12 in the setting of bloody stool related to ulcerative colitis flare    - received 1 unit of blood on 9/12 with improvement to 8.4>>7.7     - Hb today is 8/1 (improved from 7.6)    - still has some small amount blood in stool  "(water is pink)    - will re-check Hb in am, transfuse if <7    Fever and leukocytosis    - Tmax 101.5 on 9/11 with WBC elevation (14.5) thought to be steroid induced    - no focal infectious symptoms other than diarrhea with blood    - likely fever related to colitis    - no antibiotics indicated    - blood cultures x 2 on 9/12 negative to date    - now afebrile     Syncope    - patient had episode of syncope while brushing his teeth    - father caught him before he hit the ground and woke up after 5 seconds    - suspect this is related to hypovolemia and anemia, likely not to be cardiac etiology    - was monitored on telemetry overnight with no arrhythmia and echocardiogram shows no structural disease of his heart    - discontinued telemetry     Hyponatremia    - fluctuating    - monitor     Nonsevere malnutrition    - was seen by nutrition on his last admission with recommendation for pushing protein with meals and to have small frequent meals with oral nutritional supplements.     Mother in room and updated     Addendum: I spoke with Dr. Cardona. He will meet with the patient and family. I faxed some records to [place] as requested by patient's mother    DVT Prophylaxis: Lovenox 40 mg (recommended by GI)  Code Status: Full Code  Discharge Dispo: Anticipate 2-3 day stay        Interval History (Subjective):      Reports overall improvement of stool frequency and volume. No fevers. No respiratory symptoms. No pain. Had 4 BMs in 24hours, decreased volume. Tolerating some PO                  Physical Exam:      Last Vital Signs:  /60 (BP Location: Left arm)   Pulse 73   Temp 98.6  F (37  C) (Oral)   Resp 16   Ht 1.854 m (6' 1\")   Wt 61.2 kg (135 lb)   SpO2 98%   BMI 17.81 kg/m      No intake or output data in the 24 hours ending 09/13/21 1319    Constitutional: Awake, alert, cooperative, no apparent distress   Respiratory: Clear to auscultation bilaterally, no crackles or wheezing   Cardiovascular: Regular " rate and rhythm, normal S1 and S2, and no murmur noted   Abdomen: Normal bowel sounds, soft, non-distended, mild tenderness   Skin: No rashes, no cyanosis, dry to touch   Neuro: Alert and oriented x3, no weakness, numbness, memory loss   Extremities: No edema, normal range of motion   Other(s):        All other systems: Negative          Medications:      All current medications were reviewed with changes reflected in problem list.         Data:      All new lab and imaging data was reviewed.   Labs:  Recent Labs   Lab 09/16/21  0547   *   POTASSIUM 4.2   CHLORIDE 98   CO2 32   ANIONGAP 2*   *   BUN 12   CR 0.79   GFRESTIMATED >90   MADIE 7.6*     Recent Labs   Lab 09/16/21  0547   WBC 12.1*   HGB 8.1*   HCT 25.8*   MCV 89   *      Imaging:   No results found for this or any previous visit (from the past 24 hour(s)).

## 2021-09-16 NOTE — PROGRESS NOTES
Patient is alert and oriented x4. VS WNL and documented on the FS. Lung sounds clear in all lobes and patient is on RA. Denies SOB. Active bowel sounds in all 4 quadrants. Patient has had 1 BM since 1130 today. Patient states the stools have been slowing down. Patient voiding spontaneously. Denies pain. IV fluids infusing at 100 ml/hr. Regular diet (low fiber, gluten free, dairy free). Patient states he has more of an appetite today. Will order food.      Hemoglobin at 8.1  WBC at 12.1  Na at 132     Patient is independent in room when family present.     Plan: Family seeking second opinion from [facility] ER regarding severity of disease.

## 2021-09-16 NOTE — PROGRESS NOTES
COLON & RECTAL SURGERY  PROGRESS NOTE    September 16, 2021    SUBJECTIVE:  Patient denies abdominal pain.  The number of bowel movements per day are decreased compared to yesterday.  Mother notes that he was up only once during the night to go to the bathroom which is much improved.  Some blood seen in stool yesterday.  Mother would very much like to get second GI opinion at [place].    OBJECTIVE:  Temp:  [98.5  F (36.9  C)-100.3  F (37.9  C)] 98.5  F (36.9  C)  Pulse:  [] 73  Resp:  [10-22] 16  BP: ()/(55-77) 109/63  Cuff Mean (mmHg):  [86] 86  SpO2:  [92 %-99 %] 98 %  No intake or output data in the 24 hours ending 09/16/21 0926    GENERAL:  Awake, alert, no acute distress, lying in bed.  HEAD: Nomocephalic atraumatic  SCLERA: anicteric  EXTREMITIES: warm and well perfused  ABDOMEN:  Soft, non tender, non-distended, no rebound or guarding, no peritoneal signs.    LABS:  Lab Results   Component Value Date    WBC 12.1 09/16/2021    WBC 10.4 12/07/2009     Lab Results   Component Value Date    HGB 8.1 09/16/2021    HGB 13.7 12/07/2009     Lab Results   Component Value Date    HCT 25.8 09/16/2021    HCT 40.7 12/07/2009     Lab Results   Component Value Date     09/16/2021     12/07/2009     Last Basic Metabolic Panel:  Lab Results   Component Value Date     09/16/2021     12/07/2009      Lab Results   Component Value Date    POTASSIUM 4.2 09/16/2021    POTASSIUM 3.6 12/07/2009     Lab Results   Component Value Date    CHLORIDE 98 09/16/2021    CHLORIDE 103 12/07/2009     Lab Results   Component Value Date    MADIE 7.6 09/16/2021    MADIE 9.2 12/07/2009     Lab Results   Component Value Date    CO2 32 09/16/2021    CO2 30 12/07/2009     Lab Results   Component Value Date    BUN 12 09/16/2021    BUN 14 12/07/2009     Lab Results   Component Value Date    CR 0.79 09/16/2021    CR 0.64 12/07/2009     Lab Results   Component Value Date     09/16/2021    GLC 97 12/07/2009        ASSESSMENT/PLAN:  25 year old man admitted with Ulcerative Colitis flare. On IV steroids and first dose of Remicade on 9/8/21.  Repeat flex sig done this morning which showed severe (Alvarenga Score 3) ulcerative pancolitis, worsened since the last examination on 9/6.  Tmax 100.3F in last 24 hrs. Intermittently tachycardic. Hgb 8.1. WBC 12.1.     - Discussed results of flex sig with patient.  GI recommending consideration of surgery given severity of disease.  - Family would like a second opinion at [place] - will defer facilitation of transfer to [place] to the primary team.  - Dr. Flaherty can review surgical options with family again tonight or tomorrow morning.  - Medical management of Ulcerative Colitis per GI  - Pain management as needed  - Encourage ambulation  - Monitor stool output  - Lovenox for ppx      For questions/paging, please contact the CRS office at [phone].    Maria C Bhandari PA-C  [facility]  Phone: [phone]

## 2021-09-16 NOTE — PROGRESS NOTES
"[facility] Digestive Health - Gastroenterology Progress Note    Subjective:  Clinically he is feels about the same.  Continues to have crampy lower abdominal pain.  Very small stools with blood but little substance.  Flexible sigmoidoscopy this morning with notable worsening of severe ulcerative colitis.    Objective:  Physical Exam:  /61 (BP Location: Left arm)   Pulse 80   Temp 98.6  F (37  C) (Oral)   Resp 16   Ht 1.854 m (6' 1\")   Wt 61.2 kg (135 lb)   SpO2 98%   BMI 17.81 kg/m    Temp (24hrs), Av.1  F (37.3  C), Min:98.5  F (36.9  C), Max:100.2  F (37.9  C)    Patient Vitals for the past 72 hrs:   Weight   21 0751 61.2 kg (135 lb)       Intake/Output Summary (Last 24 hours) at 2021 1205  Last data filed at 2021 1300  Gross per 24 hour   Intake 290 ml   Output --   Net 290 ml     General Appearance: Comfortable, laying in bed  Abdomen: Normal    Labs / Imaging:  Recent Labs   Lab Test 21  0547 09/15/21  0537 21  0514 21  1758 21  1133 21  0623 21  0000 21  1259 21  0603 21  1918 21  1918 21  0630 21  0630   WBC 12.1* 10.4 15.0*  --   --  14.5*  --   --  15.4*  --  19.0*  --  21.1*   HGB 8.1* 7.6* 8.1* 7.9* 7.8* 7.7*  7.7* 7.9*   < > 6.9*   < > 7.9*   < > 8.7*   MCV 89 91 90  --   --  87  --   --  88  --  86  --  89   * 505* 611*  --   --  471*  --   --  455*  --  529*  --  436   INR  --   --   --   --   --   --   --   --   --   --  1.34*  --   --     < > = values in this interval not displayed.     No lab results found.    Invalid input(s): FERRITIN  Recent Labs   Lab Test 21  0547 09/15/21  0537 21  0514 21  0623 21  0603 21  1850 21  0630   POTASSIUM 4.2 4.4 3.9 4.0 3.5 4.0 3.6   CHLORIDE 98 101 98 100 99 94 101   BUN 12 13 9 8 9 10 13     Recent Labs   Lab Test 21  0623 21  1850 21  0637 21  1146   ALBUMIN 1.5* 1.9* 2.7* 2.8*   BILITOTAL 0.4 0.4 " 0.6 0.5   AST 16 23 8 11   ALT 43 60 18 21   LIPASE  --  64*  --  79       Assessment / Plan:  1.) Ulcerative Colitis:  26 y/o admitted with flare of ulcerative pancolitis, having recently been hospitalized (9/2-9/9) when he underwent flexible sigmoidoscopy (9/6/21) with severe colitis noted and subsequently received first dose of infliximab (5mg/kg) on (9/8/21).  Readmitted (9/11/21) with on going diarrhea and syncopal event at home and was found to have anemia (hgb=6.9), elevated CRP (253) and fever (T=101).  C.difficile stool negative.  Received addition infliximab (5mg/kh) on (9/12/21).    Overall feeling better since most recent infliximab yesterday which is encouraging.  Stool output with slight improvements, notably no further blood even in the setting of Lovenox for DVT prophylaxis.  Fever curve seems to be trending downwards.  WBC slightly decreased (WBC=21.1-->15--12.1) since admission.  OZB=143-->159.    Recommendations:  - notable progression of UC severity based on flexible sigmoidoscopy, minimal symptoms improvement  - CRP with only minimal improvement (after total of 10mg/kg infliximab) & on going IV steroids  - I have significant concerns of perforation given trajectory over the past week and feel as if surgery is the best approach at this point  - care discussed with [facility] IBD providers this afternoon, after which I do not believe there are any additional medical salvage options available which would have a reasonable chance of adding benefit without significant risk, additionally no role for transfer to [facility]  - case discussed with CRS  - long discussion with Jb and his parents this afternon and they are becoming more accepting of the reality of a surgical treatment as the likely next step    Anderson Cardona MD    Office: [phone]

## 2021-09-16 NOTE — PLAN OF CARE
"[id number]    Inpatient Progress Note:    /64 (BP Location: Left arm)   Pulse 73   Temp 98.9  F (37.2  C) (Oral)   Resp 16   Ht 1.854 m (6' 1\")   Wt 61.2 kg (135 lb)   SpO2 97%   BMI 17.81 kg/m       Orientation: alert and oriented x4  Pain status: reported increased abdominal cramping after eating supper, heating pad applied intermittently, encouraged to ambulate, rested comfortably throughout night  Activity: up SBA with family or staff, denies dizziness/lightheadedness  Peripheral edema: none  Resp: WDL  Cardiac: WDL  GI: 3 stools since 1135 yesterday per patient's record, watery, yellow/brown but somewhat darker in color today (9/15) - GI saw stool in toilet per patient report, denies nausea, BS active in all quadrants  : WDL  Skin: WDL  LDA: peripheral IV  Infusions: NS @ 100 mL/hr starting at 0000  Pertinent Labs: Hgb 8.1, Na 132, WBC 15.0--10.4, C diff & enteric panel (-)  Diet: NPO for flexible sigmoidoscopy today  Consults: GI, colorectal surgery, nutrition  Discharge Plan: flexible sigmoidoscopy planned for today, continue to monitor stool output, continue methylprednisolone Q8H (discuss transition to oral prednisone pending what flex sig shows), and continue lovenox for DVT prophylaxis per GI, transfuse PRBCs for Hgb < 7.0 -- 8.1 this AM, T max 100.3 F overnight - IS use encouraged, CRS - no emergent surgery, family looking into second opinion from [facility]    Will continue to monitor and provide cares.     Katheryn Hollingsworth, RN         "

## 2021-09-17 ENCOUNTER — ANESTHESIA EVENT (OUTPATIENT)
Dept: SURGERY | Facility: CLINIC | Age: 26
DRG: 329 | End: 2021-09-17
Payer: COMMERCIAL

## 2021-09-17 ENCOUNTER — ANESTHESIA (OUTPATIENT)
Dept: SURGERY | Facility: CLINIC | Age: 26
DRG: 329 | End: 2021-09-17
Payer: COMMERCIAL

## 2021-09-17 LAB
ABO/RH(D): NORMAL
ALBUMIN SERPL-MCNC: 1.5 G/DL (ref 3.4–5)
ALP SERPL-CCNC: 142 U/L (ref 40–150)
ALT SERPL W P-5'-P-CCNC: 93 U/L (ref 0–70)
ANION GAP SERPL CALCULATED.3IONS-SCNC: 5 MMOL/L (ref 3–14)
ANION GAP SERPL CALCULATED.3IONS-SCNC: 7 MMOL/L (ref 3–14)
AST SERPL W P-5'-P-CCNC: 25 U/L (ref 0–45)
BACTERIA BLD CULT: NO GROWTH
BACTERIA BLD CULT: NO GROWTH
BASOPHILS # BLD MANUAL: 0 10E3/UL (ref 0–0.2)
BASOPHILS NFR BLD MANUAL: 0 %
BILIRUB SERPL-MCNC: 0.4 MG/DL (ref 0.2–1.3)
BUN SERPL-MCNC: 13 MG/DL (ref 7–30)
BUN SERPL-MCNC: 13 MG/DL (ref 7–30)
CALCIUM SERPL-MCNC: 7.4 MG/DL (ref 8.5–10.1)
CALCIUM SERPL-MCNC: 7.5 MG/DL (ref 8.5–10.1)
CHLORIDE BLD-SCNC: 98 MMOL/L (ref 94–109)
CHLORIDE BLD-SCNC: 98 MMOL/L (ref 94–109)
CO2 SERPL-SCNC: 27 MMOL/L (ref 20–32)
CO2 SERPL-SCNC: 28 MMOL/L (ref 20–32)
CREAT SERPL-MCNC: 0.67 MG/DL (ref 0.66–1.25)
CREAT SERPL-MCNC: 0.71 MG/DL (ref 0.66–1.25)
EOSINOPHIL # BLD MANUAL: 0.1 10E3/UL (ref 0–0.7)
EOSINOPHIL NFR BLD MANUAL: 1 %
ERYTHROCYTE [DISTWIDTH] IN BLOOD BY AUTOMATED COUNT: 13.3 % (ref 10–15)
GFR SERPL CREATININE-BSD FRML MDRD: >90 ML/MIN/1.73M2
GFR SERPL CREATININE-BSD FRML MDRD: >90 ML/MIN/1.73M2
GLUCOSE BLD-MCNC: 132 MG/DL (ref 70–99)
GLUCOSE BLD-MCNC: 136 MG/DL (ref 70–99)
HCT VFR BLD AUTO: 26.7 % (ref 40–53)
HGB BLD-MCNC: 8.1 G/DL (ref 13.3–17.7)
INR PPP: 1.33 (ref 0.85–1.15)
LYMPHOCYTES # BLD MANUAL: 1.1 10E3/UL (ref 0.8–5.3)
LYMPHOCYTES NFR BLD MANUAL: 11 %
MAGNESIUM SERPL-MCNC: 2.2 MG/DL (ref 1.6–2.3)
MCH RBC QN AUTO: 27.2 PG (ref 26.5–33)
MCHC RBC AUTO-ENTMCNC: 30.3 G/DL (ref 31.5–36.5)
MCV RBC AUTO: 90 FL (ref 78–100)
MONOCYTES # BLD MANUAL: 0.6 10E3/UL (ref 0–1.3)
MONOCYTES NFR BLD MANUAL: 6 %
NEUTROPHILS # BLD MANUAL: 8.2 10E3/UL (ref 1.6–8.3)
NEUTROPHILS NFR BLD MANUAL: 82 %
PHOSPHATE SERPL-MCNC: 4.1 MG/DL (ref 2.5–4.5)
PLAT MORPH BLD: NORMAL
PLATELET # BLD AUTO: 565 10E3/UL (ref 150–450)
POTASSIUM BLD-SCNC: 3.8 MMOL/L (ref 3.4–5.3)
POTASSIUM BLD-SCNC: 3.9 MMOL/L (ref 3.4–5.3)
PROT SERPL-MCNC: 5.6 G/DL (ref 6.8–8.8)
RBC # BLD AUTO: 2.98 10E6/UL (ref 4.4–5.9)
RBC MORPH BLD: NORMAL
SODIUM SERPL-SCNC: 131 MMOL/L (ref 133–144)
SODIUM SERPL-SCNC: 132 MMOL/L (ref 133–144)
SPECIMEN EXPIRATION DATE: NORMAL
WBC # BLD AUTO: 10 10E3/UL (ref 4–11)

## 2021-09-17 PROCEDURE — 710N000009 HC RECOVERY PHASE 1, LEVEL 1, PER MIN: Performed by: COLON & RECTAL SURGERY

## 2021-09-17 PROCEDURE — 36415 COLL VENOUS BLD VENIPUNCTURE: CPT | Performed by: HOSPITALIST

## 2021-09-17 PROCEDURE — 258N000001 HC RX 258: Performed by: COLON & RECTAL SURGERY

## 2021-09-17 PROCEDURE — 80069 RENAL FUNCTION PANEL: CPT | Performed by: HOSPITALIST

## 2021-09-17 PROCEDURE — 88307 TISSUE EXAM BY PATHOLOGIST: CPT | Mod: TC | Performed by: COLON & RECTAL SURGERY

## 2021-09-17 PROCEDURE — 258N000003 HC RX IP 258 OP 636: Performed by: COLON & RECTAL SURGERY

## 2021-09-17 PROCEDURE — 86900 BLOOD TYPING SEROLOGIC ABO: CPT | Performed by: COLON & RECTAL SURGERY

## 2021-09-17 PROCEDURE — 258N000003 HC RX IP 258 OP 636: Performed by: NURSE ANESTHETIST, CERTIFIED REGISTERED

## 2021-09-17 PROCEDURE — 250N000009 HC RX 250: Performed by: COLON & RECTAL SURGERY

## 2021-09-17 PROCEDURE — 88307 TISSUE EXAM BY PATHOLOGIST: CPT | Mod: 26 | Performed by: PATHOLOGY

## 2021-09-17 PROCEDURE — 999N000141 HC STATISTIC PRE-PROCEDURE NURSING ASSESSMENT: Performed by: COLON & RECTAL SURGERY

## 2021-09-17 PROCEDURE — 85610 PROTHROMBIN TIME: CPT | Performed by: COLON & RECTAL SURGERY

## 2021-09-17 PROCEDURE — 120N000001 HC R&B MED SURG/OB

## 2021-09-17 PROCEDURE — 99233 SBSQ HOSP IP/OBS HIGH 50: CPT | Performed by: HOSPITALIST

## 2021-09-17 PROCEDURE — 258N000003 HC RX IP 258 OP 636: Performed by: INTERNAL MEDICINE

## 2021-09-17 PROCEDURE — 84100 ASSAY OF PHOSPHORUS: CPT | Performed by: COLON & RECTAL SURGERY

## 2021-09-17 PROCEDURE — 999N000198 HC STATISTIC WOC PT EDUCATION, 16-30 MIN

## 2021-09-17 PROCEDURE — 250N000011 HC RX IP 250 OP 636: Performed by: INTERNAL MEDICINE

## 2021-09-17 PROCEDURE — 83735 ASSAY OF MAGNESIUM: CPT | Performed by: COLON & RECTAL SURGERY

## 2021-09-17 PROCEDURE — 0D1B4Z4 BYPASS ILEUM TO CUTANEOUS, PERCUTANEOUS ENDOSCOPIC APPROACH: ICD-10-PCS | Performed by: COLON & RECTAL SURGERY

## 2021-09-17 PROCEDURE — 250N000011 HC RX IP 250 OP 636: Performed by: COLON & RECTAL SURGERY

## 2021-09-17 PROCEDURE — 370N000017 HC ANESTHESIA TECHNICAL FEE, PER MIN: Performed by: COLON & RECTAL SURGERY

## 2021-09-17 PROCEDURE — 85027 COMPLETE CBC AUTOMATED: CPT | Performed by: HOSPITALIST

## 2021-09-17 PROCEDURE — 0DBE8ZX EXCISION OF LARGE INTESTINE, VIA NATURAL OR ARTIFICIAL OPENING ENDOSCOPIC, DIAGNOSTIC: ICD-10-PCS | Performed by: COLON & RECTAL SURGERY

## 2021-09-17 PROCEDURE — 258N000003 HC RX IP 258 OP 636: Performed by: ANESTHESIOLOGY

## 2021-09-17 PROCEDURE — 250N000013 HC RX MED GY IP 250 OP 250 PS 637: Performed by: COLON & RECTAL SURGERY

## 2021-09-17 PROCEDURE — 250N000011 HC RX IP 250 OP 636: Performed by: NURSE ANESTHETIST, CERTIFIED REGISTERED

## 2021-09-17 PROCEDURE — 360N000077 HC SURGERY LEVEL 4, PER MIN: Performed by: COLON & RECTAL SURGERY

## 2021-09-17 PROCEDURE — 272N000001 HC OR GENERAL SUPPLY STERILE: Performed by: COLON & RECTAL SURGERY

## 2021-09-17 PROCEDURE — 250N000009 HC RX 250: Performed by: NURSE ANESTHETIST, CERTIFIED REGISTERED

## 2021-09-17 PROCEDURE — 258N000003 HC RX IP 258 OP 636: Performed by: HOSPITALIST

## 2021-09-17 PROCEDURE — P9041 ALBUMIN (HUMAN),5%, 50ML: HCPCS | Performed by: NURSE ANESTHETIST, CERTIFIED REGISTERED

## 2021-09-17 RX ORDER — POLYETHYLENE GLYCOL 3350 17 G/17G
17 POWDER, FOR SOLUTION ORAL DAILY
Status: DISCONTINUED | OUTPATIENT
Start: 2021-09-18 | End: 2021-09-18

## 2021-09-17 RX ORDER — FENTANYL CITRATE 50 UG/ML
50 INJECTION, SOLUTION INTRAMUSCULAR; INTRAVENOUS
Status: DISCONTINUED | OUTPATIENT
Start: 2021-09-17 | End: 2021-09-18

## 2021-09-17 RX ORDER — CEFAZOLIN SODIUM 2 G/100ML
2 INJECTION, SOLUTION INTRAVENOUS SEE ADMIN INSTRUCTIONS
Status: DISCONTINUED | OUTPATIENT
Start: 2021-09-17 | End: 2021-09-17 | Stop reason: HOSPADM

## 2021-09-17 RX ORDER — SODIUM CHLORIDE, SODIUM LACTATE, POTASSIUM CHLORIDE, CALCIUM CHLORIDE 600; 310; 30; 20 MG/100ML; MG/100ML; MG/100ML; MG/100ML
INJECTION, SOLUTION INTRAVENOUS CONTINUOUS
Status: DISCONTINUED | OUTPATIENT
Start: 2021-09-17 | End: 2021-09-17 | Stop reason: HOSPADM

## 2021-09-17 RX ORDER — ACETAMINOPHEN 325 MG/1
975 TABLET ORAL EVERY 8 HOURS
Status: COMPLETED | OUTPATIENT
Start: 2021-09-17 | End: 2021-09-20

## 2021-09-17 RX ORDER — KETOROLAC TROMETHAMINE 30 MG/ML
15 INJECTION, SOLUTION INTRAMUSCULAR; INTRAVENOUS EVERY 6 HOURS PRN
Status: DISCONTINUED | OUTPATIENT
Start: 2021-09-17 | End: 2021-09-17 | Stop reason: HOSPADM

## 2021-09-17 RX ORDER — NALOXONE HYDROCHLORIDE 0.4 MG/ML
0.2 INJECTION, SOLUTION INTRAMUSCULAR; INTRAVENOUS; SUBCUTANEOUS
Status: DISCONTINUED | OUTPATIENT
Start: 2021-09-17 | End: 2021-09-24 | Stop reason: HOSPADM

## 2021-09-17 RX ORDER — ONDANSETRON 2 MG/ML
4 INJECTION INTRAMUSCULAR; INTRAVENOUS EVERY 6 HOURS PRN
Status: DISCONTINUED | OUTPATIENT
Start: 2021-09-17 | End: 2021-09-24 | Stop reason: HOSPADM

## 2021-09-17 RX ORDER — NEOSTIGMINE METHYLSULFATE 1 MG/ML
VIAL (ML) INJECTION PRN
Status: DISCONTINUED | OUTPATIENT
Start: 2021-09-17 | End: 2021-09-17

## 2021-09-17 RX ORDER — FENTANYL CITRATE 50 UG/ML
INJECTION, SOLUTION INTRAMUSCULAR; INTRAVENOUS PRN
Status: DISCONTINUED | OUTPATIENT
Start: 2021-09-17 | End: 2021-09-17

## 2021-09-17 RX ORDER — CEFAZOLIN SODIUM/WATER 2 G/20 ML
2 SYRINGE (ML) INTRAVENOUS
Status: DISCONTINUED | OUTPATIENT
Start: 2021-09-17 | End: 2021-09-17 | Stop reason: HOSPADM

## 2021-09-17 RX ORDER — ALBUMIN, HUMAN INJ 5% 5 %
SOLUTION INTRAVENOUS CONTINUOUS PRN
Status: DISCONTINUED | OUTPATIENT
Start: 2021-09-17 | End: 2021-09-17

## 2021-09-17 RX ORDER — HYDROMORPHONE HCL IN WATER/PF 6 MG/30 ML
0.4 PATIENT CONTROLLED ANALGESIA SYRINGE INTRAVENOUS EVERY 5 MIN PRN
Status: DISCONTINUED | OUTPATIENT
Start: 2021-09-17 | End: 2021-09-17 | Stop reason: HOSPADM

## 2021-09-17 RX ORDER — ONDANSETRON 2 MG/ML
4 INJECTION INTRAMUSCULAR; INTRAVENOUS ONCE
Status: COMPLETED | OUTPATIENT
Start: 2021-09-17 | End: 2021-09-17

## 2021-09-17 RX ORDER — PROPOFOL 10 MG/ML
INJECTION, EMULSION INTRAVENOUS PRN
Status: DISCONTINUED | OUTPATIENT
Start: 2021-09-17 | End: 2021-09-17

## 2021-09-17 RX ORDER — DIAZEPAM 10 MG/2ML
2 INJECTION, SOLUTION INTRAMUSCULAR; INTRAVENOUS EVERY 6 HOURS PRN
Status: DISCONTINUED | OUTPATIENT
Start: 2021-09-17 | End: 2021-09-24 | Stop reason: HOSPADM

## 2021-09-17 RX ORDER — LIDOCAINE HYDROCHLORIDE 10 MG/ML
INJECTION, SOLUTION INFILTRATION; PERINEURAL PRN
Status: DISCONTINUED | OUTPATIENT
Start: 2021-09-17 | End: 2021-09-17

## 2021-09-17 RX ORDER — AMOXICILLIN 250 MG
2 CAPSULE ORAL 2 TIMES DAILY
Status: DISCONTINUED | OUTPATIENT
Start: 2021-09-17 | End: 2021-09-18

## 2021-09-17 RX ORDER — ACETAMINOPHEN 325 MG/1
650 TABLET ORAL EVERY 4 HOURS PRN
Status: DISCONTINUED | OUTPATIENT
Start: 2021-09-20 | End: 2021-09-24 | Stop reason: HOSPADM

## 2021-09-17 RX ORDER — METOPROLOL TARTRATE 1 MG/ML
1-2 INJECTION, SOLUTION INTRAVENOUS EVERY 5 MIN PRN
Status: DISCONTINUED | OUTPATIENT
Start: 2021-09-17 | End: 2021-09-17 | Stop reason: HOSPADM

## 2021-09-17 RX ORDER — ONDANSETRON 4 MG/1
4 TABLET, ORALLY DISINTEGRATING ORAL EVERY 6 HOURS PRN
Status: DISCONTINUED | OUTPATIENT
Start: 2021-09-17 | End: 2021-09-24 | Stop reason: HOSPADM

## 2021-09-17 RX ORDER — LIDOCAINE 40 MG/G
CREAM TOPICAL
Status: DISCONTINUED | OUTPATIENT
Start: 2021-09-17 | End: 2021-09-24 | Stop reason: HOSPADM

## 2021-09-17 RX ORDER — NALOXONE HYDROCHLORIDE 0.4 MG/ML
0.4 INJECTION, SOLUTION INTRAMUSCULAR; INTRAVENOUS; SUBCUTANEOUS
Status: DISCONTINUED | OUTPATIENT
Start: 2021-09-17 | End: 2021-09-24 | Stop reason: HOSPADM

## 2021-09-17 RX ORDER — BUPIVACAINE HYDROCHLORIDE AND EPINEPHRINE 5; 5 MG/ML; UG/ML
INJECTION, SOLUTION EPIDURAL; INTRACAUDAL; PERINEURAL PRN
Status: DISCONTINUED | OUTPATIENT
Start: 2021-09-17 | End: 2021-09-17 | Stop reason: HOSPADM

## 2021-09-17 RX ORDER — CEFAZOLIN SODIUM 1 G/50ML
1 INJECTION, SOLUTION INTRAVENOUS EVERY 8 HOURS
Status: COMPLETED | OUTPATIENT
Start: 2021-09-18 | End: 2021-09-18

## 2021-09-17 RX ORDER — SODIUM CHLORIDE, SODIUM LACTATE, POTASSIUM CHLORIDE, CALCIUM CHLORIDE 600; 310; 30; 20 MG/100ML; MG/100ML; MG/100ML; MG/100ML
INJECTION, SOLUTION INTRAVENOUS CONTINUOUS
Status: DISCONTINUED | OUTPATIENT
Start: 2021-09-17 | End: 2021-09-20

## 2021-09-17 RX ORDER — DIPHENHYDRAMINE HYDROCHLORIDE 50 MG/ML
25 INJECTION INTRAMUSCULAR; INTRAVENOUS EVERY 6 HOURS PRN
Status: DISCONTINUED | OUTPATIENT
Start: 2021-09-17 | End: 2021-09-24 | Stop reason: HOSPADM

## 2021-09-17 RX ORDER — GLYCOPYRROLATE 0.2 MG/ML
INJECTION, SOLUTION INTRAMUSCULAR; INTRAVENOUS PRN
Status: DISCONTINUED | OUTPATIENT
Start: 2021-09-17 | End: 2021-09-17

## 2021-09-17 RX ORDER — INDOCYANINE GREEN AND WATER 25 MG
KIT INJECTION PRN
Status: DISCONTINUED | OUTPATIENT
Start: 2021-09-17 | End: 2021-09-17

## 2021-09-17 RX ORDER — ACETAMINOPHEN 325 MG/1
975 TABLET ORAL ONCE
Status: DISCONTINUED | OUTPATIENT
Start: 2021-09-17 | End: 2021-09-17

## 2021-09-17 RX ORDER — DEXAMETHASONE SODIUM PHOSPHATE 4 MG/ML
INJECTION, SOLUTION INTRA-ARTICULAR; INTRALESIONAL; INTRAMUSCULAR; INTRAVENOUS; SOFT TISSUE PRN
Status: DISCONTINUED | OUTPATIENT
Start: 2021-09-17 | End: 2021-09-17

## 2021-09-17 RX ORDER — ONDANSETRON 4 MG/1
4 TABLET, ORALLY DISINTEGRATING ORAL EVERY 30 MIN PRN
Status: DISCONTINUED | OUTPATIENT
Start: 2021-09-17 | End: 2021-09-17 | Stop reason: HOSPADM

## 2021-09-17 RX ORDER — OXYCODONE HYDROCHLORIDE 5 MG/1
10 TABLET ORAL EVERY 4 HOURS PRN
Status: DISCONTINUED | OUTPATIENT
Start: 2021-09-17 | End: 2021-09-18

## 2021-09-17 RX ORDER — MEPERIDINE HYDROCHLORIDE 25 MG/ML
12.5 INJECTION INTRAMUSCULAR; INTRAVENOUS; SUBCUTANEOUS
Status: DISCONTINUED | OUTPATIENT
Start: 2021-09-17 | End: 2021-09-17 | Stop reason: HOSPADM

## 2021-09-17 RX ORDER — AMOXICILLIN 250 MG
1 CAPSULE ORAL 2 TIMES DAILY
Status: DISCONTINUED | OUTPATIENT
Start: 2021-09-17 | End: 2021-09-18

## 2021-09-17 RX ORDER — HEPARIN SODIUM 5000 [USP'U]/.5ML
5000 INJECTION, SOLUTION INTRAVENOUS; SUBCUTANEOUS
Status: COMPLETED | OUTPATIENT
Start: 2021-09-17 | End: 2021-09-17

## 2021-09-17 RX ORDER — OXYCODONE HYDROCHLORIDE 5 MG/1
5 TABLET ORAL EVERY 4 HOURS PRN
Status: DISCONTINUED | OUTPATIENT
Start: 2021-09-17 | End: 2021-09-18

## 2021-09-17 RX ORDER — HYDRALAZINE HYDROCHLORIDE 20 MG/ML
2.5-5 INJECTION INTRAMUSCULAR; INTRAVENOUS EVERY 10 MIN PRN
Status: DISCONTINUED | OUTPATIENT
Start: 2021-09-17 | End: 2021-09-17 | Stop reason: HOSPADM

## 2021-09-17 RX ORDER — ALBUTEROL SULFATE 0.83 MG/ML
2.5 SOLUTION RESPIRATORY (INHALATION) EVERY 4 HOURS PRN
Status: DISCONTINUED | OUTPATIENT
Start: 2021-09-17 | End: 2021-09-17 | Stop reason: HOSPADM

## 2021-09-17 RX ORDER — ONDANSETRON 2 MG/ML
4 INJECTION INTRAMUSCULAR; INTRAVENOUS EVERY 30 MIN PRN
Status: DISCONTINUED | OUTPATIENT
Start: 2021-09-17 | End: 2021-09-17 | Stop reason: HOSPADM

## 2021-09-17 RX ORDER — MAGNESIUM HYDROXIDE 1200 MG/15ML
LIQUID ORAL PRN
Status: DISCONTINUED | OUTPATIENT
Start: 2021-09-17 | End: 2021-09-17 | Stop reason: HOSPADM

## 2021-09-17 RX ORDER — FENTANYL CITRATE 50 UG/ML
50 INJECTION, SOLUTION INTRAMUSCULAR; INTRAVENOUS EVERY 5 MIN PRN
Status: DISCONTINUED | OUTPATIENT
Start: 2021-09-17 | End: 2021-09-17 | Stop reason: HOSPADM

## 2021-09-17 RX ORDER — ACETAMINOPHEN 325 MG/1
975 TABLET ORAL ONCE
Status: COMPLETED | OUTPATIENT
Start: 2021-09-17 | End: 2021-09-17

## 2021-09-17 RX ADMIN — PHENYLEPHRINE HYDROCHLORIDE 200 MCG: 10 INJECTION INTRAVENOUS at 16:17

## 2021-09-17 RX ADMIN — METRONIDAZOLE 500 MG: 500 INJECTION, SOLUTION INTRAVENOUS at 23:40

## 2021-09-17 RX ADMIN — FENTANYL CITRATE 100 MCG: 50 INJECTION, SOLUTION INTRAMUSCULAR; INTRAVENOUS at 16:28

## 2021-09-17 RX ADMIN — FENTANYL CITRATE 50 MCG: 50 INJECTION, SOLUTION INTRAMUSCULAR; INTRAVENOUS at 19:12

## 2021-09-17 RX ADMIN — ONDANSETRON 4 MG: 2 INJECTION INTRAMUSCULAR; INTRAVENOUS at 19:18

## 2021-09-17 RX ADMIN — HYDROCORTISONE SODIUM SUCCINATE 50 MG: 100 INJECTION, POWDER, FOR SOLUTION INTRAMUSCULAR; INTRAVENOUS at 15:53

## 2021-09-17 RX ADMIN — DEXAMETHASONE SODIUM PHOSPHATE 4 MG: 4 INJECTION, SOLUTION INTRA-ARTICULAR; INTRALESIONAL; INTRAMUSCULAR; INTRAVENOUS; SOFT TISSUE at 15:56

## 2021-09-17 RX ADMIN — HYDROMORPHONE HYDROCHLORIDE 0.5 MG: 1 INJECTION, SOLUTION INTRAMUSCULAR; INTRAVENOUS; SUBCUTANEOUS at 17:44

## 2021-09-17 RX ADMIN — ACETAMINOPHEN 975 MG: 325 TABLET, FILM COATED ORAL at 15:04

## 2021-09-17 RX ADMIN — ACETAMINOPHEN 975 MG: 325 TABLET, FILM COATED ORAL at 23:40

## 2021-09-17 RX ADMIN — SODIUM CHLORIDE, POTASSIUM CHLORIDE, SODIUM LACTATE AND CALCIUM CHLORIDE: 600; 310; 30; 20 INJECTION, SOLUTION INTRAVENOUS at 18:18

## 2021-09-17 RX ADMIN — CEFAZOLIN SODIUM 2 G: 2 INJECTION, SOLUTION INTRAVENOUS at 15:43

## 2021-09-17 RX ADMIN — HYDROMORPHONE HYDROCHLORIDE 0.5 MG: 1 INJECTION, SOLUTION INTRAMUSCULAR; INTRAVENOUS; SUBCUTANEOUS at 17:50

## 2021-09-17 RX ADMIN — ROCURONIUM BROMIDE 20 MG: 10 INJECTION INTRAVENOUS at 19:04

## 2021-09-17 RX ADMIN — HEPARIN SODIUM 5000 UNITS: 5000 INJECTION INTRAVENOUS; SUBCUTANEOUS at 15:26

## 2021-09-17 RX ADMIN — SODIUM CHLORIDE, POTASSIUM CHLORIDE, SODIUM LACTATE AND CALCIUM CHLORIDE: 600; 310; 30; 20 INJECTION, SOLUTION INTRAVENOUS at 15:43

## 2021-09-17 RX ADMIN — PHENYLEPHRINE HYDROCHLORIDE 100 MCG: 10 INJECTION INTRAVENOUS at 16:32

## 2021-09-17 RX ADMIN — PROPOFOL 200 MG: 10 INJECTION, EMULSION INTRAVENOUS at 15:46

## 2021-09-17 RX ADMIN — ROCURONIUM BROMIDE 20 MG: 10 INJECTION INTRAVENOUS at 16:02

## 2021-09-17 RX ADMIN — HYDROMORPHONE HYDROCHLORIDE 1 MG: 1 INJECTION, SOLUTION INTRAMUSCULAR; INTRAVENOUS; SUBCUTANEOUS at 16:47

## 2021-09-17 RX ADMIN — ROCURONIUM BROMIDE 10 MG: 10 INJECTION INTRAVENOUS at 18:31

## 2021-09-17 RX ADMIN — SODIUM CHLORIDE, POTASSIUM CHLORIDE, SODIUM LACTATE AND CALCIUM CHLORIDE: 600; 310; 30; 20 INJECTION, SOLUTION INTRAVENOUS at 23:39

## 2021-09-17 RX ADMIN — ROCURONIUM BROMIDE 20 MG: 10 INJECTION INTRAVENOUS at 16:11

## 2021-09-17 RX ADMIN — SODIUM CHLORIDE: 9 INJECTION, SOLUTION INTRAVENOUS at 22:34

## 2021-09-17 RX ADMIN — PHENYLEPHRINE HYDROCHLORIDE 200 MCG: 10 INJECTION INTRAVENOUS at 16:00

## 2021-09-17 RX ADMIN — PHENYLEPHRINE HYDROCHLORIDE 200 MCG: 10 INJECTION INTRAVENOUS at 16:07

## 2021-09-17 RX ADMIN — SODIUM CHLORIDE: 9 INJECTION, SOLUTION INTRAVENOUS at 05:00

## 2021-09-17 RX ADMIN — NEOSTIGMINE METHYLSULFATE 4 MG: 1 INJECTION, SOLUTION INTRAVENOUS at 20:34

## 2021-09-17 RX ADMIN — PHENYLEPHRINE HYDROCHLORIDE 200 MCG: 10 INJECTION INTRAVENOUS at 16:24

## 2021-09-17 RX ADMIN — PHENYLEPHRINE HYDROCHLORIDE 200 MCG: 10 INJECTION INTRAVENOUS at 16:28

## 2021-09-17 RX ADMIN — ALBUMIN HUMAN: 0.05 INJECTION, SOLUTION INTRAVENOUS at 16:49

## 2021-09-17 RX ADMIN — ROCURONIUM BROMIDE 20 MG: 10 INJECTION INTRAVENOUS at 17:32

## 2021-09-17 RX ADMIN — FENTANYL CITRATE 100 MCG: 50 INJECTION, SOLUTION INTRAMUSCULAR; INTRAVENOUS at 15:46

## 2021-09-17 RX ADMIN — METHYLPREDNISOLONE SODIUM SUCCINATE 20 MG: 40 INJECTION, POWDER, FOR SOLUTION INTRAMUSCULAR; INTRAVENOUS at 05:00

## 2021-09-17 RX ADMIN — ROCURONIUM BROMIDE 50 MG: 10 INJECTION INTRAVENOUS at 15:46

## 2021-09-17 RX ADMIN — INDOCYANINE GREEN AND WATER 7.5 MG: KIT at 19:37

## 2021-09-17 RX ADMIN — CEFAZOLIN SODIUM 1 G: 2 INJECTION, SOLUTION INTRAVENOUS at 19:41

## 2021-09-17 RX ADMIN — SODIUM CHLORIDE, POTASSIUM CHLORIDE, SODIUM LACTATE AND CALCIUM CHLORIDE: 600; 310; 30; 20 INJECTION, SOLUTION INTRAVENOUS at 16:29

## 2021-09-17 RX ADMIN — DOCUSATE SODIUM AND SENNOSIDES 1 TABLET: 8.6; 5 TABLET, FILM COATED ORAL at 23:40

## 2021-09-17 RX ADMIN — LIDOCAINE HYDROCHLORIDE 50 MG: 10 INJECTION, SOLUTION INFILTRATION; PERINEURAL at 15:46

## 2021-09-17 RX ADMIN — FENTANYL CITRATE 50 MCG: 50 INJECTION, SOLUTION INTRAMUSCULAR; INTRAVENOUS at 16:33

## 2021-09-17 RX ADMIN — ROCURONIUM BROMIDE 10 MG: 10 INJECTION INTRAVENOUS at 19:17

## 2021-09-17 RX ADMIN — ALBUMIN HUMAN: 0.05 INJECTION, SOLUTION INTRAVENOUS at 16:38

## 2021-09-17 RX ADMIN — ROCURONIUM BROMIDE 10 MG: 10 INJECTION INTRAVENOUS at 16:39

## 2021-09-17 RX ADMIN — GLYCOPYRROLATE 0.8 MG: 0.2 INJECTION, SOLUTION INTRAMUSCULAR; INTRAVENOUS at 20:34

## 2021-09-17 NOTE — ANESTHESIA PROCEDURE NOTES
Airway       Patient location during procedure: OR       Procedure Start/Stop Times: 9/17/2021 3:51 PM  Staff -        Anesthesiologist:  Rayne Pickard APRN CRNA       Performed By: CRNA  Consent for Airway        Urgency: elective  Indications and Patient Condition       Indications for airway management: oliver-procedural       Induction type:intravenous       Mask difficulty assessment: 1 - vent by mask    Final Airway Details       Final airway type: endotracheal airway       Successful airway: ETT - single and Oral  Endotracheal Airway Details        ETT size (mm): 8.0       Cuffed: yes       Successful intubation technique: direct laryngoscopy       DL Blade Type: Cleaning 3       Grade View of Cords: 1       Adjucts: stylet       Position: Right       Bite block used: Soft    Post intubation assessment        Placement verified by: capnometry, equal breath sounds and chest rise        Number of attempts at approach: 1       Secured with: plastic tape       Ease of procedure: easy       Dentition: Intact

## 2021-09-17 NOTE — PROGRESS NOTES
SPIRITUAL HEALTH SERVICES Progress Note  I met with Jb and his parents this morning before he went to surgery. They talked about the support they have around them as Jb is needing this care. They asked me to offer a prayer with them, which I did.       Javier Schilling  Associate

## 2021-09-17 NOTE — PLAN OF CARE
"INPATIENT NOTE [id number]    Admitting Diagnosis: Ulcerative colitis flare    Pt alert and oriented, up ad freeman - steady and independent, NPO prior to surgery. Denies pain and nausea - pain management plan reviewed with pt. Plan for colectomy with ileostomy placement today at 1530 - spoke with PACU and hospitalist regarding molding 1300 medications prior to surgery. Provided continued supportive care and monitoring. Pt seen by spiritual services and has both parents present as support.    /61 (BP Location: Left arm)   Pulse 74   Temp 98.6  F (37  C) (Oral)   Resp 16   Ht 1.854 m (6' 1\")   Wt 61.2 kg (135 lb)   SpO2 98%   BMI 17.81 kg/m      Darin Meyers RN on 9/17/2021    "

## 2021-09-17 NOTE — PLAN OF CARE
"/62 (BP Location: Left arm)   Pulse 72   Temp 98.2  F (36.8  C) (Oral)   Resp 16   Ht 1.854 m (6' 1\")   Wt 61.2 kg (135 lb)   SpO2 98%   BMI 17.81 kg/m    VSS. AxOx4. Ind in room. Tolerating clear liquid diet. Calm and cooperative w/ cares. Loose stools reported R/T bowel prep. Denies pain or nausea. NS running at 100mL/hr. Plan to adjust diet to NPO at 0600 for colo rectal surgery scheduled for 1530, and monitor labs. Will continue to provide supportive cares.  "

## 2021-09-17 NOTE — ANESTHESIA PREPROCEDURE EVALUATION
Anesthesia Pre-Procedure Evaluation    Patient: Luther Wood   MRN: [id number] : 1995        Preoperative Diagnosis: Ulcerative colitis with rectal bleeding (H) [K51.911]   Procedure : Procedure(s):  laparoscopic total abdominal colectomy with end ileostomy     Past Medical History:   Diagnosis Date     Colitis 2019      Past Surgical History:   Procedure Laterality Date     SIGMOIDOSCOPY FLEXIBLE N/A 2021    Procedure: SIGMOIDOSCOPY, FLEXIBLE;  Surgeon: Anderson Cardona MD;  Location:  GI      No Known Allergies   Social History     Tobacco Use     Smoking status: Never Smoker     Smokeless tobacco: Never Used   Substance Use Topics     Alcohol use: Not on file      Wt Readings from Last 1 Encounters:   21 61.2 kg (135 lb)        Anesthesia Evaluation   Pt has had prior anesthetic.         ROS/MED HX  ENT/Pulmonary:  - neg pulmonary ROS  (-) sleep apnea   Neurologic:       Cardiovascular:  - neg cardiovascular ROS     METS/Exercise Tolerance:     Hematologic:     (+) anemia,     Musculoskeletal:       GI/Hepatic: Comment: Ulcerative colitis   (-) GERD   Renal/Genitourinary:       Endo:       Psychiatric/Substance Use:       Infectious Disease:       Malignancy:       Other:            Physical Exam    Airway        Mallampati: II   TM distance: > 3 FB   Neck ROM: full     Respiratory Devices and Support         Dental           Cardiovascular          Rhythm and rate: regular and normal     Pulmonary           breath sounds clear to auscultation           OUTSIDE LABS:  CBC:   Lab Results   Component Value Date    WBC 10.0 2021    WBC 12.1 (H) 2021    HGB 8.1 (L) 2021    HGB 8.1 (L) 2021    HCT 26.7 (L) 2021    HCT 25.8 (L) 2021     (H) 2021     (H) 2021     BMP:   Lab Results   Component Value Date     (L) 2021     (L) 2021    POTASSIUM 3.8 2021    POTASSIUM 3.9 2021    CHLORIDE 98 2021     CHLORIDE 98 09/17/2021    CO2 28 09/17/2021    CO2 27 09/17/2021    BUN 13 09/17/2021    BUN 13 09/17/2021    CR 0.67 09/17/2021    CR 0.71 09/17/2021     (H) 09/17/2021     (H) 09/17/2021     COAGS:   Lab Results   Component Value Date    INR 1.33 (H) 09/17/2021     POC: No results found for: BGM, HCG, HCGS  HEPATIC:   Lab Results   Component Value Date    ALBUMIN 1.5 (L) 09/17/2021    PROTTOTAL 5.6 (L) 09/17/2021    ALT 93 (H) 09/17/2021    AST 25 09/17/2021    ALKPHOS 142 09/17/2021    BILITOTAL 0.4 09/17/2021     OTHER:   Lab Results   Component Value Date    MADIE 7.4 (L) 09/17/2021    MADIE 7.5 (L) 09/17/2021    PHOS 4.1 09/17/2021    MAG 2.2 09/17/2021    LIPASE 64 (L) 09/11/2021    TSH 1.08 09/11/2021    .0 (H) 09/15/2021    SED 68 (H) 09/11/2021       Anesthesia Plan    ASA Status:  3   NPO Status:  NPO Appropriate    Anesthesia Type: General.     - Airway: ETT   Induction: Intravenous.   Maintenance: Balanced.   Techniques and Equipment:     - Lines/Monitors: 2nd IV     Consents    Anesthesia Plan(s) and associated risks, benefits, and realistic alternatives discussed. Questions answered and patient/representative(s) expressed understanding.     - Discussed with:  Patient, Parent (Mother and/or Father)         Postoperative Care    Pain management: IV analgesics, Oral pain medications, Multi-modal analgesia.   PONV prophylaxis: Ondansetron (or other 5HT-3), Dexamethasone or Solumedrol     Comments:                Monroe Kuo MD

## 2021-09-17 NOTE — PLAN OF CARE
"Blood pressure 121/70, pulse 98, temperature 99.1  F (37.3  C), temperature source Oral, resp. rate 16, height 1.854 m (6' 1\"), weight 61.2 kg (135 lb), SpO2 99 %.    Neuro:   Patient alert, orientated x 4, pleasant, cooperative with cares.    Pulm:    LS clear, adequate sats on room air.    CV:   WDL  GI:  Intermittent cramping continues.  Patient taking only small amounts of liquids; later changed to clear liquids.  Attempting to administer bowel prep.   Patient is passing frequent small watery stools; with some blood.  Maintenance IV continues at 100/hour of 0.9 NS.    :  WDL  Labs:  HGB 8.1  Planned recheck in am.   Plan:   Planned surgery tomorrow at 3:30 PM      "

## 2021-09-17 NOTE — PROGRESS NOTES
CRS UPDATE    Spoke with Dr. Cardona earlier today - repeat flex sig with progression of UC despite maximal medical therapy with IV steroids and Infliximab.  Dr. Cardona consulted with his colleagues and with [facility] and no other salvage options for medical therapy.  Jb has had some worsening abdominal pain/cramping over the past 24 hours with persistent blood in his stools.  Given no significant clinical improvement over the past week surgical intervention is the next option.  I had a long discussion with the patient and his parents this evening regarding surgery.  Discussed the plan of a 3 staged procedure with surgery tomorrow planned as laparoscopic total abdominal colectomy with end ileostomy.  We discuss the surgery in detail including the risks of bleeding, infection, damage to surrounding structures, complication with the rectal stump and cardiopulmonary complications.  We discussed the expected post-operative recovery.  I answered all of their questions.  Will plan on WOCN consult with stoma marking in the am.  Will do bowel prep and po ABx - ok to stop if patient does not tolerate.  Surgery scheduled tomorrow at 3:30 pm.      Shahrzad lFaherty MD  Colorectal Surgery     [facility]  [address] [address]  T: [phone]  F: [phone]

## 2021-09-17 NOTE — CONSULTS
[facility] Nurse Ostomy Marking and Education         Data:   History:    25 year old man admitted with Ulcerative Colitis flare. On IV steroids and first dose of Remicade on 9/8/21.  Repeat flex sig done 9/16 which showed severe (Alvarenga Score 3) ulcerative pancolitis, worsened since the last examination on 9/6.  Given no significant improvement over the past week, surgical intervention is recommended.       Pt referred by Dr. Flaherty    Who is present for marking and education: patient and mother    Type of ostomy surgery planned:  Temporary ileostomy (possible J-pouch in future)    Abdomen:  Very thin, small area of acceptable marking locations given small abdominal musculature           Intervention:     Patient's chart evaluated.      Assessments done today:  Pt observed lying, sitting and standing.     Education: Reviewed upcoming surgery, stoma construction,post-op expectations, general ostomy cares/concerns including: differenced between colostomy/ileostomy, diet, bathing, odor, output,  activity, appliances and emptying.      Patient marked using surgical marker and covered with tegaderm.     Pt marked in RLQ          Assessment:       Learning needs/ comprehension: good comprehension, asks appropriate questions         Plan:     Plan:   ? Surgery scheduled for:  9/17/21    Will plan to follow patient post operatively.

## 2021-09-17 NOTE — PROGRESS NOTES
[facility]  Hospitalist Progress Note  Romie Coello MD 09/17/2021    Reason for Stay (Diagnosis): Diarrhea and hematochezia due to UC flare         Assessment and Plan:      Summary of Stay: Luther Wood is a 25 year old male with history of ulcerative colitis admitted on 9/11/2021 with frequent loose stools, hematochezia, fatigue and syncope. Received 1uPRBC and being treated for UC flare. Now plans for colectomy today (9/17/2021)     Profuse diarrhea with hematochezia  Known history of ulcerative colitis with current flare    - had been admitted 9/2 through 9/9 for acute ulcerative colitis flare received methylprednisolone 20 mg IV every 8 hours with flex sig performed on 9/6 showing severe ulcerative colitis and biopsies were taken    - was started on Inflectra by GI with transition of steroids to prednisone 40 mg daily and discharged on 9/9    - repeat C. difficile and enteric panel is negative    - GI following with improvement of frequency of loose stools (7 in last 24 hours and decreased volume)    - 2nd dose of Infliximab given 9/12    - solumedrol 20 mg IV every 8 hours: GI to convert to oral when appropriate    - DO NOT use Imodium or Lomitol    - tolerating regular diet (gluten and lactose free)    - flex sig done 9/16 shows worsening ulcerations    - long conversations with [facility] yesterdat    - plan for laparoscopic total abdominal colectomy with end ileostomy this afternoon with Dr. Flaherty    - cont IVF    Acute on chronic anemia due to blood loss    - symptomatic hemoglobin of 6.9 on 9/12 in the setting of bloody stool related to ulcerative colitis flare    - received 1 unit of blood on 9/12 with improvement to 8.4>>7.7     - Hb today is 8/1, stable form 8.1 yesterday (improved from 7.6)    - still has some small amount blood in stool (water is pink)    - will re-check Hb in am, transfuse if <7    Fever and leukocytosis    - Tmax 101.5 on 9/11 with WBC elevation  "(14.5) thought to be steroid induced    - no focal infectious symptoms other than diarrhea with blood    - likely fever related to colitis    - no antibiotics indicated    - blood cultures x 2 on 9/12 negative to date    - now afebrile     Syncope    - patient had episode of syncope while brushing his teeth    - father caught him before he hit the ground and woke up after 5 seconds    - suspect this is related to hypovolemia and anemia, likely not to be cardiac etiology    - was monitored on telemetry overnight with no arrhythmia and echocardiogram shows no structural disease of his heart    - discontinued telemetry     Hyponatremia    - fluctuating    - monitor     Severe malnutrition    - was seen by nutrition on his last admission with recommendation for pushing protein with meals and to have small frequent meals with oral nutritional supplements.     Mother in room and updated     DVT Prophylaxis: Lovenox 40 mg (recommended by GI) cont/discontinue as per surgery  Code Status: Full Code  Discharge Dispo: Anticipate 2-3 day stay        Interval History (Subjective):      Mom in room. Patient doing ok. Stools from bowel prep. Still has some cramping. No bleeding. No chest pain, sob.                Physical Exam:      Last Vital Signs:  /62 (BP Location: Left arm)   Pulse 76   Temp 97.6  F (36.4  C) (Oral)   Resp 16   Ht 1.854 m (6' 1\")   Wt 61.2 kg (135 lb)   SpO2 97%   BMI 17.81 kg/m      No intake or output data in the 24 hours ending 09/13/21 1319    Constitutional: Awake, alert, cooperative, no apparent distress   Respiratory: Clear to auscultation bilaterally, no crackles or wheezing   Cardiovascular: Regular rate and rhythm, normal S1 and S2, and no murmur noted   Abdomen: Normal bowel sounds, soft, non-distended, mild tenderness   Skin: No rashes, no cyanosis, dry to touch   Neuro: Alert and oriented x3, no weakness, numbness, memory loss   Extremities: No edema, normal range of motion "   Other(s):        All other systems: Negative          Medications:      All current medications were reviewed with changes reflected in problem list.         Data:      All new lab and imaging data was reviewed.   Labs:  Recent Labs   Lab 09/17/21  0603   *  131*   POTASSIUM 3.9  3.8   CHLORIDE 98  98   CO2 27  28   ANIONGAP 7  5   *  136*   BUN 13  13   CR 0.71  0.67   GFRESTIMATED >90  >90   MADIE 7.5*  7.4*     Recent Labs   Lab 09/17/21  0603   WBC 10.0   HGB 8.1*   HCT 26.7*   MCV 90   *      Imaging:   No results found for this or any previous visit (from the past 24 hour(s)).

## 2021-09-17 NOTE — PROGRESS NOTES
CLINICAL NUTRITION SERVICES - REASSESSMENT NOTE      Recommendations:   Diet per MD.    Given need for surgical intervention on now day 5 of admit with low BMI, 2nd hospital admission, and suspect meeting <75% needs for a period of weeks PTA given malabsorptive component, may need to consider parenteral nutrition support interventions.  Will defer this to CRS/GI teams given unclear trajectory of clinical course w/in the next few days in terms of diet advancement/post-op recovery.  Currently only has peripheral access.  Discussed nutrition-related POC with CRS PA.  They are considering PN post-op and plan to enter orders after surgery.  I discussed RD can enter PN orders on Saturday and that patient currently only has peripheral access (CRS team to decide between bridging with PPN vs if central access needed).  Discussed with PharmD.     Malnutrition:   % Weight Loss:  > 5% in 1 month (severe malnutrition) --> PTA  % Intake:  <75% for >/= 1 month (non-severe malnutrition) --> malabsorption component   Subcutaneous Fat Loss:  None observed (did not update 9/17)  Muscle Loss:  Temporal region mild depletion, Clavicle bone region moderate depletion, Acromion bone region moderate depletion, Patellar region moderate depletion, Anterior thigh region moderate depletion and Posterior calf region moderate depletion (did not update 9/17)  Fluid Retention:  None noted     Malnutrition Diagnosis: Severe malnutrition  In Context of: Acute on Chronic illness or disease         EVALUATION OF PROGRESS TOWARD GOALS   Diet:  NPO    Intake/Tolerance:  Information obtained from chart given NPO with colectomy and end ileostomy planned this afternoon.  Known to nutrition services and this writer with recent admit.  Again admitted 2/2 UC flare, ongoing IV steroids and Remicade, though unfortunately flex sig indicating ongoing severity and worsening of disease and surgery was recommended by CRS team.  No flowsheet recordings.  Consistently  ordering meals until yesterday for procedure.  Also ordering [email or web address] oral supplement and potential for food from home contribution.  Despite this, suspect continues to meet <75% needs (or less) in light of malabsorption component and based on stool output/frequency.      Barriers to PO intakes including: Malabsorption, abdominal pain, diet interruptions for procedures and now surgery.      ASSESSED NUTRITION NEEDS PER APPROVED PRACTICE GUIDELINES:     Dosing Weight 61.2 kg (actual wt on 9/11)  Estimated Energy Needs: 4066-1583+ kcals (30-35 Kcal/Kg)  Justification: repletion and malabsorption   Estimated Protein Needs: 75-95 grams protein (1.2-1.5 g pro/Kg)  Justification: preservation of lean body mass and malabsorption   Estimated Fluid Needs: (1 mL/Kcal)  Justification: maintenance or per provider pending fluid status      NEW FINDINGS:   - Medications reviewed including:     enoxaparin ANTICOAGULANT  40 mg Subcutaneous Q24H     methylPREDNISolone  20 mg Intravenous Q8H     sodium chloride (PF)  3 mL Intracatheter Q8H        sodium chloride 100 mL/hr at 09/17/21 0500     sodium chloride 100 mL/hr at 09/16/21 0005      - Labs reviewed including:  Electrolytes  Potassium (mmol/L)   Date Value   09/17/2021 3.8   09/16/2021 4.2   09/15/2021 4.4   12/07/2009 3.6     Phosphorus (mg/dL)   Date Value   09/17/2021 4.1    Blood Glucose  Glucose (mg/dL)   Date Value   09/17/2021 136 (H)   09/16/2021 114 (H)   09/15/2021 112 (H)   09/14/2021 138 (H)   09/13/2021 134 (H)   12/07/2009 97    Inflammatory Markers  CRP Inflammation (mg/L)   Date Value   09/15/2021 159.0 (H)   09/11/2021 253.0 (H)     WBC (10e9/L)   Date Value   12/07/2009 10.4     WBC Count (10e3/uL)   Date Value   09/17/2021 10.0   09/16/2021 12.1 (H)   09/15/2021 10.4     Albumin (g/dL)   Date Value   09/13/2021 1.5 (L)   09/11/2021 1.9 (L)   09/03/2021 2.7 (L)      Magnesium (mg/dL)   Date Value   09/17/2021 2.2     Sodium (mmol/L)   Date Value   09/17/2021  131 (L)   09/16/2021 132 (L)   09/15/2021 133   12/07/2009 138    Renal  Urea Nitrogen (mg/dL)   Date Value   09/17/2021 13   09/16/2021 12   09/15/2021 13   12/07/2009 14     Creatinine (mg/dL)   Date Value   09/17/2021 0.67   09/16/2021 0.79   09/15/2021 0.77   12/07/2009 0.64     Additional  Ketones Urine (mg/dL)   Date Value   12/07/2009 Negative        -  Weight trending reviewed:  Vitals:    09/11/21 2244 09/16/21 0751   Weight: 61.2 kg (135 lb) 61.2 kg (135 lb)     - Stooling patterns reviewed.  High output/frequency.      Previous Goals:   Pt will consume >/= 75% of small meals + 1-2 [employer] supplements daily   Evaluation: Met (potentially, does not take into account malabsorption component)    Previous Nutrition Diagnosis:   Inadequate oral intake related to decreased appetite, abdominal pain, early satiety, malabsorption with UC as evidenced by pt meeting < 75% of nutrition needs over the past month, muscle loss with generally low reserves and > 5% wt loss over the past month   Evaluation: No change though updated to more pertinent below      CURRENT NUTRITION DIAGNOSIS  Inadequate protein-energy intake related to combination inadequate oral intake and malabsorptive component with UC flare to responding to IV steroids or Remicade with 2 admits, now requiring surgical intervention as evidenced by suspect meeting <75% needs or less during admit and likely for a period of weeks PTA, severe wt loss PTA, and low BMI.      INTERVENTIONS  Recommendations / Nutrition Prescription  Diet per MD.    Given need for surgical intervention on now day 5 of admit with low BMI, 2nd hospital admission, and suspect meeting <75% needs for a period of weeks PTA, may need to consider parenteral nutrition support interventions.  Will defer this to CRS/GI teams given unclear trajectory of clinical course w/in the next few days in terms of diet advancement/post-op recovery.  Currently only has peripheral access.       Implementation  Collaboration and referral of nutrition-related POC: Discussed nutrition concerns/need for nutrition plan with hospitalist who is not primary.  Later paged CRS PA for input, as above.    Goals  Diet advancement 24-48 hrs post-op vs need for nutrition support interventions.      MONITORING AND EVALUATION:  Progress towards goals will be monitored and evaluated per protocol and Practice Guidelines      Raven Madrigal RDN, LD  Clinical Dietitian  3rd floor/ICU: [phone]  All other floors: [phone]  Weekend/holiday: [phone]

## 2021-09-17 NOTE — PROGRESS NOTES
COLON [facility] RECT[facility]GERY  PROGRESS NOTE    September 17, 2021    SUBJECTIVE:  Patient only able to tolerate about half of the bowel prep.  Multiple BMs.  Patient is ready for surgery later today.      OBJECTIVE:  Temp:  [97.6  F (36.4  C)-99.5  F (37.5  C)] 97.6  F (36.4  C)  Pulse:  [72-98] 76  Resp:  [16] 16  BP: (102-121)/(60-70) 108/62  SpO2:  [97 %-99 %] 97 %    Intake/Output Summary (Last 24 hours) at 9/17/2021 0907  Last data filed at 9/17/2021 0501  Gross per 24 hour   Intake 5231 ml   Output --   Net 5231 ml       GENERAL:  Awake, alert, no acute distress, lying in bed.  HEAD: Nomocephalic atraumatic  SCLERA: anicteric  ABDOMEN:  Non-distended.    LABS:  Lab Results   Component Value Date    WBC 10.0 09/17/2021    WBC 10.4 12/07/2009     Lab Results   Component Value Date    HGB 8.1 09/17/2021    HGB 13.7 12/07/2009     Lab Results   Component Value Date    HCT 26.7 09/17/2021    HCT 40.7 12/07/2009     Lab Results   Component Value Date     09/17/2021     12/07/2009     Last Basic Metabolic Panel:  Lab Results   Component Value Date     09/17/2021     09/17/2021     12/07/2009      Lab Results   Component Value Date    POTASSIUM 3.8 09/17/2021    POTASSIUM 3.9 09/17/2021    POTASSIUM 3.6 12/07/2009     Lab Results   Component Value Date    CHLORIDE 98 09/17/2021    CHLORIDE 98 09/17/2021    CHLORIDE 103 12/07/2009     Lab Results   Component Value Date    MADIE 7.4 09/17/2021    MADIE 7.5 09/17/2021    MADIE 9.2 12/07/2009     Lab Results   Component Value Date    CO2 28 09/17/2021    CO2 27 09/17/2021    CO2 30 12/07/2009     Lab Results   Component Value Date    BUN 13 09/17/2021    BUN 13 09/17/2021    BUN 14 12/07/2009     Lab Results   Component Value Date    CR 0.67 09/17/2021    CR 0.71 09/17/2021    CR 0.64 12/07/2009     Lab Results   Component Value Date     09/17/2021     09/17/2021    GLC 97 12/07/2009       ASSESSMENT/PLAN: 25 year old man admitted with  Ulcerative Colitis flare. On IV steroids and first dose of Remicade on 9/8/21.  Repeat flex sig done 9/16 which showed severe (Alvarenga Score 3) ulcerative pancolitis, worsened since the last examination on 9/6.  Given no significant improvement over the past week, surgical intervention is recommended.  Afebrile. Hgb 8.1. WBC 10.    - NPO  - Laparoscopic total abdominal colectomy with end ileostomy this afternoon with Dr. Krystina FARLEY consult for stoma marking prior to surgery      For questions/paging, please contact the CRS office at [phone].    Maria C Bhandari PA-C  [facility]  Phone: [phone]

## 2021-09-17 NOTE — CONSULTS
Care Management Follow Up    Length of Stay (days): 5    Expected Discharge Date: 09/20/2021     Concerns to be Addressed: Discharge planning      Patient plan of care discussed at interdisciplinary rounds: Yes    Anticipated Discharge Disposition:  Home when medically stable     Anticipated Discharge Services:  None anticipated     Referrals Placed by CM/SW:  None at this time  Private pay costs discussed: Not applicable    Additional Information:  SW consulted for elevated risk score, unplanned readmission risk >20%. Patient is having a laparoscopic total abdominal colectomy with end ileostomy this afternoon.  RN CC/SW will remain available post-op prior to discharge for any identifiable needs.     VIKTOR Ernst

## 2021-09-18 LAB
ANION GAP SERPL CALCULATED.3IONS-SCNC: 5 MMOL/L (ref 3–14)
BASOPHILS # BLD MANUAL: 0 10E3/UL (ref 0–0.2)
BASOPHILS NFR BLD MANUAL: 0 %
BUN SERPL-MCNC: 11 MG/DL (ref 7–30)
CALCIUM SERPL-MCNC: 7.5 MG/DL (ref 8.5–10.1)
CHLORIDE BLD-SCNC: 99 MMOL/L (ref 94–109)
CO2 SERPL-SCNC: 29 MMOL/L (ref 20–32)
CREAT SERPL-MCNC: 0.65 MG/DL (ref 0.66–1.25)
EOSINOPHIL # BLD MANUAL: 0 10E3/UL (ref 0–0.7)
EOSINOPHIL NFR BLD MANUAL: 0 %
ERYTHROCYTE [DISTWIDTH] IN BLOOD BY AUTOMATED COUNT: 13.6 % (ref 10–15)
GFR SERPL CREATININE-BSD FRML MDRD: >90 ML/MIN/1.73M2
GLUCOSE BLD-MCNC: 99 MG/DL (ref 70–99)
HCT VFR BLD AUTO: 24.1 % (ref 40–53)
HGB BLD-MCNC: 7.3 G/DL (ref 13.3–17.7)
LYMPHOCYTES # BLD MANUAL: 1.5 10E3/UL (ref 0.8–5.3)
LYMPHOCYTES NFR BLD MANUAL: 12 %
MAGNESIUM SERPL-MCNC: 2.2 MG/DL (ref 1.6–2.3)
MCH RBC QN AUTO: 26.8 PG (ref 26.5–33)
MCHC RBC AUTO-ENTMCNC: 30.3 G/DL (ref 31.5–36.5)
MCV RBC AUTO: 89 FL (ref 78–100)
METAMYELOCYTES # BLD MANUAL: 0.1 10E3/UL
METAMYELOCYTES NFR BLD MANUAL: 1 %
MONOCYTES # BLD MANUAL: 0.4 10E3/UL (ref 0–1.3)
MONOCYTES NFR BLD MANUAL: 3 %
MYELOCYTES # BLD MANUAL: 0.4 10E3/UL
MYELOCYTES NFR BLD MANUAL: 3 %
NEUTROPHILS # BLD MANUAL: 10 10E3/UL (ref 1.6–8.3)
NEUTROPHILS NFR BLD MANUAL: 81 %
PHOSPHATE SERPL-MCNC: 2.4 MG/DL (ref 2.5–4.5)
PLAT MORPH BLD: ABNORMAL
PLATELET # BLD AUTO: 572 10E3/UL (ref 150–450)
POTASSIUM BLD-SCNC: 3.9 MMOL/L (ref 3.4–5.3)
RBC # BLD AUTO: 2.72 10E6/UL (ref 4.4–5.9)
RBC MORPH BLD: ABNORMAL
SODIUM SERPL-SCNC: 133 MMOL/L (ref 133–144)
WBC # BLD AUTO: 12.4 10E3/UL (ref 4–11)

## 2021-09-18 PROCEDURE — 120N000001 HC R&B MED SURG/OB

## 2021-09-18 PROCEDURE — 250N000013 HC RX MED GY IP 250 OP 250 PS 637: Performed by: COLON & RECTAL SURGERY

## 2021-09-18 PROCEDURE — 83735 ASSAY OF MAGNESIUM: CPT | Performed by: COLON & RECTAL SURGERY

## 2021-09-18 PROCEDURE — 85014 HEMATOCRIT: CPT | Performed by: COLON & RECTAL SURGERY

## 2021-09-18 PROCEDURE — 250N000011 HC RX IP 250 OP 636: Performed by: COLON & RECTAL SURGERY

## 2021-09-18 PROCEDURE — 84100 ASSAY OF PHOSPHORUS: CPT | Performed by: COLON & RECTAL SURGERY

## 2021-09-18 PROCEDURE — 258N000003 HC RX IP 258 OP 636: Performed by: COLON & RECTAL SURGERY

## 2021-09-18 PROCEDURE — 80048 BASIC METABOLIC PNL TOTAL CA: CPT | Performed by: COLON & RECTAL SURGERY

## 2021-09-18 PROCEDURE — 99232 SBSQ HOSP IP/OBS MODERATE 35: CPT | Performed by: INTERNAL MEDICINE

## 2021-09-18 PROCEDURE — 36415 COLL VENOUS BLD VENIPUNCTURE: CPT | Performed by: COLON & RECTAL SURGERY

## 2021-09-18 PROCEDURE — 99207 PR CDG-MDM COMPONENT: MEETS LOW - DOWN CODED: CPT | Performed by: INTERNAL MEDICINE

## 2021-09-18 RX ADMIN — CEFAZOLIN SODIUM 1 G: 1 INJECTION, SOLUTION INTRAVENOUS at 04:20

## 2021-09-18 RX ADMIN — HYDROCORTISONE SODIUM SUCCINATE 100 MG: 100 INJECTION, POWDER, FOR SOLUTION INTRAMUSCULAR; INTRAVENOUS at 18:29

## 2021-09-18 RX ADMIN — OXYCODONE HYDROCHLORIDE 10 MG: 5 TABLET ORAL at 08:22

## 2021-09-18 RX ADMIN — METHYLPREDNISOLONE SODIUM SUCCINATE 20 MG: 40 INJECTION, POWDER, FOR SOLUTION INTRAMUSCULAR; INTRAVENOUS at 05:01

## 2021-09-18 RX ADMIN — METRONIDAZOLE 500 MG: 500 INJECTION, SOLUTION INTRAVENOUS at 14:52

## 2021-09-18 RX ADMIN — ENOXAPARIN SODIUM 40 MG: 40 INJECTION SUBCUTANEOUS at 14:56

## 2021-09-18 RX ADMIN — ACETAMINOPHEN 975 MG: 325 TABLET, FILM COATED ORAL at 14:55

## 2021-09-18 RX ADMIN — HYDROCORTISONE SODIUM SUCCINATE 100 MG: 100 INJECTION, POWDER, FOR SOLUTION INTRAMUSCULAR; INTRAVENOUS at 11:00

## 2021-09-18 RX ADMIN — OXYCODONE HYDROCHLORIDE 5 MG: 5 TABLET ORAL at 04:25

## 2021-09-18 RX ADMIN — ACETAMINOPHEN 975 MG: 325 TABLET, FILM COATED ORAL at 07:20

## 2021-09-18 RX ADMIN — DIAZEPAM 2 MG: 5 INJECTION, SOLUTION INTRAMUSCULAR; INTRAVENOUS at 10:52

## 2021-09-18 RX ADMIN — SODIUM CHLORIDE, POTASSIUM CHLORIDE, SODIUM LACTATE AND CALCIUM CHLORIDE: 600; 310; 30; 20 INJECTION, SOLUTION INTRAVENOUS at 14:33

## 2021-09-18 RX ADMIN — CEFAZOLIN SODIUM 1 G: 1 INJECTION, SOLUTION INTRAVENOUS at 23:25

## 2021-09-18 RX ADMIN — CEFAZOLIN SODIUM 1 G: 1 INJECTION, SOLUTION INTRAVENOUS at 14:15

## 2021-09-18 RX ADMIN — DOCUSATE SODIUM AND SENNOSIDES 1 TABLET: 8.6; 5 TABLET, FILM COATED ORAL at 08:07

## 2021-09-18 RX ADMIN — Medication: at 11:46

## 2021-09-18 RX ADMIN — DIAZEPAM 2 MG: 5 INJECTION, SOLUTION INTRAMUSCULAR; INTRAVENOUS at 18:38

## 2021-09-18 RX ADMIN — POLYETHYLENE GLYCOL 3350 17 G: 17 POWDER, FOR SOLUTION ORAL at 08:07

## 2021-09-18 RX ADMIN — ACETAMINOPHEN 975 MG: 325 TABLET, FILM COATED ORAL at 22:53

## 2021-09-18 ASSESSMENT — ACTIVITIES OF DAILY LIVING (ADL)
ADLS_ACUITY_SCORE: 5
ADLS_ACUITY_SCORE: 5
ADLS_ACUITY_SCORE: 9
ADLS_ACUITY_SCORE: 5
ADLS_ACUITY_SCORE: 9
ADLS_ACUITY_SCORE: 5

## 2021-09-18 NOTE — ANESTHESIA POSTPROCEDURE EVALUATION
Patient: Luther Wood    Procedure(s):  laparoscopic total abdominal colectomy with end ileostomy    Diagnosis:Ulcerative colitis with rectal bleeding (H) [K51.911]  Diagnosis Additional Information: No value filed.    Anesthesia Type:  General    Note:  Disposition: Inpatient   Postop Pain Control: Uneventful            Sign Out: Well controlled pain   PONV: No   Neuro/Psych: Uneventful            Sign Out: Acceptable/Baseline neuro status   Airway/Respiratory: Uneventful            Sign Out: Acceptable/Baseline resp. status   CV/Hemodynamics: Uneventful            Sign Out: Acceptable CV status; No obvious hypovolemia; No obvious fluid overload   Other NRE: NONE   DID A NON-ROUTINE EVENT OCCUR? No           Last vitals:  Vitals Value Taken Time   /85 09/17/21 2205   Temp 100.3  F (37.9  C) 09/17/21 2145   Pulse 93 09/17/21 2205   Resp 30 09/17/21 2205   SpO2 99 % 09/17/21 2207   Vitals shown include unvalidated device data.    Electronically Signed By: Fernando Ng MD  September 18, 2021  12:50 AM  
<--- Click to Launch ICDx for PreOp, PostOp and Procedure

## 2021-09-18 NOTE — BRIEF OP NOTE
POSTOPERATIVE / POSTPROCEDURE NOTE - IMMEDIATE :    Surgeon(s)/Proceduralist(s) and Assistants (if any):  Surgeon(s):  Soledad Flaherty MD  Circulator: Eileen Romeo RN; Irma Li, LIZBET; Raven Franco RN  Relief Circulator: Kannan Napoles RN  Relief Scrub: Elias Pollack; Kacie Nazario  Scrub Person: JayymoGodwine    Procedure(s): laparoscopic total abdominal colectomy with end ileostomy    Procedure(s) findings:   Significant inflammation of the entire colon, most severe from hepatic flexure and distal    Specimen(s) removed: Yes    (EBL) Estimated blood loss (ml): 10    Postoperative/Postprocedure Diagnosis: fulminant ulcerative colitis      Mikala Quintana MD      ADDENDUM:    PATIENT DATA  Indicate Y or N:  Home O2 No  Hemodialysis No  Transplant patient No  Cirrhosis No  Steroids in last 30 days Yes  Immunomodulators in last 30 days Yes  Anticoagulation at time of surgery No   List medication N/A  Prior abdominal surgery No  Pelvic irradiation No    Albumin within 30 days if known 1.5  Hgb within 30 days if known 1.3  Cr within 30 days if known 0.71  Body mass index is 17.81 kg/m .    OR DATA  Emergent Yes   <24 hours No   <1 week Yes  Bowel Prep (Y or N) No  Antibiotics (Y or N) Yes  DVT prophylaxis    Heparin Yes   SCD Yes   None No  Drain Yes  ASA (1,2,3,4,5 if unknown)3  OR time (min) 251  Stents No  Transfuse >/= 2U No  Anastomosis   Stapled No   Handsewn No  Leak Test (pos, neg, not done) not done        Mikala Quintana MD  Colon & Rectal Surgery Fellow  [facility]  [phone]

## 2021-09-18 NOTE — ANESTHESIA CARE TRANSFER NOTE
Patient: Luther Wood    Procedure(s):  laparoscopic total abdominal colectomy with end ileostomy    Diagnosis: Ulcerative colitis with rectal bleeding (H) [K51.911]  Diagnosis Additional Information: No value filed.    Anesthesia Type:   General     Note:    Oropharynx: oropharynx clear of all foreign objects and spontaneously breathing  Level of Consciousness: awake  Oxygen Supplementation: face mask    Independent Airway: airway patency satisfactory and stable  Dentition: dentition unchanged  Vital Signs Stable: post-procedure vital signs reviewed and stable  Report to RN Given: handoff report given  Patient transferred to: PACU  Comments: Report and signed off to RN in PACU.  Good Resps, skin pink, VSS, O2 via face tent.  Handoff Report: Identifed the Patient, Identified the Reponsible Provider, Reviewed the pertinent medical history, Discussed the surgical course, Reviewed Intra-OP anesthesia mangement and issues during anesthesia, Set expectations for post-procedure period and Allowed opportunity for questions and acknowledgement of understanding      Vitals:  Vitals Value Taken Time   /82 09/17/21 2050   Temp     Pulse 83 09/17/21 2053   Resp 13 09/17/21 2053   SpO2 100 % 09/17/21 2053   Vitals shown include unvalidated device data.    Electronically Signed By: JACQUES Arevalo CRNA  September 17, 2021  8:54 PM

## 2021-09-18 NOTE — PROGRESS NOTES
COLON & RECTAL SURGERY  PROGRESS NOTE    September 18, 2021  Post-op Day # 1    SUBJECTIVE:  Spasmy abdominal pain this AM. Only received oxycodone overnight.  Minimal attempts at clears.  No nausea or vomiting.      Stoma: 50  MIRZA: 230 serous    OBJECTIVE:  Temp:  [98.1  F (36.7  C)-100.3  F (37.9  C)] 98.5  F (36.9  C)  Pulse:  [] 81  Resp:  [8-18] 18  BP: (108-134)/(60-82) 117/67  SpO2:  [98 %-100 %] 98 %    Intake/Output Summary (Last 24 hours) at 9/18/2021 1034  Last data filed at 9/18/2021 0657  Gross per 24 hour   Intake 2600 ml   Output 1415 ml   Net 1185 ml       GENERAL:  Awake, alert, no acute distress  HEAD: Nomocephalic atraumatic  SCLERA: anicteric  EXTREMITIES: warm and well perfused  ABDOMEN:  Soft, appropriately tender, slightly distended, no rebound or guarding, no peritoneal signs.  Stoma pink.    INCISION:  C/d/i    LABS:  Lab Results   Component Value Date    WBC 12.4 09/18/2021    WBC 10.4 12/07/2009     Lab Results   Component Value Date    HGB 7.3 09/18/2021    HGB 13.7 12/07/2009     Lab Results   Component Value Date    HCT 24.1 09/18/2021    HCT 40.7 12/07/2009     Lab Results   Component Value Date     09/18/2021     12/07/2009     Last Basic Metabolic Panel:  Lab Results   Component Value Date     09/18/2021     12/07/2009      Lab Results   Component Value Date    POTASSIUM 3.9 09/18/2021    POTASSIUM 3.6 12/07/2009     Lab Results   Component Value Date    CHLORIDE 99 09/18/2021    CHLORIDE 103 12/07/2009     Lab Results   Component Value Date    MADIE 7.5 09/18/2021    MADIE 9.2 12/07/2009     Lab Results   Component Value Date    CO2 29 09/18/2021    CO2 30 12/07/2009     Lab Results   Component Value Date    BUN 11 09/18/2021    BUN 14 12/07/2009     Lab Results   Component Value Date    CR 0.65 09/18/2021    CR 0.64 12/07/2009     Lab Results   Component Value Date    GLC 99 09/18/2021    GLC 97 12/07/2009       ASSESSMENT/PLAN:  Appropriate recovery.      Clear liquid diet  PCA  Valium PRN  Steroid taper   Continue byrnes   Continue MIRZA drain  Continue rectal penrose  SQH TID    Seen and examined with Dr. Flaherty.      For questions/paging, please contact the CRS office at [phone].    José Sanchez MD MPH  Fellow in Colon and Rectal Surgery  [facility]  Pager: [phone]

## 2021-09-18 NOTE — PROGRESS NOTES
[facility]  Hospitalist Progress Note  Marcelo Schwartz MD 09/18/21    Reason for Stay (Diagnosis): Ulcerative colitis flare         Assessment and Plan:      Summary of Stay: Luther Wood is a 25 year old male with history of ulcerative colitis admitted on 9/11/2021 with frequent loose stools, hematochezia, fatigue and syncope. Received 1uPRBC and has been treated for UC flare.      He had been admitted 9/2 through 9/9 for acute ulcerative colitis flare received methylprednisolone 20 mg IV every 8 hours with flex sig performed on 9/6 showing severe ulcerative colitis and biopsies were taken.  He was started on Inflectra by GI with transition of steroids to prednisone 40 mg daily and discharged on 9/9.  He returned with loose stools and hematochezia with syncope on 9/11. repeat C. difficile and enteric panel is negative.  His 2nd dose of Infliximab was given 9/12 and he was resumed on IV steroids. flex sig done 9/16 showed worsening ulcerations and reportedly [facility] was contacted for another opinion and Colorectal surgery has been following and he underwent total colectomy with end ileostomy (9/17/2021).  He remains admitted for postop cares.     Profuse diarrhea with hematochezia  Known history of ulcerative colitis with flare  total colectomy with end ileostomy (9/17/2021)  --Diet, wound cares, pain control and steroid management as per colorectal surgery     Acute on chronic anemia due to blood loss    - symptomatic hemoglobin of 6.9 on 9/12 in the setting of bloody stool related to ulcerative colitis flare    - received 1 unit of blood on 9/12 with improvement to 8.4>>7.7     - will re-check Hb in am, transfuse if <7     Fever and leukocytosis    - Tmax 101.5 on 9/11 with WBC elevation (14.5) thought to be steroid induced    - no focal infectious symptoms other than diarrhea with blood    - likely fever related to colitis    - no antibiotics indicated    - blood cultures x 2 on 9/12  "negative to date    - now afebrile     Syncope    - patient had episode of syncope while brushing his teeth    - father caught him before he hit the ground and woke up after 5 seconds    - suspect this is related to hypovolemia and anemia, likely not to be cardiac etiology    - was monitored on telemetry overnight with no arrhythmia and echocardiogram shows no structural disease of his heart    - discontinued telemetry     Hyponatremia    - fluctuating    - monitor     Nonsevere malnutrition    - was seen by nutrition on his last admission with recommendation for pushing protein with meals and to have small frequent meals with oral nutritional supplements.    CODE STATUS: Full  DVT prophylaxis: Lovenox  Disposition: Ending return of bowel function, ostomy teaching etc.  Suspect 2-4 more days.           Interval History (Subjective):      I assumed care, patient underwent total colectomy last night  Feeling overall okay, no nausea or cardiopulmonary complaints  Discussed briefly with colorectal surgery  His mother was present and updated at the bedside                  Physical Exam:      Last Vital Signs:  /72 (BP Location: Right arm)   Pulse 64   Temp 98.3  F (36.8  C) (Oral)   Resp 14   Ht 1.854 m (6' 1\")   Wt 61.2 kg (135 lb)   SpO2 100%   BMI 17.81 kg/m        Intake/Output Summary (Last 24 hours) at 9/18/2021 1158  Last data filed at 9/18/2021 1148  Gross per 24 hour   Intake 2600 ml   Output 2645 ml   Net -45 ml       General: Alert, awake, no acute distress.  HEENT: NC/AT, eyes anicteric, external occular movements intact, face symmetric  Cardiac: RRR, S1, S2.  No murmurs appreciated.  Pulmonary: Normal chest rise, normal work of breathing.  Lungs CTA BL  Abdomen: Fresh ostomy in place.  No fluid or gas in the pouch.  Well approximated incision.  No guarding.  Soft, non-distended.    Extremities: no deformities.  Warm, well perfused.  Skin: no rashes or lesions noted.  Warm and Dry.  Neuro: No " focal deficits noted.  Speech clear.  Coordination and strength grossly normal.  Psych: Appropriate affect.         Medications:      All current medications were reviewed with changes reflected in problem list.         Data:      All new lab and imaging data was reviewed.   Labs:  Recent Labs   Lab 21  0724      POTASSIUM 3.9   CHLORIDE 99   CO2 29   ANIONGAP 5   GLC 99   BUN 11   CR 0.65*   GFRESTIMATED >90   MADIE 7.5*     Recent Labs   Lab 21  0724   WBC 12.4*   HGB 7.3*   HCT 24.1*   MCV 89   *      Imaging:   Results for orders placed or performed during the hospital encounter of 21   Echocardiogram Complete     Value    LVEF  65-70%    Narrative    [id number]  [id number]  [id number]  [id number]^KATIANA^CHELLE^CLYDE     [facility]  Echocardiography Laboratory  [address] [place] [address]     Name: RENNY AN  MRN: [id number]  : 1995  Study Date: 2021 07:40 AM  Age: 25 yrs  Gender: Male  Patient [facility]  Reason For Study: Syncope  Ordering Physician: CHELLE SAAB  Performed By: Nagi Narayan RDCS     BSA: 1.8 m2  Height: 73 in  Weight: 135 lb  HR: 134  BP: 116/66 mmHg  ______________________________________________________________________________  Procedure  Complete Echo Adult.  ______________________________________________________________________________  Interpretation Summary     Normal transthoracic echocardiogram     No prior study available for comparison.  ______________________________________________________________________________  Left Ventricle  The left ventricle is normal in size. There is normal left ventricular wall  thickness. Hyperdynamic left ventricular function. The visual ejection  fraction is 65-70%. Left ventricular diastolic function is normal. Normal left  ventricular wall motion.     Right Ventricle  The right ventricle is normal size. The right ventricular systolic function is  normal.      Atria  Normal left atrial size. Right atrial size is normal. There is no color  Doppler evidence of an atrial shunt.     Mitral Valve  The mitral valve leaflets appear normal. There is no evidence of stenosis,  fluttering, or prolapse. There is physiologic mitral regurgitation. There is  no mitral valve stenosis.     Tricuspid Valve  Normal tricuspid valve. There is trace tricuspid regurgitation. The right  ventricular systolic pressure is approximated at 24.1 mmHg plus the right  atrial pressure. IVC diameter <2.1 cm collapsing >50% with sniff suggests a  normal RA pressure of 3 mmHg.     Aortic Valve  The aortic valve is not well visualized. No aortic regurgitation is present.  No aortic stenosis is present.     Pulmonic Valve  The pulmonic valve is not well visualized.     Vessels  The aortic root is normal size.     Pericardium  There is no pericardial effusion.     ______________________________________________________________________________  MMode/2D Measurements & Calculations  IVSd: 0.72 cm  LVIDd: 4.6 cm  LVIDs: 2.9 cm  LVPWd: 0.90 cm  FS: 36.2 %     LV mass(C)d: 121.1 grams  LV mass(C)dI: 66.5 grams/m2  Ao root diam: 2.8 cm  LA dimension: 3.0 cm  LA/Ao: 1.1  LA Volume (BP): 20.3 ml  LA Volume Index (BP): 11.2 ml/m2  RWT: 0.39     Doppler Measurements & Calculations  MV E max jayy: 102.8 cm/sec  MV A max jayy: 62.6 cm/sec  MV E/A: 1.6  MV dec time: 0.20 sec     TR max jayy: 245.7 cm/sec  TR max P.1 mmHg  E/E' av.6  Lateral E/e': 5.0  Medial E/e': 6.3     ______________________________________________________________________________  Report approved by: MD Chadwick Santos 2021 09:40 AM                 Marcelo Schwartz MD , MD.

## 2021-09-18 NOTE — PLAN OF CARE
Pertinent Assessment: A&Ox4, Ax1, VSS, sats 100% RA, LS clear. Incision pain Oxycodone  10 mg given AM shift with relief of pain 2/10,pain controlled with Tylenol and PCA, PCA 0.4 mg shift total given. Abdomen lap sites with liquid bandage CDI, MIRZA out put 30 ml,  site CDI, Ileostomy intact in place with 50 ml liquid stool. Jacob in place with adequate out put. Tolerating clear liquid diet, denies nausea.  Major shift event: Started PCA  Treatment plan: Pain management, I&Os.     Bedside Nurse: Jorgito COLLADO RN

## 2021-09-18 NOTE — OP NOTE
PREOPERATIVE DIAGNOSIS: Fulminant Ulcerative colitis      POSTOPERATIVE DIAGNOSIS: Same     OPERATION PERFORMED: Laparoscopic total abdominal colectomy with end ileostomy       ANESTHESIA:  General    SURGEON: Soledad Flaherty MD      ASSISTANT SURGEON:  Mikala Quintana MD, Colon and Rectal Surgery Fellow      INDICATION: Luther Wood is a 26 year old male with a history of ulcerative colitis who was recently admitted for an ulcerative colitis flare at the beginning of September.  He was discharged home but unfortunately had worsening fatigue, hematochezia as well as a syncopal episode at home.  He was admitted to the hospital and started on IV steroids and did receive a dose of Remicade.  He had a flexible sigmoidoscopy which demonstrated worsening endoscopic appearance.  After consultation with the IBD providers as well as the [facility] there was discussion that there is no additional medical management.  Given this I had a discussion with the patient and his family that surgical intervention was the next step.  We discussed the recommendation for a 3 staged procedure with the for stage being a laparoscopic total abdominal colectomy with an end ileostomy.  I discussed the risk the procedure including bleeding, infection, damage surrounding structures, rectal stump complications as well as cardiopulmonary complications.  He understands these risks and agrees to proceed.      FINDINGS: Significant inflammation of the entire colon consistent with fulminant colitis.  This extended from the rectum up through the hepatic flexure.  He had a fairly redundant sigmoid colon.  We transected the colon just proximal to the rectosigmoid junction.     PROCEDURE:  After informed consent was obtained, the patient was brought to the operating room and placed in the supine position on the operating room table.  Sequential compression devices were placed on bilateral lower extremities prior to induction, and general  anesthesia was induced without difficulty.  A Jacob catheter was inserted.  He was placed in well-padded dorsal lithotomy position, and IV antibiotics were administered. A pink Pigazzi pad was used on the operating room table to prevent patient movement while in steep Trendelenburg position. The abdomen was then prepped and draped in standard sterile fashion.      A supraumbilical incision was made and the abdomen entered using the standard Steph technique. A 12mm balloon port was placed and the abdomen was insufflated with CO2 gas to a pressure of 15 mmHg which the patient tolerated well. A 10 mm 30 degree laparoscope was inserted through the supraumbilical port, and the abdomen was explored.  The operative findings are noted above. A laparoscopic TAP block was then performed in four quadrants with a  total of 30ml of 0.5% marcaine. Four additional trocars were placed into the abdominal cavity in the right and left upper quadrants, right and left lower quadrants positions under direct visualization.    We began with mobilization of the sigmoid colon with a medial to lateral approach. The sigmoid colon was placed on tension towards the anterior abdominal wall revealing the AUDIE pedicle on stretch. The peritoneum was opened and we then created a window below the colonic mesentery and deep to the inferior mesenteric artery and created a plane between the retroperitoneum and the colonic mesentery.  We were able to easily identify the left ureter during this dissection and keep it protected. Once we had  the colon mesentery from the retroperitoneal attachments we then continued our dissection toward the pelvis. Once we had an adequate window, we isolated the AUDIE pedicle. This was divided with the Ligasure device and hemostasis of the pedicle confirmed. We then divided the mesentery up to the level of the bare area. We then selected a healthy appearing portion of the distal sigmoid colon/proximal rectum and  created a mesenteric window at this location.     We then placed the patient in reverse Trendelenburg position and left side up, and we continued our dissection along the white line of Toldt and around the splenic flexure. When we were unable to easily continue the dissection laterally, we entered the lesser sac medially by mobilizing the omentum off the transverse colon. We were eventually able to make our two dissection planes meet, fully mobilizing the splenic flexure. We then continued our mobilization of the omentum off the transverse colon toward the hepatic flexure. Once this dissection became difficult, we transitioned to mesenteric division. We divided the descending colon mesentery around the splenic flexure, and started to divide the transverse colon mesentery up to the level of the middle colic vessels. Once this was completed, we then repositioned the patient into Trendelenburg position with the right side up to mobilize the right colon.     The right colon was elevated towards the anterior abdominal wall, revealing the origin of the ileocolic vessels on stretch.  A window was created in the colonic mesentery deep to the ileocolic vessels and this dissection was continued toward the hepatic flexure, sweeping the retroperitoneal tissue inferiorly.  The duodenum was carefully identified and preserved throughout the course of the right colonic mobilization.  Ileocolic vessels were divided using a LigaSure device with good hemostasis of the vascular pedicle. The right colon was then reflected medially, and the remaining lateral peritoneal attachments were taken down up to the hepatic flexure. The remaining omentum was removed from the hepatic flexure, meeting the lateral dissection plane. Once the right colon was fully mobilized, the remaining mesentery was divided using the Ligasure. We again ensured hemostasis of the mesentery. We then continued mesenteric dissection up to the level of there terminal  ileum at our chosen transection point.     We then extended our supraumbilical port site an additional centimeter and placed a small Rashaad wound retractor device in the incision.  We then pulled out our distal sigmoid colon to this.  We divided the sigmoid colon at her precleared transient distal transection point using a green load of the contour stapler.  We then removed the entire colon out through our supraumbilical port site and identified our proximal transection site on the distal terminal ileum.  Again we cleared the mesentery here with electrocautery.  An additional green load of the contour stapler was used to divide the terminal ileum.  The specimen was passed off the field to be sent to pathology.  We then proceed with making our ileostomy at the premarked site in the right abdomen. We placed a [place] through the ileostomy site and ensure that we had no bleeding.  We then easily pulled our terminal ileum through the ileostomy site.  We did confirm that there is no twisting of the mesentery.    We then prepared for closure of the abdomen.  We irrigated out the abdomen with several liters of warm sterile saline with clean returns.  We placed a 19 Greek Rosendo channel drain through the left lower quadrant port and removed the port.  This was secured to the skin with a 2-0 nylon suture.  The drain was placed down in the pelvis by the rectal stump.     The fascia of the supraumbilical port site was closed under direct vision using looped 0 PDS suture from either end.  All wounds were then irrigated with a copious amount of saline solution, and the skin was closed with a combination of buried interrupted subdermal 4-0 Monocryl sutures in the port sites and running 4-0 Monocryl subcuticular suture in the supraumbilical incision.  Dermabond was applied over all wounds.      We then proceeded with maturing the ileostomy after isolating the incisions from the stoma site.  The staple line of the terminal ileum  was excised using electrocautery.  We had good blood flow to the ileum.  The ileostomy was then matured in a standard Ruth fashion using 3-0 Vicryl sutures.  A stoma appliance was applied.  We did place a large 1 inch Penrose drain into the rectal stump to help with decompression.  This was sutured to the skin using a 2-0 nylon suture.     All needle and sponge counts were correct at the conclusion of the case.  The patient was then returned to the supine position.  He was awoken and extubated in the operating room.  He was then brought to the recovery room in stable condition.        EBL: 10 ml  SPECIMEN: total abdominal colon  COMPLICATIONS: none

## 2021-09-19 LAB
ANION GAP SERPL CALCULATED.3IONS-SCNC: 3 MMOL/L (ref 3–14)
BUN SERPL-MCNC: 9 MG/DL (ref 7–30)
CALCIUM SERPL-MCNC: 7.9 MG/DL (ref 8.5–10.1)
CHLORIDE BLD-SCNC: 99 MMOL/L (ref 94–109)
CO2 SERPL-SCNC: 32 MMOL/L (ref 20–32)
CREAT SERPL-MCNC: 0.59 MG/DL (ref 0.66–1.25)
ERYTHROCYTE [DISTWIDTH] IN BLOOD BY AUTOMATED COUNT: 13.5 % (ref 10–15)
GFR SERPL CREATININE-BSD FRML MDRD: >90 ML/MIN/1.73M2
GLUCOSE BLD-MCNC: 116 MG/DL (ref 70–99)
HCT VFR BLD AUTO: 24.1 % (ref 40–53)
HGB BLD-MCNC: 7.3 G/DL (ref 13.3–17.7)
MCH RBC QN AUTO: 26.9 PG (ref 26.5–33)
MCHC RBC AUTO-ENTMCNC: 30.3 G/DL (ref 31.5–36.5)
MCV RBC AUTO: 89 FL (ref 78–100)
PLATELET # BLD AUTO: 564 10E3/UL (ref 150–450)
POTASSIUM BLD-SCNC: 3.7 MMOL/L (ref 3.4–5.3)
RBC # BLD AUTO: 2.71 10E6/UL (ref 4.4–5.9)
SODIUM SERPL-SCNC: 134 MMOL/L (ref 133–144)
WBC # BLD AUTO: 12.9 10E3/UL (ref 4–11)

## 2021-09-19 PROCEDURE — 258N000003 HC RX IP 258 OP 636: Performed by: COLON & RECTAL SURGERY

## 2021-09-19 PROCEDURE — 120N000001 HC R&B MED SURG/OB

## 2021-09-19 PROCEDURE — 99232 SBSQ HOSP IP/OBS MODERATE 35: CPT | Performed by: INTERNAL MEDICINE

## 2021-09-19 PROCEDURE — 250N000011 HC RX IP 250 OP 636: Performed by: COLON & RECTAL SURGERY

## 2021-09-19 PROCEDURE — 80048 BASIC METABOLIC PNL TOTAL CA: CPT | Performed by: INTERNAL MEDICINE

## 2021-09-19 PROCEDURE — 36415 COLL VENOUS BLD VENIPUNCTURE: CPT | Performed by: INTERNAL MEDICINE

## 2021-09-19 PROCEDURE — 85027 COMPLETE CBC AUTOMATED: CPT | Performed by: INTERNAL MEDICINE

## 2021-09-19 PROCEDURE — 250N000013 HC RX MED GY IP 250 OP 250 PS 637: Performed by: COLON & RECTAL SURGERY

## 2021-09-19 RX ORDER — HYDROMORPHONE HYDROCHLORIDE 1 MG/ML
.3-.5 INJECTION, SOLUTION INTRAMUSCULAR; INTRAVENOUS; SUBCUTANEOUS
Status: DISCONTINUED | OUTPATIENT
Start: 2021-09-19 | End: 2021-09-24 | Stop reason: HOSPADM

## 2021-09-19 RX ORDER — OXYCODONE HYDROCHLORIDE 5 MG/1
5-10 TABLET ORAL EVERY 4 HOURS PRN
Status: DISCONTINUED | OUTPATIENT
Start: 2021-09-19 | End: 2021-09-24 | Stop reason: HOSPADM

## 2021-09-19 RX ADMIN — SODIUM CHLORIDE, POTASSIUM CHLORIDE, SODIUM LACTATE AND CALCIUM CHLORIDE: 600; 310; 30; 20 INJECTION, SOLUTION INTRAVENOUS at 16:31

## 2021-09-19 RX ADMIN — ACETAMINOPHEN 975 MG: 325 TABLET, FILM COATED ORAL at 22:05

## 2021-09-19 RX ADMIN — ACETAMINOPHEN 975 MG: 325 TABLET, FILM COATED ORAL at 06:29

## 2021-09-19 RX ADMIN — ENOXAPARIN SODIUM 40 MG: 40 INJECTION SUBCUTANEOUS at 15:33

## 2021-09-19 RX ADMIN — SODIUM CHLORIDE, POTASSIUM CHLORIDE, SODIUM LACTATE AND CALCIUM CHLORIDE: 600; 310; 30; 20 INJECTION, SOLUTION INTRAVENOUS at 00:53

## 2021-09-19 RX ADMIN — HYDROCORTISONE SODIUM SUCCINATE 100 MG: 100 INJECTION, POWDER, FOR SOLUTION INTRAMUSCULAR; INTRAVENOUS at 02:31

## 2021-09-19 RX ADMIN — HYDROCORTISONE SODIUM SUCCINATE 50 MG: 100 INJECTION, POWDER, FOR SOLUTION INTRAMUSCULAR; INTRAVENOUS at 09:59

## 2021-09-19 RX ADMIN — ACETAMINOPHEN 975 MG: 325 TABLET, FILM COATED ORAL at 15:33

## 2021-09-19 RX ADMIN — DIAZEPAM 2 MG: 5 INJECTION, SOLUTION INTRAMUSCULAR; INTRAVENOUS at 16:03

## 2021-09-19 RX ADMIN — HYDROCORTISONE SODIUM SUCCINATE 50 MG: 100 INJECTION, POWDER, FOR SOLUTION INTRAMUSCULAR; INTRAVENOUS at 18:24

## 2021-09-19 RX ADMIN — DIAZEPAM 2 MG: 5 INJECTION, SOLUTION INTRAMUSCULAR; INTRAVENOUS at 00:50

## 2021-09-19 RX ADMIN — DIAZEPAM 2 MG: 5 INJECTION, SOLUTION INTRAMUSCULAR; INTRAVENOUS at 22:05

## 2021-09-19 ASSESSMENT — ACTIVITIES OF DAILY LIVING (ADL)
ADLS_ACUITY_SCORE: 9
ADLS_ACUITY_SCORE: 7
ADLS_ACUITY_SCORE: 9
ADLS_ACUITY_SCORE: 7
ADLS_ACUITY_SCORE: 9
ADLS_ACUITY_SCORE: 9

## 2021-09-19 NOTE — PLAN OF CARE
End of Shift Summary  For vital signs and complete assessments, please see documentation flowsheets.     Pertinent assessments: A&Ox4, VSS except bradycardia, LS clear, denies SOB, sats 100% RA, minimal abdominal pain,  PCA discontinued. Abdominal incision derma bonded and CDI.  MIRZA in place with 100 ml drainage during shift. dressing CDI.  BS hypoactive, ostomy with 200 ml liquid stool. Jacob discontinued morning hrs, voiding adequately without difficulty. Tolerating low fiber diet, denies nausea, eating home made food, is drinking adequate fluids, patient and Mom requested to keep IVF until tomorrow, fearing dehydration.  Ambulating in greco multiple times with SBA.    Major Shift Events: PCA &Jacob discontinued.    Treatment Plan: IV fluids, PCA, symptom management, solucortef, diet.    Bedside Nurse: Jorgito COLLADO RN

## 2021-09-19 NOTE — PLAN OF CARE
End of Shift Summary  For vital signs and complete assessments, please see documentation flowsheets.     Pertinent assessments: A&O, VSS except bradycardia PCA in use and effective for abdominal pain. Valium given for muscle spasms.  Denies nausea and SOB.  Tolerating clear liquids.  Abdominal incision derma bonded and CDI.  MIRZA in place, dressing CDI.  Jacob in place with good output.  BS hypoactive, ostomy with small amount of brown output.     Major Shift Events: none     Treatment Plan: IV fluids, PCA, symptom management, solucortef,     Bedside Nurse: Agueda Pineda RN

## 2021-09-19 NOTE — PROGRESS NOTES
[facility]  Hospitalist Progress Note  Marcelo Schwartz MD 09/19/2021    Reason for Stay (Diagnosis): Ulcerative colitis flare         Assessment and Plan:      Summary of Stay: Luther Wood is a 25 year old male with history of ulcerative colitis admitted on 9/11/2021 with frequent loose stools, hematochezia, fatigue and syncope. Received 1uPRBC and has been treated for UC flare.      He had been admitted 9/2 through 9/9 for acute ulcerative colitis flare received methylprednisolone 20 mg IV every 8 hours with flex sig performed on 9/6 showing severe ulcerative colitis and biopsies were taken.  He was started on Inflectra by GI with transition of steroids to prednisone 40 mg daily and discharged on 9/9.  He returned with loose stools and hematochezia with syncope on 9/11. repeat C. difficile and enteric panel is negative.  His 2nd dose of Infliximab was given 9/12 and he was resumed on IV steroids. flex sig done 9/16 showed worsening ulcerations and reportedly [facility] was contacted for another opinion and Colorectal surgery has been following and he underwent total colectomy with end ileostomy (9/17/2021).  He remains admitted for postop cares.    He is having return of bowel function and is tolerating clear liquids.     Profuse diarrhea with hematochezia  Known history of ulcerative colitis with flare  total colectomy with end ileostomy (9/17/2021)  --Diet, wound cares, pain control and steroid management as per colorectal surgery     Acute on chronic anemia due to blood loss    - symptomatic hemoglobin of 6.9 on 9/12 in the setting of bloody stool related to ulcerative colitis flare    - received 1 unit of blood on 9/12 with improvement to 8.4>>7.7     - will re-check Hb in am, transfuse if <7     Fever and leukocytosis    - Tmax 101.5 on 9/11 with WBC elevation (14.5) thought to be steroid induced    - no focal infectious symptoms other than diarrhea with blood    - likely fever related  "to colitis    - no antibiotics indicated    - blood cultures x 2 on 9/12 negative to date    - now afebrile     Syncope    - patient had episode of syncope while brushing his teeth    - father caught him before he hit the ground and woke up after 5 seconds    - suspect this is related to hypovolemia and anemia, likely not to be cardiac etiology    - was monitored on telemetry overnight with no arrhythmia and echocardiogram shows no structural disease of his heart    - discontinued telemetry     Hyponatremia    - monitor     Nonsevere malnutrition    - was seen by nutrition on his last admission with recommendation for pushing protein with meals and to have small frequent meals with oral nutritional supplements.    CODE STATUS: Full  DVT prophylaxis: Lovenox  Disposition: Pending return of bowel function, ostomy teaching etc.  Suspect 1-3 more days.           Interval History (Subjective):      Feels much better overall  Tolerating clears  Has output into his ostomy  Walking  Rectal drain still in  Jacob can come out if OK w/ surgery.                  Physical Exam:      Last Vital Signs:  /71 (BP Location: Right arm)   Pulse 58   Temp 98.1  F (36.7  C) (Oral)   Resp 18   Ht 1.854 m (6' 1\")   Wt 61.2 kg (135 lb)   SpO2 99%   BMI 17.81 kg/m        Intake/Output Summary (Last 24 hours) at 9/18/2021 1158  Last data filed at 9/18/2021 1148  Gross per 24 hour   Intake 2600 ml   Output 2645 ml   Net -45 ml       General: Alert, awake, no acute distress.  HEENT: NC/AT, eyes anicteric, external occular movements intact, face symmetric  Cardiac: RRR, S1, S2.  No murmurs appreciated.  Pulmonary: Normal chest rise, normal work of breathing.  Lungs CTA BL  Abdomen: Fresh ostomy in place.  No fluid or gas in the pouch.  Well approximated incision.  No guarding.  Soft, non-distended.    Extremities: no deformities.  Warm, well perfused.  Skin: no rashes or lesions noted.  Warm and Dry.  Neuro: No focal deficits noted. "  Speech clear.  Coordination and strength grossly normal.  Psych: Appropriate affect.         Medications:      All current medications were reviewed with changes reflected in problem list.         Data:      All new lab and imaging data was reviewed.   Labs:  Recent Labs   Lab 21  0724      POTASSIUM 3.9   CHLORIDE 99   CO2 29   ANIONGAP 5   GLC 99   BUN 11   CR 0.65*   GFRESTIMATED >90   MADIE 7.5*     Recent Labs   Lab 21  0724   WBC 12.4*   HGB 7.3*   HCT 24.1*   MCV 89   *      Imaging:   Results for orders placed or performed during the hospital encounter of 21   Echocardiogram Complete     Value    LVEF  65-70%    Narrative    [id number]  [id number]  [id number]  [id number]^KATIANA^CHELLE^CLYDE     [facility]  Echocardiography Laboratory  [address] [place] [address]     Name: RENNY AN  MRN: [id number]  : 1995  Study Date: 2021 07:40 AM  Age: 25 yrs  Gender: Male  Patient Location: [facility]  Reason For Study: Syncope  Ordering Physician: CHELLE SAAB  Performed By: Nagi Narayan RDCS     BSA: 1.8 m2  Height: 73 in  Weight: 135 lb  HR: 134  BP: 116/66 mmHg  ______________________________________________________________________________  Procedure  Complete Echo Adult.  ______________________________________________________________________________  Interpretation Summary     Normal transthoracic echocardiogram     No prior study available for comparison.  ______________________________________________________________________________  Left Ventricle  The left ventricle is normal in size. There is normal left ventricular wall  thickness. Hyperdynamic left ventricular function. The visual ejection  fraction is 65-70%. Left ventricular diastolic function is normal. Normal left  ventricular wall motion.     Right Ventricle  The right ventricle is normal size. The right ventricular systolic function is  normal.     Atria  Normal left atrial size.  Right atrial size is normal. There is no color  Doppler evidence of an atrial shunt.     Mitral Valve  The mitral valve leaflets appear normal. There is no evidence of stenosis,  fluttering, or prolapse. There is physiologic mitral regurgitation. There is  no mitral valve stenosis.     Tricuspid Valve  Normal tricuspid valve. There is trace tricuspid regurgitation. The right  ventricular systolic pressure is approximated at 24.1 mmHg plus the right  atrial pressure. IVC diameter <2.1 cm collapsing >50% with sniff suggests a  normal RA pressure of 3 mmHg.     Aortic Valve  The aortic valve is not well visualized. No aortic regurgitation is present.  No aortic stenosis is present.     Pulmonic Valve  The pulmonic valve is not well visualized.     Vessels  The aortic root is normal size.     Pericardium  There is no pericardial effusion.     ______________________________________________________________________________  MMode/2D Measurements & Calculations  IVSd: 0.72 cm  LVIDd: 4.6 cm  LVIDs: 2.9 cm  LVPWd: 0.90 cm  FS: 36.2 %     LV mass(C)d: 121.1 grams  LV mass(C)dI: 66.5 grams/m2  Ao root diam: 2.8 cm  LA dimension: 3.0 cm  LA/Ao: 1.1  LA Volume (BP): 20.3 ml  LA Volume Index (BP): 11.2 ml/m2  RWT: 0.39     Doppler Measurements & Calculations  MV E max jayy: 102.8 cm/sec  MV A max jayy: 62.6 cm/sec  MV E/A: 1.6  MV dec time: 0.20 sec     TR max jayy: 245.7 cm/sec  TR max P.1 mmHg  E/E' av.6  Lateral E/e': 5.0  Medial E/e': 6.3     ______________________________________________________________________________  Report approved by: MD Chadwick Santos 2021 09:40 AM                 Marcelo Schwartz MD , MD.

## 2021-09-19 NOTE — PLAN OF CARE
End of Shift Summary  For vital signs and complete assessments, please see documentation flowsheets.     Pertinent assessments: A&O x4, VSS. PCA in use and effective for abdominal pain. Stood at bed x2 and took a couple steps in room. BS hypoactive. Good output in ostomy. Jacob patent. MIRZA with 210mL output. Clear liquid diet, fair appetite. Abdominal incisions WDL, open to air.    Major Shift Events: none     Treatment Plan: [place]-rectal following, symptom management. Pain management.

## 2021-09-19 NOTE — PROGRESS NOTES
Colon and Rectal Surgery Progress Note          Assessment and Plan:     POD #2 lap subtotal colectomy with end ileostomy    Remove byrnes  Low fiber diet  Cont steroid taper  Stop PCA, start oral and backup IV meds  Ambulate        Bartolo Phillips MD FACS FASCRS  Colorectal Surgeon       [facility]  [address], [address]  [place] [address]  T: [phone]  F: [phone]  Pager: [phone]  [email or web address]                 Interval History:     Doing well.  Tolerating clears, stoma productive              Physical Exam:   Vitals were reviewed  Patient Vitals for the past 24 hrs:   BP Temp Temp src Pulse Resp SpO2   09/19/21 0841 115/64 98  F (36.7  C) Oral 58 16 100 %   09/18/21 2329 115/71 98.1  F (36.7  C) Oral 58 18 99 %   09/18/21 1531 116/71 97.6  F (36.4  C) Axillary 61 14 98 %   09/18/21 1146 -- -- -- 66 -- 100 %   09/18/21 1135 124/72 98.3  F (36.8  C) Oral 64 14 100 %         Intake/Output Summary (Last 24 hours) at 9/19/2021 0915  Last data filed at 9/19/2021 0622  Gross per 24 hour   Intake 3075 ml   Output 7245 ml   Net -4170 ml       Head - Nomocephalic atraumatic  Sclera anicteric  Extremities warm and well perfused  Lungs - breathing non labored,   Abdomen - soft, non distended, minimally tender, wound looks good  Rectal exam - deferred  Skin - no rash  Psych - affect appropriate  Neuro - no focal deficits             Data:   All laboratory and imaging data in the past 24 hours reviewed    CBC  Lab Results   Component Value Date    WBC 12.9 (H) 09/19/2021    WBC 12.4 (H) 09/18/2021    WBC 10.0 09/17/2021    HGB 7.3 (L) 09/19/2021    HGB 7.3 (L) 09/18/2021    HGB 8.1 (L) 09/17/2021    HCT 24.1 (L) 09/19/2021    HCT 24.1 (L) 09/18/2021    HCT 26.7 (L) 09/17/2021     (H) 09/19/2021     (H) 09/18/2021     (H) 09/17/2021       BMP  Recent Labs   Lab Test 09/19/21  0745 09/18/21  0724    133   POTASSIUM 3.7 3.9   CHLORIDE 99 99   CO2 32 29    ANIONGAP 3 5   * 99   BUN 9 11   CR 0.59* 0.65*   MADIE 7.9* 7.5*       Liver Function Studies -   Recent Labs   Lab Test 09/17/21  0603   PROTTOTAL 5.6*   ALBUMIN 1.5*   BILITOTAL 0.4   ALKPHOS 142   AST 25   ALT 93*                      Medications:     Current Facility-Administered Medications Ordered in Epic   Medication Dose Route Frequency Last Rate Last Admin     acetaminophen (TYLENOL) tablet 650 mg  650 mg Oral Q6H PRN   650 mg at 09/12/21 0424    Or     acetaminophen (TYLENOL) Suppository 650 mg  650 mg Rectal Q6H PRN         [START ON 9/20/2021] acetaminophen (TYLENOL) tablet 650 mg  650 mg Oral Q4H PRN         acetaminophen (TYLENOL) tablet 975 mg  975 mg Oral Q8H   975 mg at 09/19/21 0629     benzocaine-menthol (CHLORASEPTIC) 6-10 MG lozenge 1 lozenge  1 lozenge Buccal Q1H PRN         diazepam (VALIUM) injection 2 mg  2 mg Intravenous Q6H PRN   2 mg at 09/19/21 0050     diphenhydrAMINE (BENADRYL) injection 25 mg  25 mg Intravenous Q6H PRN         enoxaparin ANTICOAGULANT (LOVENOX) injection 40 mg  40 mg Subcutaneous Q24H   40 mg at 09/18/21 1456     hydrocortisone sodium succinate PF (solu-CORTEF) injection 50 mg  50 mg Intravenous Q8H        Followed by     [START ON 9/20/2021] hydrocortisone sodium succinate PF (solu-CORTEF) injection 50 mg  50 mg Intravenous Q12H        Followed by     [START ON 9/21/2021] hydrocortisone sodium succinate PF (solu-CORTEF) injection 50 mg  50 mg Intravenous Q12H        Followed by     [START ON 9/22/2021] hydrocortisone sodium succinate PF (solu-CORTEF) injection 50 mg  50 mg Intravenous Q12H         HYDROmorphone (PF) (DILAUDID) injection 0.3-0.5 mg  0.3-0.5 mg Intravenous Q3H PRN         lactated ringers infusion   Intravenous Continuous 75 mL/hr at 09/19/21 0053 New Bag at 09/19/21 0053     lidocaine (LMX4) cream   Topical Q1H PRN         lidocaine (LMX4) cream   Topical Q1H PRN         lidocaine 1 % 0.1-1 mL  0.1-1 mL Other Q1H PRN         lidocaine 1 %  0.1-1 mL  0.1-1 mL Other Q1H PRN         melatonin tablet 1 mg  1 mg Oral At Bedtime PRN         naloxone (NARCAN) injection 0.2 mg  0.2 mg Intravenous Q2 Min PRN        Or     naloxone (NARCAN) injection 0.4 mg  0.4 mg Intravenous Q2 Min PRN        Or     naloxone (NARCAN) injection 0.2 mg  0.2 mg Intramuscular Q2 Min PRN        Or     naloxone (NARCAN) injection 0.4 mg  0.4 mg Intramuscular Q2 Min PRN         nitroGLYcerin (NITROSTAT) sublingual tablet 0.4 mg  0.4 mg Sublingual Q5 Min PRN         ondansetron (ZOFRAN-ODT) ODT tab 4 mg  4 mg Oral Q6H PRN        Or     ondansetron (ZOFRAN) injection 4 mg  4 mg Intravenous Q6H PRN         ondansetron (ZOFRAN-ODT) ODT tab 4 mg  4 mg Oral Q6H PRN        Or     ondansetron (ZOFRAN) injection 4 mg  4 mg Intravenous Q6H PRN         oxyCODONE (ROXICODONE) tablet 5-10 mg  5-10 mg Oral Q4H PRN         prochlorperazine (COMPAZINE) injection 10 mg  10 mg Intravenous Q6H PRN         prochlorperazine (COMPAZINE) injection 10 mg  10 mg Intravenous Q6H PRN        Or     prochlorperazine (COMPAZINE) tablet 10 mg  10 mg Oral Q6H PRN        Or     prochlorperazine (COMPAZINE) suppository 25 mg  25 mg Rectal Q12H PRN         sodium chloride (PF) 0.9% PF flush 3 mL  3 mL Intracatheter Q8H         sodium chloride (PF) 0.9% PF flush 3 mL  3 mL Intracatheter q1 min prn         sodium chloride (PF) 0.9% PF flush 3 mL  3 mL Intracatheter Q8H   3 mL at 09/18/21 2254     sodium chloride (PF) 0.9% PF flush 3 mL  3 mL Intracatheter q1 min prn         No current Epic-ordered outpatient medications on file.

## 2021-09-19 NOTE — CONSULTS
"NUTRITION ASSESSMENT    REASON FOR NUTRITION CONSULT:  Provider Order  -  \"Patient/Family request\"     ASSESSMENT:  Please refer to full reassessment completed on 9/17     FOLLOW UP:   Will follow up when patient and/or dietitian available, currently off-site.    Phoebe Rg RD, LD  Clinical Dietitian       "

## 2021-09-20 LAB
ERYTHROCYTE [DISTWIDTH] IN BLOOD BY AUTOMATED COUNT: 13.8 % (ref 10–15)
ERYTHROCYTE [DISTWIDTH] IN BLOOD BY AUTOMATED COUNT: 14.1 % (ref 10–15)
HCT VFR BLD AUTO: 27.2 % (ref 40–53)
HCT VFR BLD AUTO: 27.4 % (ref 40–53)
HGB BLD-MCNC: 8.1 G/DL (ref 13.3–17.7)
HGB BLD-MCNC: 8.3 G/DL (ref 13.3–17.7)
MCH RBC QN AUTO: 27.1 PG (ref 26.5–33)
MCH RBC QN AUTO: 27.7 PG (ref 26.5–33)
MCHC RBC AUTO-ENTMCNC: 29.8 G/DL (ref 31.5–36.5)
MCHC RBC AUTO-ENTMCNC: 30.3 G/DL (ref 31.5–36.5)
MCV RBC AUTO: 91 FL (ref 78–100)
MCV RBC AUTO: 91 FL (ref 78–100)
PATH REPORT.COMMENTS IMP SPEC: NORMAL
PATH REPORT.FINAL DX SPEC: NORMAL
PATH REPORT.GROSS SPEC: NORMAL
PATH REPORT.MICROSCOPIC SPEC OTHER STN: NORMAL
PATH REPORT.RELEVANT HX SPEC: NORMAL
PHOTO IMAGE: NORMAL
PLATELET # BLD AUTO: 607 10E3/UL (ref 150–450)
PLATELET # BLD AUTO: 607 10E3/UL (ref 150–450)
PLATELET # BLD AUTO: 645 10E3/UL (ref 150–450)
RBC # BLD AUTO: 2.99 10E6/UL (ref 4.4–5.9)
RBC # BLD AUTO: 3 10E6/UL (ref 4.4–5.9)
WBC # BLD AUTO: 16.7 10E3/UL (ref 4–11)
WBC # BLD AUTO: 19 10E3/UL (ref 4–11)

## 2021-09-20 PROCEDURE — 120N000001 HC R&B MED SURG/OB

## 2021-09-20 PROCEDURE — 88342 IMHCHEM/IMCYTCHM 1ST ANTB: CPT | Mod: 26

## 2021-09-20 PROCEDURE — 250N000011 HC RX IP 250 OP 636: Performed by: COLON & RECTAL SURGERY

## 2021-09-20 PROCEDURE — 88305 TISSUE EXAM BY PATHOLOGIST: CPT | Mod: 26

## 2021-09-20 PROCEDURE — 99233 SBSQ HOSP IP/OBS HIGH 50: CPT | Performed by: INTERNAL MEDICINE

## 2021-09-20 PROCEDURE — 88341 IMHCHEM/IMCYTCHM EA ADD ANTB: CPT | Mod: 26

## 2021-09-20 PROCEDURE — 85049 AUTOMATED PLATELET COUNT: CPT | Performed by: COLON & RECTAL SURGERY

## 2021-09-20 PROCEDURE — G0463 HOSPITAL OUTPT CLINIC VISIT: HCPCS

## 2021-09-20 PROCEDURE — 36415 COLL VENOUS BLD VENIPUNCTURE: CPT | Performed by: INTERNAL MEDICINE

## 2021-09-20 PROCEDURE — 250N000013 HC RX MED GY IP 250 OP 250 PS 637: Performed by: COLON & RECTAL SURGERY

## 2021-09-20 PROCEDURE — 85049 AUTOMATED PLATELET COUNT: CPT | Performed by: INTERNAL MEDICINE

## 2021-09-20 PROCEDURE — 36415 COLL VENOUS BLD VENIPUNCTURE: CPT | Performed by: COLON & RECTAL SURGERY

## 2021-09-20 RX ADMIN — HYDROCORTISONE SODIUM SUCCINATE 50 MG: 100 INJECTION, POWDER, FOR SOLUTION INTRAMUSCULAR; INTRAVENOUS at 02:31

## 2021-09-20 RX ADMIN — DIAZEPAM 2 MG: 5 INJECTION, SOLUTION INTRAMUSCULAR; INTRAVENOUS at 23:19

## 2021-09-20 RX ADMIN — HYDROCORTISONE SODIUM SUCCINATE 50 MG: 100 INJECTION, POWDER, FOR SOLUTION INTRAMUSCULAR; INTRAVENOUS at 09:57

## 2021-09-20 RX ADMIN — ACETAMINOPHEN 975 MG: 325 TABLET, FILM COATED ORAL at 15:34

## 2021-09-20 RX ADMIN — HYDROCORTISONE SODIUM SUCCINATE 50 MG: 100 INJECTION, POWDER, FOR SOLUTION INTRAMUSCULAR; INTRAVENOUS at 22:30

## 2021-09-20 RX ADMIN — ACETAMINOPHEN 975 MG: 325 TABLET, FILM COATED ORAL at 06:04

## 2021-09-20 RX ADMIN — DIAZEPAM 2 MG: 5 INJECTION, SOLUTION INTRAMUSCULAR; INTRAVENOUS at 16:06

## 2021-09-20 RX ADMIN — OXYCODONE HYDROCHLORIDE 5 MG: 5 TABLET ORAL at 06:05

## 2021-09-20 RX ADMIN — ENOXAPARIN SODIUM 40 MG: 40 INJECTION SUBCUTANEOUS at 15:35

## 2021-09-20 ASSESSMENT — ACTIVITIES OF DAILY LIVING (ADL)
ADLS_ACUITY_SCORE: 7
ADLS_ACUITY_SCORE: 5
ADLS_ACUITY_SCORE: 7

## 2021-09-20 NOTE — PROGRESS NOTES
WO Nurse Inpatient [facility]   WO Nurse Inpatient Adult Ostomy Assessment    Follow Up Assessment   Assessment of new end Ileostomy Stoma complication(s) none   Mucocutaneous junction; intact   Peristomal complication(s) none   Pouch wear time:3-4 days,   Following today's visit:Patient / parents are able to demonstrate;       1. How to empty their pouch? no      2. How to change their pouch?  no      3. How to read and record intake and output correctly? no    Objective data:  Patient history according to medical record: Patient s/p laparoscopic total abdominal colectomy with end ileostomy on 9/17/21 with Dr. Flaherty  Current Diet/Nutrition: Orders Placed This Encounter      Low Fiber Diet     TPN no   I/O last 3 completed shifts:  In: 1570 [P.O.:300; I.V.:1270]  Out: 3925 [Urine:2900; Drains:325; Stool:700]  Labs:  Recent Labs   Lab 09/20/21  1147 09/18/21  0724 09/17/21  0603 09/16/21  0547 09/15/21  0537   ALBUMIN  --   --  1.5*  --   --    HGB 8.3*   < > 8.1*   < > 7.6*   INR  --   --  1.33*  --   --    WBC 19.0*   < > 10.0   < > 10.4   CRP  --   --   --   --  159.0*    < > = values in this interval not displayed.        Physical Exam:  Current pouching system:Reeder   Reason for pouch change today: ostomy education, leakage and routine schedule  Stoma appearance: healthy and pink  Stoma size; 1 1/4 inches, protrudes 2.5cm  Peristomal skin: intact  Stoma output :brown and liquid   Abdominal  Assessment  firm , NG still in place? No  Surgical Site: open to air  Pain: Dull ache  Is patient still on a PCA No    Interventions:  Patient's chart evaluated.  Focus of today's visit: initial fitting, pouch change demonstration , verbal instruction , diet and hydration , pouch emptying, output measurement, odor/flatus management and lifestyle adjustments   Participant of teaching session today patient  and parent  Orders: Written  Change made with ostomy management today: No  Patient/family:  observing  Supplies:Ordered pouches, rings, powder, scissors, M9 drops    Plan:  Learning needs: refitting of appliance, pouch change return demonstration, verbal instruction , diet and hydration , pouch emptying, output measurement, odor/flatus management, lifestyle adjustments and discharge instructions  Preparation for discharge: Prescriptions or note left on chart for MD to sign/complete  Recommend home care? determine closer to discharge    Discussed plan of care with Patient, Family and Nurse  Nursing to notify the Provider(s) and re-consult the WOC Nurse if new ostomy concerns or discharge planned before next planned WOC visit.    WOC Nurse will return: Daily (M-F)  Face to face time: 30 minutes    Levy Tam RN CWOCN

## 2021-09-20 NOTE — PROVIDER NOTIFICATION
Page to the PMD Dr Brown regarding concern for new onset light headed. MD returned with new order for full CBC with Platelet, asked for vitals to be taken. They were obtained and in Epic.    Page to Surgery team- unfortunately a message had to be left as unable to reach a representative on 2 attempts of calling. There is a concern regarding small amount of rectal blood. Noting that there was a rectal exam yesterday after penrose removed and an additional rectal exam today. Writer asked that the nurse be allowed to see the amount and color of drainage at next out of bed.     Received a call back from the CRS team regarding the concerns. The amount of discharge is normal and to be expected. Hgb improved to 8.3. Pt looks clinically stable at this time no concerns.

## 2021-09-20 NOTE — CONSULTS
Care Management Initial Consult    General Information  Assessment completed with: Children, Parents,    Type of CM/SW Visit: Initial Assessment    Primary Care Provider verified and updated as needed: Yes   Readmission within the last 30 days: other (see comments)   Return Category: Exacerbation of disease  Reason for Consult: discharge planning      Communication Assessment  Patient's communication style: spoken language (English or Bilingual)    Hearing Difficulty or Deaf: no   Wear Glasses or Blind: no    Cognitive  Cognitive/Neuro/Behavioral: WDL  Level of Consciousness: alert  Arousal Level: opens eyes spontaneously  Orientation: oriented x 4  Mood/Behavior: calm, cooperative  Best Language: 0 - No aphasia  Speech: clear    Living Environment:   People in home: parent(s)     Current living Arrangements:    house    Able to return to prior arrangements: yes       Family/Social Support:  Care provided by: self, parent(s)  Provides care for: no one     Parent(s)          Description of Support System: Supportive         Current Resources:   Patient receiving home care services: No     Community Resources: None  Equipment currently used at home: none  Supplies currently used at home: None    Employment/Financial:    Financial Concerns: No concerns identified           Lifestyle & Psychosocial Needs:  Social Determinants of Health     Tobacco Use: Low Risk      Smoking Tobacco Use: Never Smoker     Smokeless Tobacco Use: Never Used   Alcohol Use:      Frequency of Alcohol Consumption:      Average Number of Drinks:      Frequency of Binge Drinking:    Financial Resource Strain:      Difficulty of Paying Living Expenses:    Food Insecurity:      Worried About Running Out of Food in the Last Year:      Ran Out of Food in the Last Year:    Transportation Needs:      Lack of Transportation (Medical):      Lack of Transportation (Non-Medical):    Physical Activity:      Days of Exercise per Week:      Minutes of  Exercise per Session:    Stress:      Feeling of Stress :    Social Connections:      Frequency of Communication with Friends and Family:      Frequency of Social Gatherings with Friends and Family:      Attends Yarsanism Services:      Active Member of Clubs or Organizations:      Attends Club or Organization Meetings:      Marital Status:    Intimate Partner Violence:      Fear of Current or Ex-Partner:      Emotionally Abused:      Physically Abused:      Sexually Abused:    Depression:      PHQ-2 Score:    Housing Stability:      Unable to Pay for Housing in the Last Year:      Number of Places Lived in the Last Year:      Unstable Housing in the Last Year:        Functional Status:  Prior to admission patient needed assistance:    no          Mental Health Status:  Mental Health Status: No Current Concerns       Chemical Dependency Status:  Chemical Dependency Status: No Current Concerns         Additional Information:  Pt is POD #3 s/p la total abdominal colectomy with end ileostomy.  Pt is agreeable to home care WOC RN.  He lives with his parents.  They are supportive and involved in his care.  He requests that I send a referral to [facility].  Pt doesn't have a primary care provider.  He follows mainly with GI.  He is agreeable to establish care and has resources to do so in the near future.  I will ask CRS to follow for home care orders after discharge.      1544: [facility] can't take pt on.  I called [facility] and they said they can take him on for home care RN WOC support.  I will update intake at [phone] Deshawn. Fax: [phone]  Earlene Pat RN BSN   Inpatient Care Coordination  [facility]  [phone]    Stefania Pat RN

## 2021-09-20 NOTE — PROVIDER NOTIFICATION
Page to hospital provider related to the family concern of the increasing WBC and generally stating that the patient does not look as well as he did yesterday. - MD messaged back that continue to track VS and plan to recheck labs in the AM    Page to the CRS team related to patient stating that he is having some rectal pressure. He describes it as feeling like he is having to hold something in the rectum while trying to urinate. - PA called back she spoke with MD Flaherty, they aware of the WBC and the drainage, the pressure is not unexpected. If any clinical changes to notify the CRS team.

## 2021-09-20 NOTE — PROGRESS NOTES
[facility]    Hospitalist Progress Note  Name: Luther Wood    MRN: [id number]  Provider:  Chadwick Brown DO  Date of Service: 09/20/2021    Summary of Stay: Luther Wood is a 26 year old male with a history of ulcerative colitis admitted on 9/11/2021 with frequent loose stools, hematochezia, fatigue, and syncope.  The patient was recently admitted from 9/2 through 9/9 for treatment of an acute flare of ulcerative colitis.  The patient received methylprednisolone IV at that time and a flexible sigmoidoscopy was performed on 9/6 showing severe ulcerative colitis.  Biopsies were taken.  The patient was then started on Inflectra by gastroenterology and transition to oral steroids.  He returned on 9/11 after loose stools and hematochezia followed by syncopal event.  The patient had a C. difficile PCR and enteric panel that were negative.  He did receive his second dose of infliximab on 9/12 and IV steroids were restarted.  On 9/16, a flexible sigmoidoscopy was performed showing worsening ulcerations.  Male gastroenterology was contacted for a second opinion and colorectal surgery had been following and recommended a total colectomy with end ileostomy which was performed on 9/7/2021.  The patient's diet was advanced and he tolerated it well.  Wound care was consulted for stoma/ostomy care and teaching.    TODAY'S PLAN:  Appreciate CRS recommendations.  Tolerating oral diet.  Continuing to learn stoma cares.  MIRZA drain in place.  Parents at bedside.  All questions answered.  Pt with some dizziness after standing.  Check stat CBC.  Vital signs stable.  Suspect orthostasis.  Discussed with the patient as well as mother at bedside that his colon was removed so he will need to adjust accordingly with his diet.  He should make sure that he is taking in enough fluids.    Problem List:   Profuse diarrhea with hematochezia  Ulcerative Colitis Flare s/p total colectomy with end ileostomy (9/17/2021)  -  Diet, wound cares, pain control and steroid management as per colorectal surgery  - Steroid taper per GI     Acute on chronic anemia due to blood loss s/p tranfusion of 1 unit PRBC  - symptomatic hemoglobin of 6.9 on 9/12 in the setting of bloody stool related to ulcerative colitis flare  - received 1 unit of blood on 9/12 with improvement to 8.4>>7.7   - Daily CBC  - Transfuse for hgb less than 7     Fever and leukocytosis  - Tmax 101.5 on 9/11 with WBC elevation (14.5) thought to be steroid induced  - no focal infectious symptoms other than diarrhea with blood  - likely fever related to colitis  - no antibiotics indicated  - blood cultures x 2 on 9/12 negative to date  - now afebrile     Syncope  - patient had episode of syncope while brushing his teeth  - father caught him before he hit the ground and woke up after 5 seconds  - suspect this is related to hypovolemia and anemia, likely not to be cardiac etiology  - was monitored on telemetry overnight with no arrhythmia and echocardiogram shows no structural disease of his heart  - discontinued telemetry     Hyponatremia  - monitor     Nonsevere malnutrition  - Was seen by nutrition on his last admission with recommendation for pushing protein with meals and to have small frequent meals with oral nutritional supplements.    DVT Prophylaxis: Enoxaparin (Lovenox) SQ  Code Status: Full Code  Diet: Snacks/Supplements Adult: Melissa [employer] Standard Oral Supplement; Between Meals  Low Fiber Diet    Jacob Catheter: Not present  Disposition: Expected discharge in 1-2 days to home. Goals prior to discharge include tolerating oral diet, comfortable with ostomy/stoma cares.   Family updated today: Yes      Interval History   Pt seen and examined.  Pt admits to some dizziness when getting out of bed.  Denies sob, cp.    -Data reviewed today: I personally reviewed all new labs and imaging results over the last 24 hours.     Physical Exam   Temp: 97.7  F (36.5  C) Temp src: Oral  BP: 103/55 Pulse: 77   Resp: 16 SpO2: 100 % O2 Device: None (Room air)    Vitals:    09/11/21 2244 09/16/21 0751   Weight: 61.2 kg (135 lb) 61.2 kg (135 lb)     Vital Signs with Ranges  Temp:  [97.6  F (36.4  C)-97.7  F (36.5  C)] 97.7  F (36.5  C)  Pulse:  [] 77  Resp:  [16] 16  BP: (103-111)/(55-64) 103/55  Cuff Mean (mmHg):  [69] 69  SpO2:  [99 %-100 %] 100 %  I/O last 3 completed shifts:  In: 1570 [P.O.:300; I.V.:1270]  Out: 3925 [Urine:2900; Drains:325; Stool:700]    GENERAL: No apparent distress. Awake, alert, and fully oriented.  HEENT: Normocephalic, atraumatic. Extraocular movements intact.  CARDIOVASCULAR: Regular rate and rhythm without murmurs or rubs. No S3.  PULMONARY: Clear bilaterally.  GASTROINTESTINAL: Soft, non-tender, non-distended. Bowel sounds normoactive.   EXTREMITIES: No cyanosis or clubbing. No edema.  NEUROLOGICAL: CN 2-12 grossly intact, no focal neurological deficits.  DERMATOLOGICAL: No rash, ulcer, bruising, nor jaundice.    Medications       acetaminophen  975 mg Oral Q8H     enoxaparin ANTICOAGULANT  40 mg Subcutaneous Q24H     hydrocortisone sodium succinate PF  50 mg Intravenous Q12H    Followed by     [START ON 9/21/2021] hydrocortisone sodium succinate PF  50 mg Intravenous Q12H    Followed by     [START ON 9/22/2021] hydrocortisone sodium succinate PF  50 mg Intravenous Q12H     sodium chloride (PF)  3 mL Intracatheter Q8H     sodium chloride (PF)  3 mL Intracatheter Q8H     Data     Laboratory:  Recent Labs   Lab 09/20/21  1147 09/20/21  0755 09/19/21  0745 09/18/21  0724 09/18/21  0724   WBC 19.0*  --  12.9*  --  12.4*   HGB 8.3*  --  7.3*  --  7.3*   HCT 27.4*  --  24.1*  --  24.1*   MCV 91  --  89  --  89   * 607* 564*   < > 572*    < > = values in this interval not displayed.     Recent Labs   Lab 09/19/21  0745 09/18/21  0724 09/17/21  0603    133 132*  131*   POTASSIUM 3.7 3.9 3.9  3.8   CHLORIDE 99 99 98  98   CO2 32 29 27  28   ANIONGAP 3 5 7  5    * 99 132*  136*   BUN 9 11 13  13   CR 0.59* 0.65* 0.71  0.67   GFRESTIMATED >90 >90 >90  >90   MADIE 7.9* 7.5* 7.5*  7.4*     No results for input(s): CULT in the last 168 hours.    Imaging:  No results found for this or any previous visit (from the past 24 hour(s)).      Chadwick Brown DO  [facility] Hospitalist  [address] [place] [address]  09/20/2021

## 2021-09-20 NOTE — PROGRESS NOTES
COLON & RECTAL SURGERY  PROGRESS NOTE    September 20, 2021  Post-op Day # 3    SUBJECTIVE:  Patient reports he is doing well.  Pain controlled.  Tolerating low fiber diet.  Stoma functioning.  Urinating after byrnes removed.  Up walking throughout the day.    OBJECTIVE:  Temp:  [97.6  F (36.4  C)-97.7  F (36.5  C)] 97.7  F (36.5  C)  Pulse:  [] 77  Resp:  [16] 16  BP: (103-111)/(55-64) 103/55  Cuff Mean (mmHg):  [69] 69  SpO2:  [99 %-100 %] 100 %    Intake/Output Summary (Last 24 hours) at 9/20/2021 0935  Last data filed at 9/20/2021 0930  Gross per 24 hour   Intake 2030 ml   Output 3925 ml   Net -1895 ml       GENERAL:  Awake, alert, no acute distress, lying in bed.  HEAD: Nomocephalic atraumatic  SCLERA: anicteric  EXTREMITIES: warm and well perfused  ABDOMEN:  Soft, appropriately tender, non-distended, no rebound or guarding, no peritoneal signs. Stoma viable with gas and stool in bag.  MIRZA drain with serosanguinous output.  INCISION:  C/d/i, no sign of infection    LABS:  Lab Results   Component Value Date    WBC 12.9 09/19/2021    WBC 10.4 12/07/2009     Lab Results   Component Value Date    HGB 7.3 09/19/2021    HGB 13.7 12/07/2009     Lab Results   Component Value Date    HCT 24.1 09/19/2021    HCT 40.7 12/07/2009     Lab Results   Component Value Date     09/20/2021     12/07/2009     Last Basic Metabolic Panel:  Lab Results   Component Value Date     09/19/2021     12/07/2009      Lab Results   Component Value Date    POTASSIUM 3.7 09/19/2021    POTASSIUM 3.6 12/07/2009     Lab Results   Component Value Date    CHLORIDE 99 09/19/2021    CHLORIDE 103 12/07/2009     Lab Results   Component Value Date    MADIE 7.9 09/19/2021    MADIE 9.2 12/07/2009     Lab Results   Component Value Date    CO2 32 09/19/2021    CO2 30 12/07/2009     Lab Results   Component Value Date    BUN 9 09/19/2021    BUN 14 12/07/2009     Lab Results   Component Value Date    CR 0.59 09/19/2021    CR 0.64  12/07/2009     Lab Results   Component Value Date     09/19/2021    GLC 97 12/07/2009       ASSESSMENT/PLAN: 26 year old man POD#3 s/p lap total abdominal colectomy with end ileostomy for medically refractory Ulcerative Colitis.  Afebrile.      - Low fiber diet  - Follow up labs this morning  - Pain management as needed  - Continue MIRZA drain  - Transition to PO steroids  - Strict I&Os  - Encourage ambulation  - Stoma care and teaching  - Lovenox for ppx, will need at discharge       Disposition: Expected discharge in 1-2 days when more comfortable with stoma cares.  Stoma: care and teaching.  Appreciate WOCN assistance.    For questions/paging, please contact the CRS office at [phone].    Maria C Bhandari PA-C  [facility]  Phone: [phone]      [facility] Staff  I performed a history and physical examination of the patient and discussed their management with the physician assistant. I reviewed the physician assistants note and agree with the documented findings and plan of care.     Doing well.  AVSS.  Rectal tube removed yesterday.  Good stoma output and tolerating diet.    Exam:   Alert, NAD  abd soft, appropriately tender per incisions  Stoma pink, viable and productive  MIRZA with serosang output  Rectal exam - small amount of blood on examining finger    Plan:   LFD  TKO IVFs  Cont steroid taper  WOCN to start stoma cares/teaching  Lovenox for DVT ppx - will need at discharge      Shahrzad Flaherty MD    [facility]  [address]  [place] [address]  T: [phone]  F: [phone]

## 2021-09-20 NOTE — DISCHARGE INSTRUCTIONS
Your home care [facility]  If you haven't heard from them within the next 24-48 hours,  Please call them at [phone]    [facility]  WO Nurse Discharge Instructions for New Ileostomy      When you get home     - Upon arrival home, call the [facility] Nurses to schedule an outpatient follow up appoint in a few weeks after discharge from hospital.  The appointment is to assess pouching plan, organize supply ordering, etc.   Call the WO office at   [facility]      [phone]    - Supplies- Upon discharge from the hospital you will be sent home with ostomy supplies.      If you have been set up for home care visits the home home care nurse will help you coordinate ordering supplies.    If you have not been set up for home care then make sure to make a follow up appointment with the [facility] nurse to reassess your abdomen and ostomy for changes and determine the correct plan.  At that time ordering supplies will be discussed.       Pouching     1. Change appliance 2x / wk and as needed for any leakage.  Notify the [facility] Nurse if needing to change pouch more than daily or if skin is itchy.   2. Empty pouch when no more than 1/3 to half full (solid, liquid, gas)  3. May shower with pouch on or off, removing pouch/ barrier down to the skin is ok. Just note that you cannot control output so there may occasionally be clean up if you choose to shower / bathe without a pouch on.     The pouching procedure:   1.  Use the  Pouch Change Instruction  sheet to gather and organize supplies  2.  Trace old pattern onto new pouch and cut out new opening on new barrier.  3.  Remove old pouch, observe for any leakage  4.  Clean skin with water and paper towel   5.  Apply Ring around stoma  6.   Apply prepared barrier to the clean skin and press into place.  7.  Hold hand on pouch/ over stoma to warm pouch into place for 2-5 minutes      Your Pouching Plan / Supplies                        NOTE:  Once your supply company has been determined call your supply company with supply issues      POUCH / BARRIER BRAND of ostomy supplies:  [place]  POUCH: 8531   ACCESSORIES Rings: [id number]    [place]  M9 deodorizing drops  [place]  M9 deodorizing spray          Activity       Walk short distances & increase as your strength allows    You may climb stairs    Do not do strenuous exercise or activity such as golfing or heavy lifting    Do not drive until approved by your doctor    Carry an extra pouching system with you or in your car, be prepared    Bathing:  You may shower with your pouch on or if a pouch change day may remove pouch and shower without a pouch on.      Diet and Hydration     ? Consume foods throughout the day that will help to thicken output and therefore help maintain a good seal.  Consider these foods:  o Grains such as pasta, rice, soda crackers ,pretzels, cheese and yogurt, applesauce, bananas, potatoes, peanut butter and tapioca  ? Some foods increase/ thin your output  o Sugars  o Prunes, raisins   ? Eat 3-4 servings of protein per day  ? No timed release medications  ? Want to avoid a blockage and dehydration  1.  Avoiding a blockage    Eat 6 small meals /day. Small meals, small bites.     Avoid raw vegetables, seeds, nuts peels, grapefruit, oranges, pineapple. Watch out for peels, and tough meats like jerkies and casings.     If the food cannot be cut easily with a fork avoid for now.  o IF you have a blockage try to gently massage blockage, do NOT take a laxative and call MD.     2. Avoiding dehydration    Drink plenty of fluids, minimally  eight glasses of water per day  plus if you empty your pouch, have another glass of fluid    Remember caffeine and alcohol makes you more dehydrated, be sure to add in more fluids if consuming caffeine or alcohol    Monitor the color of your urine, if getting too dark, need more fluids    Remember to keep your input/ out put in  balance and notify MD if out of balance     If you feel you are getting dehydrated then consider drinking Gatorade, Pedialyte, type of beverage     Follow up     1. Physician - follow instructions from MD for follow ups with them  Contact your physician if you have the following signs or symptoms:    Pain in your incision that is not relieved by a short rest or ordered pain medications    Chills or temperature of 101 or higher    Redness or swelling in your incision    2. WOC Nurses (ostomy nurses)        When you get home make an outpatient appointment with the WOCN nurses to assure your pouching plan is still a good plan, etc    [facility] [phone]  [facility] appointments clinic is located in the [address] building just east of the [facility] in [address].

## 2021-09-20 NOTE — PROGRESS NOTES
"CLINICAL NUTRITION SERVICES - BRIEF NOTE     - Refer to previous RD notes.  - Received patient/family request consult yesterday, RD was off-site.  - In the setting of UC flare not responding to IV steroids and Remicade, required total colectomy with end ileostomy 9/17.    - Diet advanced to clears 9/17, low fiber since yesterday AM.  Still has a prn [employer] oral supplement order.  - Discussed nutrition-related POC with patient and parents in room.  They are extremely upset with overall nutrition plan in regard to menu and limited variety for those with food allergies and intolerances.  Patient is gluten free, dairy free, and is now on a low fiber combination.  They do not feel there are many options for him to choose from and also describe patient has had a blood allergy test which indicates he is sensitive to other foods including eggs and nuts, making self-selection within an already restricted menu difficult, especially as he is progressing throughout the stages of liquids.  I agree, there are not many realistic options and parents are frustrated they have been \"running around\" bringing in food for patient, as opposed to it being supplied by the hospital.  They are also upset with patient previously being on isolation/not able to have food reheated within the microwave as well as not having a mini fridge in room for the food being brought into the hospital by his parents.    - Actively listened, provided suggestions for current self-selection within our menu.  Helped to order a meal and also encouraged ongoing small/frequent meals, ongoing use of [employer], and we verbally discussed low fiber for home (parents had low fiber diet handout that was previously provided).  Ultimately the feedback on our menu was passed along to , CNM, and .  I discussed with patient and his parents that I am a nutrition advocate for them and if they would like to have the kitchen/production side " of our department contact them, this is available.  They declined and I provided my contact information and to please reach out while admitted if ongoing concerns.  This was discussed with the RN in the room as well.  - Will continue following.          Raven Madrigal RDN, LD  Clinical Dietitian  3rd floor/ICU: [phone]  All other floors: [phone]  Weekend/holiday: [phone]

## 2021-09-20 NOTE — PLAN OF CARE
End of Shift Summary  For vital signs and complete assessments, please see documentation flowsheets.     Cared for patient from [id number].  Pertinent assessments: VSS. Mild abdominal pain reported, scheduled tylenol effective. Needs encouragement to get OOB. Ileostomy with good output, voiding without difficulty. Ambulated halls once tonight with SBA. Valium x1 for abdominal cramping. MIRZA patent, 85 mL output. Low fiber diet, glucose and lactose free (family brings in food) tolerating well with no nausea reported. Abdominal incisions WDL. Patient does not want IVF stopped yet, stating he is not drinking fluids overnight- kept running @50/hr.     Major Shift Events: none    Treatment Plan: [place]-rectal following, WOC to start teaching tomorrow. Symptom management.

## 2021-09-21 ENCOUNTER — TRANSFERRED RECORDS (OUTPATIENT)
Dept: HEALTH INFORMATION MANAGEMENT | Facility: CLINIC | Age: 26
End: 2021-09-21
Payer: COMMERCIAL

## 2021-09-21 LAB
ERYTHROCYTE [DISTWIDTH] IN BLOOD BY AUTOMATED COUNT: 14.4 % (ref 10–15)
HCT VFR BLD AUTO: 28.7 % (ref 40–53)
HGB BLD-MCNC: 8.5 G/DL (ref 13.3–17.7)
MCH RBC QN AUTO: 26.9 PG (ref 26.5–33)
MCHC RBC AUTO-ENTMCNC: 29.6 G/DL (ref 31.5–36.5)
MCV RBC AUTO: 91 FL (ref 78–100)
PATH REPORT.COMMENTS IMP SPEC: NORMAL
PATH REPORT.FINAL DX SPEC: NORMAL
PATH REPORT.GROSS SPEC: NORMAL
PATH REPORT.MICROSCOPIC SPEC OTHER STN: NORMAL
PATH REPORT.RELEVANT HX SPEC: NORMAL
PHOTO IMAGE: NORMAL
PLATELET # BLD AUTO: 678 10E3/UL (ref 150–450)
RBC # BLD AUTO: 3.16 10E6/UL (ref 4.4–5.9)
SARS-COV-2 RNA RESP QL NAA+PROBE: NEGATIVE
WBC # BLD AUTO: 21.9 10E3/UL (ref 4–11)

## 2021-09-21 PROCEDURE — 250N000013 HC RX MED GY IP 250 OP 250 PS 637: Performed by: COLON & RECTAL SURGERY

## 2021-09-21 PROCEDURE — 87635 SARS-COV-2 COVID-19 AMP PRB: CPT | Performed by: INTERNAL MEDICINE

## 2021-09-21 PROCEDURE — 250N000011 HC RX IP 250 OP 636: Performed by: COLON & RECTAL SURGERY

## 2021-09-21 PROCEDURE — 99207 PR CDG-MDM COMPONENT: MEETS LOW - DOWN CODED: CPT | Performed by: INTERNAL MEDICINE

## 2021-09-21 PROCEDURE — 99232 SBSQ HOSP IP/OBS MODERATE 35: CPT | Performed by: INTERNAL MEDICINE

## 2021-09-21 PROCEDURE — G0463 HOSPITAL OUTPT CLINIC VISIT: HCPCS

## 2021-09-21 PROCEDURE — 36415 COLL VENOUS BLD VENIPUNCTURE: CPT | Performed by: PHYSICIAN ASSISTANT

## 2021-09-21 PROCEDURE — 120N000001 HC R&B MED SURG/OB

## 2021-09-21 PROCEDURE — 250N000012 HC RX MED GY IP 250 OP 636 PS 637: Performed by: PHYSICIAN ASSISTANT

## 2021-09-21 PROCEDURE — 85027 COMPLETE CBC AUTOMATED: CPT | Performed by: PHYSICIAN ASSISTANT

## 2021-09-21 RX ADMIN — ENOXAPARIN SODIUM 40 MG: 40 INJECTION SUBCUTANEOUS at 16:18

## 2021-09-21 RX ADMIN — ACETAMINOPHEN 650 MG: 325 TABLET, FILM COATED ORAL at 16:22

## 2021-09-21 RX ADMIN — PREDNISONE 25 MG: 5 TABLET ORAL at 10:03

## 2021-09-21 RX ADMIN — OXYCODONE HYDROCHLORIDE 5 MG: 5 TABLET ORAL at 01:25

## 2021-09-21 ASSESSMENT — ACTIVITIES OF DAILY LIVING (ADL)
ADLS_ACUITY_SCORE: 3
ADLS_ACUITY_SCORE: 7
ADLS_ACUITY_SCORE: 5
ADLS_ACUITY_SCORE: 3
ADLS_ACUITY_SCORE: 5
ADLS_ACUITY_SCORE: 3

## 2021-09-21 NOTE — PLAN OF CARE
End of Shift Summary  For vital signs and complete assessments, please see documentation flowsheets.     Pertinent assessments: POD #3. VSS,    Major Shift Events: WOC worked with ostomy cares. 1 episode of lightheadedness resolved with rest. Small amount of rectal drainage- changed undershorts. Moderate amount of MIRZA output. Showered this evening. Lovenox continues. IV steroids complete, plan to switch oral steroids going forward. Tolerated the low fiber diet with some home foods options as our gluten free dairy free and low fiber options are very limited. Walked in the halls 2 times and showered.     Treatment Plan: Continue to tolerate diet,  ostomy education, Encourage independence     Bedside Nurse: Brianna Mari RN

## 2021-09-21 NOTE — PLAN OF CARE
End of Shift Summary  For vital signs and complete assessments, please see documentation flowsheets.     Pertinent assessments: Reports pain & feeling anxious, oxy & valium given. Denies N/V. BS audible t/o. Ileostomy o/p thick, greenish. MIRZA o/p serosan. Continues to have scant rectal discharge.   Major Shift Events: None.   Treatment Plan: Continue to tolerate diet,  ostomy education, Encourage independence     Bedside Nurse: Asha Villa RN

## 2021-09-21 NOTE — PROGRESS NOTES
Care Management Follow Up    Length of Stay (days): 9    Expected Discharge Date: 09/21/2021     Concerns to be Addressed: home care       Patient plan of care discussed at interdisciplinary rounds: Yes    Anticipated Discharge Disposition:  Home w/parents and [facility] WOC RN      Anticipated Discharge Services:  WOC RN   Anticipated Discharge DME:  Ostomy supplies     Patient/family educated on Medicare website which has current facility and service quality ratings:   yes  Education Provided on the Discharge Plan:  Pt and mom  Patient/Family in Agreement with the Plan:  yes    Referrals Placed by CM/SW:  Home care     Additional Information:  [facility] believes they can take him on.  Awaiting confirmation.      ADDENDUM 1540: [facility] has accepted him.      Earlene Pat RN BSN   Inpatient Care Coordination  [facility]  [phone]    Stefania Pat, RN

## 2021-09-21 NOTE — PROGRESS NOTES
WO Nurse Inpatient [facility]   WO Nurse Inpatient Adult Ostomy Assessment    Follow Up Assessment   Assessment of new end Ileostomy Stoma complication(s) none   Mucocutaneous junction; intact   Peristomal complication(s) none   Pouch wear time:3-4 days,   Following today's visit:Patient / parents are able to demonstrate;       1. How to empty their pouch? yes      2. How to change their pouch?  Not yet working on it      3. How to read and record intake and output correctly? no    Objective data:  Patient history according to medical record: Patient s/p laparoscopic total abdominal colectomy with end ileostomy on 9/17/21 with Dr. Flaherty  Current Diet/Nutrition: Orders Placed This Encounter      Low Fiber Diet     TPN no   I/O last 3 completed shifts:  In: 660 [P.O.:660]  Out: 3465 [Urine:2400; Drains:415; Stool:650]  Labs:    Recent Labs   Lab 09/20/21  1147 09/18/21  0724 09/17/21  0603 09/16/21  0547 09/15/21  0537   ALBUMIN  --   --  1.5*  --   --    HGB 8.3*   < > 8.1*   < > 7.6*   INR  --   --  1.33*  --   --    WBC 19.0*   < > 10.0   < > 10.4   CRP  --   --   --   --  159.0*    < > = values in this interval not displayed.        Physical Exam:  Current pouching system:[place] 1pc flat lock n roll pouch   Reason for pouch change today: ostomy education  Stoma appearance: healthy and pink  Stoma size; 1 1/4 inches, protrudes 2.5cm  Peristomal skin: intact  Stoma output : brown and fluffy no odor   Abdominal  Assessment  firm , NG still in place? No  Surgical Site: open to air  Pain: Dull ache  Is patient still on a PCA No    Interventions:  Patient's chart evaluated.  Focus of today's visit: second of 3 teachings, signed him up for samples with their permission, covered that insurance covers pouches and what is prescribed, stealth belt, ostomysecrets, hard cap for sports. Showering, managing gas, 2pc vs 1pc, night time gas production, documenting output, intake and urine.  traveling  Participant of teaching session today patient  and parent  Orders: Written  Change made with ostomy management today: No  Patient/family: observing  Supplies:Ordered pouches, rings, powder, scissors, M9 drops ,     Plan:  Learning needs: changing the pouch, documenting output, urine, intake, ordering supplies through insurance monthly   Preparation for discharge: Prescriptions or note left on chart for MD to sign/complete  Recommend home care?  determine closer to discharge    Discussed plan of care with Patient, Family and Nurse  Nursing to notify the Provider(s) and re-consult the WOC Nurse if new ostomy concerns or discharge planned before next planned WOC visit.    WOC Nurse will return: Daily (M-F)  Face to face time: 60 minutes    Shannon Perla RN BS CWOCN

## 2021-09-21 NOTE — PROGRESS NOTES
COLON & RECTAL SURGERY  PROGRESS NOTE    September 21, 2021  Post-op Day # 4    SUBJECTIVE:  Feels well.  Pain controlled.  Has been up walking.  Took a shower yesterday.  Tolerating low fiber diet.  Feels pressure in rectum when urinating.  Passing minimal bloody mucus from rectum. MIRZA with 415mL in past 24 hours.  Stoma with 600mL.      OBJECTIVE:  Temp:  [97.3  F (36.3  C)-98  F (36.7  C)] 97.3  F (36.3  C)  Pulse:  [73-80] 73  Resp:  [20] 20  BP: (105-116)/(55-65) 115/65  SpO2:  [99 %-100 %] 100 %    Intake/Output Summary (Last 24 hours) at 9/21/2021 0853  Last data filed at 9/21/2021 0624  Gross per 24 hour   Intake 660 ml   Output 3465 ml   Net -2805 ml       GENERAL:  Awake, alert, no acute distress, resting in bed, appears comfortable   RESPIRATORY: unlabored breathing on room air   EXTREMITIES: warm and well perfused  ABDOMEN:  Soft, appropriately tender, non-distended, no rebound or guarding, no peritoneal signs; incision C/D/I, RLQ stoma pink and productive of stool, MIRZA drain with serosanguinous output       LABS:  Lab Results   Component Value Date    WBC 19.0 09/20/2021    WBC 10.4 12/07/2009     Lab Results   Component Value Date    HGB 8.3 09/20/2021    HGB 13.7 12/07/2009     Lab Results   Component Value Date    HCT 27.4 09/20/2021    HCT 40.7 12/07/2009     Lab Results   Component Value Date     09/20/2021     12/07/2009     Last Basic Metabolic Panel:  Lab Results   Component Value Date     09/19/2021     12/07/2009      Lab Results   Component Value Date    POTASSIUM 3.7 09/19/2021    POTASSIUM 3.6 12/07/2009     Lab Results   Component Value Date    CHLORIDE 99 09/19/2021    CHLORIDE 103 12/07/2009     Lab Results   Component Value Date    MADIE 7.9 09/19/2021    MADIE 9.2 12/07/2009     Lab Results   Component Value Date    CO2 32 09/19/2021    CO2 30 12/07/2009     Lab Results   Component Value Date    BUN 9 09/19/2021    BUN 14 12/07/2009     Lab Results   Component Value  Date    CR 0.59 09/19/2021    CR 0.64 12/07/2009     Lab Results   Component Value Date     09/19/2021    GLC 97 12/07/2009       ASSESSMENT/PLAN: 26 year old man POD#4 s/p lap total abdominal colectomy with end ileostomy for medically refractory Ulcerative Colitis.  Afebrile.      - continue stoma teaching and care  - will check CBC today   - low fiber diet  - ambulate  - pain control  - continue MIRZA drain  - Lovenox for DVT ppx, will need at discharge  - transition to PO steroids  - discussed with Dr. Flaherty        For questions/paging, please contact the CRS office at [phone].    Alida Burk PA-C  [facility]  Phone: [phone]      Colon and Rectal Surgery Staff  I performed a history and physical examination of the patient and discussed their management with the physician assistant. I reviewed the physician assistants note and agree with the documented findings and plan of care.       Agree with above.  Noted rectal pressure while urinating last night and had some drainage after walking this am.  Afebrile and VSS.  Taking in good po.  Good UOP and stoma output.  Leukocytosis to 21K.    Exam:   Alert, NAD  adb soft, NT,ND  Stoma pink, viable, edematous with liquid stool in bag  Rectal exam with bloody purulent fluid in rectal stump  Aspirated ~ 220 mL of bloody, mucopurulent fluid from stump with RRC    Plan:   LFD  Transition to po steroids  Cont IV/PO pain meds  Will irrigate and aspirate rectal stump again tomorrow given amount that was evacuated at bedside today.    Suspect leukocytosis due to steroids and purulent fluid in rectal stump.  Cont WOCN cares and teaching    Shahrzad Flaherty MD    [facility]  [address]  [place] [address]  T: [phone]  F: [phone]

## 2021-09-21 NOTE — PROGRESS NOTES
[facility]    Hospitalist Progress Note  Name: Luther Wood    MRN: [id number]  Provider:  Chadwick Brown DO  Date of Service: 09/21/2021    Summary of Stay: Luther Wood is a 26 year old male with a history of ulcerative colitis admitted on 9/11/2021 with frequent loose stools, hematochezia, fatigue, and syncope.  The patient was recently admitted from 9/2 through 9/9 for treatment of an acute flare of ulcerative colitis.  The patient received methylprednisolone IV at that time and a flexible sigmoidoscopy was performed on 9/6 showing severe ulcerative colitis.  Biopsies were taken.  The patient was then started on Inflectra by gastroenterology and transition to oral steroids.  He returned on 9/11 after loose stools and hematochezia followed by syncopal event.  The patient had a C. difficile PCR and enteric panel that were negative.  He did receive his second dose of infliximab on 9/12 and IV steroids were restarted.  On 9/16, a flexible sigmoidoscopy was performed showing worsening ulcerations.  Male gastroenterology was contacted for a second opinion and colorectal surgery had been following and recommended a total colectomy with end ileostomy which was performed on 9/7/2021.  The patient's diet was advanced and he tolerated it well.  Wound care was consulted for stoma/ostomy care and teaching.    TODAY'S PLAN:  WBC increasing to 21.9.  No fevers or chills.  Clinically, pt is feeling better and everyday with some improvement.  Also with thrombocytosis.  ?infection vs steroid induced leukcytosis/thrombocytosis.  Appreciate Colorectal recommendations.  Plan for rectal irrigation tomorrow.  Would get culture if able.  No plans for abx quite yet.  Discussed with father at bedside.  All questions answered.      Problem List:   Profuse diarrhea with hematochezia  Ulcerative Colitis Flare s/p total colectomy with end ileostomy (9/17/2021)  - Diet, wound cares, pain control and steroid management  as per colorectal surgery  - Steroid taper per GI     Acute on chronic anemia due to blood loss s/p tranfusion of 1 unit PRBC  - symptomatic hemoglobin of 6.9 on 9/12 in the setting of bloody stool related to ulcerative colitis flare  - received 1 unit of blood on 9/12 with improvement to 8.4>>7.7   - Daily CBC  - Transfuse for hgb less than 7     Fever and leukocytosis  - Tmax 101.5 on 9/11 with WBC elevation (14.5) thought to be steroid induced  - no focal infectious symptoms other than diarrhea with blood  - likely fever related to colitis  - no antibiotics indicated  - blood cultures x 2 on 9/12 negative to date  - now afebrile     Syncope  - patient had episode of syncope while brushing his teeth  - father caught him before he hit the ground and woke up after 5 seconds  - suspect this is related to hypovolemia and anemia, likely not to be cardiac etiology  - was monitored on telemetry overnight with no arrhythmia and echocardiogram shows no structural disease of his heart  - discontinued telemetry     Hyponatremia  - monitor     Nonsevere malnutrition  - Was seen by nutrition on his last admission with recommendation for pushing protein with meals and to have small frequent meals with oral nutritional supplements.    Leukocytosis  - Likely secondary to steroids  - Pt afebrile and wounds look ok  - ?infection of rectal area  - Continue to monitor for now  - Daily CBC    DVT Prophylaxis: Enoxaparin (Lovenox) SQ  Code Status: Full Code  Diet: Snacks/Supplements Adult: Melissa Salas Standard Oral Supplement; Between Meals  Low Fiber Diet    Jacob Catheter: Not present  Disposition: Expected discharge in 1-2 days to home. Goals prior to discharge include outpatient follow up with CRS determined, wound care and ostomy teaching complete.   Family updated today: Yes      Interval History   Pt seen and examined.  States he is getting his appetite back.  Denies abd pain.     -Data reviewed today: I personally reviewed all  new labs and imaging results over the last 24 hours.     Physical Exam   Temp: 97.3  F (36.3  C) Temp src: Oral BP: 115/65 Pulse: 73   Resp: 20 SpO2: 100 % O2 Device: None (Room air)    Vitals:    09/11/21 2244 09/16/21 0751   Weight: 61.2 kg (135 lb) 61.2 kg (135 lb)     Vital Signs with Ranges  Temp:  [97.3  F (36.3  C)-98  F (36.7  C)] 97.3  F (36.3  C)  Pulse:  [73-80] 73  Resp:  [20] 20  BP: (108-116)/(56-65) 115/65  SpO2:  [99 %-100 %] 100 %  I/O last 3 completed shifts:  In: 660 [P.O.:660]  Out: 3465 [Urine:2400; Drains:415; Stool:650]    GENERAL: No apparent distress. Awake, alert, and fully oriented.  HEENT: Normocephalic, atraumatic. Extraocular movements intact.  CARDIOVASCULAR: Regular rate and rhythm without murmurs or rubs. No S3.  PULMONARY: Clear bilaterally.  GASTROINTESTINAL: Soft, non-tender, non-distended. Bowel sounds normoactive.   EXTREMITIES: No cyanosis or clubbing. No edema.  NEUROLOGICAL: CN 2-12 grossly intact, no focal neurological deficits.  DERMATOLOGICAL: No rash, ulcer, bruising, nor jaundice.    Medications       enoxaparin ANTICOAGULANT  40 mg Subcutaneous Q24H     predniSONE  25 mg Oral Daily     sodium chloride (PF)  3 mL Intracatheter Q8H     sodium chloride (PF)  3 mL Intracatheter Q8H     Data     Laboratory:  Recent Labs   Lab 09/21/21  0935 09/20/21  1147 09/20/21  0755   WBC 21.9* 19.0* 16.7*   HGB 8.5* 8.3* 8.1*   HCT 28.7* 27.4* 27.2*   MCV 91 91 91   * 645* 607*  607*     Recent Labs   Lab 09/19/21  0745 09/18/21  0724 09/17/21  0603    133 132*  131*   POTASSIUM 3.7 3.9 3.9  3.8   CHLORIDE 99 99 98  98   CO2 32 29 27  28   ANIONGAP 3 5 7  5   * 99 132*  136*   BUN 9 11 13  13   CR 0.59* 0.65* 0.71  0.67   GFRESTIMATED >90 >90 >90  >90   MADIE 7.9* 7.5* 7.5*  7.4*     No results for input(s): CULT in the last 168 hours.    Imaging:  No results found for this or any previous visit (from the past 24 hour(s)).      Chadwick Brown DO  [facility]  Hospitalist  [address] [facility] [place] [address]  09/21/2021

## 2021-09-22 LAB
ANION GAP SERPL CALCULATED.3IONS-SCNC: 6 MMOL/L (ref 3–14)
BUN SERPL-MCNC: 11 MG/DL (ref 7–30)
CALCIUM SERPL-MCNC: 7.9 MG/DL (ref 8.5–10.1)
CHLORIDE BLD-SCNC: 100 MMOL/L (ref 94–109)
CO2 SERPL-SCNC: 30 MMOL/L (ref 20–32)
CREAT SERPL-MCNC: 0.72 MG/DL (ref 0.66–1.25)
ERYTHROCYTE [DISTWIDTH] IN BLOOD BY AUTOMATED COUNT: 14.8 % (ref 10–15)
GFR SERPL CREATININE-BSD FRML MDRD: >90 ML/MIN/1.73M2
GLUCOSE BLD-MCNC: 116 MG/DL (ref 70–99)
HCT VFR BLD AUTO: 31.1 % (ref 40–53)
HGB BLD-MCNC: 9.2 G/DL (ref 13.3–17.7)
MCH RBC QN AUTO: 27.4 PG (ref 26.5–33)
MCHC RBC AUTO-ENTMCNC: 29.6 G/DL (ref 31.5–36.5)
MCV RBC AUTO: 93 FL (ref 78–100)
PLATELET # BLD AUTO: 625 10E3/UL (ref 150–450)
POTASSIUM BLD-SCNC: 3.1 MMOL/L (ref 3.4–5.3)
RBC # BLD AUTO: 3.36 10E6/UL (ref 4.4–5.9)
SODIUM SERPL-SCNC: 136 MMOL/L (ref 133–144)
WBC # BLD AUTO: 21.4 10E3/UL (ref 4–11)

## 2021-09-22 PROCEDURE — 99232 SBSQ HOSP IP/OBS MODERATE 35: CPT | Performed by: INTERNAL MEDICINE

## 2021-09-22 PROCEDURE — 87205 SMEAR GRAM STAIN: CPT | Performed by: INTERNAL MEDICINE

## 2021-09-22 PROCEDURE — 87075 CULTR BACTERIA EXCEPT BLOOD: CPT | Performed by: INTERNAL MEDICINE

## 2021-09-22 PROCEDURE — 250N000013 HC RX MED GY IP 250 OP 250 PS 637: Performed by: COLON & RECTAL SURGERY

## 2021-09-22 PROCEDURE — 120N000001 HC R&B MED SURG/OB

## 2021-09-22 PROCEDURE — 85027 COMPLETE CBC AUTOMATED: CPT | Performed by: PHYSICIAN ASSISTANT

## 2021-09-22 PROCEDURE — 250N000011 HC RX IP 250 OP 636: Performed by: COLON & RECTAL SURGERY

## 2021-09-22 PROCEDURE — 250N000013 HC RX MED GY IP 250 OP 250 PS 637: Performed by: INTERNAL MEDICINE

## 2021-09-22 PROCEDURE — 36415 COLL VENOUS BLD VENIPUNCTURE: CPT | Performed by: COLON & RECTAL SURGERY

## 2021-09-22 PROCEDURE — 87070 CULTURE OTHR SPECIMN AEROBIC: CPT | Performed by: INTERNAL MEDICINE

## 2021-09-22 PROCEDURE — 80048 BASIC METABOLIC PNL TOTAL CA: CPT | Performed by: COLON & RECTAL SURGERY

## 2021-09-22 PROCEDURE — 87185 SC STD ENZYME DETCJ PER NZM: CPT | Performed by: INTERNAL MEDICINE

## 2021-09-22 PROCEDURE — 99207 PR CDG-MDM COMPONENT: MEETS LOW - DOWN CODED: CPT | Performed by: INTERNAL MEDICINE

## 2021-09-22 PROCEDURE — 250N000012 HC RX MED GY IP 250 OP 636 PS 637: Performed by: PHYSICIAN ASSISTANT

## 2021-09-22 PROCEDURE — G0463 HOSPITAL OUTPT CLINIC VISIT: HCPCS

## 2021-09-22 RX ORDER — POTASSIUM CHLORIDE 1500 MG/1
40 TABLET, EXTENDED RELEASE ORAL ONCE
Status: COMPLETED | OUTPATIENT
Start: 2021-09-22 | End: 2021-09-22

## 2021-09-22 RX ADMIN — POTASSIUM CHLORIDE 40 MEQ: 1500 TABLET, EXTENDED RELEASE ORAL at 14:03

## 2021-09-22 RX ADMIN — ACETAMINOPHEN 650 MG: 325 TABLET, FILM COATED ORAL at 04:03

## 2021-09-22 RX ADMIN — ACETAMINOPHEN 650 MG: 325 TABLET, FILM COATED ORAL at 15:19

## 2021-09-22 RX ADMIN — ENOXAPARIN SODIUM 40 MG: 40 INJECTION SUBCUTANEOUS at 15:13

## 2021-09-22 RX ADMIN — ACETAMINOPHEN 650 MG: 325 TABLET, FILM COATED ORAL at 20:24

## 2021-09-22 RX ADMIN — PREDNISONE 25 MG: 5 TABLET ORAL at 08:42

## 2021-09-22 RX ADMIN — ACETAMINOPHEN 650 MG: 325 TABLET, FILM COATED ORAL at 08:42

## 2021-09-22 ASSESSMENT — ACTIVITIES OF DAILY LIVING (ADL)
ADLS_ACUITY_SCORE: 7
ADLS_ACUITY_SCORE: 7
ADLS_ACUITY_SCORE: 5
ADLS_ACUITY_SCORE: 7

## 2021-09-22 NOTE — PROGRESS NOTES
COLON & RECTAL SURGERY  PROGRESS NOTE    September 22, 2021  Post-op Day # 5    SUBJECTIVE:  Patient tolerating low fiber diet.  Pain controlled.  Ambulating.  Getting more comfortable with stoma cares.  Having some rectal drainage.    OBJECTIVE:  Temp:  [97.8  F (36.6  C)-98.6  F (37  C)] 98  F (36.7  C)  Pulse:  [67-90] 75  Resp:  [16-18] 16  BP: (111-121)/(59-70) 121/63  SpO2:  [99 %-100 %] 100 %    Intake/Output Summary (Last 24 hours) at 9/22/2021 0838  Last data filed at 9/22/2021 0800  Gross per 24 hour   Intake 770 ml   Output 5280 ml   Net -4510 ml       GENERAL:  Awake, alert, no acute distress, lying in bed.  HEAD: Nomocephalic atraumatic  SCLERA: anicteric  EXTREMITIES: warm and well perfused  ABDOMEN:  Soft, appropriately tender, non-distended, no rebound or guarding, no peritoneal signs.  Stoma pink with gas and stool in bag.  MIRZA with serosanguinous output.  INCISION:  C/d/i, no sign of infection    LABS:  Lab Results   Component Value Date    WBC 21.9 09/21/2021    WBC 10.4 12/07/2009     Lab Results   Component Value Date    HGB 8.5 09/21/2021    HGB 13.7 12/07/2009     Lab Results   Component Value Date    HCT 28.7 09/21/2021    HCT 40.7 12/07/2009     Lab Results   Component Value Date     09/21/2021     12/07/2009     Last Basic Metabolic Panel:  Lab Results   Component Value Date     09/19/2021     12/07/2009      Lab Results   Component Value Date    POTASSIUM 3.7 09/19/2021    POTASSIUM 3.6 12/07/2009     Lab Results   Component Value Date    CHLORIDE 99 09/19/2021    CHLORIDE 103 12/07/2009     Lab Results   Component Value Date    MADIE 7.9 09/19/2021    MADIE 9.2 12/07/2009     Lab Results   Component Value Date    CO2 32 09/19/2021    CO2 30 12/07/2009     Lab Results   Component Value Date    BUN 9 09/19/2021    BUN 14 12/07/2009     Lab Results   Component Value Date    CR 0.59 09/19/2021    CR 0.64 12/07/2009     Lab Results   Component Value Date      09/19/2021    GLC 97 12/07/2009       ASSESSMENT/PLAN:  26 year old man POD#5 s/p lap total abdominal colectomy with end ileostomy for medically refractory Ulcerative Colitis.  Afebrile.       - Continue PO steroids  - Stoma teaching and care  - CBC today   - Low fiber diet  - Ambulate  - Pain control as needed  - Continue MIRZA drain for now  - Lovenox for DVT ppx, will need at discharge  - Possible discharge tomorrow from our perspective      For questions/paging, please contact the CRS office at [phone].    Maria C Bhandari PA-C  [facility]  Phone: [phone]    Colon and Rectal Surgery Attending Note    Patient seen and examined independently.  Agree with above assessment and plan.   late entry: Patient seen at 730 a.m.  He is overall feeling better.    He just finished ordering breakfast.  He denies nausea or vomiting.  His stoma is functioning with thick green output.  His abdomen is soft, nontender, nondistended.  Incisions are well healed without signs of infection.  A 16 French red rubber catheter was used to irrigate and aspirate the rectal stump.  Roughly 200 ml of thick blood-tinged mucopurulent was removed.    Plan as above  Continue oral steroid taper, stoma care and teaching, encourage p.o. intake, minimize narcotics, continue MIRZA for now  Will consider repeat irrigation of the rectal stump again tomorrow.  Hopeful for discharge home with home health care tomorrow or the following day.    Sheri Lemus MD  [facility]  [address], [address]  [place] [address]  T: [phone]  F: [phone]   [email or web address]

## 2021-09-22 NOTE — PROGRESS NOTES
WO Nurse Inpatient [facility]   WO Nurse Inpatient Adult Ostomy Assessment    Follow Up Assessment   Assessment of new end Ileostomy Stoma complication(s) none   Mucocutaneous junction; intact   Peristomal complication(s) none   Pouch wear time:3-4 days,   Following today's visit:Patient / parents are able to demonstrate;       1. How to empty their pouch? yes      2. How to change their pouch?  Yes with mild assist      3. How to read and record intake and output correctly? Yes     Objective data:  Patient history according to medical record: Patient s/p laparoscopic total abdominal colectomy with end ileostomy on 9/17/21 with Dr. Flaherty  Current Diet/Nutrition: Orders Placed This Encounter      Low Fiber Diet     TPN no   I/O last 3 completed shifts:  In: 770 [P.O.:770]  Out: 4410 [Urine:3100; Drains:475; Stool:835]  Labs:    Recent Labs   Lab 09/22/21  0917 09/18/21  0724 09/17/21  0603   ALBUMIN  --   --  1.5*   HGB 9.2*   < > 8.1*   INR  --   --  1.33*   WBC 21.4*   < > 10.0    < > = values in this interval not displayed.        Physical Exam:  Current pouching system:[place] 1pc flat lock n roll pouch   Reason for pouch change today: ostomy education  Stoma appearance: healthy and pink  Stoma size; 1 3/8 inches, protrudes 3cm  Peristomal skin: intact  Stoma output : brown and liquid and pasty    Abdominal  Assessment  firm , NG still in place? No  Surgical Site: open to air  Pain: Dull ache  Is patient still on a PCA No    Interventions:  Patient's chart evaluated.  Focus of today's visit: Showering, managing gas, 2pc vs 1pc, night time gas production, documenting output, intake and urine.  Activity, clothing, diet, fluids, stealth belt, stoma guards.  Participant of teaching session today patient  and parent  Orders: Written  Change made with ostomy management today: No  Patient/family: active participation and performed with minimal verbal cues  Supplies:at bedside,     Plan:  Learning  needs: changing the pouch, documenting output, urine, intake, ordering supplies through insurance monthly   Preparation for discharge: Prescriptions or note left on chart for MD to sign/complete, instructions added in EPIC  Recommend home care?  determine closer to discharge    Discussed plan of care with Patient, Family and Nurse  Nursing to notify the Provider(s) and re-consult the WOC Nurse if new ostomy concerns or discharge planned before next planned WOC visit.    WOC Nurse will return: Daily (M-F)  Face to face time: 40 minutes    Levy Tam RN, CWOCN

## 2021-09-22 NOTE — PLAN OF CARE
To Do:  End of Shift Summary  For vital signs and complete assessments, please see documentation flowsheets.     Pertinent assessments: POD#5. VSS. Denies nausea and SOB. Tylenol given for abdo pain. Midline incision with glue. BS active, ostomy with stool and gas. MIRZA drain 100 ml output. Small amount rectal drainage. Up ind. Minimal assistance when emptying ostomy.   Major Shift Events: none  Treatment Plan: Prednisone taper, irrigation and aspiration of rectum today.symptom management and eccourage independence with ostomy cares.  Bedside Nurse: Angela Denny RN

## 2021-09-22 NOTE — PLAN OF CARE
To Do:  End of Shift Summary  For vital signs and complete assessments, please see documentation flowsheets.     Pertinent assessments: Patient is Aox4 this shift. Complains of abdominal cramping at times, tylenol given with relief. Active bowel sounds. Able to preform all own ostomy cares. Denies SOB or nausea     Major Shift Events: 220 mls of bloody drainage aspirated from rectum by colorectal today. IV steroids stopped, transitioned to PO prednisone.     Treatment Plan: Continue with PO steroids, ostomy cares and teaching. Potential discharge tomorrow.     Bedside Nurse: Kay Purvis RN

## 2021-09-22 NOTE — PLAN OF CARE
End of Shift Summary  For vital signs and complete assessments, please see documentation flowsheets.     Pertinent assessments: POD #4. LS clear. BS hypoactive, pt having stool and gas in ostomy bag; encouraging independence with cares. Denies pain and nausea. Low Fiber diet, tolerating without issues. Independently ambulating, walking hallways frequently. PIV - SL. Incisions glued, MARGIE, no drainage noted. Family visiting tonight. Resting comfortably in bed.     Major Shift Events: none    Treatment Plan: Lovenox, Prednisone taper, Encourage independence with ostomy cares

## 2021-09-22 NOTE — PROGRESS NOTES
[facility]    Hospitalist Progress Note  Name: Luther Wood    MRN: [id number]  Provider:  Chadwick Brown DO  Date of Service: 09/22/2021    Summary of Stay: Luther Wood is a 26 year old male with a history of ulcerative colitis admitted on 9/11/2021 with frequent loose stools, hematochezia, fatigue, and syncope.  The patient was recently admitted from 9/2 through 9/9 for treatment of an acute flare of ulcerative colitis.  The patient received methylprednisolone IV at that time and a flexible sigmoidoscopy was performed on 9/6 showing severe ulcerative colitis.  Biopsies were taken.  The patient was then started on Inflectra by gastroenterology and transition to oral steroids.  He returned on 9/11 after loose stools and hematochezia followed by syncopal event.  The patient had a C. difficile PCR and enteric panel that were negative.  He did receive his second dose of infliximab on 9/12 and IV steroids were restarted.  On 9/16, a flexible sigmoidoscopy was performed showing worsening ulcerations.  Male gastroenterology was contacted for a second opinion and colorectal surgery had been following and recommended a total colectomy with end ileostomy which was performed on 9/7/2021.  The patient's diet was advanced and he tolerated it well.  Wound care was consulted for stoma/ostomy care and teaching.  The patient did have an elevated WBC, which was likely due to his steroid use.      TODAY'S PLAN:  Pt still with rectal output and had washing today with cultures sent.  Doubt infection.  WBC slightly improved and likely secondary to steroid use.  Thrombocytosis also improved.  Encouraged ambulation.  Appreciate CRS recommendations.  Anticipate discharge home in the next 24-48 hours pending CRS recommendations and lab improvement.  Discussed with mother at bedside.  All questions answered.      Problem List:   Profuse diarrhea with hematochezia  Ulcerative Colitis Flare s/p total colectomy with  end ileostomy (9/17/2021)  - Diet, wound cares, pain control and steroid management as per colorectal surgery  - Steroid taper per GI     Acute on chronic anemia due to blood loss s/p tranfusion of 1 unit PRBC  - symptomatic hemoglobin of 6.9 on 9/12 in the setting of bloody stool related to ulcerative colitis flare  - received 1 unit of blood on 9/12 with improvement to 8.4>>7.7   - Daily CBC  - Transfuse for hgb less than 7     Fever on Admission - Now Resolved  - Tmax 101.5 on 9/11 with WBC elevation (14.5) thought to be steroid induced  - no focal infectious symptoms other than diarrhea with blood  - likely fever related to colitis  - no antibiotics indicated  - blood cultures x 2 on 9/12 negative to date  - now afebrile     Syncope  - patient had episode of syncope while brushing his teeth  - father caught him before he hit the ground and woke up after 5 seconds  - suspect this is related to hypovolemia and anemia, likely not to be cardiac etiology  - was monitored on telemetry overnight with no arrhythmia and echocardiogram shows no structural disease of his heart  - discontinued telemetry     Hyponatremia  - monitor     Nonsevere malnutrition  - Was seen by nutrition on his last admission with recommendation for pushing protein with meals and to have small frequent meals with oral nutritional supplements.     Leukocytosis  - Likely secondary to steroids  - Pt afebrile and wounds look ok  - ?infection of rectal area  - Continue to monitor for now  - Daily CBC    Mild Hypokalemia  - Replace    DVT Prophylaxis: Enoxaparin (Lovenox) SQ  Code Status: Full Code  Diet: Snacks/Supplements Adult: Melissa Salas Standard Oral Supplement; Between Meals  Low Fiber Diet    Jacob Catheter: Not present  Disposition: Expected discharge in 1-2 days to home. Goals prior to discharge include colorectal surgery work up complete.   Family updated today: Yes      Interval History   Pt seen and examined.  Denies sob.  Reports some mild  abd cramping and rectal output.    -Data reviewed today: I personally reviewed all new labs and imaging results over the last 24 hours.     Physical Exam   Temp: 98  F (36.7  C) Temp src: Oral BP: 121/63 Pulse: 75   Resp: 16 SpO2: 100 % O2 Device: None (Room air)    Vitals:    09/11/21 2244 09/16/21 0751   Weight: 61.2 kg (135 lb) 61.2 kg (135 lb)     Vital Signs with Ranges  Temp:  [97.8  F (36.6  C)-98.6  F (37  C)] 98  F (36.7  C)  Pulse:  [67-90] 75  Resp:  [16-18] 16  BP: (111-121)/(59-70) 121/63  SpO2:  [99 %-100 %] 100 %  I/O last 3 completed shifts:  In: 770 [P.O.:770]  Out: 4410 [Urine:3100; Drains:475; Stool:835]    GENERAL: No apparent distress. Awake, alert, and fully oriented.  HEENT: Normocephalic, atraumatic. Extraocular movements intact.  CARDIOVASCULAR: Regular rate and rhythm without murmurs or rubs. No S3.  PULMONARY: Clear bilaterally.  GASTROINTESTINAL: Soft, non-tender, non-distended. Bowel sounds normoactive.   EXTREMITIES: No cyanosis or clubbing. No edema.  NEUROLOGICAL: CN 2-12 grossly intact, no focal neurological deficits.  DERMATOLOGICAL: No rash, ulcer, bruising, nor jaundice.    Medications       enoxaparin ANTICOAGULANT  40 mg Subcutaneous Q24H     predniSONE  25 mg Oral Daily     sodium chloride (PF)  3 mL Intracatheter Q8H     sodium chloride (PF)  3 mL Intracatheter Q8H     Data     Laboratory:  Recent Labs   Lab 09/22/21  0917 09/21/21  0935 09/20/21  1147   WBC 21.4* 21.9* 19.0*   HGB 9.2* 8.5* 8.3*   HCT 31.1* 28.7* 27.4*   MCV 93 91 91   * 678* 645*     Recent Labs   Lab 09/22/21  0917 09/19/21  0745 09/18/21  0724    134 133   POTASSIUM 3.1* 3.7 3.9   CHLORIDE 100 99 99   CO2 30 32 29   ANIONGAP 6 3 5   * 116* 99   BUN 11 9 11   CR 0.72 0.59* 0.65*   GFRESTIMATED >90 >90 >90   MADIE 7.9* 7.9* 7.5*     No results for input(s): CULT in the last 168 hours.    Imaging:  No results found for this or any previous visit (from the past 24 hour(s)).      Chadwick Brown,  DO  [facility] Hospitalist  [address] [place] [address]  09/22/2021

## 2021-09-23 ENCOUNTER — APPOINTMENT (OUTPATIENT)
Dept: PHYSICAL THERAPY | Facility: CLINIC | Age: 26
DRG: 329 | End: 2021-09-23
Attending: PHYSICIAN ASSISTANT
Payer: COMMERCIAL

## 2021-09-23 ENCOUNTER — APPOINTMENT (OUTPATIENT)
Dept: CT IMAGING | Facility: CLINIC | Age: 26
DRG: 329 | End: 2021-09-23
Attending: PHYSICIAN ASSISTANT
Payer: COMMERCIAL

## 2021-09-23 LAB
ANION GAP SERPL CALCULATED.3IONS-SCNC: 7 MMOL/L (ref 3–14)
BUN SERPL-MCNC: 11 MG/DL (ref 7–30)
CALCIUM SERPL-MCNC: 8 MG/DL (ref 8.5–10.1)
CHLORIDE BLD-SCNC: 101 MMOL/L (ref 94–109)
CO2 SERPL-SCNC: 28 MMOL/L (ref 20–32)
CREAT FLD-MCNC: 0.7 MG/DL
CREAT SERPL-MCNC: 0.74 MG/DL (ref 0.66–1.25)
ERYTHROCYTE [DISTWIDTH] IN BLOOD BY AUTOMATED COUNT: 15.1 % (ref 10–15)
ERYTHROCYTE [DISTWIDTH] IN BLOOD BY AUTOMATED COUNT: 15.2 % (ref 10–15)
GFR SERPL CREATININE-BSD FRML MDRD: >90 ML/MIN/1.73M2
GLUCOSE BLD-MCNC: 112 MG/DL (ref 70–99)
HCT VFR BLD AUTO: 28.1 % (ref 40–53)
HCT VFR BLD AUTO: 29.5 % (ref 40–53)
HGB BLD-MCNC: 8.3 G/DL (ref 13.3–17.7)
HGB BLD-MCNC: 8.6 G/DL (ref 13.3–17.7)
MCH RBC QN AUTO: 26.6 PG (ref 26.5–33)
MCH RBC QN AUTO: 27.3 PG (ref 26.5–33)
MCHC RBC AUTO-ENTMCNC: 29.2 G/DL (ref 31.5–36.5)
MCHC RBC AUTO-ENTMCNC: 29.5 G/DL (ref 31.5–36.5)
MCV RBC AUTO: 91 FL (ref 78–100)
MCV RBC AUTO: 92 FL (ref 78–100)
PLATELET # BLD AUTO: 574 10E3/UL (ref 150–450)
PLATELET # BLD AUTO: 574 10E3/UL (ref 150–450)
PLATELET # BLD AUTO: 600 10E3/UL (ref 150–450)
POTASSIUM BLD-SCNC: 3.4 MMOL/L (ref 3.4–5.3)
RBC # BLD AUTO: 3.04 10E6/UL (ref 4.4–5.9)
RBC # BLD AUTO: 3.23 10E6/UL (ref 4.4–5.9)
SODIUM SERPL-SCNC: 136 MMOL/L (ref 133–144)
WBC # BLD AUTO: 19.1 10E3/UL (ref 4–11)
WBC # BLD AUTO: 22 10E3/UL (ref 4–11)

## 2021-09-23 PROCEDURE — 85027 COMPLETE CBC AUTOMATED: CPT | Performed by: PHYSICIAN ASSISTANT

## 2021-09-23 PROCEDURE — 250N000011 HC RX IP 250 OP 636: Performed by: INTERNAL MEDICINE

## 2021-09-23 PROCEDURE — 99232 SBSQ HOSP IP/OBS MODERATE 35: CPT | Performed by: INTERNAL MEDICINE

## 2021-09-23 PROCEDURE — 85014 HEMATOCRIT: CPT | Performed by: INTERNAL MEDICINE

## 2021-09-23 PROCEDURE — 250N000011 HC RX IP 250 OP 636: Performed by: COLON & RECTAL SURGERY

## 2021-09-23 PROCEDURE — 99207 PR CDG-MDM COMPONENT: MEETS LOW - DOWN CODED: CPT | Performed by: INTERNAL MEDICINE

## 2021-09-23 PROCEDURE — 36415 COLL VENOUS BLD VENIPUNCTURE: CPT | Performed by: INTERNAL MEDICINE

## 2021-09-23 PROCEDURE — 74177 CT ABD & PELVIS W/CONTRAST: CPT

## 2021-09-23 PROCEDURE — 97750 PHYSICAL PERFORMANCE TEST: CPT | Mod: GP | Performed by: PHYSICAL THERAPIST

## 2021-09-23 PROCEDURE — 250N000012 HC RX MED GY IP 250 OP 636 PS 637: Performed by: PHYSICIAN ASSISTANT

## 2021-09-23 PROCEDURE — 85049 AUTOMATED PLATELET COUNT: CPT | Performed by: COLON & RECTAL SURGERY

## 2021-09-23 PROCEDURE — 82570 ASSAY OF URINE CREATININE: CPT | Performed by: COLON & RECTAL SURGERY

## 2021-09-23 PROCEDURE — 250N000009 HC RX 250: Performed by: INTERNAL MEDICINE

## 2021-09-23 PROCEDURE — 999N000197 HC STATISTIC WOC PT EDUCATION, 0-15 MIN

## 2021-09-23 PROCEDURE — 80048 BASIC METABOLIC PNL TOTAL CA: CPT | Performed by: PHYSICIAN ASSISTANT

## 2021-09-23 PROCEDURE — 97161 PT EVAL LOW COMPLEX 20 MIN: CPT | Mod: GP | Performed by: PHYSICAL THERAPIST

## 2021-09-23 PROCEDURE — 97530 THERAPEUTIC ACTIVITIES: CPT | Mod: GP | Performed by: PHYSICAL THERAPIST

## 2021-09-23 PROCEDURE — 36415 COLL VENOUS BLD VENIPUNCTURE: CPT | Performed by: COLON & RECTAL SURGERY

## 2021-09-23 PROCEDURE — 250N000013 HC RX MED GY IP 250 OP 250 PS 637: Performed by: COLON & RECTAL SURGERY

## 2021-09-23 PROCEDURE — 250N000013 HC RX MED GY IP 250 OP 250 PS 637: Performed by: PHYSICIAN ASSISTANT

## 2021-09-23 PROCEDURE — 120N000001 HC R&B MED SURG/OB

## 2021-09-23 RX ORDER — IOPAMIDOL 755 MG/ML
500 INJECTION, SOLUTION INTRAVASCULAR ONCE
Status: COMPLETED | OUTPATIENT
Start: 2021-09-23 | End: 2021-09-23

## 2021-09-23 RX ORDER — HYDROCORTISONE ACETATE 25 MG/1
25 SUPPOSITORY RECTAL AT BEDTIME
Status: DISCONTINUED | OUTPATIENT
Start: 2021-09-23 | End: 2021-09-24 | Stop reason: HOSPADM

## 2021-09-23 RX ADMIN — PREDNISONE 25 MG: 5 TABLET ORAL at 10:04

## 2021-09-23 RX ADMIN — ACETAMINOPHEN 650 MG: 325 TABLET, FILM COATED ORAL at 06:57

## 2021-09-23 RX ADMIN — SODIUM CHLORIDE 57 ML: 9 INJECTION, SOLUTION INTRAVENOUS at 12:10

## 2021-09-23 RX ADMIN — ACETAMINOPHEN 650 MG: 325 TABLET, FILM COATED ORAL at 11:01

## 2021-09-23 RX ADMIN — ACETAMINOPHEN 650 MG: 325 TABLET, FILM COATED ORAL at 19:18

## 2021-09-23 RX ADMIN — ACETAMINOPHEN 650 MG: 325 TABLET, FILM COATED ORAL at 00:34

## 2021-09-23 RX ADMIN — ENOXAPARIN SODIUM 40 MG: 40 INJECTION SUBCUTANEOUS at 17:17

## 2021-09-23 RX ADMIN — HYDROCORTISONE ACETATE 25 MG: 25 SUPPOSITORY RECTAL at 21:25

## 2021-09-23 RX ADMIN — ACETAMINOPHEN 650 MG: 325 TABLET, FILM COATED ORAL at 15:38

## 2021-09-23 RX ADMIN — IOPAMIDOL 68 ML: 755 INJECTION, SOLUTION INTRAVENOUS at 12:10

## 2021-09-23 ASSESSMENT — ACTIVITIES OF DAILY LIVING (ADL)
ADLS_ACUITY_SCORE: 5

## 2021-09-23 NOTE — PROGRESS NOTES
WO Nurse Inpatient [facility]   WO Nurse Inpatient Adult Ostomy Assessment    Follow Up Assessment   Assessment of new end Ileostomy Stoma complication(s) none   Mucocutaneous junction; intact on 9/22/21  Peristomal complication(s) none on 9/22/21  Pouch wear time:3-4 days,   Following today's visit:Patient / parents are able to demonstrate;       1. How to empty their pouch? yes      2. How to change their pouch?  Yes with mild assist      3. How to read and record intake and output correctly? Yes     Objective data:  Patient history according to medical record: Patient s/p laparoscopic total abdominal colectomy with end ileostomy on 9/17/21 with Dr. Flaherty  Current Diet/Nutrition: Orders Placed This Encounter      Low Fiber Diet     TPN no   I/O last 3 completed shifts:  In: 1080 [P.O.:1080]  Out: 5050 [Urine:3675; Drains:280; Stool:1095]  Labs:    Recent Labs   Lab 09/23/21  0856 09/18/21  0724 09/17/21  0603   ALBUMIN  --   --  1.5*   HGB 8.6*   < > 8.1*   INR  --   --  1.33*   WBC 22.0*   < > 10.0    < > = values in this interval not displayed.        Physical Exam:  Current pouching system:Priddy 1pc flat lock n roll pouch   Reason for pouch change today: pouch not changed today  Stoma appearance: healthy and pink  Stoma size: approximately  1 3/8 inches, protrudes 3cm  Peristomal skin: intact on 9/22/21  Stoma output : brown and liquid and pasty    Abdominal  Assessment  firm , NG still in place? No  Surgical Site: open to air  Pain: Dull ache  Is patient still on a PCA No    Interventions:  Patient's chart evaluated.  Focus of today's visit: Activity, bathing, clothing, diet, fluids.  Participant of teaching session today patient  and parent  Orders: Reviewed  Change made with ostomy management today: No  Patient/family: active participation and performed with minimal verbal cues  Supplies:at bedside,     Plan:  Learning needs: changing the pouch, documenting output, urine, intake,  ordering supplies through insurance monthly   Preparation for discharge: Prescriptions or note left on chart for MD to sign/complete, instructions added in EPIC  Recommend home care?  determine closer to discharge    Discussed plan of care with Patient, Family and Nurse  Nursing to notify the Provider(s) and re-consult the WOC Nurse if new ostomy concerns or discharge planned before next planned WOC visit.    WOC Nurse will return: Daily (M-F)  Face to face time: 15 minutes    Levy Tam RN, CWOCN

## 2021-09-23 NOTE — PLAN OF CARE
To Do:  End of Shift Summary  For vital signs and complete assessments, please see documentation flowsheets.     Pertinent assessments: Patient Aox4 this shift. Complains of cramping abdominal pain intermittently. PRN Tylenol given with relief. Active bowel sounds, able to do own colostomy cares with minimal help. Denies SOB and nausea. MIRZA drain dressing changed.     Major Shift Events: Colorectal surgery irrigated rectum again with 220 mls out. Culture collected and sent to lab. Started teaching to family and patient how to self administer Lovenox injects as mom reported needing them at discharge.     Treatment Plan: Potential discharge home in the next 24 to 48 hours. Continue with pain control    Bedside Nurse: Kay Purvis RN

## 2021-09-23 NOTE — PROGRESS NOTES
09/23/21 1128   Quick Adds   Type of Visit Initial PT Evaluation   Living Environment   People in home parent(s)   Current Living Arrangements house   Home Accessibility stairs to enter home;stairs within home   Number of Stairs, Main Entrance 2   Number of Stairs, Within Home, Primary greater than 10 stairs   Stair Railings, Within Home, Primary railings safe and in good condition   Self-Care   Usual Activity Tolerance good   Current Activity Tolerance good   Equipment Currently Used at Home none   Activity/Exercise/Self-Care Comment independent   General Information   Onset of Illness/Injury or Date of Surgery 09/21/21   Referring Physician Maria C Bhandari PA-C   Pertinent History of Current Problem (include personal factors and/or comorbidities that impact the POC)  26 year old male with a history of ulcerative colitis admitted on 9/11/2021 with frequent loose stools, hematochezia, fatigue, and syncope.  The patient was recently admitted from 9/2 through 9/9 for treatment of an acute flare of ulcerative colitis.  The patient received methylprednisolone IV at that time and a flexible sigmoidoscopy was performed on 9/6 showing severe ulcerative colitis.  Biopsies were taken.  The patient was then started on Inflectra by gastroenterology and transition to oral steroids.  He returned on 9/11 after loose stools and hematochezia followed by syncopal event.    Existing Precautions/Restrictions no known precautions/restrictions   Cognition   Orientation Status (Cognition) oriented x 4   Affect/Mental Status (Cognition) WNL   Follows Commands (Cognition) WNL   Pain Assessment   Patient Currently in Pain Yes, see Vital Sign flowsheet   Range of Motion (ROM)   ROM Quick Adds ROM WNL   Strength   Manual Muscle Testing Quick Adds Strength WNL   Bed Mobility   Comment (Bed Mobility) SBA   Transfers   Transfer Safety Comments indep   Gait/Stairs (Locomotion)   Comment (Gait/Stairs) SBA w/o AD   Clinical  Impression   Criteria for Skilled Therapeutic Intervention yes, treatment indicated   PT Diagnosis (PT) Dec endurance   Influenced by the following impairments impaired activity tolerance   Functional limitations due to impairments impaired mobility   Clinical Presentation Stable/Uncomplicated   Clinical Presentation Rationale PMH   Clinical Decision Making (Complexity) low complexity   Therapy Frequency (PT) One time eval and treatment only   Planned Therapy Interventions (PT) home exercise program;progressive activity/exercise;bed mobility training   Risk & Benefits of therapy have been explained evaluation/treatment results reviewed;care plan/treatment goals reviewed;risks/benefits reviewed;patient;father   PT Discharge Planning    PT Discharge Recommendation (DC Rec) home   PT Rationale for DC Rec Pt near baseline for mobility and transfers, limited by pain, fatigue, benefitted from single session with PT for educ on activity progression and home program, negotiatied full flight of stair indep, no further PT needs PT orders will be completed.    Total Evaluation Time   Total Evaluation Time (Minutes) 10

## 2021-09-23 NOTE — PLAN OF CARE
To Do:  End of Shift Summary  For vital signs and complete assessments, please see documentation flowsheets.     Pertinent assessments: POD 6, VSS. Has mild discomfort to abdomen, given tylenol.  Tolerating a low fiber diet. MIRZA with serosanguinous output.  Ileostomy with good output.  Having some drainage from rectum, patient able to pass on own.  Jacob temporarily placed in rectal stump with dark bloody output, 50 ml. Ambulating independently.   Major Shift Events: Rectal stump irrigated, CT of abdomen.   Treatment Plan:  Potential discharge home in the next 24 to 48 hours. Continue with pain control    Bedside Nurse: Moo Oliveira RN

## 2021-09-23 NOTE — PROGRESS NOTES
[facility]    Hospitalist Progress Note  Name: Luther Wood    MRN: [id number]  Provider: Yvonne Lopez MD  Date of Service: 09/23/2021    Assessment & Plan   Summary of Stay: Luther Wood is a 26 year old male who was admitted on 9/11/2021 ulcerative colitis flare status post total colectomy with end ileostomy 9/17/2021 presented with profuse diarrhea and hematochezia.    Patient's past medical history significant for ulcerative colitis with frequent loose stools hematochezia.  Was recently hospitalized from [address] with acute flare of ulcerative colitis patient did receive methylprednisolone at that time flexible sigmoidoscopy performed which showed severe ulcerative colitis.  Biopsies were taken was started on infliximab by gastroenterology and transition to oral steroids.  He returned on 911 with more loose stools and hematochezia.  He did receive a second dose of infliximab  On 912 and was started on steroids.  Repeat sigmoidoscopy 916 showed worsening ulcerations.  Second opinion was obtained and patient elected to undergo total colectomy with end ileostomy which was performed 9/7/2021.    9/23/2021 colorectal recommended CT abdomen pelvis due to increased output and leukocytosis      Profuse diarrhea with hematochezia  Ulcerative Colitis Flare s/p total colectomy with end ileostomy (9/17/2021)  - Diet, wound cares, pain control and steroid management as per colorectal surgery  - Steroid taper per GI     Acute on chronic anemia due to blood loss s/p tranfusion of 1 unit PRBC  - symptomatic hemoglobin of 6.9 on 9/12 in the setting of bloody stool related to ulcerative colitis flare  - received 1 unit of blood on 9/12 with improvement to 8.4>>7.7   - Daily CBC  - Transfuse for hgb less than 7     Fever on Admission - Now Resolved  -On admission Tmax 101.5 on 9/11 with WBC elevation (14.5) thought to be steroid induced  - no focal infectious symptoms other than diarrhea with blood  - likely  fever related to colitis  - no antibiotics indicated  - blood cultures x 2 on 9/12 negative to date  - now afebrile  -Has worsening leukocytosis increased output.  CT abdomen and pelvis ordered by colorectal     Syncope  - patient had episode of syncope while brushing his teeth  - father caught him before he hit the ground and woke up after 5 seconds  - suspect this is related to hypovolemia and anemia, likely not to be cardiac etiology  - was monitored on telemetry overnight with no arrhythmia and echocardiogram shows no structural disease of his heart  - discontinued telemetry     Hyponatremia  - monitor     Nonsevere malnutrition  - Was seen by nutrition on his last admission with recommendation for pushing protein with meals and to have small frequent meals with oral nutritional supplements.     Leukocytosis  - Likely secondary to steroids  - Pt afebrile and wounds look ok  - ?infection of rectal area  - Continue to monitor for now  - Daily CBC     Mild Hypokalemia  - Replace     DVT Prophylaxis: Enoxaparin (Lovenox) SQ  Code Status: Full Code  Diet: Snacks/Supplements Adult: Melissa [employer] Standard Oral Supplement; Between Meals  Low Fiber Diet    Jacob Catheter: Not present  Disposition: Expected discharge in 1-2 days to home          Interval History   Assumed care reviewed chart patient doing better today has been able to tolerate p.o. but has noted increased output and rectal drainage.  Anxious about repeat CT scan.  Denies any fever chills.  More than 10 point review of systems was carried out was otherwise negative.    -Data reviewed today: I reviewed all new labs and imaging reports over the last 24 hours. I personally reviewed no images or EKG's today.    Physical Exam   Temp: 97.6  F (36.4  C) Temp src: Oral BP: 109/64 Pulse: 72   Resp: 20 SpO2: 100 % O2 Device: None (Room air)    Vitals:    09/11/21 2244 09/16/21 0751   Weight: 61.2 kg (135 lb) 61.2 kg (135 lb)     Vital Signs with Ranges  Temp:  [97.6  F  (36.4  C)-98.1  F (36.7  C)] 97.6  F (36.4  C)  Pulse:  [] 72  Resp:  [18-20] 20  BP: (101-118)/(64-69) 109/64  SpO2:  [98 %-100 %] 100 %  I/O last 3 completed shifts:  In: 1080 [P.O.:1080]  Out: 5050 [Urine:3675; Drains:280; Stool:1095]      GENERAL: No apparent distress. Awake, alert, and fully oriented.  Thin  HEENT: Normocephalic, atraumatic. Extraocular movements intact.  CARDIOVASCULAR: Regular rate and rhythm without murmurs or rubs. No S3.  PULMONARY: Clear bilaterally.  GASTROINTESTINAL: Soft, non-tender, non-distended. Bowel sounds normoactive.   EXTREMITIES: No cyanosis or clubbing. No edema.  NEUROLOGICAL: CN 2-12 grossly intact, no focal neurological deficits.  DERMATOLOGICAL: No rash, ulcer, bruising, nor jaundice.    Medications       enoxaparin ANTICOAGULANT  40 mg Subcutaneous Q24H     predniSONE  25 mg Oral Daily     sodium chloride (PF)  3 mL Intracatheter Q8H     sodium chloride (PF)  3 mL Intracatheter Q8H     Data     Recent Labs   Lab 09/23/21  0633 09/22/21  0917 09/21/21  0935 09/20/21  1147 09/20/21  1147   WBC  --  21.4* 21.9*  --  19.0*   HGB  --  9.2* 8.5*  --  8.3*   HCT  --  31.1* 28.7*  --  27.4*   MCV  --  93 91  --  91   * 625* 678*   < > 645*    < > = values in this interval not displayed.     Recent Labs   Lab 09/22/21  0917 09/19/21  0745 09/18/21  0724    134 133   POTASSIUM 3.1* 3.7 3.9   CHLORIDE 100 99 99   CO2 30 32 29   ANIONGAP 6 3 5   * 116* 99   BUN 11 9 11   CR 0.72 0.59* 0.65*   GFRESTIMATED >90 >90 >90   MADIE 7.9* 7.9* 7.5*     No results for input(s): CULT in the last 168 hours.  Recent Labs   Lab 09/22/21  0917 09/19/21  0745 09/18/21  0724 09/17/21  0603 09/17/21  0603    134 133   < > 132*  131*   POTASSIUM 3.1* 3.7 3.9   < > 3.9  3.8   CHLORIDE 100 99 99   < > 98  98   CO2 30 32 29   < > 27  28   ANIONGAP 6 3 5   < > 7  5   * 116* 99   < > 132*  136*   BUN 11 9 11   < > 13  13   CR 0.72 0.59* 0.65*   < > 0.71  0.67    GFRESTIMATED >90 >90 >90   < > >90  >90   MADIE 7.9* 7.9* 7.5*   < > 7.5*  7.4*   MAG  --   --  2.2  --  2.2   PHOS  --   --  2.4*  --  4.1   PROTTOTAL  --   --   --   --  5.6*   ALBUMIN  --   --   --   --  1.5*   BILITOTAL  --   --   --   --  0.4   ALKPHOS  --   --   --   --  142   AST  --   --   --   --  25   ALT  --   --   --   --  93*    < > = values in this interval not displayed.     No results for input(s): NTBNPI, NTBNP in the last 168 hours.  GFR Estimate   Date Value Ref Range Status   09/22/2021 >90 >60 mL/min/1.73m2 Final     Comment:     As of July 11, 2021, eGFR is calculated by the CKD-EPI creatinine equation, without race adjustment. eGFR can be influenced by muscle mass, exercise, and diet. The reported eGFR is an estimation only and is only applicable if the renal function is stable.   09/19/2021 >90 >60 mL/min/1.73m2 Final     Comment:     As of July 11, 2021, eGFR is calculated by the CKD-EPI creatinine equation, without race adjustment. eGFR can be influenced by muscle mass, exercise, and diet. The reported eGFR is an estimation only and is only applicable if the renal function is stable.   09/18/2021 >90 >60 mL/min/1.73m2 Final     Comment:     As of July 11, 2021, eGFR is calculated by the CKD-EPI creatinine equation, without race adjustment. eGFR can be influenced by muscle mass, exercise, and diet. The reported eGFR is an estimation only and is only applicable if the renal function is stable.   12/07/2009 GFR not calculated, patient <16 years old. mL/min/1.7m2 Final     GFR Estimate If Black   Date Value Ref Range Status   12/07/2009 GFR not calculated, patient <16 years old. mL/min/1.7m2 Final     Recent Labs   Lab 09/22/21  0917 09/19/21  0745 09/18/21  0724 09/17/21  0603   * 116* 99 132*  136*     Recent Labs   Lab 09/22/21  0917 09/21/21  0935 09/20/21  1147   HGB 9.2* 8.5* 8.3*     Recent Labs   Lab 09/17/21  0603   AST 25   ALT 93*   ALKPHOS 142   BILITOTAL 0.4     Recent  Labs   Lab 09/17/21  0603   INR 1.33*     No results for input(s): LACT in the last 168 hours.  No results for input(s): BILINEONATAL, TCBIL in the last 168 hours.  Recent Labs   Lab 09/23/21  0633 09/22/21  0917 09/21/21  0935   * 625* 678*     Recent Labs   Lab 09/22/21  0917 09/19/21  0745 09/18/21  0724   BUN 11 9 11   CR 0.72 0.59* 0.65*     No results for input(s): TSH in the last 168 hours.  No results for input(s): TROPONIN, TROPI, TROPR in the last 168 hours.    Invalid input(s): TROP, TROPONINIES  No results for input(s): COLOR, APPEARANCE, URINEGLC, URINEBILI, URINEKETONE, SG, UBLD, URINEPH, PROTEIN, UROBILINOGEN, NITRITE, LEUKEST, RBCU, WBCU in the last 168 hours.    No results found for this or any previous visit (from the past 24 hour(s)).

## 2021-09-23 NOTE — PROGRESS NOTES
COLON [facility] RECT[facility]  PROGRESS NOTE    September 23, 2021  Post-op Day # 6    SUBJECTIVE:  Patient reports he is doing well.  Pain controlled.  Tolerating low fiber diet.  Had urge to pass bloody tinged drainage per rectum on the toilet last night.    OBJECTIVE:  Temp:  [97.6  F (36.4  C)-98.1  F (36.7  C)] 97.6  F (36.4  C)  Pulse:  [] 72  Resp:  [18-20] 20  BP: (101-118)/(64-69) 109/64  SpO2:  [98 %-100 %] 100 %    Intake/Output Summary (Last 24 hours) at 9/23/2021 0826  Last data filed at 9/23/2021 0655  Gross per 24 hour   Intake 1080 ml   Output 4180 ml   Net -3100 ml       GENERAL:  Awake, alert, no acute distress, lying in bed.  HEAD: Nomocephalic atraumatic  SCLERA: anicteric  EXTREMITIES: warm and well perfused  ABDOMEN:  Soft, appropriately tender, non-distended, no rebound or guarding, no peritoneal signs.  Stoma pink with gas and stool in bag.  MIRZA with serosanguinous output.  INCISION:  C/d/i, no sign of infection    LABS:  Lab Results   Component Value Date    WBC 21.4 09/22/2021    WBC 10.4 12/07/2009     Lab Results   Component Value Date    HGB 9.2 09/22/2021    HGB 13.7 12/07/2009     Lab Results   Component Value Date    HCT 31.1 09/22/2021    HCT 40.7 12/07/2009     Lab Results   Component Value Date     09/23/2021     12/07/2009     Last Basic Metabolic Panel:  Lab Results   Component Value Date     09/22/2021     12/07/2009      Lab Results   Component Value Date    POTASSIUM 3.1 09/22/2021    POTASSIUM 3.6 12/07/2009     Lab Results   Component Value Date    CHLORIDE 100 09/22/2021    CHLORIDE 103 12/07/2009     Lab Results   Component Value Date    MADIE 7.9 09/22/2021    MADIE 9.2 12/07/2009     Lab Results   Component Value Date    CO2 30 09/22/2021    CO2 30 12/07/2009     Lab Results   Component Value Date    BUN 11 09/22/2021    BUN 14 12/07/2009     Lab Results   Component Value Date    CR 0.72 09/22/2021    CR 0.64 12/07/2009     Lab Results   Component  Value Date     09/22/2021    GLC 97 12/07/2009       ASSESSMENT/PLAN: 26 year old man POD#6 s/p lap total abdominal colectomy with end ileostomy for medically refractory Ulcerative Colitis.  Afebrile, vitals stable.  Rectal stump irrigated again this morning.       - PT consult to evaluate ability to do steps  - Continue PO steroid taper  - Stoma teaching and care  - Monitor ostomy output  - Low fiber diet  - Ambulate  - Pain control as needed  - Continue MIRZA drain for now  - Lovenox for DVT ppx, will need at discharge  - Will need home health care at discharge      For questions/paging, please contact the CRS office at [phone].    Maria C Bhandari PA-C  [facility]  Phone: [phone]

## 2021-09-23 NOTE — PROGRESS NOTES
09/23/21 1100   Signing Clinician's Name / Credentials   Signing clinician's name / credentials Anitra Cohen PT   Dynamic Gait Index (Alex and Ferreira Chisago, 1995)   Gait Level Surface 2   Change in Gait Speed 3   Gait and Horizontal Head Turns 3   Gait with Vertical Head Turns 3   Gait and Pivot Turns 3   Step Over Obstacle 3   Step Around Obstacles 3   Steps 3   Total Dynamic Gait Index Score  (A score of 19 or less has been correlated to an increased risk of falls in community dwelling older adults, patients with vestibular disorders, and patients with MS.)   Total Score (out of 24) 23

## 2021-09-23 NOTE — PLAN OF CARE
End of Shift Summary  For vital signs and complete assessments, please see documentation flowsheets.     Pertinent assessments: Reports pain/discomfort to surgical site, tylenol given.   Major Shift Events: None   Treatment Plan: Potential discharge home in the next 24 to 48 hours. Continue with pain control    Bedside Nurse: Asha Villa RN

## 2021-09-24 VITALS
TEMPERATURE: 98.1 F | DIASTOLIC BLOOD PRESSURE: 57 MMHG | RESPIRATION RATE: 16 BRPM | HEART RATE: 99 BPM | HEIGHT: 73 IN | BODY MASS INDEX: 17.89 KG/M2 | OXYGEN SATURATION: 100 % | WEIGHT: 135 LBS | SYSTOLIC BLOOD PRESSURE: 112 MMHG

## 2021-09-24 LAB
ANION GAP SERPL CALCULATED.3IONS-SCNC: 5 MMOL/L (ref 3–14)
BACTERIA SPEC CULT: ABNORMAL
BASOPHILS # BLD MANUAL: 0 10E3/UL (ref 0–0.2)
BASOPHILS NFR BLD MANUAL: 0 %
BUN SERPL-MCNC: 11 MG/DL (ref 7–30)
CALCIUM SERPL-MCNC: 8 MG/DL (ref 8.5–10.1)
CHLORIDE BLD-SCNC: 102 MMOL/L (ref 94–109)
CO2 SERPL-SCNC: 29 MMOL/L (ref 20–32)
CREAT SERPL-MCNC: 0.7 MG/DL (ref 0.66–1.25)
EOSINOPHIL # BLD MANUAL: 0 10E3/UL (ref 0–0.7)
EOSINOPHIL NFR BLD MANUAL: 0 %
ERYTHROCYTE [DISTWIDTH] IN BLOOD BY AUTOMATED COUNT: 15.3 % (ref 10–15)
GFR SERPL CREATININE-BSD FRML MDRD: >90 ML/MIN/1.73M2
GLUCOSE BLD-MCNC: 116 MG/DL (ref 70–99)
GRAM STAIN RESULT: ABNORMAL
GRAM STAIN RESULT: ABNORMAL
HCT VFR BLD AUTO: 29.4 % (ref 40–53)
HGB BLD-MCNC: 8.4 G/DL (ref 13.3–17.7)
LYMPHOCYTES # BLD MANUAL: 2.7 10E3/UL (ref 0.8–5.3)
LYMPHOCYTES NFR BLD MANUAL: 13 %
MCH RBC QN AUTO: 26.6 PG (ref 26.5–33)
MCHC RBC AUTO-ENTMCNC: 28.6 G/DL (ref 31.5–36.5)
MCV RBC AUTO: 93 FL (ref 78–100)
MONOCYTES # BLD MANUAL: 0.2 10E3/UL (ref 0–1.3)
MONOCYTES NFR BLD MANUAL: 1 %
MYELOCYTES # BLD MANUAL: 0.6 10E3/UL
MYELOCYTES NFR BLD MANUAL: 3 %
NEUTROPHILS # BLD MANUAL: 17.3 10E3/UL (ref 1.6–8.3)
NEUTROPHILS NFR BLD MANUAL: 83 %
PLAT MORPH BLD: ABNORMAL
PLATELET # BLD AUTO: 590 10E3/UL (ref 150–450)
POTASSIUM BLD-SCNC: 3.4 MMOL/L (ref 3.4–5.3)
RBC # BLD AUTO: 3.16 10E6/UL (ref 4.4–5.9)
RBC MORPH BLD: ABNORMAL
SODIUM SERPL-SCNC: 136 MMOL/L (ref 133–144)
WBC # BLD AUTO: 20.8 10E3/UL (ref 4–11)

## 2021-09-24 PROCEDURE — 85027 COMPLETE CBC AUTOMATED: CPT | Performed by: INTERNAL MEDICINE

## 2021-09-24 PROCEDURE — G0463 HOSPITAL OUTPT CLINIC VISIT: HCPCS

## 2021-09-24 PROCEDURE — 250N000012 HC RX MED GY IP 250 OP 636 PS 637: Performed by: PHYSICIAN ASSISTANT

## 2021-09-24 PROCEDURE — 36415 COLL VENOUS BLD VENIPUNCTURE: CPT | Performed by: INTERNAL MEDICINE

## 2021-09-24 PROCEDURE — 99239 HOSP IP/OBS DSCHRG MGMT >30: CPT | Performed by: INTERNAL MEDICINE

## 2021-09-24 PROCEDURE — 250N000013 HC RX MED GY IP 250 OP 250 PS 637: Performed by: COLON & RECTAL SURGERY

## 2021-09-24 PROCEDURE — 80048 BASIC METABOLIC PNL TOTAL CA: CPT | Performed by: INTERNAL MEDICINE

## 2021-09-24 RX ORDER — OXYCODONE HYDROCHLORIDE 5 MG/1
5 TABLET ORAL EVERY 6 HOURS PRN
Qty: 8 TABLET | Refills: 0 | Status: SHIPPED | OUTPATIENT
Start: 2021-09-24 | End: 2022-02-11

## 2021-09-24 RX ORDER — PREDNISONE 5 MG/1
5 TABLET ORAL DAILY
Qty: 85 TABLET | Refills: 0 | Status: SHIPPED | OUTPATIENT
Start: 2021-09-25 | End: 2022-02-11

## 2021-09-24 RX ADMIN — ACETAMINOPHEN 650 MG: 325 TABLET, FILM COATED ORAL at 00:57

## 2021-09-24 RX ADMIN — ACETAMINOPHEN 650 MG: 325 TABLET, FILM COATED ORAL at 11:03

## 2021-09-24 RX ADMIN — ACETAMINOPHEN 650 MG: 325 TABLET, FILM COATED ORAL at 06:48

## 2021-09-24 RX ADMIN — PREDNISONE 25 MG: 5 TABLET ORAL at 08:38

## 2021-09-24 ASSESSMENT — ACTIVITIES OF DAILY LIVING (ADL)
ADLS_ACUITY_SCORE: 5

## 2021-09-24 NOTE — PROGRESS NOTES
COLON & RECTAL SURGERY  PROGRESS NOTE    September 24, 2021  Post-op Day # 7    SUBJECTIVE:  Patient is doing well.  Tolerating diet.  Pain controlled.  Able to do stairs with PT yesterday.  Passed bloody/mucus from rectum on toilet.  Ready to go home. Ostomy output 1400 yesterday.  MIRZA output 270 yesterday.    OBJECTIVE:  Temp:  [97.6  F (36.4  C)-98.1  F (36.7  C)] 98.1  F (36.7  C)  Pulse:  [] 99  Resp:  [16-18] 16  BP: (105-112)/(57-63) 112/57  SpO2:  [100 %] 100 %    Intake/Output Summary (Last 24 hours) at 9/24/2021 0852  Last data filed at 9/24/2021 0827  Gross per 24 hour   Intake 300 ml   Output 2920 ml   Net -2620 ml       GENERAL:  Awake, alert, no acute distress, lying in bed.  HEAD: Nomocephalic atraumatic  SCLERA: anicteric  EXTREMITIES: warm and well perfused  ABDOMEN:  Soft, appropriately tender, non-distended, no rebound or guarding, no peritoneal signs.  Stoma pink with gas and stool in bag.  MIRZA with serosanguinous output.  INCISION:  C/d/i, no sign of infection    LABS:  Lab Results   Component Value Date    WBC 22.0 09/23/2021    WBC 10.4 12/07/2009     Lab Results   Component Value Date    HGB 8.6 09/23/2021    HGB 13.7 12/07/2009     Lab Results   Component Value Date    HCT 29.5 09/23/2021    HCT 40.7 12/07/2009     Lab Results   Component Value Date     09/23/2021     12/07/2009     Last Basic Metabolic Panel:  Lab Results   Component Value Date     09/23/2021     12/07/2009      Lab Results   Component Value Date    POTASSIUM 3.4 09/23/2021    POTASSIUM 3.6 12/07/2009     Lab Results   Component Value Date    CHLORIDE 101 09/23/2021    CHLORIDE 103 12/07/2009     Lab Results   Component Value Date    MADIE 8.0 09/23/2021    MADIE 9.2 12/07/2009     Lab Results   Component Value Date    CO2 28 09/23/2021    CO2 30 12/07/2009     Lab Results   Component Value Date    BUN 11 09/23/2021    BUN 14 12/07/2009     Lab Results   Component Value Date    CR 0.74 09/23/2021     CR 0.64 12/07/2009     Lab Results   Component Value Date     09/23/2021    GLC 97 12/07/2009       ASSESSMENT/PLAN: 26 year old man POD#7 s/p lap total abdominal colectomy with end ileostomy for medically refractory Ulcerative Colitis.  Afebrile, vitals stable.  Rectal stump irrigated this morning with minimal output.       - Continue PO steroid taper  - Stoma teaching and care, will need home care nurse at discharge  - Monitor ostomy output - patient was educated about measuring output at home and when to call our office  - Regular diet  - Ambulate  - Pain control as needed, minimize narcotics  - Continue MIRZA drain for now - will check output at noon today and will decide at that time if drain can be removed before discharge.  - Lovenox for DVT ppx, will need at discharge  - Ok to discharge today  - Our office will arrange follow up appointment in 2 weeks      For questions/paging, please contact the CRS office at [phone].    Maria C Bhandari PA-C  [facility]  Phone: [phone]

## 2021-09-24 NOTE — DISCHARGE SUMMARY
[facility]  Discharge Summary  Hospitalist      Date of Admission:  9/11/2021  Date of Discharge:  9/24/2021  Provider:  Yvonne Lopez MD  Date of Service (when I last saw the patient): 09/24/21      Primary Provider: Soledad Flaherty          Discharge Diagnosis:       Discharge Diagnoses   Profuse diarrhea with hematochezia  Ulcerative Colitis Flare s/p total colectomy with end ileostomy (9/17/2021)  Acute on chronic anemia due to blood loss s/p tranfusion of 1 unit PRBC  Fever on Admission - Now Resolved  Syncope  Hyponatremia  Nonsevere malnutrition  Leukocytosis  Mild Hypokalemia    Other medical issues:  Past Medical History:   Diagnosis Date     Colitis 2019     History of blood transfusion 09/12/2021          History of Present Illness   Luther Wood is an 26 year old male who presented with diarhea, abd pain and hematochezia.  Please see the admission history and physical for full details.    Hospital Course     Luther Wood was admitted on 9/11/2021. He  is a 26 year old male who was admitted for  ulcerative colitis presented with profuse diarrhea and hematochezia.    Patient's past medical history significant for ulcerative colitis with frequent loose stools hematochezia.  Was recently hospitalized from  with acute flare of ulcerative colitis patient did receive methylprednisolone at that time flexible sigmoidoscopy performed which showed severe ulcerative colitis.  Biopsies were taken was started on infliximab by gastroenterology and transition to oral steroids.  He returned on 9/11 with more loose stools and hematochezia.  He did receive a second dose of infliximab On 9/12 and was started on steroids.  Repeat sigmoidoscopy 9/16 showed worsening ulcerations.  Second opinion was obtained and patient elected to undergo total colectomy with end ileostomy which was performed 9/17/2021.  Postoperatively bowel functions returned returned, electrolytes replaced.  Patient  continued on steroid taper had elevated leukocytosison 9/23/2021.  colorectal recommended CT abdomen pelvis due to increased output and leukocytosis or infection. CT showed no evidence of abscess or infection.  Patient is being discharged home with plans to follow-up with colorectal.  Will need repeat lab work to follow-up on leukocytosis.    The following problems were addressed during his hospitalization:    Profuse diarrhea with hematochezia  Ulcerative Colitis Flare s/p total colectomy with end ileostomy (9/17/2021)  - Diet, wound cares, pain control and steroid management as per colorectal surgery  - Steroid taper per GI  -Status post colectomy patient is doing well.  Managing dressing changes, tolerating p.o.  -Is being discharged on steroid taper.  He will follow up with GI and colorectal     Acute on chronic anemia due to blood loss s/p tranfusion of 1 unit PRBC  - symptomatic hemoglobin of 6.9 on 9/12 in the setting of bloody stool related to ulcerative colitis flare  - received 1 unit of blood on 9/12 with improvement to 8.4>>7.7   -He we will need to follow-up CBC as outpatient     Fever on Admission - Now Resolved  -On admission Tmax 101.5 on 9/11 with WBC elevation (14.5) thought to be steroid induced  - no focal infectious symptoms other than diarrhea with blood  - likely fever related to colitis  - no antibiotics indicated  - blood cultures x 2 on 9/12 negative to date  - now afebrile  -Has worsening leukocytosis increased output.  CT abdomen and pelvis showed no new signs of infection or abscess     Syncope  - patient had episode of syncope while brushing his teeth  - father caught him before he hit the ground and woke up after 5 seconds  - suspect this is related to hypovolemia and anemia, likely not to be cardiac etiology  - was monitored on telemetry overnight with no arrhythmia and echocardiogram shows no structural disease of his heart  - discontinued  telemetry     Hyponatremia  -Resolved     Nonsevere malnutrition  - Was seen by nutrition on his last admission with recommendation for pushing protein with meals and to have small frequent meals with oral nutritional supplements.     Leukocytosis  - Likely secondary to steroids  - Pt afebrile and wounds look ok  -CT abdomen pelvis with no signs of abscess or infection  - Continue to monitor CBC as outpatient       Mild Hypokalemia  - Replaced .    Significant Results and Procedures   As noted above    Pending Results   Unresulted Labs Ordered in the Past 30 Days of this Admission     Date and Time Order Name Status Description    9/21/2021 11:25 AM Anaerobic Bacterial Culture Routine Preliminary           Code Status   Full Code       Primary Care Physician   Soledad Flaherty    Physical Exam   Temp: 98.1  F (36.7  C) Temp src: Oral BP: 112/57 Pulse: 99   Resp: 16 SpO2: 100 % O2 Device: None (Room air)    Vitals:    09/11/21 2244 09/16/21 0751   Weight: 61.2 kg (135 lb) 61.2 kg (135 lb)     Vital Signs with Ranges  Temp:  [97.6  F (36.4  C)-98.1  F (36.7  C)] 98.1  F (36.7  C)  Pulse:  [] 99  Resp:  [16-18] 16  BP: (105-112)/(57-63) 112/57  SpO2:  [100 %] 100 %  I/O last 3 completed shifts:  In: -   Out: 2870 [Urine:1150; Drains:270; Stool:1400; Blood:50]    Constitutional: Awake, alert, cooperative, no apparent distress, and appears stated age.  Respiratory: No increased work of breathing, good air exchange, clear to auscultation bilaterally, no crackles or wheezing.  Cardiovascular: Normal apical impulse,normal S1 and S2,   GI: Status post ileostomy.  Ileostomy site looks clean no signs of infection normal bowel sounds, soft, non-distended, non-tender      Discharge Disposition   Discharged to home    Consultations This Hospital Stay   GASTROENTEROLOGY IP CONSULT  COLORECTAL SURGERY IP CONSULT  WOUND OSTOMY CONTINENCE NURSE  IP CONSULT  SPIRITUAL HEALTH SERVICES IP CONSULT  CARE MANAGEMENT / SOCIAL WORK  IP CONSULT  NUTRITION SERVICES ADULT IP CONSULT  CARE MANAGEMENT / SOCIAL WORK IP CONSULT  PHARMACY DISCHARGE EDUCATION BY PHARMACIST  PHYSICAL THERAPY ADULT IP CONSULT    Time Spent on this Encounter   I, Yvonne Lopez MD, personally saw the patient today and spent greater than 30 minutes discharging this patient.    Discharge Orders      Home care nursing referral      Follow-up and recommended labs and tests     Follow up in the [place] office on 10/5/21 with Dr. Flaherty at 2 PM, please arrive 15 minutes early for paperwork.  Call [phone] to reschedule your appointment as needed. Call the office at [phone] if you develop fever, uncontrolled pain, bleeding, nausea, vomiting, or constipation.    [place] Office:   [address]  [place] [address]     Activity    No heavy lifting >10-15lbs for 6 weeks.     Do not drive while taking narcotic pain medication.    Get regular activity and try to walk for a total of 30 minutes. Start slow by walking 5-10 minutes at a time, increasing each day to 30 minutes at one time. Slowly return to your regular level of activity. Rest as needed.     MD face to face encounter    Documentation of Face to Face and Certification for Home Health Services    I certify that patient: Luther Wood is under my care and that I, or a nurse practitioner or physician's assistant working with me, had a face-to-face encounter that meets the physician face-to-face encounter requirements with this patient on: 9/24/2021.    This encounter with the patient was in whole, or in part, for the following medical condition, which is the primary reason for home health care: major abdominal surgery, stoma care and teaching.    I certify that, based on my findings, the following services are medically necessary home health services: Nursing.    My clinical findings support the need for the above services because: Nurse is needed: For complex aftercare of surgical  procedures because the patient needs instruction and cannot perform care on their own due to: Recovering from major abdominal surgery, complexity of stoma cares.  Patient needs further assistance and teaching.    Further, I certify that my clinical findings support that this patient is homebound (i.e. absences from home require considerable and taxing effort and are for medical reasons or [employer] services or infrequently or of short duration when for other reasons) because: Leaving home is medically contraindicated for the following reason(s): Other physician ordered restriction: 10 lb lifting restriction and Requires assistance of another person or specialized equipment to access medical services because patient: unable to drive after major abdominal surgery.    Based on the above findings. I certify that this patient is confined to the home and needs intermittent skilled nursing care, physical therapy and/or speech therapy.  The patient is under my care, and I have initiated the establishment of the plan of care.  This patient will be followed by a physician who will periodically review the plan of care.  Physician/Provider to provide follow up care: Soledad Flaherty    Attending hospital physician (the Medicare certified [place] provider): Dr. Rosario  Physician Signature: See electronic signature associated with these discharge orders.  Date: 9/24/2021     When to contact your care team    Please call the clinic if:  - fever greater than 101 degrees  - any signs of infection (increasing redness, swelling, tenderness, warmth, change in appearance, increased drainage)  - blood in urine or stool  - severe pain that is not relieved by medicine, rest, or ice    Or as questions or concerns arise. Contact clinic at [phone]    Call 911 if you feel you are having a medical emergency     Monitor and record    Please measure your stoma output daily.  If the stool output is greater than 1,000 ml (1 liter) per 24  hour period for 2 or more days, please call Colon & Rectal Surgery office at [phone].     Wound care and dressings    Instructions to care for your incisions at home: ice to area for comfort and may get incision wet in shower but do not soak or scrub.     Reason for your hospital stay    Please refer to discharge summary.     Diet    You can eat a regular diet.     Discharge Medications   Current Discharge Medication List      START taking these medications    Details   enoxaparin ANTICOAGULANT (LOVENOX) 40 MG/0.4ML syringe Inject 0.4 mLs (40 mg) Subcutaneous every 24 hours for 22 doses  Qty: 8.8 mL, Refills: 0    Associated Diagnoses: Ulcerative colitis with complication, unspecified location (H)      oxyCODONE (ROXICODONE) 5 MG tablet Take 1 tablet (5 mg) by mouth every 6 hours as needed for moderate to severe pain  Qty: 8 tablet, Refills: 0    Associated Diagnoses: Ulcerative colitis with complication, unspecified location (H)         CONTINUE these medications which have CHANGED    Details   predniSONE (DELTASONE) 5 MG tablet Take 1 tablet (5 mg) by mouth daily Take 5 tablets (25 mg) by mouth daily x3 days, then 4 tablets (20 mg) by mouth daily x7 days, then 3 tablets (15 mg) by mouth daily x7 days, then 2 tablets (10 mg) by mouth daily x7 days, then 1 tablet (5 mg) by mouth daily x7 days.  Qty: 85 tablet, Refills: 0    Associated Diagnoses: Ulcerative colitis with complication, unspecified location (H)         STOP taking these medications       balsalazide (COLAZAL) 750 MG capsule Comments:   Reason for Stopping:         CLARAVIS 20 MG capsule Comments:   Reason for Stopping:             Allergies   No Known Allergies  Data   Most Recent 3 CBC's:Recent Labs   Lab Test 09/24/21  0908 09/23/21  0856 09/23/21  0633   WBC 20.8* 22.0* 19.1*   HGB 8.4* 8.6* 8.3*   MCV 93 91 92   * 600* 574*  574*      Most Recent 3 BMP's:  Recent Labs   Lab Test 09/24/21  0908 09/23/21  0856 09/22/21  0917    136  136   POTASSIUM 3.4 3.4 3.1*   CHLORIDE 102 101 100   CO2 29 28 30   BUN 11 11 11   CR 0.70 0.74 0.72   ANIONGAP 5 7 6   MADIE 8.0* 8.0* 7.9*   * 112* 116*     Most Recent 2 LFT's:  Recent Labs   Lab Test 09/17/21  0603 09/13/21  0623   AST 25 16   ALT 93* 43   ALKPHOS 142 89   BILITOTAL 0.4 0.4     Most Recent INR's and Anticoagulation Dosing History:  Anticoagulation Dose History     Recent Dosing and Labs Latest Ref Rng & Units 9/11/2021 9/17/2021    INR 0.85 - 1.15 1.34(H) 1.33(H)        Most Recent 3 Troponin's:No lab results found.  Most Recent Cholesterol Panel:No lab results found.  Most Recent 6 Bacteria Isolates From Any Culture (See EPIC Reports for Culture Details):No lab results found.  Most Recent TSH, T4 and A1c Labs:  Recent Labs   Lab Test 09/11/21  1850   TSH 1.08     Results for orders placed or performed during the hospital encounter of 09/11/21   CT Abdomen Pelvis w Contrast    Narrative    CT ABDOMEN AND PELVIS WITH CONTRAST 9/23/2021 12:19 PM    CLINICAL HISTORY: Total abdominal colectomy. Ulcerative colitis.  Leukocytosis.    TECHNIQUE: CT scan of the abdomen and pelvis was performed following  injection of IV contrast. Multiplanar reformats were obtained. Dose  reduction techniques were used.  CONTRAST: 68mL Isovue-370    COMPARISON: 9/3/2021    FINDINGS:   LOWER CHEST: Normal.    HEPATOBILIARY: Normal.    PANCREAS: Normal.    SPLEEN: Normal.    ADRENAL GLANDS: Normal.    KIDNEYS/BLADDER: Normal.    BOWEL: Postoperative changes of colectomy with ileal J-pouch creation  and a right lower quadrant diverting ileostomy. A surgical drain  remains in place. There are tiny foci of gas over the surface of the  liver, likely related to recent surgery and indwelling drain. No  intra-abdominal abscess demonstrated. No ascites.    PELVIC ORGANS: Normal.    ADDITIONAL FINDINGS: No adenopathy.    MUSCULOSKELETAL: Normal.      Impression    IMPRESSION:   1.  No acute abnormality demonstrated. No  evidence of abscess.    EV MANZO MD         SYSTEM ID:  [id number]   Echocardiogram Complete     Value    LVEF  65-70%    [facility]    [id number]  [id number]  [id number]  [id number]^KATIANA^CHELLE^CLYDE     [facility]  Echocardiography Laboratory  [address] [place] [address]     Name: RENNY AN  MRN: [id number]  : 1995  Study Date: 2021 07:40 AM  Age: 25 yrs  Gender: Male  Patient Location: [facility]  Reason For Study: Syncope  Ordering Physician: CHELLE SAAB  Performed By: Nagi Narayan RDCS     BSA: 1.8 m2  Height: 73 in  Weight: 135 lb  HR: 134  BP: 116/66 mmHg  ______________________________________________________________________________  Procedure  Complete Echo Adult.  ______________________________________________________________________________  Interpretation Summary     Normal transthoracic echocardiogram     No prior study available for comparison.  ______________________________________________________________________________  Left Ventricle  The left ventricle is normal in size. There is normal left ventricular wall  thickness. Hyperdynamic left ventricular function. The visual ejection  fraction is 65-70%. Left ventricular diastolic function is normal. Normal left  ventricular wall motion.     Right Ventricle  The right ventricle is normal size. The right ventricular systolic function is  normal.     Atria  Normal left atrial size. Right atrial size is normal. There is no color  Doppler evidence of an atrial shunt.     Mitral Valve  The mitral valve leaflets appear normal. There is no evidence of stenosis,  fluttering, or prolapse. There is physiologic mitral regurgitation. There is  no mitral valve stenosis.     Tricuspid Valve  Normal tricuspid valve. There is trace tricuspid regurgitation. The right  ventricular systolic pressure is approximated at 24.1 mmHg plus the right  atrial pressure. IVC diameter <2.1 cm collapsing >50% with sniff suggests  a  normal RA pressure of 3 mmHg.     Aortic Valve  The aortic valve is not well visualized. No aortic regurgitation is present.  No aortic stenosis is present.     Pulmonic Valve  The pulmonic valve is not well visualized.     Vessels  The aortic root is normal size.     Pericardium  There is no pericardial effusion.     ______________________________________________________________________________  MMode/2D Measurements & Calculations  IVSd: 0.72 cm  LVIDd: 4.6 cm  LVIDs: 2.9 cm  LVPWd: 0.90 cm  FS: 36.2 %     LV mass(C)d: 121.1 grams  LV mass(C)dI: 66.5 grams/m2  Ao root diam: 2.8 cm  LA dimension: 3.0 cm  LA/Ao: 1.1  LA Volume (BP): 20.3 ml  LA Volume Index (BP): 11.2 ml/m2  RWT: 0.39     Doppler Measurements & Calculations  MV E max jayy: 102.8 cm/sec  MV A max jayy: 62.6 cm/sec  MV E/A: 1.6  MV dec time: 0.20 sec     TR max jayy: 245.7 cm/sec  TR max P.1 mmHg  E/E' av.6  Lateral E/e': 5.0  Medial E/e': 6.3     ______________________________________________________________________________  Report approved by: MD Chadwick Santos 2021 09:40 AM                   Disclaimer: This note consists of symbols derived from keyboarding, dictation and/or voice recognition software. As a result, there may be errors in the script that have gone undetected. Please consider this when interpreting information found in this chart.

## 2021-09-24 NOTE — PROGRESS NOTES
Care Management Discharge Note    Discharge Date: 09/24/2021       Discharge Disposition:  W/parents and HC [facility].     Discharge Services:  WOC RN    Discharge DME:  none    Discharge Transportation:  mom    Education Provided on the Discharge Plan:  Pt and mom  Persons Notified of Discharge Plans: Pt and Mom  Patient/Family in Agreement with the Plan:  yes    Additional Information:  [facility] Intake notified of discharge.  Orders faxed:       Phone: [phone] Desha[place]. Fax: [phone]    Pt and Mom agreeable.    Earlene Pat RN BSN   Inpatient Care Coordination  [facility]  [phone]        Stefania Pat, RN

## 2021-09-24 NOTE — PLAN OF CARE
Abd disconfort controlled with tyleonl. Pt up indep in room & greco. Marie regular diet. Voiding without difficulty. Good stool output per ostomy- pt emptying with 1 assist. MIRZA output 60ml. Reviewed how to empty MIRZA in case pt has to go home with it. Pt's dad gave the lovenox shot this evening with proper technique.

## 2021-09-24 NOTE — PROGRESS NOTES
Pt to D/C to home.  Pt provided with d/c instructions, including new medications, when medications were last given, and when to take them again.  Pt also informed to f/u with CRS as scheduled.  Pt verbalized understanding of all d/c and f/u instructions.  All questions were answered at this time.  Copy of paperwork sent with pt.  Medication x 3 sent with pt.  Mother to provide transport.  All personal belongings sent with pt.

## 2021-09-24 NOTE — PROGRESS NOTES
WO Nurse Inpatient [facility]   WO Nurse Inpatient Adult Ostomy Assessment    Follow Up Assessment   Assessment of new end Ileostomy Stoma complication(s) none   Mucocutaneous junction; intact   Peristomal complication(s) none   Pouch wear time:3-4 days,   Following today's visit:Patient / parents are able to demonstrate;       1. How to empty their pouch? yes      2. How to change their pouch?  Yes with minimal assist      3. How to read and record intake and output correctly? Yes     Objective data:  Patient history according to medical record: Patient s/p laparoscopic total abdominal colectomy with end ileostomy on 9/17/21 with Dr. Flaherty  Current Diet/Nutrition: Orders Placed This Encounter      Regular Diet Adult      Diet     TPN no   I/O last 3 completed shifts:  In: -   Out: 2870 [Urine:1150; Drains:270; Stool:1400; Blood:50]  Labs:    Recent Labs   Lab 09/24/21  0908   HGB 8.4*   WBC 20.8*        Physical Exam:  Current pouching system:Ararat 1pc flat lock n roll pouch   Reason for pouch change today: education  Stoma appearance: healthy and pink  Stoma size: 1 3/8 inches, protrudes 3cm  Peristomal skin: intact   Stoma output : brown and liquid and pasty    Abdominal  Assessment  firm , NG still in place? No  Surgical Site: open to air  Pain: Dull ache  Is patient still on a PCA No    Interventions:  Patient's chart evaluated.  Focus of today's visit: Activity, bathing, clothing, diet, fluids.  Participant of teaching session today patient  and parent  Orders: Reviewed  Change made with ostomy management today: No  Patient/family: active participation and performed with minimal verbal cues  Supplies:at bedside,     Plan:  Learning needs: changing the pouch, documenting output, urine, intake, ordering supplies through insurance monthly   Preparation for discharge:  instructions added in EPIC  Recommend home care? yes    Discussed plan of care with Patient, Family and Nurse  Nursing to  notify the Provider(s) and re-consult the WOC Nurse if new ostomy concerns or discharge planned before next planned WOC visit.    WOC Nurse will return: Daily (M-F)  Face to face time: 45 minutes    Levy Tam RN, CWOCN

## 2021-09-24 NOTE — PLAN OF CARE
To Do:  End of Shift Summary  For vital signs and complete assessments, please see documentation flowsheets.     Pertinent assessments: VSS. Tenderness in abdomen, tylenol given. Will continue to monitor    Major Shift Events uneventful    Treatment Plan: possible discharge today    Bedside Nurse: Florina Vogt RN

## 2021-09-29 LAB — BACTERIA WND CULT: ABNORMAL

## 2021-10-02 NOTE — PLAN OF CARE
PRIMARY DIAGNOSIS: UC FLARE / FATIGUE / FAINTING SPELL  OUTPATIENT/OBSERVATION GOALS TO BE MET BEFORE DISCHARGE:  1. Orthostatic performed: N/A    2. Diagnostic testing complete & at baseline neurologic testing: No    3. Cleared by consultants (if involved): No    4. Interpretation of cardiac rhythm per telemetry tech: Sinus tach with     5. Tolerating adequate PO diet and medications: Yes    6. Return to near baseline physical activity or neurologic status: Yes     Vitals are Temp: (!) 101.5  F (38.6  C) Temp src: Oral BP: 109/65 Pulse: 113   Resp: 18 SpO2: 94 %.    Patient is Alert and Oriented x4. They are SBA with no assistive devices. Pt has regular diet ordered. But patient states he is on a low fiber / gluten free / dairy free diet due to his UC. They are denying pain. Patient has Normal Saline 0.9% running at 100 mL per hour. Plan for echo in AM, monitor stools overnight, and recheck labs.      Discharge Planner Nurse   Safe discharge environment identified: Yes  Barriers to discharge: Yes       Entered by: Barbara Lucio 09/12/2021 4:29 AM     Please review provider order for any additional goals.   Nurse to notify provider when observation goals have been met and patient is ready for discharge.   Name band;

## 2021-10-07 ENCOUNTER — VIRTUAL VISIT (OUTPATIENT)
Dept: FAMILY MEDICINE | Facility: CLINIC | Age: 26
End: 2021-10-07
Payer: COMMERCIAL

## 2021-10-07 DIAGNOSIS — K51.919 ULCERATIVE COLITIS WITH COMPLICATION, UNSPECIFIED LOCATION (H): Primary | ICD-10-CM

## 2021-10-07 PROCEDURE — 99213 OFFICE O/P EST LOW 20 MIN: CPT | Mod: GT | Performed by: PHYSICIAN ASSISTANT

## 2021-10-07 NOTE — PROGRESS NOTES
Jb is a 26 year old who is being evaluated via a billable video visit.      How would you like to obtain your AVS? MyChart  If the video visit is dropped, the invitation should be resent by: Text to cell phone: [phone]  Will anyone else be joining your video visit? No    Video Start Time: 12:32    Assessment & Plan     Ulcerative colitis with complication, unspecified location (H)  Improved from surgery and tolerating his care plan well. No current concerns. Recommending maintaining plan and following up in person with likely future preoperative exams. Once operations are complete, present for annual wellness exams annually.         No follow-ups on file.    Marko Rubalcava PA-C  [facility]   Jb is a 26 year old who presents for the following health issues     History of Present Illness       He eats 4 or more servings of fruits and vegetables daily.He consumes 0 sweetened beverage(s) daily.He exercises with enough effort to increase his heart rate 60 or more minutes per day.  He exercises with enough effort to increase his heart rate 7 days per week.   He is taking medications regularly.       New Patient/Transfer of Care    In summary, patient was diagnosed with ulcerative colitis in 2019. In sept, patient tessa    Saw colorectal surgery on 10/5/21 for post op appts. Plans to follow up with them in January for evaluation for next surgical steps. Plan is a 3 step surgical process which can take 9-12 months to return to normal BMS. Currently has ostomy bag and awaiting return to baseline weight, which he has gained ~30 lbs since hospitalization and tolerating 10 small meals a day.     Has seen [facility] for ulcerative colitis since discharge. Dr. Hopkins was recommended by colorectal surgery. Has not seen them and plans to establish care with him in the coming weeks.     Patient has signed ROIs for his pediatrician as well as GI and colorectal to be faxed to clinic.  "Not currently available to review, but states is up to date on all routine vaccines and plans to get the flu shot and covid booster after he is off his current steroid course which ends in 2 weeks.        In general from surgery, feels well. No concerns with abdominal pain, fever, blood in stool and as above weight has improved. No concerns with colostomy bag or care of this. Overall, happy with care and future care plans.         Review of Systems   Constitutional, HEENT, cardiovascular, pulmonary, GI, , musculoskeletal, neuro, skin, endocrine and psych systems are negative, except as otherwise noted.      Objective    Vitals - Patient Reported  Systolic (Patient Reported): 111  Diastolic (Patient Reported): 59  Weight (Patient Reported): 60.5 kg (133 lb 6.4 oz)  Height (Patient Reported): 601 cm (19' 8.61\")  BMI (Based on Pt Reported Ht/Wt): 1.68      Vitals:  No vitals were obtained today due to virtual visit.    Physical Exam   GENERAL: Healthy, alert and no distress  EYES: Eyes grossly normal to inspection.  No discharge or erythema, or obvious scleral/conjunctival abnormalities.  RESP: No audible wheeze, cough, or visible cyanosis.  No visible retractions or increased work of breathing.    SKIN: Visible skin clear. No significant rash, abnormal pigmentation or lesions.  NEURO: Cranial nerves grossly intact.  Mentation and speech appropriate for age.  PSYCH: Mentation appears normal, affect normal/bright, judgement and insight intact, normal speech and appearance well-groomed.            Video-Visit Details    Type of service:  Video Visit    Video End Time:1:04 PM    Originating Location (pt. Location): Home    Distant Location (provider location):  [facility]     Platform used for Video Visit: Jennifer"

## 2021-10-08 ENCOUNTER — HOSPITAL ENCOUNTER (OUTPATIENT)
Dept: WOUND CARE | Facility: CLINIC | Age: 26
Discharge: HOME | End: 2021-10-08
Attending: PHYSICIAN ASSISTANT | Admitting: PHYSICIAN ASSISTANT
Payer: COMMERCIAL

## 2021-10-08 PROCEDURE — G0463 HOSPITAL OUTPT CLINIC VISIT: HCPCS

## 2021-10-08 NOTE — DISCHARGE INSTRUCTIONS
OUTPATIENT OSTOMY INSTRUCTIONS                                                                        Type of Stoma: Ileostomy         Supplier: [employer]      Equipment:    Product # Brand Description   Pouch 13386 Coloplast 1 pc 1 3/8 inch flat         Ring 8885 Aquilino Adapt   No Sting 3344 3M Cavilon No Sting   Deodorizer 7715 Western M9 drops   Elastic barrier strips 560596 Coloplast Elastic barrier strips               Optional pouch 73850 Western 1 pc 1 3/8 inch flat        Procedure:  Close the bottom of the pouch  and then remove backings from adhesive surfaces.  Remove the soiled pouch and discard.  Wash skin with water and dry.    Then: Apply No-Sting around stoma  Then: Apply Ring/Paste: Around stoma.               Then: Apply pouching system to stoma site and hold in place for 2-5 minutes.     ______________________________________________________________________________    Pouch Change: Twice weekly       WOC Nurse Specialist: Levy Tam RN CWOCN      Questions: Cristiane [phone] ()      Follow-up Appointment: as needed. Please call [facility] [phone] () to request an appointment.    I have received a copy of these instructions, have had an opportunity to ask questions and have had  them answered to my satisfaction.    ________________________________________________________________________________  Patient Signature and Date

## 2021-10-08 NOTE — PROGRESS NOTES
OUTPATIENT OSTOMY ASSESSMENT    INTAKE  Type of Stoma: Temporary Ileostomy  Anticipated date of takedown: possible 2nd stage of j-pouch in January    Diagnosis Pertinent to Stoma: U.C.   Type of Surgery: Ileo   Surgery Date: 9/17/21  Surgeon:[facility]: Cristiane    Purpose of this visit: Checkup:2 week    Pertinent Information: Patient comes in for routine follow up, patient reports some occasional redness under tape border, this has improved since using No Sting    Present for Teaching Session: Patient and Family Member   Present: EDDIE      Current Equipment:    Product # Brand Description   Pouch 8531 Walker 1 pc CTF flat         Ring/Paste 7805 Walker Adapt     Powder      Liquid Skin Barrier 3344 Other:3M Cavilon No Sting                 Pouch Change Frequency: twice a week  Provider of Care: Emptying: self Pouch Change: self    ASSESSMENT  Stoma Size: Round 1 1/4 inches  Protrusion: 2.5cm  Stoma Appearance: Pink and Granulomas-3 at 2-4 o'clock, not bleeding  Mucocutaneous Juncture: Separation (slight extending out 0.1cm from 10-11 o'clock) and Sutures Present   Peristomal Skin: Intact    TREATMENT  Applied No Sting Skin barrier    INTERVENTIONS  Refitting done, Educated on peristomal skin treatment and Educated on pouch change procedure    INSTRUCTIONS GIVEN  WOC Role, Activity, Anatomy, Bathing: Ostomy supplies are waterproof., Clothing, Diet, Fluids: Drink 6-8 glasses of fluids daily , Pouching Products: Showed pouching system  and Ordering supplies    PLAN  Change in Supplies: See Outpatient Ostomy Instructions      Total Time Spent with Patient: 50 minutes

## 2021-10-21 ENCOUNTER — TELEPHONE (OUTPATIENT)
Dept: FAMILY MEDICINE | Facility: CLINIC | Age: 26
End: 2021-10-21

## 2021-10-21 NOTE — TELEPHONE ENCOUNTER
Has some lumps on his stoma and they come and they go. He states when he puts a warm wash cloth on his stoma they have disappeared. Wondering if it is necessary to get them burned off? We can do that in clinic but if they are not bleeding we would not do that. IT is possible that the stoma is in peristalsis or reacting to being cleansed. He has one on his side and will see how that behaves. He understood and had no further questions.

## 2021-10-27 NOTE — DISCHARGE INSTRUCTIONS
"OUTPATIENT OSTOMY INSTRUCTIONS                                                                        Type of Stoma: Ileostomy         Supplier: [employer]      Equipment:    Product # Brand Description   Pouch 36273 Coloplast 1 pc 1 3/8\" flat         Ring 7805 Wellington Adapt   No Sting 3344 3M Cavilon No Sting   Elastic Barrier Strips 76915 Coloplast Elastic Barrier Strips   Brava Lubricating Deodorant 09240 Coloplast Brava lubricating Deodorant         Deodorizer 7715 Wellington M9 Drops   Optional pouch 61505 Wellington 1 pc 1 3/8 flat        Procedure:  Close the bottom of the pouch.  If needed cut the opening of your pouch to the correct size (follow pattern or 1/8 inch larger than stoma) and then remove backings from adhesive surfaces.  Remove the soiled pouch and discard.  Wash skin with water and dry.    Then: Apply No-Sting over powdered area.  Then: Apply Ring/Paste: Around stoma.               Then: Apply pouching system to stoma site and hold in place for 2-5 minutes.     ______________________________________________________________________________    Pouch Change: Twice weekly       WOC Nurse Specialist: Rabia Fuchs RN CWOCN  Questions: Cristiane [phone] ()      Follow-up Appointment: as needed. Please call [facility] [phone] () to request an appointment.        "

## 2021-10-27 NOTE — CONSULTS
"OUTPATIENT OSTOMY ASSESSMENT     INTAKE  Type of Stoma: Temporary Ileostomy  Anticipated date of takedown: possible 2nd stage of j-pouch in January     Diagnosis Pertinent to Stoma: U.C.      Type of Surgery: Ileo                          Surgery Date: 9/17/21  Surgeon:[facility]: Cristiane     Purpose of this visit: Checkup: Follow up for bumps concerns on intestinal lining of stoma     Pertinent Information: Patient comes in for routine follow up, patient wants to ensure that the bumps on the intestinal lining of his stoma are ok.           Present for Teaching Session: Patient and Family Member   Present: EDDIE        Current Equipment:       Product # Brand Description   Pouch 23056 Coloplast 1 pc 1 3/8\" flat         Ring 7805 Aquilino Adapt   No Sting 3344 3M Cavilon No Sting   Elastic Barrier Strips 87900 Coloplast Elastic Barrier Strips               Deodorizer 7715 Aquilino M9 Drops   Optional pouch 52112 Aquilino 1 pc 1 3/8 flat          Pouch Change Frequency: twice a week  Provider of Care: Emptying: self         Pouch Change: self     ASSESSMENT  Stoma Size: Round 1 1/4 inches  Protrusion: 2.5cm  Stoma Appearance: Pink with sporadic small intact granulomas on the stoma and Granuloma at 2 o'clock, not bleeding  Mucocutaneous Juncture:  (slight extending out 0.1cm from 10- 1 o'clock) healed  Peristomal Skin: Intact     TREATMENT  Pouch lubricating deodorant for pancaking patient is desribing     INTERVENTIONS  No refitting needed, Educated on peristomal skin treatment and discussion on granulomas.      INSTRUCTIONS GIVEN  Pouching Products: Showed pouching system  and Ordering supplies     PLAN  Change in Supplies: See Outpatient Ostomy Instructions        Total Time Spent with Patient: 20 minutes    Rabia Fuchs RN CWOCN       "

## 2021-10-28 ENCOUNTER — HOSPITAL ENCOUNTER (OUTPATIENT)
Dept: WOUND CARE | Facility: CLINIC | Age: 26
End: 2021-10-28
Attending: COLON & RECTAL SURGERY
Payer: COMMERCIAL

## 2021-11-13 ENCOUNTER — HEALTH MAINTENANCE LETTER (OUTPATIENT)
Age: 26
End: 2021-11-13

## 2021-12-22 ENCOUNTER — TELEPHONE (OUTPATIENT)
Dept: WOUND CARE | Facility: CLINIC | Age: 26
End: 2021-12-22
Payer: COMMERCIAL

## 2021-12-22 NOTE — TELEPHONE ENCOUNTER
Patient called with with skin irritation under part of tape border.  Is not under all of tape border.  Recommended using No Sting where tape border is for a couple pouch changes and see if this improves it.  If not recommend follow up in clinic.  Patient agrees.

## 2021-12-30 ENCOUNTER — MYC MEDICAL ADVICE (OUTPATIENT)
Dept: FAMILY MEDICINE | Facility: CLINIC | Age: 26
End: 2021-12-30
Payer: COMMERCIAL

## 2021-12-30 DIAGNOSIS — Z13.1 SCREENING FOR DIABETES MELLITUS: ICD-10-CM

## 2021-12-30 DIAGNOSIS — D50.8 OTHER IRON DEFICIENCY ANEMIA: ICD-10-CM

## 2021-12-30 DIAGNOSIS — L65.9 HAIR LOSS: Primary | ICD-10-CM

## 2021-12-30 DIAGNOSIS — K51.919 ULCERATIVE COLITIS WITH COMPLICATION, UNSPECIFIED LOCATION (H): ICD-10-CM

## 2021-12-31 ENCOUNTER — LAB (OUTPATIENT)
Dept: LAB | Facility: CLINIC | Age: 26
End: 2021-12-31
Payer: COMMERCIAL

## 2021-12-31 DIAGNOSIS — L65.9 HAIR LOSS: ICD-10-CM

## 2021-12-31 DIAGNOSIS — K51.919 ULCERATIVE COLITIS WITH COMPLICATION, UNSPECIFIED LOCATION (H): ICD-10-CM

## 2021-12-31 LAB
ERYTHROCYTE [DISTWIDTH] IN BLOOD BY AUTOMATED COUNT: 17.2 % (ref 10–15)
FOLATE SERPL-MCNC: 10.8 NG/ML
HCT VFR BLD AUTO: 36.7 % (ref 40–53)
HGB BLD-MCNC: 10.6 G/DL (ref 13.3–17.7)
MCH RBC QN AUTO: 20.5 PG (ref 26.5–33)
MCHC RBC AUTO-ENTMCNC: 28.9 G/DL (ref 31.5–36.5)
MCV RBC AUTO: 71 FL (ref 78–100)
PLATELET # BLD AUTO: 437 10E3/UL (ref 150–450)
RBC # BLD AUTO: 5.16 10E6/UL (ref 4.4–5.9)
VIT B12 SERPL-MCNC: 559 PG/ML (ref 193–986)
WBC # BLD AUTO: 10 10E3/UL (ref 4–11)

## 2021-12-31 PROCEDURE — 84630 ASSAY OF ZINC: CPT | Mod: 90

## 2021-12-31 PROCEDURE — 82746 ASSAY OF FOLIC ACID SERUM: CPT

## 2021-12-31 PROCEDURE — 83550 IRON BINDING TEST: CPT

## 2021-12-31 PROCEDURE — 82306 VITAMIN D 25 HYDROXY: CPT

## 2021-12-31 PROCEDURE — 82607 VITAMIN B-12: CPT

## 2021-12-31 PROCEDURE — 84443 ASSAY THYROID STIM HORMONE: CPT

## 2021-12-31 PROCEDURE — 84590 ASSAY OF VITAMIN A: CPT | Mod: 90

## 2021-12-31 PROCEDURE — 84446 ASSAY OF VITAMIN E: CPT | Mod: 90

## 2021-12-31 PROCEDURE — 36415 COLL VENOUS BLD VENIPUNCTURE: CPT

## 2021-12-31 PROCEDURE — 85027 COMPLETE CBC AUTOMATED: CPT

## 2021-12-31 PROCEDURE — 99000 SPECIMEN HANDLING OFFICE-LAB: CPT

## 2021-12-31 PROCEDURE — 84255 ASSAY OF SELENIUM: CPT | Mod: 90

## 2022-01-03 PROBLEM — D50.8 OTHER IRON DEFICIENCY ANEMIA: Status: ACTIVE | Noted: 2022-01-03

## 2022-01-03 RX ORDER — FERROUS SULFATE 325(65) MG
325 TABLET ORAL
COMMUNITY
Start: 2022-01-03 | End: 2022-08-21

## 2022-01-04 ENCOUNTER — TRANSFERRED RECORDS (OUTPATIENT)
Dept: HEALTH INFORMATION MANAGEMENT | Facility: CLINIC | Age: 27
End: 2022-01-04
Payer: COMMERCIAL

## 2022-01-09 ENCOUNTER — MYC MEDICAL ADVICE (OUTPATIENT)
Dept: FAMILY MEDICINE | Facility: CLINIC | Age: 27
End: 2022-01-09
Payer: COMMERCIAL

## 2022-01-09 DIAGNOSIS — D50.8 OTHER IRON DEFICIENCY ANEMIA: Primary | ICD-10-CM

## 2022-01-10 NOTE — TELEPHONE ENCOUNTER
See Mychart message, please review and advise plan, pt has appointment for sigmoidoscopy next month with gastro, has appointment next week with you, route to inform pt of plan and if E visit or virtual visit needed    Appointments in Next Year    Jan 19, 2022  9:00 AM  (Arrive by 8:40 AM)  Provider Visit with Marko Rubalcava PA-C  [facility] ([facility] ) [phone]   Feb 11, 2022 10:30 AM  (Arrive by 10:10 AM)  Pre-Operative Physical with Marko Rubalcava PA-C  [facility] ([facility] ) [phone]   Feb 22, 2022  7:40 AM  (Arrive by 7:25 AM)  XR COLON WATER SOLUBLE DIAGNOSTIC with [facility]CCXR1, [facility], [facility]  [facility] ([facility] ) [phone]        Obdulia Ortez, RN, BSN  [facility]

## 2022-01-11 LAB
ERYTHROCYTE [DISTWIDTH] IN BLOOD BY AUTOMATED COUNT: 18.9 % (ref 10–15)
HCT VFR BLD AUTO: 39.2 % (ref 40–53)
HGB BLD-MCNC: 11.4 G/DL (ref 13.3–17.7)
MCH RBC QN AUTO: 20.6 PG (ref 26.5–33)
MCHC RBC AUTO-ENTMCNC: 29.1 G/DL (ref 31.5–36.5)
MCV RBC AUTO: 71 FL (ref 78–100)
PLATELET # BLD AUTO: 422 10E3/UL (ref 150–450)
RBC # BLD AUTO: 5.53 10E6/UL (ref 4.4–5.9)
WBC # BLD AUTO: 6.6 10E3/UL (ref 4–11)

## 2022-01-11 PROCEDURE — 85027 COMPLETE CBC AUTOMATED: CPT | Performed by: PHYSICIAN ASSISTANT

## 2022-01-11 PROCEDURE — 36415 COLL VENOUS BLD VENIPUNCTURE: CPT | Performed by: PHYSICIAN ASSISTANT

## 2022-01-14 LAB — HEMOCCULT STL QL IA: NEGATIVE

## 2022-01-14 PROCEDURE — 82274 ASSAY TEST FOR BLOOD FECAL: CPT | Performed by: PHYSICIAN ASSISTANT

## 2022-01-19 ENCOUNTER — TELEPHONE (OUTPATIENT)
Dept: WOUND CARE | Facility: CLINIC | Age: 27
End: 2022-01-19
Payer: COMMERCIAL

## 2022-01-19 NOTE — TELEPHONE ENCOUNTER
Patient reports still having some irritation under tape border (only one small spot) which heals but then reopens.  Recommended trying part a barrier strip to area before tape border.  Patient will try and if not improving he will call and make appointment.

## 2022-02-04 ENCOUNTER — TRANSFERRED RECORDS (OUTPATIENT)
Dept: HEALTH INFORMATION MANAGEMENT | Facility: CLINIC | Age: 27
End: 2022-02-04
Payer: COMMERCIAL

## 2022-02-05 DIAGNOSIS — Z11.59 ENCOUNTER FOR SCREENING FOR OTHER VIRAL DISEASES: Primary | ICD-10-CM

## 2022-02-09 DIAGNOSIS — Z11.59 ENCOUNTER FOR SCREENING FOR OTHER VIRAL DISEASES: Primary | ICD-10-CM

## 2022-02-11 ENCOUNTER — OFFICE VISIT (OUTPATIENT)
Dept: FAMILY MEDICINE | Facility: CLINIC | Age: 27
End: 2022-02-11
Payer: COMMERCIAL

## 2022-02-11 VITALS
OXYGEN SATURATION: 99 % | BODY MASS INDEX: 21.73 KG/M2 | WEIGHT: 164.7 LBS | DIASTOLIC BLOOD PRESSURE: 72 MMHG | TEMPERATURE: 98.5 F | RESPIRATION RATE: 16 BRPM | SYSTOLIC BLOOD PRESSURE: 110 MMHG | HEART RATE: 53 BPM

## 2022-02-11 DIAGNOSIS — Z93.2 ILEOSTOMY IN PLACE (H): ICD-10-CM

## 2022-02-11 DIAGNOSIS — K51.919 ULCERATIVE COLITIS WITH COMPLICATION, UNSPECIFIED LOCATION (H): ICD-10-CM

## 2022-02-11 DIAGNOSIS — Z01.818 PREOP GENERAL PHYSICAL EXAM: Primary | ICD-10-CM

## 2022-02-11 LAB
ERYTHROCYTE [DISTWIDTH] IN BLOOD BY AUTOMATED COUNT: 23.6 % (ref 10–15)
HCT VFR BLD AUTO: 47.9 % (ref 40–53)
HGB BLD-MCNC: 13.6 G/DL (ref 13.3–17.7)
MCH RBC QN AUTO: 22.1 PG (ref 26.5–33)
MCHC RBC AUTO-ENTMCNC: 28.4 G/DL (ref 31.5–36.5)
MCV RBC AUTO: 78 FL (ref 78–100)
PLATELET # BLD AUTO: 352 10E3/UL (ref 150–450)
RBC # BLD AUTO: 6.15 10E6/UL (ref 4.4–5.9)
WBC # BLD AUTO: 6.6 10E3/UL (ref 4–11)

## 2022-02-11 PROCEDURE — 36415 COLL VENOUS BLD VENIPUNCTURE: CPT | Performed by: PHYSICIAN ASSISTANT

## 2022-02-11 PROCEDURE — 99214 OFFICE O/P EST MOD 30 MIN: CPT | Performed by: PHYSICIAN ASSISTANT

## 2022-02-11 PROCEDURE — 85027 COMPLETE CBC AUTOMATED: CPT | Performed by: PHYSICIAN ASSISTANT

## 2022-02-11 SDOH — ECONOMIC STABILITY: INCOME INSECURITY: HOW HARD IS IT FOR YOU TO PAY FOR THE VERY BASICS LIKE FOOD, HOUSING, MEDICAL CARE, AND HEATING?: NOT HARD AT ALL

## 2022-02-11 SDOH — ECONOMIC STABILITY: INCOME INSECURITY: IN THE LAST 12 MONTHS, WAS THERE A TIME WHEN YOU WERE NOT ABLE TO PAY THE MORTGAGE OR RENT ON TIME?: NO

## 2022-02-11 SDOH — ECONOMIC STABILITY: TRANSPORTATION INSECURITY
IN THE PAST 12 MONTHS, HAS LACK OF TRANSPORTATION KEPT YOU FROM MEETINGS, WORK, OR FROM GETTING THINGS NEEDED FOR DAILY LIVING?: NO

## 2022-02-11 SDOH — HEALTH STABILITY: PHYSICAL HEALTH: ON AVERAGE, HOW MANY DAYS PER WEEK DO YOU ENGAGE IN MODERATE TO STRENUOUS EXERCISE (LIKE A BRISK WALK)?: 7 DAYS

## 2022-02-11 SDOH — ECONOMIC STABILITY: FOOD INSECURITY: WITHIN THE PAST 12 MONTHS, THE FOOD YOU BOUGHT JUST DIDN'T LAST AND YOU DIDN'T HAVE MONEY TO GET MORE.: NEVER TRUE

## 2022-02-11 SDOH — HEALTH STABILITY: PHYSICAL HEALTH: ON AVERAGE, HOW MANY MINUTES DO YOU ENGAGE IN EXERCISE AT THIS LEVEL?: 110 MIN

## 2022-02-11 SDOH — ECONOMIC STABILITY: TRANSPORTATION INSECURITY
IN THE PAST 12 MONTHS, HAS THE LACK OF TRANSPORTATION KEPT YOU FROM MEDICAL APPOINTMENTS OR FROM GETTING MEDICATIONS?: NO

## 2022-02-11 SDOH — ECONOMIC STABILITY: FOOD INSECURITY: WITHIN THE PAST 12 MONTHS, YOU WORRIED THAT YOUR FOOD WOULD RUN OUT BEFORE YOU GOT MONEY TO BUY MORE.: NEVER TRUE

## 2022-02-11 ASSESSMENT — ANXIETY QUESTIONNAIRES
3. WORRYING TOO MUCH ABOUT DIFFERENT THINGS: NOT AT ALL
GAD7 TOTAL SCORE: 0
5. BEING SO RESTLESS THAT IT IS HARD TO SIT STILL: NOT AT ALL
7. FEELING AFRAID AS IF SOMETHING AWFUL MIGHT HAPPEN: NOT AT ALL
GAD7 TOTAL SCORE: 0
7. FEELING AFRAID AS IF SOMETHING AWFUL MIGHT HAPPEN: NOT AT ALL
GAD7 TOTAL SCORE: 0
2. NOT BEING ABLE TO STOP OR CONTROL WORRYING: NOT AT ALL
1. FEELING NERVOUS, ANXIOUS, OR ON EDGE: NOT AT ALL
6. BECOMING EASILY ANNOYED OR IRRITABLE: NOT AT ALL
4. TROUBLE RELAXING: NOT AT ALL

## 2022-02-11 ASSESSMENT — SOCIAL DETERMINANTS OF HEALTH (SDOH)
ARE YOU MARRIED, WIDOWED, DIVORCED, SEPARATED, NEVER MARRIED, OR LIVING WITH A PARTNER?: NEVER MARRIED
HOW OFTEN DO YOU ATTEND CHURCH OR RELIGIOUS SERVICES?: NEVER
DO YOU BELONG TO ANY CLUBS OR ORGANIZATIONS SUCH AS CHURCH GROUPS UNIONS, FRATERNAL OR ATHLETIC GROUPS, OR SCHOOL GROUPS?: YES
IN A TYPICAL WEEK, HOW MANY TIMES DO YOU TALK ON THE PHONE WITH FAMILY, FRIENDS, OR NEIGHBORS?: NEVER
HOW OFTEN DO YOU GET TOGETHER WITH FRIENDS OR RELATIVES?: MORE THAN THREE TIMES A WEEK

## 2022-02-11 ASSESSMENT — LIFESTYLE VARIABLES
HOW OFTEN DO YOU HAVE SIX OR MORE DRINKS ON ONE OCCASION: NEVER
HOW MANY STANDARD DRINKS CONTAINING ALCOHOL DO YOU HAVE ON A TYPICAL DAY: 3 OR 4
HOW OFTEN DO YOU HAVE A DRINK CONTAINING ALCOHOL: 2-4 TIMES A MONTH

## 2022-02-11 ASSESSMENT — PATIENT HEALTH QUESTIONNAIRE - PHQ9
10. IF YOU CHECKED OFF ANY PROBLEMS, HOW DIFFICULT HAVE THESE PROBLEMS MADE IT FOR YOU TO DO YOUR WORK, TAKE CARE OF THINGS AT HOME, OR GET ALONG WITH OTHER PEOPLE: NOT DIFFICULT AT ALL
SUM OF ALL RESPONSES TO PHQ QUESTIONS 1-9: 0
SUM OF ALL RESPONSES TO PHQ QUESTIONS 1-9: 0

## 2022-02-11 NOTE — PROGRESS NOTES
[facility]  [address] [place] [id number]  Phone: [phone]  Primary Provider: Arlene Green  Pre-op Performing Provider: ARLENE GREEN      PREOPERATIVE EVALUATION:  Today's date: 2/11/2022    Luther Wood is a 26 year old male who presents for a preoperative evaluation.    Surgical Information:  Surgery/Procedure: SIGMOIDOSCOPY, FLEXIBLE crsal, ROBOTIC COMPLETION PROCTOCOLECTOMY ILEAL POUCH ANAL ANASTOMOSIS, PLACEMENT OF BILATERAL URETERAL CATHETERS  Surgery Location: [facility]   Surgeon: Soledad Flaherty MD  Surgery Date: 02/22/2022, 03/02/2022  Time of Surgery: 9:05AM,7:30AM  Where patient plans to recover: At home with family  Fax number for surgical facility: Note does not need to be faxed, will be available electronically in Epic.    Type of Anesthesia Anticipated: General    Assessment & Plan     The proposed surgical procedure is considered INTERMEDIATE risk.    Preop general physical exam  Stable exam. Cleared.   - CBC with platelets; Future  - Honoring Choices Referral; Future  - CBC with platelets    Ileostomy in place (H)  Stable. Surgery to remove    Ulcerative colitis with complication, unspecified location (H)  As above            Risks and Recommendations:  The patient has the following additional risks and recommendations for perioperative complications:   - No identified additional risk factors other than previously addressed    Medication Instructions:  hold iron day of surgery to aid in prevention of constipation    RECOMMENDATION:  APPROVAL GIVEN to proceed with proposed procedure, without further diagnostic evaluation.      }    Subjective     HPI related to upcoming procedure: history of ulcerative colitis s/p ileostomy. The above procedures were deemed the next best steps in management.      Preop Questions 2/11/2022   1. Have you ever had a heart attack or stroke? No   2. Have you ever had surgery on your  heart or blood vessels, such as a stent placement, a coronary artery bypass, or surgery on an artery in your head, neck, heart, or legs? No   3. Do you have chest pain with activity? No   4. Do you have a history of  heart failure? No   5. Do you currently have a cold, bronchitis or symptoms of other infection? No   6. Do you have a cough, shortness of breath, or wheezing? No   7. Do you or anyone in your family have previous history of blood clots? No   8. Do you or does anyone in your family have a serious bleeding problem such as prolonged bleeding following surgeries or cuts? No   9. Have you ever had problems with anemia or been told to take iron pills? YES -    10. Have you had any abnormal blood loss such as black, tarry or bloody stools? No   11. Have you ever had a blood transfusion? YES -    11a. Have you ever had a transfusion reaction? No   12. Are you willing to have a blood transfusion if it is medically needed before, during, or after your surgery? Yes   13. Have you or any of your relatives ever had problems with anesthesia? No   14. Do you have sleep apnea, excessive snoring or daytime drowsiness? No   15. Do you have any artifical heart valves or other implanted medical devices like a pacemaker, defibrillator, or continuous glucose monitor? No   16. Do you have artificial joints? No   17. Are you allergic to latex? No       Health Care Directive:  Patient does not have a Health Care Directive or Living Will: Discussed advance care planning with patient; information given to patient to review.    Preoperative Review of :   reviewed - no record of controlled substances prescribed. recently.       Status of Chronic Conditions:  See problem list for active medical problems.  Problems all longstanding and stable, except as noted/documented.  See ROS for pertinent symptoms related to these conditions.    ANEMIA - Patient has a recent history of moderate-severe anemia, which has not been  symptomatic. Work up to date has revealed   Hemoglobin   Date Value Ref Range Status   02/11/2022 13.6 13.3 - 17.7 g/dL Final   12/07/2009 13.7 11.7 - 15.7 g/dL Final   ]  . Treatment has been as below.       Review of Systems  CONSTITUTIONAL: NEGATIVE for fever, chills, change in weight  INTEGUMENTARY/SKIN: NEGATIVE for worrisome rashes, moles or lesions  EYES: NEGATIVE for vision changes or irritation  ENT/MOUTH: NEGATIVE for ear, mouth and throat problems  RESP: NEGATIVE for significant cough or SOB  CV: NEGATIVE for chest pain, palpitations or peripheral edema  GI: NEGATIVE for nausea, abdominal pain, heartburn, or change in bowel habits  : NEGATIVE for frequency, dysuria, or hematuria  MUSCULOSKELETAL: NEGATIVE for significant arthralgias or myalgia  NEURO: NEGATIVE for weakness, dizziness or paresthesias  ENDOCRINE: NEGATIVE for temperature intolerance, skin/hair changes  HEME: NEGATIVE for bleeding problems  PSYCHIATRIC: NEGATIVE for changes in mood or affect    Patient Active Problem List    Diagnosis Date Noted     Ileostomy in place (H) 02/11/2022     Priority: Medium     Other iron deficiency anemia 01/03/2022     Priority: Medium     Hyponatremia 09/11/2021     Priority: Medium     Anemia due to blood loss, acute 09/11/2021     Priority: Medium     Ulcerative colitis with rectal bleeding, unspecified location (H) 09/11/2021     Priority: Medium     Ulcerative colitis with complication, unspecified location (H) 09/02/2021     Priority: Medium     Acne vulgaris 07/07/2019     Priority: Medium     Tear of right acetabular labrum 07/07/2019     Priority: Medium     Attention deficit disorder 07/07/2019     Priority: Medium      Past Medical History:   Diagnosis Date     Colitis 2019     History of blood transfusion 09/12/2021     Past Surgical History:   Procedure Laterality Date     COLONOSCOPY       ENT SURGERY  2016    wisdom teeth     LAPAROSCOPIC ASSISTED COLECTOMY N/A 9/17/2021    Procedure:  laparoscopic total abdominal colectomy with end ileostomy;  Surgeon: Soledad Flaherty MD;  Location: [facility] OR     LAPAROSCOPIC ILEOSTOMY N/A 9/17/2021    Procedure: Laparoscopic Ileostomy;  Surgeon: Soledad Flaherty MD;  Location: [facility] OR     SIGMOIDOSCOPY FLEXIBLE N/A 9/16/2021    Procedure: SIGMOIDOSCOPY, FLEXIBLE;  Surgeon: Anderson Cardona MD;  Location:  GI     TONSILLECTOMY & ADENOIDECTOMY  2006     Current Outpatient Medications   Medication Sig Dispense Refill     ferrous sulfate (FEROSUL) 325 (65 Fe) MG tablet Take 1 tablet (325 mg) by mouth daily (with breakfast)         Allergies   Allergen Reactions     Dairy Digestive      Gluten Meal         Social History     Tobacco Use     Smoking status: Never Smoker     Smokeless tobacco: Never Used   Substance Use Topics     Alcohol use: Not Currently     Comment: occassional     Family History   Problem Relation Age of Onset     Diabetes Father         type I     History   Drug Use Unknown         Objective     /72   Pulse 53   Temp 98.5  F (36.9  C) (Oral)   Resp 16   Wt 74.7 kg (164 lb 11.2 oz)   SpO2 99%   BMI 21.73 kg/m      Physical Exam    GENERAL APPEARANCE: healthy, alert and no distress     EYES: EOMI,  PERRL     HENT: ear canals and TM's normal and nose and mouth without ulcers or lesions     NECK: no adenopathy, no asymmetry, masses, or scars and thyroid normal to palpation     RESP: lungs clear to auscultation - no rales, rhonchi or wheezes     CV: regular rates and rhythm, normal S1 S2, no S3 or S4 and no murmur, click or rub     ABDOMEN:  ileostomy in place without surrounding erythema, tenderness to palpation or warmth; soft, nontender, no HSM or masses and bowel sounds normal     MS: extremities normal- no gross deformities noted, no evidence of inflammation in joints, FROM in all extremities.     SKIN: no suspicious lesions or rashes     NEURO: Normal strength and tone, sensory exam grossly normal, mentation intact and  speech normal     PSYCH: mentation appears normal. and affect normal/bright     LYMPHATICS: No cervical adenopathy    Recent Labs   Lab Test 01/11/22  1114 12/31/21  1401 09/24/21  0908 09/23/21  0856 09/18/21  0724 09/17/21  0603 09/12/21  0603 09/11/21  1918   HGB 11.4* 10.6* 8.4* 8.6*   < > 8.1*   < > 7.9*    437 590* 600*   < > 565*   < > 529*   INR  --   --   --   --   --  1.33*  --  1.34*   NA  --   --  136 136   < > 132*  131*   < >  --    POTASSIUM  --   --  3.4 3.4   < > 3.9  3.8   < >  --    CR  --   --  0.70 0.74   < > 0.71  0.67   < >  --     < > = values in this interval not displayed.        Diagnostics:  No results found for this or any previous visit (from the past 24 hour(s)).   No EKG required, no history of coronary heart disease, significant arrhythmia, peripheral arterial disease or other structural heart disease.    Revised Cardiac Risk Index (RCRI):  The patient has the following serious cardiovascular risks for perioperative complications:   - No serious cardiac risks = 0 points     RCRI Interpretation: 0 points: Class I (very low risk - 0.4% complication rate)           Signed Electronically by: Marko Rubalcava PA-C  Copy of this evaluation report is provided to requesting physician.

## 2022-02-11 NOTE — PATIENT INSTRUCTIONS
Preparing for Your Surgery  Getting started  A nurse will call you to review your health history and instructions. They will give you an arrival time based on your scheduled surgery time. Please be ready to share:    Your doctor's clinic name and phone number    Your medical, surgical and anesthesia history    A list of allergies and sensitivities    A list of medicines, including herbal treatments and over-the-counter drugs    Whether the patient has a legal guardian (ask how to send us the papers in advance)  Please tell us if you're pregnant--or if there's any chance you might be pregnant. Some surgeries may injure a fetus (unborn baby), so they require a pregnancy test. Surgeries that are safe for a fetus don't always need a test, and you can choose whether to have one.   If you have a child who's having surgery, please ask for a copy of Preparing for Your Child's Surgery.    Preparing for surgery    Within 30 days of surgery: Have a pre-op exam (sometimes called an H&P, or History and Physical). This can be done at a clinic or pre-operative center.  ? If you're having a , you may not need this exam. Talk to your care team.    At your pre-op exam, talk to your care team about all medicines you take. If you need to stop any medicines before surgery, ask when to start taking them again.  ? We do this for your safety. Many medicines can make you bleed too much during surgery. Some change how well surgery (anesthesia) drugs work.    Call your insurance company to let them know you're having surgery. (If you don't have insurance, call [phone].)    Call your clinic if there's any change in your health. This includes signs of a cold or flu (sore throat, runny nose, cough, rash, fever). It also includes a scrape or scratch near the surgery site.    If you have questions on the day of surgery, call your hospital or surgery center.  COVID testing  You may need to be tested for COVID-19 before having  surgery. If so, your surgical team will give you instructions for scheduling this test, separate from your preoperative history and physical.  Eating and drinking guidelines  For your safety: Unless your surgeon tells you otherwise, follow the guidelines below.    Eat and drink as usual until 8 hours before surgery. After that, no food or milk.    Drink clear liquids until 2 hours before surgery. These are liquids you can see through, like water, Gatorade and Propel Water. You may also have black coffee and tea (no cream or milk).    Nothing by mouth within 2 hours of surgery. This includes gum, candy and breath mints.    If you drink alcohol: Stop drinking it the night before surgery.    If your care team tells you to take medicine on the morning of surgery, it's okay to take it with a sip of water.  Preventing infection    Shower or bathe the night before and morning of your surgery. Follow the instructions your clinic gave you. (If no instructions, use regular soap.)    Don't shave or clip hair near your surgery site. We'll remove the hair if needed.    Don't smoke or vape the morning of surgery. You may chew nicotine gum up to 2 hours before surgery. A nicotine patch is okay.  ? Note: Some surgeries require you to completely quit smoking and nicotine. Check with your surgeon.    Your care team will make every effort to keep you safe from infection. We will:  ? Clean our hands often with soap and water (or an alcohol-based hand rub).  ? Clean the skin at your surgery site with a special soap that kills germs.  ? Give you a special gown to keep you warm. (Cold raises the risk of infection.)  ? Wear special hair covers, masks, gowns and gloves during surgery.  ? Give antibiotic medicine, if prescribed. Not all surgeries need antibiotics.  What to bring on the day of surgery    Photo ID and insurance card    Copy of your health care directive, if you have one    Glasses and hearing aides (bring cases)  ? You can't  wear contacts during surgery    Inhaler and eye drops, if you use them (tell us about these when you arrive)    CPAP machine or breathing device, if you use them    A few personal items, if spending the night    If you have . . .  ? A pacemaker, ICD (cardiac defibrillator) or other implant: Bring the ID card.  ? An implanted stimulator: Bring the remote control.  ? A legal guardian: Bring a copy of the certified (court-stamped) guardianship papers.  Please remove any jewelry, including body piercings. Leave jewelry and other valuables at home.  If you're going home the day of surgery    You must have a responsible adult drive you home. They should stay with you overnight as well.    If you don't have someone to stay with you, and you aren't safe to go home alone, we may keep you overnight. Insurance often won't pay for this.  After surgery  If it's hard to control your pain or you need more pain medicine, please call your surgeon's office.  Questions?   If you have any questions for your care team, list them here: _________________________________________________________________________________________________________________________________________________________________________ ____________________________________ ____________________________________ ____________________________________  For informational purposes only. Not to replace the advice of your health care provider. Copyright   2003, 2019 [facility]. All rights reserved. Clinically reviewed by Margie Waller MD. [place] [id number] - REV 07/21.

## 2022-02-12 ASSESSMENT — ANXIETY QUESTIONNAIRES: GAD7 TOTAL SCORE: 0

## 2022-02-12 ASSESSMENT — PATIENT HEALTH QUESTIONNAIRE - PHQ9: SUM OF ALL RESPONSES TO PHQ QUESTIONS 1-9: 0

## 2022-02-16 ENCOUNTER — MYC MEDICAL ADVICE (OUTPATIENT)
Dept: FAMILY MEDICINE | Facility: CLINIC | Age: 27
End: 2022-02-16
Payer: COMMERCIAL

## 2022-02-18 ENCOUNTER — LAB (OUTPATIENT)
Dept: LAB | Facility: CLINIC | Age: 27
End: 2022-02-18
Attending: FAMILY MEDICINE
Payer: COMMERCIAL

## 2022-02-18 DIAGNOSIS — Z20.822 ENCOUNTER FOR LABORATORY TESTING FOR COVID-19 VIRUS: ICD-10-CM

## 2022-02-18 PROCEDURE — U0005 INFEC AGEN DETEC AMPLI PROBE: HCPCS

## 2022-02-18 PROCEDURE — U0003 INFECTIOUS AGENT DETECTION BY NUCLEIC ACID (DNA OR RNA); SEVERE ACUTE RESPIRATORY SYNDROME CORONAVIRUS 2 (SARS-COV-2) (CORONAVIRUS DISEASE [COVID-19]), AMPLIFIED PROBE TECHNIQUE, MAKING USE OF HIGH THROUGHPUT TECHNOLOGIES AS DESCRIBED BY CMS-2020-01-R: HCPCS

## 2022-02-19 LAB — SARS-COV-2 RNA RESP QL NAA+PROBE: NEGATIVE

## 2022-02-22 ENCOUNTER — HOSPITAL ENCOUNTER (OUTPATIENT)
Facility: CLINIC | Age: 27
Discharge: HOME | End: 2022-02-22
Attending: COLON & RECTAL SURGERY | Admitting: COLON & RECTAL SURGERY
Payer: COMMERCIAL

## 2022-02-22 ENCOUNTER — HOSPITAL ENCOUNTER (OUTPATIENT)
Dept: GENERAL RADIOLOGY | Facility: CLINIC | Age: 27
Discharge: HOME | End: 2022-02-22
Attending: COLON & RECTAL SURGERY | Admitting: COLON & RECTAL SURGERY
Payer: COMMERCIAL

## 2022-02-22 VITALS
DIASTOLIC BLOOD PRESSURE: 77 MMHG | TEMPERATURE: 98.7 F | HEIGHT: 73 IN | RESPIRATION RATE: 16 BRPM | BODY MASS INDEX: 21.73 KG/M2 | OXYGEN SATURATION: 100 % | HEART RATE: 76 BPM | SYSTOLIC BLOOD PRESSURE: 130 MMHG

## 2022-02-22 DIAGNOSIS — K51.90 ULCERATIVE COLITIS (H): ICD-10-CM

## 2022-02-22 LAB — FLEXIBLE SIGMOIDOSCOPY: NORMAL

## 2022-02-22 PROCEDURE — 45331 SIGMOIDOSCOPY AND BIOPSY: CPT | Performed by: COLON & RECTAL SURGERY

## 2022-02-22 PROCEDURE — 88305 TISSUE EXAM BY PATHOLOGIST: CPT | Mod: TC | Performed by: COLON & RECTAL SURGERY

## 2022-02-22 PROCEDURE — 74270 X-RAY XM COLON 1CNTRST STD: CPT

## 2022-02-22 PROCEDURE — 88305 TISSUE EXAM BY PATHOLOGIST: CPT | Mod: 26 | Performed by: PATHOLOGY

## 2022-02-22 RX ORDER — ONDANSETRON 2 MG/ML
4 INJECTION INTRAMUSCULAR; INTRAVENOUS EVERY 6 HOURS PRN
Status: DISCONTINUED | OUTPATIENT
Start: 2022-02-22 | End: 2022-02-22 | Stop reason: HOSPADM

## 2022-02-22 RX ORDER — FLUMAZENIL 0.1 MG/ML
0.2 INJECTION, SOLUTION INTRAVENOUS
Status: DISCONTINUED | OUTPATIENT
Start: 2022-02-22 | End: 2022-02-22 | Stop reason: HOSPADM

## 2022-02-22 RX ORDER — EPINEPHRINE 1 MG/ML
0.1 INJECTION, SOLUTION INTRAMUSCULAR; SUBCUTANEOUS
Status: DISCONTINUED | OUTPATIENT
Start: 2022-02-22 | End: 2022-02-22 | Stop reason: HOSPADM

## 2022-02-22 RX ORDER — NALOXONE HYDROCHLORIDE 0.4 MG/ML
0.4 INJECTION, SOLUTION INTRAMUSCULAR; INTRAVENOUS; SUBCUTANEOUS
Status: DISCONTINUED | OUTPATIENT
Start: 2022-02-22 | End: 2022-02-22 | Stop reason: HOSPADM

## 2022-02-22 RX ORDER — SIMETHICONE 40MG/0.6ML
133 SUSPENSION, DROPS(FINAL DOSAGE FORM)(ML) ORAL
Status: DISCONTINUED | OUTPATIENT
Start: 2022-02-22 | End: 2022-02-22 | Stop reason: HOSPADM

## 2022-02-22 RX ORDER — PROCHLORPERAZINE MALEATE 10 MG
10 TABLET ORAL EVERY 6 HOURS PRN
Status: DISCONTINUED | OUTPATIENT
Start: 2022-02-22 | End: 2022-02-22 | Stop reason: HOSPADM

## 2022-02-22 RX ORDER — NALOXONE HYDROCHLORIDE 0.4 MG/ML
0.2 INJECTION, SOLUTION INTRAMUSCULAR; INTRAVENOUS; SUBCUTANEOUS
Status: DISCONTINUED | OUTPATIENT
Start: 2022-02-22 | End: 2022-02-22 | Stop reason: HOSPADM

## 2022-02-22 RX ORDER — ONDANSETRON 4 MG/1
4 TABLET, ORALLY DISINTEGRATING ORAL EVERY 6 HOURS PRN
Status: DISCONTINUED | OUTPATIENT
Start: 2022-02-22 | End: 2022-02-22 | Stop reason: HOSPADM

## 2022-02-22 RX ORDER — ATROPINE SULFATE 0.4 MG/ML
0.4 AMPUL (ML) INJECTION
Status: DISCONTINUED | OUTPATIENT
Start: 2022-02-22 | End: 2022-02-22 | Stop reason: HOSPADM

## 2022-02-22 RX ORDER — FENTANYL CITRATE 0.05 MG/ML
25-50 INJECTION, SOLUTION INTRAMUSCULAR; INTRAVENOUS
Status: DISCONTINUED | OUTPATIENT
Start: 2022-02-22 | End: 2022-02-22 | Stop reason: HOSPADM

## 2022-02-22 RX ORDER — LIDOCAINE 40 MG/G
CREAM TOPICAL
Status: DISCONTINUED | OUTPATIENT
Start: 2022-02-22 | End: 2022-02-22 | Stop reason: HOSPADM

## 2022-02-22 RX ORDER — FENTANYL CITRATE 0.05 MG/ML
50-100 INJECTION, SOLUTION INTRAMUSCULAR; INTRAVENOUS
Status: DISCONTINUED | OUTPATIENT
Start: 2022-02-22 | End: 2022-02-22 | Stop reason: HOSPADM

## 2022-02-22 RX ORDER — ONDANSETRON 2 MG/ML
4 INJECTION INTRAMUSCULAR; INTRAVENOUS
Status: DISCONTINUED | OUTPATIENT
Start: 2022-02-22 | End: 2022-02-22 | Stop reason: HOSPADM

## 2022-02-22 RX ADMIN — DIATRIZOATE MEGLUMINE AND DIATRIZOATE SODIUM 480 ML: 660; 100 SOLUTION ORAL; RECTAL at 08:10

## 2022-02-22 NOTE — H&P
Pre-Endoscopy History and Physical     Luther Wood MRN# [id number]   YOB: 1995 Age: 26 year old     Date of Procedure: 2/22/2022  Primary care provider: Marko Rubalcava  Type of Endoscopy: Colonoscopy  Reason for Procedure: Ulcerative colitis surveillance  Type of Anesthesia Anticipated: Moderate Sedation    HPI:    Luther is a 26 year old male who will be undergoing the above procedure.      A history and physical has been performed. The patient's medications and allergies have been reviewed. The risks and benefits of the procedure and the sedation options and risks were discussed with the patient.  All questions were answered and informed consent was obtained.      He denies a personal or family history of anesthesia complications or bleeding disorders.     Allergies   Allergen Reactions     Dairy Digestive      Gluten Meal         No current facility-administered medications on file prior to encounter.  No current outpatient medications on file prior to encounter.      Patient Active Problem List   Diagnosis     Acne vulgaris     Tear of right acetabular labrum     Attention deficit disorder     Ulcerative colitis with complication, unspecified location (H)     Hyponatremia     Anemia due to blood loss, acute     Ulcerative colitis with rectal bleeding, unspecified location (H)     Other iron deficiency anemia     Ileostomy in place (H)        Past Medical History:   Diagnosis Date     Colitis 2019     History of blood transfusion 09/12/2021        Past Surgical History:   Procedure Laterality Date     COLONOSCOPY       ENT SURGERY  2016    wisdom teeth     LAPAROSCOPIC ASSISTED COLECTOMY N/A 9/17/2021    Procedure: laparoscopic total abdominal colectomy with end ileostomy;  Surgeon: Soledad Flaherty MD;  Location: [facility] OR     LAPAROSCOPIC ILEOSTOMY N/A 9/17/2021    Procedure: Laparoscopic Ileostomy;  Surgeon: Soledad Flaherty MD;  Location: [facility] OR     SIGMOIDOSCOPY FLEXIBLE N/A  "9/16/2021    Procedure: SIGMOIDOSCOPY, FLEXIBLE;  Surgeon: Anderson Cardona MD;  Location:  GI     TONSILLECTOMY & ADENOIDECTOMY  2006       Social History     Tobacco Use     Smoking status: Never Smoker     Smokeless tobacco: Never Used   Substance Use Topics     Alcohol use: Not Currently     Comment: occassional       Family History   Problem Relation Age of Onset     Diabetes Father         type I     Colon Cancer No family hx of        REVIEW OF SYSTEMS:     5 point ROS negative except as noted above in HPI, including Gen., Resp., CV, GI &  system review.      PHYSICAL EXAM:   Ht 1.854 m (6' 1\")   BMI 21.73 kg/m   Estimated body mass index is 21.73 kg/m  as calculated from the following:    Height as of this encounter: 1.854 m (6' 1\").    Weight as of 2/11/22: 74.7 kg (164 lb 11.2 oz).   GENERAL APPEARANCE: healthy and alert  MENTAL STATUS: alert  AIRWAY EXAM: Mallampatti Class I (visualization of the soft palate, fauces, uvula, anterior and posterior pillars)  RESP: lungs clear to auscultation - no rales, rhonchi or wheezes  CV: regular rates and rhythm      IMPRESSION   ASA Class 2 - Mild systemic disease        PLAN:     Plan for colonoscopy. We discussed the risks, benefits and alternatives and the patient wished to proceed.    The above has been forwarded to the consulting provider.      Shahrzad Flaherty MD  [facility]  Phone: [phone]  Fax: [phone]  February 22, 2022    "

## 2022-02-22 NOTE — H&P (VIEW-ONLY)
Pre-Endoscopy History and Physical     Luther Wodo MRN# [id number]   YOB: 1995 Age: 26 year old     Date of Procedure: 2/22/2022  Primary care provider: Marko Rubalcava  Type of Endoscopy: Colonoscopy  Reason for Procedure: Ulcerative colitis surveillance  Type of Anesthesia Anticipated: Moderate Sedation    HPI:    Luther is a 26 year old male who will be undergoing the above procedure.      A history and physical has been performed. The patient's medications and allergies have been reviewed. The risks and benefits of the procedure and the sedation options and risks were discussed with the patient.  All questions were answered and informed consent was obtained.      He denies a personal or family history of anesthesia complications or bleeding disorders.     Allergies   Allergen Reactions     Dairy Digestive      Gluten Meal         No current facility-administered medications on file prior to encounter.  No current outpatient medications on file prior to encounter.      Patient Active Problem List   Diagnosis     Acne vulgaris     Tear of right acetabular labrum     Attention deficit disorder     Ulcerative colitis with complication, unspecified location (H)     Hyponatremia     Anemia due to blood loss, acute     Ulcerative colitis with rectal bleeding, unspecified location (H)     Other iron deficiency anemia     Ileostomy in place (H)        Past Medical History:   Diagnosis Date     Colitis 2019     History of blood transfusion 09/12/2021        Past Surgical History:   Procedure Laterality Date     COLONOSCOPY       ENT SURGERY  2016    wisdom teeth     LAPAROSCOPIC ASSISTED COLECTOMY N/A 9/17/2021    Procedure: laparoscopic total abdominal colectomy with end ileostomy;  Surgeon: Soledad Flaherty MD;  Location: [facility] OR     LAPAROSCOPIC ILEOSTOMY N/A 9/17/2021    Procedure: Laparoscopic Ileostomy;  Surgeon: Soledad Flaherty MD;  Location: [facility] OR     SIGMOIDOSCOPY FLEXIBLE N/A  "9/16/2021    Procedure: SIGMOIDOSCOPY, FLEXIBLE;  Surgeon: Anderson Cardona MD;  Location:  GI     TONSILLECTOMY & ADENOIDECTOMY  2006       Social History     Tobacco Use     Smoking status: Never Smoker     Smokeless tobacco: Never Used   Substance Use Topics     Alcohol use: Not Currently     Comment: occassional       Family History   Problem Relation Age of Onset     Diabetes Father         type I     Colon Cancer No family hx of        REVIEW OF SYSTEMS:     5 point ROS negative except as noted above in HPI, including Gen., Resp., CV, GI &  system review.      PHYSICAL EXAM:   Ht 1.854 m (6' 1\")   BMI 21.73 kg/m   Estimated body mass index is 21.73 kg/m  as calculated from the following:    Height as of this encounter: 1.854 m (6' 1\").    Weight as of 2/11/22: 74.7 kg (164 lb 11.2 oz).   GENERAL APPEARANCE: healthy and alert  MENTAL STATUS: alert  AIRWAY EXAM: Mallampatti Class I (visualization of the soft palate, fauces, uvula, anterior and posterior pillars)  RESP: lungs clear to auscultation - no rales, rhonchi or wheezes  CV: regular rates and rhythm      IMPRESSION   ASA Class 2 - Mild systemic disease        PLAN:     Plan for colonoscopy. We discussed the risks, benefits and alternatives and the patient wished to proceed.    The above has been forwarded to the consulting provider.      Shahrzad Flaherty MD  [facility]  Phone: [phone]  Fax: [phone]  February 22, 2022    "

## 2022-02-23 LAB
PATH REPORT.COMMENTS IMP SPEC: NORMAL
PATH REPORT.FINAL DX SPEC: NORMAL
PATH REPORT.GROSS SPEC: NORMAL
PATH REPORT.MICROSCOPIC SPEC OTHER STN: NORMAL
PATH REPORT.RELEVANT HX SPEC: NORMAL
PHOTO IMAGE: NORMAL

## 2022-02-26 ENCOUNTER — LAB (OUTPATIENT)
Dept: LAB | Facility: CLINIC | Age: 27
End: 2022-02-26
Attending: COLON & RECTAL SURGERY
Payer: COMMERCIAL

## 2022-02-26 DIAGNOSIS — Z11.59 ENCOUNTER FOR SCREENING FOR OTHER VIRAL DISEASES: ICD-10-CM

## 2022-02-26 PROCEDURE — U0005 INFEC AGEN DETEC AMPLI PROBE: HCPCS

## 2022-02-27 LAB — SARS-COV-2 RNA RESP QL NAA+PROBE: NEGATIVE

## 2022-03-01 ENCOUNTER — ANESTHESIA EVENT (OUTPATIENT)
Dept: SURGERY | Facility: CLINIC | Age: 27
DRG: 330 | End: 2022-03-01
Payer: COMMERCIAL

## 2022-03-01 RX ORDER — METRONIDAZOLE 500 MG/1
500 TABLET ORAL SEE ADMIN INSTRUCTIONS
Status: ON HOLD | COMMUNITY
End: 2022-03-07

## 2022-03-01 RX ORDER — NEOMYCIN SULFATE 500 MG/1
500 TABLET ORAL SEE ADMIN INSTRUCTIONS
Status: ON HOLD | COMMUNITY
End: 2022-03-07

## 2022-03-02 ENCOUNTER — HOSPITAL ENCOUNTER (INPATIENT)
Facility: CLINIC | Age: 27
LOS: 5 days | Discharge: HOME | DRG: 330 | End: 2022-03-07
Attending: COLON & RECTAL SURGERY | Admitting: COLON & RECTAL SURGERY
Payer: COMMERCIAL

## 2022-03-02 ENCOUNTER — ANESTHESIA (OUTPATIENT)
Dept: SURGERY | Facility: CLINIC | Age: 27
DRG: 330 | End: 2022-03-02
Payer: COMMERCIAL

## 2022-03-02 DIAGNOSIS — K51.919 ULCERATIVE COLITIS WITH COMPLICATION, UNSPECIFIED LOCATION (H): ICD-10-CM

## 2022-03-02 DIAGNOSIS — Z93.2 ILEOSTOMY IN PLACE (H): Primary | ICD-10-CM

## 2022-03-02 PROBLEM — K51.90 ULCERATIVE COLITIS (H): Status: ACTIVE | Noted: 2022-03-02

## 2022-03-02 LAB
ABO/RH(D): NORMAL
ANTIBODY SCREEN: NEGATIVE
CREAT SERPL-MCNC: 1.12 MG/DL (ref 0.66–1.25)
GFR SERPL CREATININE-BSD FRML MDRD: >90 ML/MIN/1.73M2
HGB BLD-MCNC: 13.6 G/DL (ref 13.3–17.7)
PLATELET # BLD AUTO: 291 10E3/UL (ref 150–450)
SPECIMEN EXPIRATION DATE: NORMAL

## 2022-03-02 PROCEDURE — 258N000001 HC RX 258: Performed by: COLON & RECTAL SURGERY

## 2022-03-02 PROCEDURE — 250N000025 HC SEVOFLURANE, PER MIN: Performed by: COLON & RECTAL SURGERY

## 2022-03-02 PROCEDURE — 36415 COLL VENOUS BLD VENIPUNCTURE: CPT | Performed by: ANESTHESIOLOGY

## 2022-03-02 PROCEDURE — 258N000003 HC RX IP 258 OP 636: Performed by: COLON & RECTAL SURGERY

## 2022-03-02 PROCEDURE — 250N000011 HC RX IP 250 OP 636: Performed by: COLON & RECTAL SURGERY

## 2022-03-02 PROCEDURE — 258N000003 HC RX IP 258 OP 636: Performed by: ANESTHESIOLOGY

## 2022-03-02 PROCEDURE — 250N000011 HC RX IP 250 OP 636: Performed by: ANESTHESIOLOGY

## 2022-03-02 PROCEDURE — 120N000001 HC R&B MED SURG/OB

## 2022-03-02 PROCEDURE — 250N000009 HC RX 250: Performed by: COLON & RECTAL SURGERY

## 2022-03-02 PROCEDURE — 370N000017 HC ANESTHESIA TECHNICAL FEE, PER MIN: Performed by: COLON & RECTAL SURGERY

## 2022-03-02 PROCEDURE — 250N000009 HC RX 250: Performed by: NURSE ANESTHETIST, CERTIFIED REGISTERED

## 2022-03-02 PROCEDURE — 0DBB0ZZ EXCISION OF ILEUM, OPEN APPROACH: ICD-10-PCS | Performed by: COLON & RECTAL SURGERY

## 2022-03-02 PROCEDURE — 85018 HEMOGLOBIN: CPT | Performed by: ANESTHESIOLOGY

## 2022-03-02 PROCEDURE — 272N000001 HC OR GENERAL SUPPLY STERILE: Performed by: COLON & RECTAL SURGERY

## 2022-03-02 PROCEDURE — 88305 TISSUE EXAM BY PATHOLOGIST: CPT | Mod: 26 | Performed by: PATHOLOGY

## 2022-03-02 PROCEDURE — 0DTP4ZZ RESECTION OF RECTUM, PERCUTANEOUS ENDOSCOPIC APPROACH: ICD-10-PCS | Performed by: COLON & RECTAL SURGERY

## 2022-03-02 PROCEDURE — 710N000009 HC RECOVERY PHASE 1, LEVEL 1, PER MIN: Performed by: COLON & RECTAL SURGERY

## 2022-03-02 PROCEDURE — 8E0W4CZ ROBOTIC ASSISTED PROCEDURE OF TRUNK REGION, PERCUTANEOUS ENDOSCOPIC APPROACH: ICD-10-PCS | Performed by: COLON & RECTAL SURGERY

## 2022-03-02 PROCEDURE — 250N000011 HC RX IP 250 OP 636: Performed by: NURSE ANESTHETIST, CERTIFIED REGISTERED

## 2022-03-02 PROCEDURE — 0DN84ZZ RELEASE SMALL INTESTINE, PERCUTANEOUS ENDOSCOPIC APPROACH: ICD-10-PCS | Performed by: COLON & RECTAL SURGERY

## 2022-03-02 PROCEDURE — 0TJB8ZZ INSPECTION OF BLADDER, VIA NATURAL OR ARTIFICIAL OPENING ENDOSCOPIC: ICD-10-PCS | Performed by: STUDENT IN AN ORGANIZED HEALTH CARE EDUCATION/TRAINING PROGRAM

## 2022-03-02 PROCEDURE — 36415 COLL VENOUS BLD VENIPUNCTURE: CPT | Performed by: COLON & RECTAL SURGERY

## 2022-03-02 PROCEDURE — 85049 AUTOMATED PLATELET COUNT: CPT | Performed by: COLON & RECTAL SURGERY

## 2022-03-02 PROCEDURE — 0DUP47Z SUPPLEMENT RECTUM WITH AUTOLOGOUS TISSUE SUBSTITUTE, PERCUTANEOUS ENDOSCOPIC APPROACH: ICD-10-PCS | Performed by: COLON & RECTAL SURGERY

## 2022-03-02 PROCEDURE — 52005 CYSTO W/URTRL CATHJ: CPT | Performed by: STUDENT IN AN ORGANIZED HEALTH CARE EDUCATION/TRAINING PROGRAM

## 2022-03-02 PROCEDURE — 999N000141 HC STATISTIC PRE-PROCEDURE NURSING ASSESSMENT: Performed by: COLON & RECTAL SURGERY

## 2022-03-02 PROCEDURE — 88305 TISSUE EXAM BY PATHOLOGIST: CPT | Mod: TC | Performed by: COLON & RECTAL SURGERY

## 2022-03-02 PROCEDURE — 86850 RBC ANTIBODY SCREEN: CPT | Performed by: ANESTHESIOLOGY

## 2022-03-02 PROCEDURE — 88307 TISSUE EXAM BY PATHOLOGIST: CPT | Mod: 26 | Performed by: PATHOLOGY

## 2022-03-02 PROCEDURE — 0D1B4Z4 BYPASS ILEUM TO CUTANEOUS, PERCUTANEOUS ENDOSCOPIC APPROACH: ICD-10-PCS | Performed by: COLON & RECTAL SURGERY

## 2022-03-02 PROCEDURE — 250N000013 HC RX MED GY IP 250 OP 250 PS 637: Performed by: COLON & RECTAL SURGERY

## 2022-03-02 PROCEDURE — C1769 GUIDE WIRE: HCPCS | Performed by: COLON & RECTAL SURGERY

## 2022-03-02 PROCEDURE — 360N000080 HC SURGERY LEVEL 7, PER MIN: Performed by: COLON & RECTAL SURGERY

## 2022-03-02 PROCEDURE — 82565 ASSAY OF CREATININE: CPT | Performed by: COLON & RECTAL SURGERY

## 2022-03-02 PROCEDURE — 0DJD8ZZ INSPECTION OF LOWER INTESTINAL TRACT, VIA NATURAL OR ARTIFICIAL OPENING ENDOSCOPIC: ICD-10-PCS | Performed by: COLON & RECTAL SURGERY

## 2022-03-02 PROCEDURE — 4A1BXSH MONITORING OF GASTROINTESTINAL VASCULAR PERFUSION USING INDOCYANINE GREEN DYE, EXTERNAL APPROACH: ICD-10-PCS | Performed by: COLON & RECTAL SURGERY

## 2022-03-02 PROCEDURE — 0DTN8ZZ RESECTION OF SIGMOID COLON, VIA NATURAL OR ARTIFICIAL OPENING ENDOSCOPIC: ICD-10-PCS | Performed by: COLON & RECTAL SURGERY

## 2022-03-02 RX ORDER — VECURONIUM BROMIDE 1 MG/ML
INJECTION, POWDER, LYOPHILIZED, FOR SOLUTION INTRAVENOUS PRN
Status: DISCONTINUED | OUTPATIENT
Start: 2022-03-02 | End: 2022-03-02

## 2022-03-02 RX ORDER — ONDANSETRON 4 MG/1
4 TABLET, ORALLY DISINTEGRATING ORAL EVERY 6 HOURS PRN
Status: DISCONTINUED | OUTPATIENT
Start: 2022-03-02 | End: 2022-03-07 | Stop reason: HOSPADM

## 2022-03-02 RX ORDER — CEFAZOLIN SODIUM 1 G/3ML
INJECTION, POWDER, FOR SOLUTION INTRAMUSCULAR; INTRAVENOUS PRN
Status: DISCONTINUED | OUTPATIENT
Start: 2022-03-02 | End: 2022-03-02

## 2022-03-02 RX ORDER — ONDANSETRON 2 MG/ML
4 INJECTION INTRAMUSCULAR; INTRAVENOUS EVERY 6 HOURS PRN
Status: DISCONTINUED | OUTPATIENT
Start: 2022-03-02 | End: 2022-03-07 | Stop reason: HOSPADM

## 2022-03-02 RX ORDER — CEFAZOLIN SODIUM/WATER 2 G/20 ML
2 SYRINGE (ML) INTRAVENOUS SEE ADMIN INSTRUCTIONS
Status: DISCONTINUED | OUTPATIENT
Start: 2022-03-02 | End: 2022-03-02

## 2022-03-02 RX ORDER — ACETAMINOPHEN 325 MG/1
975 TABLET ORAL ONCE
Status: COMPLETED | OUTPATIENT
Start: 2022-03-02 | End: 2022-03-02

## 2022-03-02 RX ORDER — LIDOCAINE 40 MG/G
CREAM TOPICAL
Status: DISCONTINUED | OUTPATIENT
Start: 2022-03-02 | End: 2022-03-07 | Stop reason: HOSPADM

## 2022-03-02 RX ORDER — OXYCODONE HYDROCHLORIDE 5 MG/1
5 TABLET ORAL EVERY 4 HOURS PRN
Status: DISCONTINUED | OUTPATIENT
Start: 2022-03-02 | End: 2022-03-02 | Stop reason: HOSPADM

## 2022-03-02 RX ORDER — ONDANSETRON 2 MG/ML
4 INJECTION INTRAMUSCULAR; INTRAVENOUS ONCE
Status: DISCONTINUED | OUTPATIENT
Start: 2022-03-02 | End: 2022-03-02

## 2022-03-02 RX ORDER — FENTANYL CITRATE 50 UG/ML
INJECTION, SOLUTION INTRAMUSCULAR; INTRAVENOUS PRN
Status: DISCONTINUED | OUTPATIENT
Start: 2022-03-02 | End: 2022-03-02

## 2022-03-02 RX ORDER — ACETAMINOPHEN 325 MG/1
650 TABLET ORAL EVERY 4 HOURS PRN
Status: DISCONTINUED | OUTPATIENT
Start: 2022-03-05 | End: 2022-03-07 | Stop reason: HOSPADM

## 2022-03-02 RX ORDER — NALOXONE HYDROCHLORIDE 0.4 MG/ML
0.4 INJECTION, SOLUTION INTRAMUSCULAR; INTRAVENOUS; SUBCUTANEOUS
Status: DISCONTINUED | OUTPATIENT
Start: 2022-03-02 | End: 2022-03-07 | Stop reason: HOSPADM

## 2022-03-02 RX ORDER — NALOXONE HYDROCHLORIDE 0.4 MG/ML
0.2 INJECTION, SOLUTION INTRAMUSCULAR; INTRAVENOUS; SUBCUTANEOUS
Status: DISCONTINUED | OUTPATIENT
Start: 2022-03-02 | End: 2022-03-07 | Stop reason: HOSPADM

## 2022-03-02 RX ORDER — PROPOFOL 10 MG/ML
INJECTION, EMULSION INTRAVENOUS CONTINUOUS PRN
Status: DISCONTINUED | OUTPATIENT
Start: 2022-03-02 | End: 2022-03-02

## 2022-03-02 RX ORDER — CEFAZOLIN SODIUM 1 G/3ML
1 INJECTION, POWDER, FOR SOLUTION INTRAMUSCULAR; INTRAVENOUS EVERY 8 HOURS
Status: COMPLETED | OUTPATIENT
Start: 2022-03-03 | End: 2022-03-03

## 2022-03-02 RX ORDER — ONDANSETRON 2 MG/ML
INJECTION INTRAMUSCULAR; INTRAVENOUS PRN
Status: DISCONTINUED | OUTPATIENT
Start: 2022-03-02 | End: 2022-03-02

## 2022-03-02 RX ORDER — OXYCODONE HYDROCHLORIDE 5 MG/1
5 TABLET ORAL EVERY 4 HOURS PRN
Status: DISCONTINUED | OUTPATIENT
Start: 2022-03-02 | End: 2022-03-07 | Stop reason: HOSPADM

## 2022-03-02 RX ORDER — SODIUM CHLORIDE, SODIUM LACTATE, POTASSIUM CHLORIDE, CALCIUM CHLORIDE 600; 310; 30; 20 MG/100ML; MG/100ML; MG/100ML; MG/100ML
INJECTION, SOLUTION INTRAVENOUS CONTINUOUS
Status: DISCONTINUED | OUTPATIENT
Start: 2022-03-02 | End: 2022-03-07 | Stop reason: HOSPADM

## 2022-03-02 RX ORDER — DEXAMETHASONE SODIUM PHOSPHATE 4 MG/ML
INJECTION, SOLUTION INTRA-ARTICULAR; INTRALESIONAL; INTRAMUSCULAR; INTRAVENOUS; SOFT TISSUE PRN
Status: DISCONTINUED | OUTPATIENT
Start: 2022-03-02 | End: 2022-03-02

## 2022-03-02 RX ORDER — HYDROMORPHONE HCL IN WATER/PF 6 MG/30 ML
0.2 PATIENT CONTROLLED ANALGESIA SYRINGE INTRAVENOUS EVERY 5 MIN PRN
Status: DISCONTINUED | OUTPATIENT
Start: 2022-03-02 | End: 2022-03-02 | Stop reason: HOSPADM

## 2022-03-02 RX ORDER — GLYCOPYRROLATE 0.2 MG/ML
INJECTION, SOLUTION INTRAMUSCULAR; INTRAVENOUS PRN
Status: DISCONTINUED | OUTPATIENT
Start: 2022-03-02 | End: 2022-03-02

## 2022-03-02 RX ORDER — FENTANYL CITRATE 50 UG/ML
25 INJECTION, SOLUTION INTRAMUSCULAR; INTRAVENOUS EVERY 5 MIN PRN
Status: DISCONTINUED | OUTPATIENT
Start: 2022-03-02 | End: 2022-03-02 | Stop reason: HOSPADM

## 2022-03-02 RX ORDER — LIDOCAINE HYDROCHLORIDE 20 MG/ML
INJECTION, SOLUTION INFILTRATION; PERINEURAL PRN
Status: DISCONTINUED | OUTPATIENT
Start: 2022-03-02 | End: 2022-03-02

## 2022-03-02 RX ORDER — CEFAZOLIN SODIUM/WATER 2 G/20 ML
2 SYRINGE (ML) INTRAVENOUS
Status: COMPLETED | OUTPATIENT
Start: 2022-03-02 | End: 2022-03-02

## 2022-03-02 RX ORDER — SODIUM CHLORIDE, SODIUM LACTATE, POTASSIUM CHLORIDE, CALCIUM CHLORIDE 600; 310; 30; 20 MG/100ML; MG/100ML; MG/100ML; MG/100ML
INJECTION, SOLUTION INTRAVENOUS CONTINUOUS
Status: DISCONTINUED | OUTPATIENT
Start: 2022-03-02 | End: 2022-03-02 | Stop reason: HOSPADM

## 2022-03-02 RX ORDER — NEOSTIGMINE METHYLSULFATE 1 MG/ML
VIAL (ML) INJECTION PRN
Status: DISCONTINUED | OUTPATIENT
Start: 2022-03-02 | End: 2022-03-02

## 2022-03-02 RX ORDER — SODIUM CHLORIDE, SODIUM LACTATE, POTASSIUM CHLORIDE, CALCIUM CHLORIDE 600; 310; 30; 20 MG/100ML; MG/100ML; MG/100ML; MG/100ML
INJECTION, SOLUTION INTRAVENOUS CONTINUOUS
Status: DISCONTINUED | OUTPATIENT
Start: 2022-03-02 | End: 2022-03-02

## 2022-03-02 RX ORDER — ONDANSETRON 2 MG/ML
4 INJECTION INTRAMUSCULAR; INTRAVENOUS EVERY 30 MIN PRN
Status: DISCONTINUED | OUTPATIENT
Start: 2022-03-02 | End: 2022-03-02 | Stop reason: HOSPADM

## 2022-03-02 RX ORDER — OXYCODONE HYDROCHLORIDE 5 MG/1
10 TABLET ORAL EVERY 4 HOURS PRN
Status: DISCONTINUED | OUTPATIENT
Start: 2022-03-02 | End: 2022-03-07 | Stop reason: HOSPADM

## 2022-03-02 RX ORDER — DIAZEPAM 10 MG/2ML
2 INJECTION, SOLUTION INTRAMUSCULAR; INTRAVENOUS EVERY 6 HOURS PRN
Status: DISCONTINUED | OUTPATIENT
Start: 2022-03-02 | End: 2022-03-07 | Stop reason: HOSPADM

## 2022-03-02 RX ORDER — INDOCYANINE GREEN AND WATER 25 MG
KIT INJECTION PRN
Status: DISCONTINUED | OUTPATIENT
Start: 2022-03-02 | End: 2022-03-02

## 2022-03-02 RX ORDER — ONDANSETRON 4 MG/1
4 TABLET, ORALLY DISINTEGRATING ORAL EVERY 30 MIN PRN
Status: DISCONTINUED | OUTPATIENT
Start: 2022-03-02 | End: 2022-03-02 | Stop reason: HOSPADM

## 2022-03-02 RX ORDER — METRONIDAZOLE 500 MG/100ML
500 INJECTION, SOLUTION INTRAVENOUS EVERY 12 HOURS
Status: COMPLETED | OUTPATIENT
Start: 2022-03-02 | End: 2022-03-05

## 2022-03-02 RX ORDER — KETAMINE HYDROCHLORIDE 10 MG/ML
INJECTION INTRAMUSCULAR; INTRAVENOUS PRN
Status: DISCONTINUED | OUTPATIENT
Start: 2022-03-02 | End: 2022-03-02

## 2022-03-02 RX ORDER — DIPHENHYDRAMINE HYDROCHLORIDE 50 MG/ML
25 INJECTION INTRAMUSCULAR; INTRAVENOUS EVERY 6 HOURS PRN
Status: DISCONTINUED | OUTPATIENT
Start: 2022-03-02 | End: 2022-03-07 | Stop reason: HOSPADM

## 2022-03-02 RX ORDER — MAGNESIUM HYDROXIDE 1200 MG/15ML
LIQUID ORAL PRN
Status: DISCONTINUED | OUTPATIENT
Start: 2022-03-02 | End: 2022-03-02 | Stop reason: HOSPADM

## 2022-03-02 RX ORDER — ACETAMINOPHEN 325 MG/1
975 TABLET ORAL EVERY 8 HOURS
Status: DISPENSED | OUTPATIENT
Start: 2022-03-02 | End: 2022-03-05

## 2022-03-02 RX ORDER — HEPARIN SODIUM 5000 [USP'U]/.5ML
5000 INJECTION, SOLUTION INTRAVENOUS; SUBCUTANEOUS
Status: COMPLETED | OUTPATIENT
Start: 2022-03-02 | End: 2022-03-02

## 2022-03-02 RX ORDER — BUPIVACAINE HYDROCHLORIDE 5 MG/ML
INJECTION, SOLUTION PERINEURAL PRN
Status: DISCONTINUED | OUTPATIENT
Start: 2022-03-02 | End: 2022-03-02 | Stop reason: HOSPADM

## 2022-03-02 RX ORDER — PROPOFOL 10 MG/ML
INJECTION, EMULSION INTRAVENOUS PRN
Status: DISCONTINUED | OUTPATIENT
Start: 2022-03-02 | End: 2022-03-02

## 2022-03-02 RX ADMIN — SODIUM CHLORIDE, POTASSIUM CHLORIDE, SODIUM LACTATE AND CALCIUM CHLORIDE: 600; 310; 30; 20 INJECTION, SOLUTION INTRAVENOUS at 06:57

## 2022-03-02 RX ADMIN — GLYCOPYRROLATE 0.5 MG: 0.2 INJECTION, SOLUTION INTRAMUSCULAR; INTRAVENOUS at 15:49

## 2022-03-02 RX ADMIN — VECURONIUM BROMIDE 1 MG: 1 INJECTION, POWDER, LYOPHILIZED, FOR SOLUTION INTRAVENOUS at 12:16

## 2022-03-02 RX ADMIN — ACETAMINOPHEN 975 MG: 325 TABLET, FILM COATED ORAL at 06:24

## 2022-03-02 RX ADMIN — Medication 10 MG: at 10:32

## 2022-03-02 RX ADMIN — VECURONIUM BROMIDE 1 MG: 1 INJECTION, POWDER, LYOPHILIZED, FOR SOLUTION INTRAVENOUS at 13:36

## 2022-03-02 RX ADMIN — FENTANYL CITRATE 25 MCG: 50 INJECTION, SOLUTION INTRAMUSCULAR; INTRAVENOUS at 17:25

## 2022-03-02 RX ADMIN — METRONIDAZOLE 500 MG: 500 INJECTION, SOLUTION INTRAVENOUS at 21:20

## 2022-03-02 RX ADMIN — ROCURONIUM BROMIDE 30 MG: 50 INJECTION, SOLUTION INTRAVENOUS at 09:01

## 2022-03-02 RX ADMIN — ROCURONIUM BROMIDE 10 MG: 50 INJECTION, SOLUTION INTRAVENOUS at 10:17

## 2022-03-02 RX ADMIN — FENTANYL CITRATE 25 MCG: 50 INJECTION, SOLUTION INTRAMUSCULAR; INTRAVENOUS at 17:37

## 2022-03-02 RX ADMIN — ROCURONIUM BROMIDE 10 MG: 50 INJECTION, SOLUTION INTRAVENOUS at 09:35

## 2022-03-02 RX ADMIN — HYDROMORPHONE HYDROCHLORIDE 0.5 MG: 1 INJECTION, SOLUTION INTRAMUSCULAR; INTRAVENOUS; SUBCUTANEOUS at 09:43

## 2022-03-02 RX ADMIN — ACETAMINOPHEN 975 MG: 325 TABLET, FILM COATED ORAL at 22:35

## 2022-03-02 RX ADMIN — ROCURONIUM BROMIDE 10 MG: 50 INJECTION, SOLUTION INTRAVENOUS at 08:27

## 2022-03-02 RX ADMIN — INDOCYANINE GREEN AND WATER 7.5 MG: KIT at 13:10

## 2022-03-02 RX ADMIN — DEXAMETHASONE SODIUM PHOSPHATE 4 MG: 4 INJECTION, SOLUTION INTRA-ARTICULAR; INTRALESIONAL; INTRAMUSCULAR; INTRAVENOUS; SOFT TISSUE at 08:15

## 2022-03-02 RX ADMIN — SODIUM CHLORIDE, POTASSIUM CHLORIDE, SODIUM LACTATE AND CALCIUM CHLORIDE: 600; 310; 30; 20 INJECTION, SOLUTION INTRAVENOUS at 12:02

## 2022-03-02 RX ADMIN — HYDROMORPHONE HYDROCHLORIDE 0.5 MG: 1 INJECTION, SOLUTION INTRAMUSCULAR; INTRAVENOUS; SUBCUTANEOUS at 08:40

## 2022-03-02 RX ADMIN — Medication 2 G: at 07:41

## 2022-03-02 RX ADMIN — Medication 10 MG: at 11:57

## 2022-03-02 RX ADMIN — VECURONIUM BROMIDE 1 MG: 1 INJECTION, POWDER, LYOPHILIZED, FOR SOLUTION INTRAVENOUS at 14:47

## 2022-03-02 RX ADMIN — CEFAZOLIN 2 G: 1 INJECTION, POWDER, FOR SOLUTION INTRAMUSCULAR; INTRAVENOUS at 15:57

## 2022-03-02 RX ADMIN — VECURONIUM BROMIDE 1 MG: 1 INJECTION, POWDER, LYOPHILIZED, FOR SOLUTION INTRAVENOUS at 11:57

## 2022-03-02 RX ADMIN — NEOSTIGMINE METHYLSULFATE 3.5 MG: 1 INJECTION, SOLUTION INTRAVENOUS at 15:49

## 2022-03-02 RX ADMIN — ROCURONIUM BROMIDE 50 MG: 50 INJECTION, SOLUTION INTRAVENOUS at 07:43

## 2022-03-02 RX ADMIN — VECURONIUM BROMIDE 1 MG: 1 INJECTION, POWDER, LYOPHILIZED, FOR SOLUTION INTRAVENOUS at 12:50

## 2022-03-02 RX ADMIN — ROCURONIUM BROMIDE 10 MG: 50 INJECTION, SOLUTION INTRAVENOUS at 10:01

## 2022-03-02 RX ADMIN — ONDANSETRON 4 MG: 2 INJECTION INTRAMUSCULAR; INTRAVENOUS at 15:36

## 2022-03-02 RX ADMIN — VECURONIUM BROMIDE 1 MG: 1 INJECTION, POWDER, LYOPHILIZED, FOR SOLUTION INTRAVENOUS at 10:56

## 2022-03-02 RX ADMIN — HEPARIN SODIUM 5000 UNITS: 5000 INJECTION INTRAVENOUS; SUBCUTANEOUS at 06:24

## 2022-03-02 RX ADMIN — VECURONIUM BROMIDE 1 MG: 1 INJECTION, POWDER, LYOPHILIZED, FOR SOLUTION INTRAVENOUS at 14:01

## 2022-03-02 RX ADMIN — Medication 2 G: at 11:57

## 2022-03-02 RX ADMIN — VECURONIUM BROMIDE 1 MG: 1 INJECTION, POWDER, LYOPHILIZED, FOR SOLUTION INTRAVENOUS at 13:16

## 2022-03-02 RX ADMIN — CEFAZOLIN 1 G: 1 INJECTION, POWDER, FOR SOLUTION INTRAMUSCULAR; INTRAVENOUS at 23:48

## 2022-03-02 RX ADMIN — DIPHENHYDRAMINE HYDROCHLORIDE 25 MG: 50 INJECTION INTRAMUSCULAR; INTRAVENOUS at 20:00

## 2022-03-02 RX ADMIN — VECURONIUM BROMIDE 1 MG: 1 INJECTION, POWDER, LYOPHILIZED, FOR SOLUTION INTRAVENOUS at 12:34

## 2022-03-02 RX ADMIN — VECURONIUM BROMIDE 1 MG: 1 INJECTION, POWDER, LYOPHILIZED, FOR SOLUTION INTRAVENOUS at 15:09

## 2022-03-02 RX ADMIN — Medication 10 MG: at 13:26

## 2022-03-02 RX ADMIN — VECURONIUM BROMIDE 1 MG: 1 INJECTION, POWDER, LYOPHILIZED, FOR SOLUTION INTRAVENOUS at 11:23

## 2022-03-02 RX ADMIN — INDOCYANINE GREEN AND WATER 7.5 MG: KIT at 13:58

## 2022-03-02 RX ADMIN — MIDAZOLAM 2 MG: 1 INJECTION INTRAMUSCULAR; INTRAVENOUS at 07:37

## 2022-03-02 RX ADMIN — PROPOFOL 200 MG: 10 INJECTION, EMULSION INTRAVENOUS at 07:43

## 2022-03-02 RX ADMIN — LIDOCAINE HYDROCHLORIDE 100 MG: 20 INJECTION, SOLUTION INFILTRATION; PERINEURAL at 07:43

## 2022-03-02 RX ADMIN — METRONIDAZOLE 500 MG: 500 INJECTION, SOLUTION INTRAVENOUS at 06:57

## 2022-03-02 RX ADMIN — PROPOFOL 50 MCG/KG/MIN: 10 INJECTION, EMULSION INTRAVENOUS at 08:00

## 2022-03-02 RX ADMIN — HYDROMORPHONE HYDROCHLORIDE 0.2 MG: 0.2 INJECTION, SOLUTION INTRAMUSCULAR; INTRAVENOUS; SUBCUTANEOUS at 18:07

## 2022-03-02 RX ADMIN — ROCURONIUM BROMIDE 10 MG: 50 INJECTION, SOLUTION INTRAVENOUS at 10:52

## 2022-03-02 RX ADMIN — ROCURONIUM BROMIDE 10 MG: 50 INJECTION, SOLUTION INTRAVENOUS at 10:33

## 2022-03-02 RX ADMIN — ROCURONIUM BROMIDE 10 MG: 50 INJECTION, SOLUTION INTRAVENOUS at 08:21

## 2022-03-02 RX ADMIN — Medication: at 16:53

## 2022-03-02 RX ADMIN — VECURONIUM BROMIDE 1 MG: 1 INJECTION, POWDER, LYOPHILIZED, FOR SOLUTION INTRAVENOUS at 13:02

## 2022-03-02 RX ADMIN — SODIUM CHLORIDE, POTASSIUM CHLORIDE, SODIUM LACTATE AND CALCIUM CHLORIDE: 600; 310; 30; 20 INJECTION, SOLUTION INTRAVENOUS at 19:03

## 2022-03-02 RX ADMIN — FENTANYL CITRATE 100 MCG: 50 INJECTION, SOLUTION INTRAMUSCULAR; INTRAVENOUS at 07:43

## 2022-03-02 RX ADMIN — VECURONIUM BROMIDE 1 MG: 1 INJECTION, POWDER, LYOPHILIZED, FOR SOLUTION INTRAVENOUS at 14:35

## 2022-03-02 RX ADMIN — SODIUM CHLORIDE, POTASSIUM CHLORIDE, SODIUM LACTATE AND CALCIUM CHLORIDE: 600; 310; 30; 20 INJECTION, SOLUTION INTRAVENOUS at 17:41

## 2022-03-02 ASSESSMENT — ACTIVITIES OF DAILY LIVING (ADL)
ADLS_ACUITY_SCORE: 3
ADLS_ACUITY_SCORE: 12
ADLS_ACUITY_SCORE: 3

## 2022-03-02 NOTE — BRIEF OP NOTE
[facility]    Brief Operative Note    Pre-operative diagnosis: Ulcerative colitis (H) [K51.90]  Post-operative diagnosis Same as pre-operative diagnosis    Procedure: Procedure(s):  ROBOTIC COMPLETION PROCTOCOLECTOMY, ILEAL POUCH ANAL ANASTOMOSIS, LAPAROSCOPIC LYSIS OF ADHESION, DIVERTING LOOP ILLEOSTOMY,AND RIDGID PROTOCTOMY  CYSTOSCOPY, PLACEMENT OF BILATERAL URETERAL CATHETERS  Surgeon: Surgeon(s) and Role:  Panel 1:     * Soledad Flaherty MD - Primary     * Kay Finney MD - Assisting     * Campos Del Angel PA-C - Assisting  Panel 2:     * Aron Gallego MD - Primary  Anesthesia: General   Estimated Blood Loss: 25mL    Drains: Emil-Wood  Specimens:   ID Type Source Tests Collected by Time Destination   1 : ILEOSTOMY TRIM Tissue Small Intestine, Ileum SURGICAL PATHOLOGY EXAM Soledad Flaherty MD 3/2/2022  9:09 AM    2 : distal sigmoid colon and rectum Tissue Large Intestine, Colon, Sigmoid/Rectal SURGICAL PATHOLOGY EXAM Soledad Flaherty MD 3/2/2022 12:29 PM    3 : donuts Tissue Other SURGICAL PATHOLOGY EXAM Soledad Flaherty MD 3/2/2022  3:28 PM      Findings:   Normal configuration of J pouch.  .  Complications: None.  Implants: * No implants in log *    Condition on discharge from OR: Satisfactory    José Sanchez MD   [facility]   [phone].        ADDENDUM:    PATIENT DATA  Indicate Y or N:  Home O2 No  Hemodialysis  No  Transplant patient  No  Cirrhosis  No  Steroids in last 30 days  No  Immunomodulators in last 30 days  No  Anticoagulation at time of surgery  No   List medication   Prior abdominal surgery  Yes  Pelvic irradiation  No    Albumin within 30 days if known NA  Lab Results     Hgb within 30 days if known    Hemoglobin   Date Value Ref Range Status   03/02/2022 13.6 13.3 - 17.7 g/dL Final   12/07/2009 13.7 11.7 - 15.7 g/dL Final   ]  Cr within 30 days if known    Creatinine   Date Value Ref Range  Status   09/24/2021 0.70 0.66 - 1.25 mg/dL Final   12/07/2009 0.64 0.39 - 0.73 mg/dL Final     Comment:     New IDMS-traceable calibration  beginning 5/1/08   ]  Body mass index is 21.81 kg/m .      OR DATA  Emergent  No   <24 hours  No   <1 week  No  Bowel Prep No  Antibiotics  Yes  DVT prophylaxis    Heparin  Yes   SCD  Yes   None  No  Drain  Yes  ASA (1,2,3,4) 2  OR time (min) 470 (including urology time)  Stents  Yes  Transfuse >/= 2U  No  Anastomosis   Stapled  Yes   Handsewn  No  Leak Test    Positive  No   Negative  Yes   Not done  No           Route (Have a good day)

## 2022-03-02 NOTE — ANESTHESIA CARE TRANSFER NOTE
Patient: Luther Wood    Procedure: Procedure(s):  ROBOTIC COMPLETION PROCTOCOLECTOMY, ILEAL POUCH ANAL ANASTOMOSIS, LAPAROSCOPIC LYSIS OF ADHESION, DIVERTING LOOP ILLEOSTOMY,AND RIDGID PROTOCTOMY  CYSTOSCOPY, PLACEMENT OF BILATERAL URETERAL CATHETERS       Diagnosis: Ulcerative colitis (H) [K51.90]  Diagnosis Additional Information: No value filed.    Anesthesia Type:   General     Note:    Oropharynx: oropharynx clear of all foreign objects and spontaneously breathing  Level of Consciousness: awake  Oxygen Supplementation: face mask  Level of Supplemental Oxygen (L/min / FiO2): 6  Independent Airway: airway patency satisfactory and stable  Dentition: dentition unchanged  Vital Signs Stable: post-procedure vital signs reviewed and stable  Report to RN Given: handoff report given  Patient transferred to: PACU  Comments:   Neuromuscular blockade reversed after TOF 4/4, spontaneous respirations, adequate tidal volumes, followed commands to voice, oropharynx suctioned with soft flexible catheter, extubated atraumatically, extubated with suction, airway patent after extubation.  Oxygen via facemask at 6 liters per minute to PACU. Oxygen tubing connected to wall O2 in PACU, SpO2, NiBP, and EKG monitors and alarms on and functioning, report on patient's clinical status given to PACU RN, RN questions answered.         Handoff Report: Identifed the Patient, Identified the Reponsible Provider, Reviewed the pertinent medical history, Discussed the surgical course, Reviewed Intra-OP anesthesia mangement and issues during anesthesia, Set expectations for post-procedure period and Allowed opportunity for questions and acknowledgement of understanding      Vitals:  Vitals Value Taken Time   /99 03/02/22 1627   Temp     Pulse 53 03/02/22 1629   Resp 14 03/02/22 1629   SpO2 100 % 03/02/22 1629   Vitals shown include unvalidated device data.    Electronically Signed By: JACQUES Flaherty CRNA  March 2, 2022  4:30  PM

## 2022-03-02 NOTE — OP NOTE
[facility]  Operative Note    Pre-operative diagnosis: Ulcerative colitis (H) [K51.90]   Post-operative diagnosis Ulcerative colitis (H) [K51.90]   Procedure: Procedure(s):  ROBOTIC COMPLETION PROCTOCOLECTOMY, ILEAL POUCH ANAL ANASTOMOSIS - Krystina    CYSTOSCOPY, PLACEMENT OF BILATERAL URETERAL CATHETERS - Lashonda   Surgeon: Aron Gallego MD   Assistants(s): none   Anesthesia: General    Estimated blood loss: None from urology portion of case    Total IV fluids: (See anesthesia record)   Blood transfusion: None from urology portion of case   Total urine output: Not recorded from urology portion of case   Drains: Bilateral ureteral catheters (blue = left, red = right)  Jacob catheter  All attached to Goldberg device   Specimens: None from urology portion of case   Implants: None from urology portion of case     Findings:   Bilateral ureteral catheters placed to 25 cm without issue.   Complications: None.   Condition: Stable             Description of procedure:  26-year-old male with ulcerative colitis who is undergoing completion proctectomy with ileal pouch anal anastomosis today, upon whom urology is consulted for placement of bilateral ureteral catheters for intraoperative ureteral identification.  I discussed risks with the patient and his mother in the preoperative area, including dysuria, hematuria, small risk of infection.  Informed consent was obtained.    The patient was brought to robotic operating room #1.  Antibiotics were given prior to the procedure.  General anesthesia was induced, he was placed in dorsal lithotomy position, prepped and draped in standard sterile fashion.  A timeout was performed.    A 22 Irish Storz cystoscope was assembled and passed through the urethra into the bladder.  The right ureteral orifice was identified.  This was cannulated with a 5 Irish tiger tail catheter which was passed all the way up to 25 cm easily without any resistance.  The same  process was completed on the left side with a separate tiger tail.  The scope was removed, leaving the catheters in place.  A Jacob catheter was then placed, the balloon was inflated, and the Jacob was pulled back against the bladder neck.  The ureteral catheters were then tied to the Jacob catheter using 2-0 silk ties.  The ureteral catheters and the Jacob catheter were then left to gravity drainage using a Goldberg adapter.  The patient was then turned back over to the colorectal service.    Aron Gallego MD   [facility]  [phone] clinic phone

## 2022-03-02 NOTE — OP NOTE
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 Cystoscopy with bilateral ureteral catheter placement (Dr. Galelgo)  2.  Take down of end ileostomy   3.  Abdominal lysis of adhesions lasting 25 minutes   4.  Robotic assisted completion proctectomy with creation of ileal J-pouch and ileoanal anastomosis  5.  Rigid proctoscopy with anastomotic leak testing   6.  Diverting loop ileostomy.   7.  Fluorescence angiography with ICG  8. Bilateral TAP blocks with 0.5% Marcaine     Surgeon:  Shahrzad Flaherty MD  Co-Surgeon: Kay Finney MD  Assistant: DIVYA Park MD, Colon and Rectal Surgery Fellow   Conrado Ferraro MD, Colon and Rectal Surgery Fellow       ANESTHESIA: General.      INDICATION: Luther Wood is a 26 year old male with a history of ulcerative colitis and previous total abdominal colectomy with end ileostomy for fulminant colitis. Luther presents today for a completion proctectomy with ileal pouch anal anastomosis for definitive management of ulcerative colitis. The risks and benefits of this procedure were discussed and the patient agreed to proceed.      OPERATIVE FINDINGS:  Multiple intra-abdominal adhesions between the small bowel, anterior abdominal wall and colonic mesentery.  No other significant intra-abdominal disease.     PROCEDURE IN DETAIL: After informed consent was obtained, the patient was brought to the operating room and placed in the supine position on the operating room table. Sequential compression devices were placed on the lower extremities prior to induction and general anesthesia was induced without difficulty. The patient was placed into a well-padded dorsal lithotomy position and IV antibiotics and subcutaneous heparin were administered. A Pigazzi pad was used on the operating room table to prevent  them from sliding off the table in steep Trendelenburg position.    Dr. Gallego then performed cystoscopy and bilateral ureteral catheter placement.  Please see him operative dictation for full details regarding his portion of the procedure.      The abdomen was sterilely prepped and draped in standard fashion with isolation of the end ileostomy.  We started by taking down the ileostomy.  Cautery was used to make a incision around the ileostomy, and dissection carried down through the subcutaneous tissue to the level of the fascia.  The ileostomy and terminal ileal mesentery were eventually able to be  from the abdominal fascia and rectus muscle circumferentially, however we did encountered dense adhesions to the rectus muscle.  Once the ileum and mesentery were fully mobilized, a window was then made in the ileal mesentery just below the brooked ileostomy, and the mesentery divided with ties.  The terminal ileum was divided with a single firing of the 100 mm CARLEEN stapler, blue load.  The ileum was then returned to the abdomen.  There were some additional adhesions between the omentum and the anterior abdominal wall which were lysed through the ileostomy site.  A gel point mini was then placed through the ileostomy, and the abdomen insufflated to 15 mmHg which the patient tolerated well.  Abdominal exploration was then ensued with multiple adhesions between the small bowel, anterior abdominal wall, pelvic brim and to the colonic mesentery.  As the majority of the anterior abdominal wall was free from adhesions, we then proceeded with placing our remaining robotic port sites.  An 8 mm trocar was placed in the gel point mini, and 3 additional 8 mm trocars placed in a straight line across the abdomen, one through his previous supraumbilical incision and 2 in the left abdomen.  A 5 mm air seal device was then placed through his previous incision in the right upper quadrant.    The patient was then placed in steep  Trendelenburg position and we proceeded with a laparoscopic lysis of adhesions.  The small bowel loops in the upper abdomen that were adherent to the anterior abdominal wall were lysed sharply.  Once this portion was completed, we then docked the robot to continue with the lysis of adhesions in the pelvis.  The robotic instruments used included a tip up grasper in arm 1, a fenestrated bipolar in arm 2, the camera in arm 3 and monopolar scissors in arm 4.  We then used a combination of blunt dissection and cautery in order to mobilize the small bowel out of the pelvis, and mobilized adhesions along the left paracolic gutter to the colonic mesentery.  The total lysis of adhesions lasted approximately 25 minutes.  Once this was completed, the small bowel was swept out of the pelvis and we started our dissection for a completion proctectomy.    With mobilization of the small bowel out of the pelvis, we were able to identify the staple line of the rectum.  The peritoneum along either side of the rectum was incised using cautery scissors, opening up the presacral space.  We were able to meet our dissection planes bilaterally, we then switched to the robotic LigaSure device.  During this dissection both the right and left ureter were easily identified and protected.  Once we had a window in the presacral plane underneath the rectum, the remaining sigmoid colonic mesentery was divided with the LigaSure in order to open the presacral space and allow retraction of the rectum out of the pelvis.  Once this was divided, the rectum was retracted towards the pubic symphysis, and we continued dissection using cautery in the pelvis and the avascular, presacral plane.  Once this became difficult, we then proceeded with lateral dissection and eventually dissection anteriorly.  We continued the circumferential dissection of the mesentery down to the level of the pelvic floor. Once we had complete mobilization of the mesorectum we  cleared the distal rectal wall to prepare for division of our specimen at the level of the levators.  Dr. Finney performed a rectal examination to confirm that our dissection was complete down to the top of the anorectal ring.    We then proceeded with the open portion of the case.  A 10 cm Pfannenstiel incision was made 2 fingerbreadths above the pubic symphysis and dissection carried down to the level of the fascia. The fascia was opened, the rectus muscle divided and we entered the peritoneal cavity. A medium Rashaad wound retractor was then placed into this incision.  We then packed the small bowel out of the pelvis, and ensured hemostasis in the pelvis.  We then used a TA-30 stapler to divide the rectum.  This was placed along the distal bowel, and I then went below to perform a rectal exam.  We confirmed placement of the stapler at the top of the anorectal ring.  The stapler was then fired and the rectum divided sharply with a 10 blade.  The specimen was then removed from the operating room table and sent to pathology for analysis.  We then performed a leak test of our stump by insufflating the stump under water with the rigid proctoscope, this was notable for bubbles from the left side of the staple line on the rectum.  It appeared that the stapler did not fire completely to the end of the rectum.  There was about a 5 mm defect here.  We decided to oversew this robotically.  We packed the pelvis while we created the J-pouch.      We then proceeded with creating our ileal J-pouch.  There were some remaining adhesions to the ileal mesentery which were lysed through the Pfannenstiel incision.  We first performed fluorescence angiography by injecting ICG intravenously and examining the terminal ileum.  There was excellent perfusion of the terminal ileum up to the staple line.  We then measured approximately 18 cm from the end of the staple line and marked this area on the bowel.  This did reach easily to the  pelvis for a tension-free anastomosis.  We then placed Lembert sutures in either end of the planned J-pouch in order to line up the ileum for creation of the pouch.  We then placed a pursestring in the bowel in the antimesenteric border using a 3-0 Vicryl suture.  We then made an enterotomy in the small bowel and extended this to the size of the 100 mm CARLEEN stapler.  We then utilized 2 firings of the CARLEEN stapler to create our ileal J-pouch.  This anastomosis was created in the antimesenteric border of both limbs of the ileum.  After the pouch was created, we ensured there was no bleeding from the internal staple line, and then placed a pursestring suture in the distal end of the pouch using a 2-0 double-armed Prolene.  We then performed a leak test of the pouch by filling the pouch with Betadine solution.  All staple lines appeared intact without evidence of a leak.  We then placed the anvil of the 28 mm EEA stapler into the pouch, and secured the pursestring suture around the anvil.  A second pursestring was used to ensure good approximation of tissue to the anvil.  We then buried the staple line of the tip of the J-pouch using 3-0 Vicryl Lembert sutures, placed sutures in the crotch of the anastomosis, and placed intermittent Lembert sutures along the anterior side of the pouch to ensure there was no tension on the staple lines. The pouch was then returned to the abdomen.    We then redocked the robot in order to close the proctotomy.  This was done by running 3-0 PDS along the rectal staple line.  I reinforced the closure with interrupted figure of eight 3-0 vicryl suture.  Dr. Finney then performed a leak test of the rectal stump and this was negative.  We then proceeded with the ileal pouch anal anastomosis.  The small bowel was swept out of the pelvis, and into the left abdomen.  We ensured that the mesentery was straight and there was no twisting of the mesentery. Dr. Finney then went below and inserted the  stapling instrument into the anal canal, advancing the spike through the staple line of the distal cuff. The 2 ends of the stapler were connected, the orientation of the pouch and the ileal mesentery confirmed and the stapler tightened and fired.  2 anastomotic donuts were identified and found to be intact. On palpation of the anastomosis, there was no evidence of a defect. The anastomosis was tested by insufflating from below through the proctoscope with the anastomosis under saline. There was no evidence of leak despite multiple insufflations. When we were evacuating the air form the pouch we did notice tiny bubbles from the anterior staple line.  We examined the pouch and several of the lembert sutures on the anterior staple line had pulled through the bowel.  These areas were reinforced with several Lembert 3-0 vicryl sutures.  We performed another leak test of the pouch and this was negative.  The anastomosis was under no tension and there was excellent blood supply on both sides of the anastomosis. Excess air and some liquid stool was evacuated from below through the proctoscope.  A drain was then placed through the left sided abdominal port and into the pelvis posterior to the pouch.  A large penrose drain was also placed into the pouch transanal.      The abdomen was irrigated with a copious amount of saline solution. Hemostasis was evident. We then proceeded with maturing our loop ileostomy. A loop of ileum approximately 30-40 cm proximal to the pouch was chosen as the site for the loop ileostomy.  The ileum was delivered through the stoma aperture and appropriate orientation with the proximal loop superior and the distal loop inferior confirmed.  A small window was created in the mesentery at the bowel wall, and a tonsil clamp used to clamp the ileostomy in place to ensure appropriate orientation.    We then proceeded with closing the Pfannenstiel incision.  The peritoneum was closed with a running 3-0  Vicryl suture.  The fascia was closed with 2 #0 looped PDS sutures from either end. All wounds were irrigated with a copious amount of saline solution and the skin was closed with 4-0 Monocryl sutures the skin dressed with skin glue. The wounds were isolated and the loop ileostomy was matured in standard Ruth fashion using interrupted 3-0 Vicryl sutures. We did place 2 figure of eight sutures with #1 PDS in the fascia to decrease the size of the stoma aperture through the fascia.  A stoma toña was not necessary. The ileostomy was pink and viable with excellent blood supply. A stoma appliance was applied.     The patient tolerated this procedure well without complications.  At the end of the procedure, all sponge needle and instrument counts were correct.  The patient was extubated in the operating room and brought to the recovery room in stable condition.      EBL: 25 ml  SPECIMEN: ileostomy, distal sigmoid colon and rectum, anastomotic rings  COMPLICATIONS: none    SALTY LEOS MD

## 2022-03-02 NOTE — ANESTHESIA PROCEDURE NOTES
Airway       Patient location: [facility] - Operating Room or Procedural Area.       Procedure Start/Stop Times: 3/2/2022 7:45 AM and 3/2/2022 7:45 AM  Staff -        CRNA: Casey Villa APRN CRNA       Performed By: CRNAIndications and Patient Condition       Indications for airway management: oliver-procedural and airway protection       Induction type:intravenous       Mask difficulty assessment: 1 - vent by mask    Final Airway Details       Final airway type: endotracheal airway       Successful airway: ETT - single  Endotracheal Airway Details        ETT size (mm): 7.0       Cuffed: yes       Successful intubation technique: direct laryngoscopy       DL Blade Type: Cleaning 2       Grade View of Cords: 1       Adjucts: stylet       Position: Center       Measured from: gums/teeth       Secured at (cm): 23       Bite block used: None    Post intubation assessment        Placement verified by: capnometry, equal breath sounds and chest rise        Number of attempts at approach: 1       Number of other approaches attempted: 0       Secured with: pink tape       Ease of procedure: easy       Dentition: Intact and Unchanged    Additional Comments         Routine oliver-procedural airway protection.     Cleaning 2.    7.0 mm ID endotracheal tube.

## 2022-03-02 NOTE — PROVIDER NOTIFICATION
Dr Belcher at bedside.  RN noticed rash developing on upper chest/neck area.  Rash area not raised, no discomfort at site per pt.  No medications had been given in pacu.   Per Dr Belcher, continue to monitor, no interventions at this time.

## 2022-03-02 NOTE — OP NOTE
OPERATIVE REPORT     PREOPERATIVE DIAGNOSIS:   Ulcerative colitis with previous total abdominal colectomy with end ileostomy      POSTOPERATIVE DIAGNOSIS:  Same     OPERATIONS PERFORMED:  1.   Robotic assisted completion proctectomy with ileal pouch anal anastomosis  2.   Abdominal lysis of adhesions  3.   Rigid proctoscopy with anastomotic leak testing  4.   Diverting loop ileostomy     SURGEON:  Shahrzad Flaherty MD     COSURGEON: Kay Finney MD     ASSISTANT: DIVYA Park MD, Colon and Rectal Surgery Fellow   Conrado Ferraro MD, Colon and Rectal Surgery Fellow     ANESTHESIA:  General.     INDICATIONS FOR PROCEDURE:  Luther Wood is a 26 year old man who underwent a previous total abdominal colectomy with end ileostomy for fulminant ulcerative colitis. He presents today for a completion proctectomy and creation of an ileal J-pouch with an ileoanal anastomosis and diverting loop ileostomy. The risks and benefits of this procedure have been discussed with the patient preoperatively by Dr. Flaherty.       OPERATIVE FINDINGS:  A size 28 EEA stapler was used to create the ileal pouch-anal anastomosis.  The anastomosis was located at the top of the anorectal ring and was hemostatic.  Insufflation from below did not reveal any evidence of an anastomotic leak. There was a small leak from the anterior portion of the J-pouch which was repaired. Repeat leak testing demonstrated an airtight seal.     PROCEDURE IN DETAIL:  See Dr. Flaherty's operative report for full details of the surgery. I assisted with division of the distal rectum, as well as the abdominal lysis of adhesions. I also performed the stapled anastomosis from below and rigid proctoscopy.  I went below and passed the 28 EEA stapler into the anal canal, advancing the spike through the staple line of the rectal stump.  The 2 ends of the stapler were connected, tightened and fired.  Two anastomotic doughnuts were completely intact and were sent  separately to pathology.  The anastomosis was then tested by insufflating from below through the proctoscope with the anastomosis under saline.   There was no leak from the ileal pouch anal anastomosis, however there was a leak from the anterior staple line of the J-pouch. This was repaired, and rigid proctoscopy with leak testing was repeated with evidence of an airtight seal. Excess air was evacuated from below through the proctoscope and a Penrose drain placed in the ileoanal pouch. The abdomen was irrigated with a copious amount of saline solution and sucked dry.  Hemostasis was evident. The pelvic drain was placed, the loop of small bowel approximately 30 cm proximal to the pouch was selected as a loop ileostomy and was pulled through through the previous ileostomy site in the right lower quadrant. Appropriate orientation with the proximal limb superior and the distal and inferior was confirmed. We then proceeded with abdominal closure.    Kay Finney MD

## 2022-03-02 NOTE — ANESTHESIA PREPROCEDURE EVALUATION
Anesthesia Pre-Procedure Evaluation    Patient: Luther Wood   MRN: [id number] : 1995        Procedure : Procedure(s):  ROBOTIC COMPLETION PROCTOCOLECTOMY, ILEAL POUCH ANAL ANASTOMOSIS  PLACEMENT OF BILATERAL URETERAL CATHETERS          Past Medical History:   Diagnosis Date     Colitis 2019     History of blood transfusion 2021      Past Surgical History:   Procedure Laterality Date     COLONOSCOPY       ENT SURGERY  2016    wisdom teeth     LAPAROSCOPIC ASSISTED COLECTOMY N/A 2021    Procedure: laparoscopic total abdominal colectomy with end ileostomy;  Surgeon: Soledad Flaherty MD;  Location: [facility] OR     LAPAROSCOPIC ILEOSTOMY N/A 2021    Procedure: Laparoscopic Ileostomy;  Surgeon: Soledad Flaherty MD;  Location: [facility] OR     SIGMOIDOSCOPY FLEXIBLE N/A 2021    Procedure: SIGMOIDOSCOPY, FLEXIBLE;  Surgeon: Anderson Cardona MD;  Location: [facility] GI     TONSILLECTOMY & ADENOIDECTOMY  2006      Allergies   Allergen Reactions     Dairy Digestive       Social History     Tobacco Use     Smoking status: Never Smoker     Smokeless tobacco: Never Used   Substance Use Topics     Alcohol use: Not Currently     Comment: occassional      Wt Readings from Last 1 Encounters:   22 75 kg (165 lb 4.8 oz)        Anesthesia Evaluation            ROS/MED HX  ENT/Pulmonary:  - neg pulmonary ROS     Neurologic:       Cardiovascular:  - neg cardiovascular ROS     METS/Exercise Tolerance:     Hematologic:     (+) anemia,     Musculoskeletal:       GI/Hepatic: Comment: Ulcerative colitis    (-) GERD and liver disease   Renal/Genitourinary:    (-) renal disease   Endo:       Psychiatric/Substance Use: Comment: ADHD      Infectious Disease:       Malignancy:       Other:            Physical Exam    Airway        Mallampati: II   TM distance: > 3 FB   Neck ROM: full     Respiratory Devices and Support         Dental  no notable dental history         Cardiovascular   cardiovascular exam normal           Pulmonary           breath sounds clear to auscultation           OUTSIDE LABS:  CBC:   Lab Results   Component Value Date    WBC 6.6 02/11/2022    WBC 6.6 01/11/2022    HGB 13.6 03/02/2022    HGB 13.6 02/11/2022    HCT 47.9 02/11/2022    HCT 39.2 (L) 01/11/2022     02/11/2022     01/11/2022     BMP:   Lab Results   Component Value Date     09/24/2021     09/23/2021    POTASSIUM 3.4 09/24/2021    POTASSIUM 3.4 09/23/2021    CHLORIDE 102 09/24/2021    CHLORIDE 101 09/23/2021    CO2 29 09/24/2021    CO2 28 09/23/2021    BUN 11 09/24/2021    BUN 11 09/23/2021    CR 0.70 09/24/2021    CR 0.74 09/23/2021     (H) 09/24/2021     (H) 09/23/2021     COAGS:   Lab Results   Component Value Date    INR 1.33 (H) 09/17/2021     POC: No results found for: BGM, HCG, HCGS  HEPATIC:   Lab Results   Component Value Date    ALBUMIN 1.5 (L) 09/17/2021    PROTTOTAL 5.6 (L) 09/17/2021    ALT 93 (H) 09/17/2021    AST 25 09/17/2021    ALKPHOS 142 09/17/2021    BILITOTAL 0.4 09/17/2021     OTHER:   Lab Results   Component Value Date    MADIE 8.0 (L) 09/24/2021    PHOS 2.4 (L) 09/18/2021    MAG 2.2 09/18/2021    LIPASE 64 (L) 09/11/2021    TSH 1.35 12/31/2021    .0 (H) 09/15/2021    SED 68 (H) 09/11/2021       Anesthesia Plan    ASA Status:  2      Anesthesia Type: General.     - Airway: ETT              Consents    Anesthesia Plan(s) and associated risks, benefits, and realistic alternatives discussed. Questions answered and patient/representative(s) expressed understanding.    - Discussed:     - Discussed with:  Patient         Postoperative Care       PONV prophylaxis: Ondansetron (or other 5HT-3), Dexamethasone or Solumedrol, Background Propofol Infusion     Comments:                Evelyne Belcher

## 2022-03-03 LAB
ANION GAP SERPL CALCULATED.3IONS-SCNC: 4 MMOL/L (ref 3–14)
BUN SERPL-MCNC: 14 MG/DL (ref 7–30)
CALCIUM SERPL-MCNC: 8.4 MG/DL (ref 8.5–10.1)
CHLORIDE BLD-SCNC: 102 MMOL/L (ref 94–109)
CO2 SERPL-SCNC: 29 MMOL/L (ref 20–32)
CREAT SERPL-MCNC: 1.12 MG/DL (ref 0.66–1.25)
ERYTHROCYTE [DISTWIDTH] IN BLOOD BY AUTOMATED COUNT: 23.9 % (ref 10–15)
GFR SERPL CREATININE-BSD FRML MDRD: >90 ML/MIN/1.73M2
GLUCOSE BLD-MCNC: 98 MG/DL (ref 70–99)
HCT VFR BLD AUTO: 43.4 % (ref 40–53)
HGB BLD-MCNC: 13.4 G/DL (ref 13.3–17.7)
MAGNESIUM SERPL-MCNC: 2 MG/DL (ref 1.6–2.3)
MCH RBC QN AUTO: 23.8 PG (ref 26.5–33)
MCHC RBC AUTO-ENTMCNC: 30.9 G/DL (ref 31.5–36.5)
MCV RBC AUTO: 77 FL (ref 78–100)
PHOSPHATE SERPL-MCNC: 2.8 MG/DL (ref 2.5–4.5)
PLATELET # BLD AUTO: 311 10E3/UL (ref 150–450)
POTASSIUM BLD-SCNC: 3.9 MMOL/L (ref 3.4–5.3)
RBC # BLD AUTO: 5.62 10E6/UL (ref 4.4–5.9)
SODIUM SERPL-SCNC: 135 MMOL/L (ref 133–144)
WBC # BLD AUTO: 12.8 10E3/UL (ref 4–11)

## 2022-03-03 PROCEDURE — 258N000003 HC RX IP 258 OP 636: Performed by: COLON & RECTAL SURGERY

## 2022-03-03 PROCEDURE — 36415 COLL VENOUS BLD VENIPUNCTURE: CPT | Performed by: COLON & RECTAL SURGERY

## 2022-03-03 PROCEDURE — G0463 HOSPITAL OUTPT CLINIC VISIT: HCPCS

## 2022-03-03 PROCEDURE — 250N000011 HC RX IP 250 OP 636: Performed by: COLON & RECTAL SURGERY

## 2022-03-03 PROCEDURE — 85027 COMPLETE CBC AUTOMATED: CPT | Performed by: COLON & RECTAL SURGERY

## 2022-03-03 PROCEDURE — 250N000013 HC RX MED GY IP 250 OP 250 PS 637: Performed by: COLON & RECTAL SURGERY

## 2022-03-03 PROCEDURE — 84100 ASSAY OF PHOSPHORUS: CPT | Performed by: COLON & RECTAL SURGERY

## 2022-03-03 PROCEDURE — 120N000001 HC R&B MED SURG/OB

## 2022-03-03 PROCEDURE — 83735 ASSAY OF MAGNESIUM: CPT | Performed by: COLON & RECTAL SURGERY

## 2022-03-03 PROCEDURE — 80048 BASIC METABOLIC PNL TOTAL CA: CPT | Performed by: COLON & RECTAL SURGERY

## 2022-03-03 RX ADMIN — METRONIDAZOLE 500 MG: 500 INJECTION, SOLUTION INTRAVENOUS at 22:25

## 2022-03-03 RX ADMIN — METRONIDAZOLE 500 MG: 500 INJECTION, SOLUTION INTRAVENOUS at 09:32

## 2022-03-03 RX ADMIN — ACETAMINOPHEN 975 MG: 325 TABLET, FILM COATED ORAL at 22:27

## 2022-03-03 RX ADMIN — CEFAZOLIN 1 G: 1 INJECTION, POWDER, FOR SOLUTION INTRAMUSCULAR; INTRAVENOUS at 17:38

## 2022-03-03 RX ADMIN — DIAZEPAM 2 MG: 5 INJECTION INTRAMUSCULAR; INTRAVENOUS at 04:18

## 2022-03-03 RX ADMIN — DIAZEPAM 2 MG: 5 INJECTION INTRAMUSCULAR; INTRAVENOUS at 20:02

## 2022-03-03 RX ADMIN — ONDANSETRON 4 MG: 2 INJECTION INTRAMUSCULAR; INTRAVENOUS at 13:30

## 2022-03-03 RX ADMIN — CEFAZOLIN 1 G: 1 INJECTION, POWDER, FOR SOLUTION INTRAMUSCULAR; INTRAVENOUS at 08:28

## 2022-03-03 RX ADMIN — Medication 1 LOZENGE: at 08:45

## 2022-03-03 RX ADMIN — SODIUM CHLORIDE, POTASSIUM CHLORIDE, SODIUM LACTATE AND CALCIUM CHLORIDE: 600; 310; 30; 20 INJECTION, SOLUTION INTRAVENOUS at 18:30

## 2022-03-03 RX ADMIN — ENOXAPARIN SODIUM 40 MG: 40 INJECTION SUBCUTANEOUS at 09:32

## 2022-03-03 RX ADMIN — DIAZEPAM 2 MG: 5 INJECTION INTRAMUSCULAR; INTRAVENOUS at 11:56

## 2022-03-03 RX ADMIN — ACETAMINOPHEN 975 MG: 325 TABLET, FILM COATED ORAL at 06:37

## 2022-03-03 ASSESSMENT — ACTIVITIES OF DAILY LIVING (ADL)
ADLS_ACUITY_SCORE: 3
ADLS_ACUITY_SCORE: 5
ADLS_ACUITY_SCORE: 3
ADLS_ACUITY_SCORE: 5
ADLS_ACUITY_SCORE: 3
ADLS_ACUITY_SCORE: 5
ADLS_ACUITY_SCORE: 3
ADLS_ACUITY_SCORE: 5
ADLS_ACUITY_SCORE: 3
ADLS_ACUITY_SCORE: 5
ADLS_ACUITY_SCORE: 3
ADLS_ACUITY_SCORE: 3
ADLS_ACUITY_SCORE: 5
ADLS_ACUITY_SCORE: 3
ADLS_ACUITY_SCORE: 3

## 2022-03-03 NOTE — DISCHARGE INSTRUCTIONS
Follow up in the office in 4 weeks with Dr. Flaherty or at your already scheduled post op visit. Call [phone] to schedule your appointment.   Call the office at [phone] if you develop fever >101, uncontrolled pain, bleeding, nausea, vomiting, or constipation (no stool for 4-5 days).   Continue a low residue diet for 1-2 weeks.  Avoid nuts, seeds, popcorn, raw fruits and veggies with peels.  No heavy lifting or straining over 10 lbs for 6 weeks. No driving while taking narcotic pain medications.        New Ileostomy     Surgery date:  3/2/22  Surgeon:  Dr. Shahrzad Flaherty  Procedure:  Diverting Loop Ilestomy  Related diagnosis:  Ulcerative Colitis    General:   1. Change appliance 2-3x/week and as needed for any leakage.    2. Empty pouch when 1/3 full. Initially wake to check need for pouch emtpying at least once during the night.    3. Carry an extra pouching system with you at all times (can prepare the barrier ahead of time - cut out the opening and apply ring).  4. Diet: Eat 6 small meals/day.  Drink 8oz/fluid every hour during waking hours. Chew all food well!    5. Diet: Eat pasta, rice, potatoes, hot/dry cereal, toast, breads, pretzels, chips, crackers, yogurt, meat, fish, eggs, cheese, peanut butter, bananas to thicken the stool. Eat 4-6 servings of protein/day.  6. Avoid eating: Seeds, nuts, peels, raw vegetables, chinese vegetables, casings on meats, grape/prune juice, grapefruit, oranges, pineapple, mushrooms.   7. NO laxatives. NO timed-released medications.   8. May shower with appliance on or off. Blow dry (on cool setting) cloth backing and tape following bathing.   9. Change your appliance in the morning before breakfast (less likely to have stool coming out at this time).  10. Walk frequently but no heavy lifting.  11. Can use an odor eliminator (Ex: Febreeze) in the room prior to emptying or changing appliance.  12. Initially monitor your output to avoid dehydration.  Call Physician if your  "output exceeds 1500cc/24hrs.    Supplies: OK to resume home supplies        1. Pouching system: Coloplast Beige one piece flat with filter #67062 (5/box = 3 box/month)   2.  Ring:  Brava Moldable Ring 4.2mm #83587  (10/box = 2 box/month)  3.  3M Cavilon no-sting skin barrier (optional if needed)   -   #3344  (50/box = 1 box as needed)  4.  Stoma powder (if needed)   -    #64768  (1 bottle as needed)  5.  Odor eliminator drops (optional)   -   #35166  (8oz bottle) or #22554 (20 sachets)      Pouching procedure:  1.  Cut out opening in barrier following pattern.   (approx. 1/8\" larger than stoma)  2.  Remove plastic backing.  3.  Open ring, stretch and apply around the cut opening on sticky side of barrier.  Press into place.  Note:  can 'build up' the ring to fill in any divots in peristomal skin.     4.  Fold back edge of paper that covers the tape to create a \"tab.\"  This assists with paper removal later on.  5.  May attach pouch to barrier at this time.  Set prepared pouch aside.  6.  Remove old pouch from around stoma.  Discard.   7.  Cleanse around stoma with water only.  Dry well.     8.  Apply pouch:  Ensure skin completely dry.  Pull up on abdomen to create flat pouching surface.  Center stoma in barrier opening and press barrier firmly to skin.  9.  Pull on \"tabs\" to remove paper that covers the tape.  Press tape to skin.  Ok if there are wrinkles in the tape.    10.  Attach pouch to barrier, if have not already done.  Ensure pouch is closed.   11.  Hold warm hand over stoma/barrier for a few minutes, to help pouch form a good secure bond with the skin.      Online ostomy resources: Tyros website, has good videos - [email or web address]/en/ostomycare/educationaltools   - See also [email or web address].[email or web address]/ostomy; Ostomy.org  - Smart phone emile: Ostobuddy      Follow-up as needed (after home care is done):     Elsy Laura Amanda, Kaitlin - CWOCNs (ostomy nurses)  Clinic room is on 1st floor in [facility]" [facility] - check in at [facility] in [facility]  Office phone # [phone] (Mon - Fri) - call for any questions or concerns    OR     Levy CWOCN  [facility]  Office phone # [phone]

## 2022-03-03 NOTE — PROGRESS NOTES
Dr. Belcher approved pt to Tx to [facility] 7700.  A&Ox4, RA--lungs clear, Tele: SB/SR--no ectopy, byrnes patent, penrose drain intact, LLQ MIRZA drain, RLQ ileostomy, port incisions x3 c/d/i.  Mother updated.

## 2022-03-03 NOTE — PROVIDER NOTIFICATION
MD Notification    Notified Person: MD    Notified Person Name: Gianni Varela    Notification Date/Time: 745pm    Notification Interaction: phone call    Purpose of Notification: Pt has rash that per PACU RN worsened since surgery. Rash is on chest and raise on face, pt appears comfortable and unbothered. NO SOB noted. Writer was to administer benadryl IV but family requested we have an MD come assess their son before benadryl was given.     Orders Received: Give IV benadryl and if no effect, call MD to get stat hospitalist consult ordered.    Comments:

## 2022-03-03 NOTE — ANESTHESIA POSTPROCEDURE EVALUATION
Patient: Luther Wood    Procedure: Procedure(s):  ROBOTIC COMPLETION PROCTOCOLECTOMY, ILEAL POUCH ANAL ANASTOMOSIS, LAPAROSCOPIC LYSIS OF ADHESION, DIVERTING LOOP ILLEOSTOMY,AND RIDGID PROTOCTOMY  CYSTOSCOPY, PLACEMENT OF BILATERAL URETERAL CATHETERS       Anesthesia Type:  General    Note:  Disposition: Admission   Postop Pain Control: Uneventful            Sign Out: Well controlled pain   PONV: No   Neuro/Psych: Uneventful            Sign Out: Acceptable/Baseline neuro status   Airway/Respiratory: Uneventful            Sign Out: Acceptable/Baseline resp. status   CV/Hemodynamics: Uneventful            Sign Out: Acceptable CV status   Other NRE:    DID A NON-ROUTINE EVENT OCCUR? No           Last vitals:  Vitals Value Taken Time   /74 03/02/22 1830   Temp 36.9  C (98.5  F) 03/02/22 1740   Pulse 60 03/02/22 1837   Resp 18 03/02/22 1837   SpO2 100 % 03/02/22 1839   Vitals shown include unvalidated device data.    Electronically Signed By: Evelyne Belcher  March 2, 2022  7:08 PM

## 2022-03-03 NOTE — CONSULTS
"[facility] Nurse Inpatient Ostomy Assessment     Assessment of new loop Ileostomy     Surgery date:  3/2/22  Surgeon:  Dr. Kay Finney  Procedure:  Diverting Loop Ilestomy  Related diagnosis:  Ulcerative Colitis    WO Assessment & Physical Exam:        Current status: Slightly drowsy but alert. Pleasant      Date of last photo: 3/3      Stoma location: RLQ    Stoma size: 1 1/8\", os pointing to 6 o clock    Stoma appearance: pink-red, round and moist    Mucocutaneous junction:  intact    Peristomal complication(s): none     Abdominal assessment: soft    Surgical site(s): open to air    NG still in place? No    Output: serosanguinous     Pain: Cramping    Is patient still on a PCA? 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 No questions.      Psychosocial: Accepting of stoma, excited for next step. Supportive mother and father.      WOC Plan:         Pouching product plan: Coloplast 1 piece. Patient with allergies to aquilino products    Comments: Patient with allergies to aquilino products    Frequency of pouch changes: 2-3x weekly      Pt support system on discharge: Parents    [facility] recommend home care?  No      WOC follow-up plan: daily M-F      Objective Data:     Patient history according to medical record: Appropriate recovery after robotic completion proctectomy and ileal pouch anal anastomosis with diverting ileostomy           Current Diet/Nutrition:   Orders Placed This Encounter      Full Liquid Diet       Output:  I/O last 3 completed shifts:  In: 1784.4 [I.V.:1784.4]  Out: 1765 [Urine:1625; Drains:115; Blood:25]      Labs:   Recent Labs   Lab 03/03/22  0733   HGB 13.4   WBC 12.8*         Jun Risk Assessment:   Sensory Perception: 4-->no " impairment  Moisture: 4-->rarely moist  Activity: 3-->walks occasionally  Mobility: 4-->no limitation  Nutrition: 3-->adequate  Friction and Shear: 3-->no apparent problem  Jun Score: 21      WOC Interventions:       Patient's chart evaluated    Focus of today's visit: initial fitting, evaluate leakage issue, pouch change demonstration , verbal instruction  and discharge instructions     Pouch changed 3/3    Participant(s) in teaching session today: patient  and parent    Change made with ostomy management today: Yes, coloplast with brava ring    Supplies: Gathered and at bedside    Educational materials: Given [employer] education but patient didn't feel he needed has been managing ileostomy since August    Preparation for discharge: Discussed when to follow up with a WOC Nurse in the future, Ok to resume home supplies on discharge AVS written    Registered for supply samples? NA    Discussed plan of care with: Patient and Family    Rabia Fuchs RN, CWOCN

## 2022-03-03 NOTE — PROGRESS NOTES
"SPIRITUAL HEALTH SERVICES Progress Note  [facility] 22    Visit for emotional support.    Upon my introduction, Jb said he is \"doing ok,\" had no SH needs, and wanted to rest.    I invited him to reach out to SH as desired.    SH remains available.    Rev Mandy Ascencio  Associate   Spiritual Health Phone Line [phone]  Spiritual Health Pager [phone]  "

## 2022-03-03 NOTE — PROGRESS NOTES
COLON & RECTAL SURGERY  PROGRESS NOTE    March 3, 2022  Post-op Day # 1    SUBJECTIVE:  Moderate pain this AM, tolerable with PCA and valium.  Jacob to gravity.  Ambulated.  Tolerating CLD.  No gas from stoma.      Rash overnight with resolution after IV benadryl    Stoma: 0  UOP: 1625  MIRZA:115    OBJECTIVE:  Temp:  [97.4  F (36.3  C)-100  F (37.8  C)] (P) 98.4  F (36.9  C)  Pulse:  [52-94] 87  Resp:  [14-22] (P) 18  BP: (116-142)/(55-99) 120/59  SpO2:  [95 %-100 %] 97 %    Intake/Output Summary (Last 24 hours) at 3/3/2022 0733  Last data filed at 3/3/2022 0639  Gross per 24 hour   Intake 1784.4 ml   Output 1765 ml   Net 19.4 ml       GENERAL:  Awake, alert, no acute distress  HEAD: Nomocephalic atraumatic  SCLERA: anicteric  EXTREMITIES: warm and well perfused  ABDOMEN:  Soft, appropriately tender, non-distended, no rebound or guarding, no peritoneal signs.  Stoma pink without gas or succus in bag.  MIRZA serosang.   INCISION:  C/d/i    LABS:  Lab Results   Component Value Date    WBC 6.6 02/11/2022    WBC 10.4 12/07/2009     Lab Results   Component Value Date    HGB 13.6 03/02/2022    HGB 13.7 12/07/2009     Lab Results   Component Value Date    HCT 47.9 02/11/2022    HCT 40.7 12/07/2009     Lab Results   Component Value Date     03/02/2022     12/07/2009     Last Basic Metabolic Panel:  Lab Results   Component Value Date     09/24/2021     12/07/2009      Lab Results   Component Value Date    POTASSIUM 3.4 09/24/2021    POTASSIUM 3.6 12/07/2009     Lab Results   Component Value Date    CHLORIDE 102 09/24/2021    CHLORIDE 103 12/07/2009     Lab Results   Component Value Date    MADIE 8.0 09/24/2021    MADIE 9.2 12/07/2009     Lab Results   Component Value Date    CO2 29 09/24/2021    CO2 30 12/07/2009     Lab Results   Component Value Date    BUN 11 09/24/2021    BUN 14 12/07/2009     Lab Results   Component Value Date    CR 1.12 03/02/2022    CR 0.64 12/07/2009     Lab Results   Component Value  Date     09/24/2021    GLC 97 12/07/2009       ASSESSMENT/PLAN:  Appropriate recovery after robotic completion proctectomy and ileal pouch anal anastomosis with diverting ileostomy    Cont CLD  Cont IVF  Cont PCA  Cont byrnes  Cont Lovenox  Cont MIRZA  Follow up labs  Typical supportive care     Will discuss with Dr. Flaherty and addend with updates.    José Sanchez MD, MPH  Fellow in Colon and Rectal Surgery  [facility]  Pager: [phone]    Colon and Rectal Surgery Staff  I performed a history and physical examination of the patient and discussed their management with the physician assistant. I reviewed the physician assistants note and agree with the documented findings and plan of care.     Discussed surgery.  Overall doing as expected.  Pain controlled with PCA and valium. Tolerating clears.  Good UOP.    Exam:   Alert, NAD  Rash resolved on chest  abd soft, appropriately tender per incisons  MIRZA with serosang output    Plan:  Cont clears  Cont IVFs  PCA and valium for pain  Cont lovenox  Cont MIRZA and Byrnes  OOB and ambulation today      Shahrzad Flaherty MD, FACS    [facility]  [address] [address]  T: [phone]  F: [phone]

## 2022-03-04 LAB
GLUCOSE BLDC GLUCOMTR-MCNC: 105 MG/DL (ref 70–99)
PATH REPORT.COMMENTS IMP SPEC: NORMAL
PATH REPORT.COMMENTS IMP SPEC: NORMAL
PATH REPORT.FINAL DX SPEC: NORMAL
PATH REPORT.GROSS SPEC: NORMAL
PATH REPORT.MICROSCOPIC SPEC OTHER STN: NORMAL
PATH REPORT.RELEVANT HX SPEC: NORMAL
PHOTO IMAGE: NORMAL

## 2022-03-04 PROCEDURE — 250N000011 HC RX IP 250 OP 636: Performed by: COLON & RECTAL SURGERY

## 2022-03-04 PROCEDURE — 250N000013 HC RX MED GY IP 250 OP 250 PS 637: Performed by: COLON & RECTAL SURGERY

## 2022-03-04 PROCEDURE — 999N000197 HC STATISTIC WOC PT EDUCATION, 0-15 MIN

## 2022-03-04 PROCEDURE — 120N000001 HC R&B MED SURG/OB

## 2022-03-04 PROCEDURE — 258N000003 HC RX IP 258 OP 636: Performed by: COLON & RECTAL SURGERY

## 2022-03-04 RX ORDER — CEFAZOLIN SODIUM 1 G/3ML
1 INJECTION, POWDER, FOR SOLUTION INTRAMUSCULAR; INTRAVENOUS EVERY 8 HOURS
Status: COMPLETED | OUTPATIENT
Start: 2022-03-04 | End: 2022-03-05

## 2022-03-04 RX ORDER — MORPHINE SULFATE 2 MG/ML
2 INJECTION, SOLUTION INTRAMUSCULAR; INTRAVENOUS
Status: COMPLETED | OUTPATIENT
Start: 2022-03-04 | End: 2022-03-04

## 2022-03-04 RX ADMIN — CEFAZOLIN 1 G: 1 INJECTION, POWDER, FOR SOLUTION INTRAMUSCULAR; INTRAVENOUS at 15:14

## 2022-03-04 RX ADMIN — MORPHINE SULFATE 2 MG: 2 INJECTION, SOLUTION INTRAMUSCULAR; INTRAVENOUS at 14:01

## 2022-03-04 RX ADMIN — ONDANSETRON 4 MG: 2 INJECTION INTRAMUSCULAR; INTRAVENOUS at 09:38

## 2022-03-04 RX ADMIN — METRONIDAZOLE 500 MG: 500 INJECTION, SOLUTION INTRAVENOUS at 10:39

## 2022-03-04 RX ADMIN — ACETAMINOPHEN 975 MG: 325 TABLET, FILM COATED ORAL at 20:38

## 2022-03-04 RX ADMIN — METRONIDAZOLE 500 MG: 500 INJECTION, SOLUTION INTRAVENOUS at 20:39

## 2022-03-04 RX ADMIN — SODIUM CHLORIDE, POTASSIUM CHLORIDE, SODIUM LACTATE AND CALCIUM CHLORIDE: 600; 310; 30; 20 INJECTION, SOLUTION INTRAVENOUS at 08:42

## 2022-03-04 RX ADMIN — ACETAMINOPHEN 975 MG: 325 TABLET, FILM COATED ORAL at 05:30

## 2022-03-04 RX ADMIN — DIAZEPAM 2 MG: 5 INJECTION INTRAMUSCULAR; INTRAVENOUS at 01:56

## 2022-03-04 RX ADMIN — DIAZEPAM 2 MG: 5 INJECTION INTRAMUSCULAR; INTRAVENOUS at 17:23

## 2022-03-04 RX ADMIN — CEFAZOLIN 1 G: 1 INJECTION, POWDER, FOR SOLUTION INTRAMUSCULAR; INTRAVENOUS at 23:37

## 2022-03-04 RX ADMIN — ENOXAPARIN SODIUM 40 MG: 40 INJECTION SUBCUTANEOUS at 10:39

## 2022-03-04 RX ADMIN — DIAZEPAM 2 MG: 5 INJECTION INTRAMUSCULAR; INTRAVENOUS at 23:37

## 2022-03-04 RX ADMIN — CEFAZOLIN 1 G: 1 INJECTION, POWDER, FOR SOLUTION INTRAMUSCULAR; INTRAVENOUS at 08:38

## 2022-03-04 RX ADMIN — PROCHLORPERAZINE EDISYLATE 10 MG: 5 INJECTION INTRAMUSCULAR; INTRAVENOUS at 13:19

## 2022-03-04 ASSESSMENT — ACTIVITIES OF DAILY LIVING (ADL)
ADLS_ACUITY_SCORE: 5
DEPENDENT_IADLS:: INDEPENDENT
ADLS_ACUITY_SCORE: 5

## 2022-03-04 NOTE — PROGRESS NOTES
A&OX4, VSS, pain controlled with IV dilaudid, however questionable causing nausea hence pt hesitant to use.  Nausea resolved with IV zofran and compazine.  Lungs clear.  Abd flat with hypoactive BS, belching.  Ileostomy with liquid brown/green stool and flatus in bag. Jacob patent with clear yellow urine.  Penrose drain in rectum draining scant dark red/brown drainage.  Inc with dermabond.  Up amb in room with SBA and gait belt.  Parent present and very involved.

## 2022-03-04 NOTE — PLAN OF CARE
Patient is alert and oriented X4, VSS on RA. Up with assist of 1 with GB and walker. IV LR infusing 75ml/hr. On clear liquid diet. Patient complain of rash on upper chest, neck and bilateral upper arm, PRN benadryl was given and On call hospitalist paged.Incision on abdomen open to air, penrose drain  via rectum. Jacob catheter in place with minimum output , austin red urine.  Will continue to monitor.

## 2022-03-04 NOTE — CONSULTS
Patient has insurance through [employer].     Enoxaparin 40mg x 28 syringes: $8.     GUILLERMINA Chavez, Pharmacy Technician/Liaison, Discharge Pharmacy [phone]

## 2022-03-04 NOTE — PLAN OF CARE
"Goal Outcome Evaluation:    Plan of Care Reviewed With: patient, father     Overall Patient Progress: no change    Outcome Evaluation: Continues with intermittent nausea, pain and overall \"tired\" state. Progressed to full liquid diet, taking slowly.      "

## 2022-03-04 NOTE — PROGRESS NOTES
"[facility]  WO Nurse Inpatient Ostomy Assessment     Assessment of new loop Ileostomy     Surgery date:  3/2/22  Surgeon:  Dr. Kay Finney  Procedure:  Diverting Loop Ilestomy  Related diagnosis:  Ulcerative Colitis    WO Assessment & Physical Exam:        Current status: Slightly drowsy but alert. Pleasant      Date of last photo: 3/3      Stoma location: RLQ    Stoma size: 1 1/8\", os pointing to 6 o clock    Stoma appearance: pink-red, round and moist    Mucocutaneous junction:  intact    Peristomal complication(s): none     Abdominal assessment: soft    Surgical site(s): open to air    NG still in place? No    Output: serosanguinous     Pain: Cramping    Is patient still on a PCA? Yes      Current pouching system: Coloplast 1 piece    Pouch last changed:  3/2, 3/3    Reason for pouch change today: pouch not changed today, no evidence of leaking      Learning and comprehension: Patient on 2nd stage of IPAA, understands ileostomy and supplies and how to change. No questions.      Psychosocial: Accepting of stoma, excited for next step. Supportive mother and father.      WOC Plan:         Pouching product plan: Coloplast 1 piece.     Comments: Patient with allergies to deborah products    Frequency of pouch changes: 2-3x weekly      Pt support system on discharge: Parents    WO recommend home care?  No      WOC follow-up plan: daily M-F      Objective Data:     Patient history according to medical record: Appropriate recovery after robotic completion proctectomy and ileal pouch anal anastomosis with diverting ileostomy           Current Diet/Nutrition:   Orders Placed This Encounter      Full Liquid Diet       Output:  I/O last 3 completed shifts:  In: 1446 [P.O.:1400; I.V.:46]  Out: 4570 [Urine:4325; Drains:245]      Labs:   Recent Labs   Lab 03/03/22  0733   HGB 13.4   WBC 12.8*         Jun Risk Assessment:   Sensory Perception: 4-->no impairment  Moisture: 4-->rarely " moist  Activity: 3-->walks occasionally  Mobility: 4-->no limitation  Nutrition: 3-->adequate  Friction and Shear: 3-->no apparent problem  Jun Score: 21      WOC Interventions:       Patient's chart evaluated    Focus of today's visit: verbal instruction  and discharge instructions     Pouch changed 3/3    Participant(s) in teaching session today: patient  and parent    Change made with ostomy management today: No    Supplies: Gathered and at bedside    Educational materials: Given [employer] education but patient didn't feel he needed has been managing ileostomy since August    Preparation for discharge: Discussed when to follow up with a WOC Nurse in the future, Ok to resume home supplies on discharge AVS written    Registered for supply samples? NA    Discussed plan of care with: Patient and Family    Rabia Fuchs RN, CWOCN

## 2022-03-04 NOTE — PLAN OF CARE
Goal Outcome Evaluation:    A&Ox4. VSS on RA. Up SBA, ambulated in room 2. Pain managed with dilaudid PCA and valium prn. Tolerating clear liquid diet. Ostomy has small pink/red liquid, observed very small amount of gas in bag around 0600. MIRZA draining moderate bloody red output, dressing CDI. Incisions MARGIE with liquid bandage. BS hypoactive. Jacob draining adequately, clear/pink/yellow output, more yellow in AM. Rectal drain has small amount of dark red drainage. No rash to chest/neck, at 19:00 pt had blotchy rash on hands, but has since improved, IV benadryl available if comes back. Discharge pending progress, continue to monitor and encourage ambulation.

## 2022-03-04 NOTE — PROGRESS NOTES
COLON & RECTAL SURGERY  PROGRESS NOTE    March 4, 2022  Post-op Day # 2    SUBJECTIVE:  Pain controlled with PCA and valium. Starting to have some flatus from stoma. Ambulated a few times. Had a rash but improved. Tolerating CLD, no n/v. 4.3L UOP, 245cc MIRZA. AVSS.     OBJECTIVE:  Temp:  [98.2  F (36.8  C)-99.1  F (37.3  C)] 99.1  F (37.3  C)  Pulse:  [70-91] 84  Resp:  [16-18] 16  BP: (123-142)/(61-76) 135/69  SpO2:  [94 %-96 %] 96 %    Intake/Output Summary (Last 24 hours) at 3/4/2022 0710  Last data filed at 3/4/2022 0652  Gross per 24 hour   Intake 1446 ml   Output 4570 ml   Net -3124 ml       GENERAL:  Awake, alert, no acute distress  HEAD: Normocephalic atraumatic  SCLERA: Anicteric  EXTREMITIES: Warm and well perfused  ABDOMEN:  Mildly firm, appropriately tender, non-distended. No guarding, rigidity, or peritoneal signs. Stoma pink with some gas and bowel sweat in bag. MIRZA serosang.  INCISION:  C/d/i    LABS:  Lab Results   Component Value Date    WBC 12.8 03/03/2022    WBC 10.4 12/07/2009     Lab Results   Component Value Date    HGB 13.4 03/03/2022    HGB 13.7 12/07/2009     Lab Results   Component Value Date    HCT 43.4 03/03/2022    HCT 40.7 12/07/2009     Lab Results   Component Value Date     03/03/2022     12/07/2009     Last Basic Metabolic Panel:  Lab Results   Component Value Date     03/03/2022     12/07/2009      Lab Results   Component Value Date    POTASSIUM 3.9 03/03/2022    POTASSIUM 3.6 12/07/2009     Lab Results   Component Value Date    CHLORIDE 102 03/03/2022    CHLORIDE 103 12/07/2009     Lab Results   Component Value Date    MADIE 8.4 03/03/2022    MADIE 9.2 12/07/2009     Lab Results   Component Value Date    CO2 29 03/03/2022    CO2 30 12/07/2009     Lab Results   Component Value Date    BUN 14 03/03/2022    BUN 14 12/07/2009     Lab Results   Component Value Date    CR 1.12 03/03/2022    CR 0.64 12/07/2009     Lab Results   Component Value Date     03/04/2022     GLC 98 03/03/2022    GLC 97 12/07/2009       ASSESSMENT/PLAN: 27 yo M POD#2 s/p robotic completion proctectomy and ileal pouch anal anastomosis with diverting ileostomy. Stable.     1. Full liquid diet  2. Continue PCA and valium. Plan to transition to IV & PO meds tomorrow.   3. Continue IVF until adequate PO intake  4. Continue Byrnes until POD#3  5. Continue MIRZA and rectal drain  6. OOB, ambulate  7. Stoma care and teaching  8. Lovenox for ppx, needs for 28 days post-op    Discussed with Dr. Flaherty.    For questions/paging, please contact the CRS office at [phone].    Campos Del Angel PA-C  Colorectal Physician Assistant    [facility]  [address]  [place] [address]  T: [phone]  F: [phone]        Colon and Rectal Surgery Staff  I performed a history and physical examination of the patient and discussed their management with the physician assistant. I reviewed the physician assistants note and agree with the documented findings and plan of care.     Feeling nauseous this afternoon and more tired.  Has been OOB and ambulation x2.  Toelrating clears.  No emesis.  Stoma now starting to function.    Exam:  Alert, NAD  abd soft, mild distention, ttp per incisions  MIRZA with serosang output  Stoma pink, viable and productive with dark output    Plan:  Cont liquid diet for now  Will try morphine for pain, cont PCA for now and prn Valium  Cont byrnes and MIRZA drain  Keep rectal penrose drain in place until prior to discharge  Lovenox for DVT ppx    Shahrzad Flaherty MD, FACS    [facility]  [address]  [place] [address]  T: [phone]  F: [phone]

## 2022-03-04 NOTE — CONSULTS
Care Management Initial Consult    General Information  Assessment completed with: Patient, Parents, -chart review,  (patient and his father)  Type of CM/SW Visit: Initial Assessment    Primary Care Provider verified and updated as needed: Yes   Readmission within the last 30 days: planned readmission      Reason for Consult: discharge planning  Advance Care Planning: Advance Care Planning Reviewed: other (see comments) (none)          Communication Assessment  Patient's communication style: spoken language (English or Bilingual)    Hearing Difficulty or Deaf: no   Wear Glasses or Blind: no    Cognitive  Cognitive/Neuro/Behavioral: WDL  Level of Consciousness: alert  Arousal Level: opens eyes spontaneously, arouses to voice  Orientation: oriented x 4  Mood/Behavior: calm, cooperative  Best Language: 0 - No aphasia  Speech: clear    Living Environment:   People in home: parent(s)     Current living Arrangements: house      Able to return to prior arrangements: yes       Family/Social Support:  Care provided by: self  Provides care for: no one  Marital Status: Single  Parent(s)          Description of Support System:           Current Resources:   Patient receiving home care services: No     Community Resources: None  Equipment currently used at home: colostomy/ostomy supplies  Supplies currently used at home: None    Employment/Financial:  Employment Status:          Financial Concerns: No concerns identified           Lifestyle & Psychosocial Needs:  Social Determinants of Health     Tobacco Use: Low Risk      Smoking Tobacco Use: Never Smoker     Smokeless Tobacco Use: Never Used   Alcohol Use: Not At Risk     Frequency of Alcohol Consumption: 2-4 times a month     Average Number of Drinks: 3 or 4     Frequency of Binge Drinking: Never   Financial Resource Strain: Low Risk      Difficulty of Paying Living Expenses: Not hard at all   Food Insecurity: No Food Insecurity     Worried About Running Out of Food in the  Last Year: Never true     Ran Out of Food in the Last Year: Never true   Transportation Needs: No Transportation Needs     Lack of Transportation (Medical): No     Lack of Transportation (Non-Medical): No   Physical Activity: Sufficiently Active     Days of Exercise per Week: 7 days     Minutes of Exercise per Session: 110 min   Stress: No Stress Concern Present     Feeling of Stress : Not at all   Social Connections: Moderately Isolated     Frequency of Communication with Friends and Family: Never     Frequency of Social Gatherings with Friends and Family: More than three times a week     Attends Adventist Services: Never     Active Member of Clubs or Organizations: Yes     Attends Club or Organization Meetings: Not on file     Marital Status: Never    Intimate Partner Violence: Not on file   Depression: Not at risk     PHQ-2 Score: 0   Housing Stability: Unknown     Unable to Pay for Housing in the Last Year: No     Number of Places Lived in the Last Year: Not on file     Unstable Housing in the Last Year: No       Functional Status:  Prior to admission patient needed assistance:   Dependent ADLs:: Independent  Dependent IADLs:: Independent       Mental Health Status:  Mental Health Status: No Current Concerns       Chemical Dependency Status:  Chemical Dependency Status: No Current Concerns             Values/Beliefs:  Spiritual, Cultural Beliefs, Adventist Practices, Values that affect care:  (Religion)               Additional Information:  Patient anticipates home at discharge.Reviewed WOC RN recommendation that C not recommended as had previoius ostomy. Patient in agreement with this assessment and feels comfotable with ostomy cares. Father also in agreement with this plan. No needs identified at this time for discharge.      Heidy Delaney RN   [facility]   Phone [phone]

## 2022-03-05 LAB
ANION GAP SERPL CALCULATED.3IONS-SCNC: 4 MMOL/L (ref 3–14)
BUN SERPL-MCNC: 10 MG/DL (ref 7–30)
CALCIUM SERPL-MCNC: 9.2 MG/DL (ref 8.5–10.1)
CHLORIDE BLD-SCNC: 96 MMOL/L (ref 94–109)
CO2 SERPL-SCNC: 30 MMOL/L (ref 20–32)
CREAT FLD-MCNC: 0.8 MG/DL
CREAT SERPL-MCNC: 0.98 MG/DL (ref 0.66–1.25)
GFR SERPL CREATININE-BSD FRML MDRD: >90 ML/MIN/1.73M2
GLUCOSE BLD-MCNC: 112 MG/DL (ref 70–99)
PLATELET # BLD AUTO: 400 10E3/UL (ref 150–450)
POTASSIUM BLD-SCNC: 4.4 MMOL/L (ref 3.4–5.3)
SODIUM SERPL-SCNC: 130 MMOL/L (ref 133–144)

## 2022-03-05 PROCEDURE — 36415 COLL VENOUS BLD VENIPUNCTURE: CPT | Performed by: COLON & RECTAL SURGERY

## 2022-03-05 PROCEDURE — 258N000003 HC RX IP 258 OP 636: Performed by: COLON & RECTAL SURGERY

## 2022-03-05 PROCEDURE — 250N000013 HC RX MED GY IP 250 OP 250 PS 637: Performed by: COLON & RECTAL SURGERY

## 2022-03-05 PROCEDURE — 80048 BASIC METABOLIC PNL TOTAL CA: CPT | Performed by: COLON & RECTAL SURGERY

## 2022-03-05 PROCEDURE — 85049 AUTOMATED PLATELET COUNT: CPT | Performed by: COLON & RECTAL SURGERY

## 2022-03-05 PROCEDURE — 82570 ASSAY OF URINE CREATININE: CPT | Performed by: COLON & RECTAL SURGERY

## 2022-03-05 PROCEDURE — 250N000011 HC RX IP 250 OP 636: Performed by: COLON & RECTAL SURGERY

## 2022-03-05 PROCEDURE — 120N000001 HC R&B MED SURG/OB

## 2022-03-05 RX ORDER — HYDROMORPHONE HYDROCHLORIDE 1 MG/ML
.3-.5 INJECTION, SOLUTION INTRAMUSCULAR; INTRAVENOUS; SUBCUTANEOUS
Status: DISCONTINUED | OUTPATIENT
Start: 2022-03-05 | End: 2022-03-07 | Stop reason: HOSPADM

## 2022-03-05 RX ADMIN — DIAZEPAM 2 MG: 5 INJECTION INTRAMUSCULAR; INTRAVENOUS at 06:14

## 2022-03-05 RX ADMIN — CEFAZOLIN 1 G: 1 INJECTION, POWDER, FOR SOLUTION INTRAMUSCULAR; INTRAVENOUS at 07:42

## 2022-03-05 RX ADMIN — CEFAZOLIN 1 G: 1 INJECTION, POWDER, FOR SOLUTION INTRAMUSCULAR; INTRAVENOUS at 22:47

## 2022-03-05 RX ADMIN — ACETAMINOPHEN 975 MG: 325 TABLET, FILM COATED ORAL at 06:13

## 2022-03-05 RX ADMIN — CEFAZOLIN 1 G: 1 INJECTION, POWDER, FOR SOLUTION INTRAMUSCULAR; INTRAVENOUS at 14:46

## 2022-03-05 RX ADMIN — ENOXAPARIN SODIUM 40 MG: 40 INJECTION SUBCUTANEOUS at 10:14

## 2022-03-05 RX ADMIN — SODIUM CHLORIDE, POTASSIUM CHLORIDE, SODIUM LACTATE AND CALCIUM CHLORIDE: 600; 310; 30; 20 INJECTION, SOLUTION INTRAVENOUS at 06:14

## 2022-03-05 RX ADMIN — METRONIDAZOLE 500 MG: 500 INJECTION, SOLUTION INTRAVENOUS at 09:02

## 2022-03-05 RX ADMIN — ACETAMINOPHEN 650 MG: 325 TABLET, FILM COATED ORAL at 21:30

## 2022-03-05 RX ADMIN — ACETAMINOPHEN 975 MG: 325 TABLET, FILM COATED ORAL at 14:19

## 2022-03-05 ASSESSMENT — ACTIVITIES OF DAILY LIVING (ADL)
ADLS_ACUITY_SCORE: 7
ADLS_ACUITY_SCORE: 7
ADLS_ACUITY_SCORE: 5
ADLS_ACUITY_SCORE: 7
ADLS_ACUITY_SCORE: 5
ADLS_ACUITY_SCORE: 7
ADLS_ACUITY_SCORE: 5
ADLS_ACUITY_SCORE: 7
ADLS_ACUITY_SCORE: 5
ADLS_ACUITY_SCORE: 7
ADLS_ACUITY_SCORE: 5
ADLS_ACUITY_SCORE: 7
ADLS_ACUITY_SCORE: 7
ADLS_ACUITY_SCORE: 5

## 2022-03-05 NOTE — PROGRESS NOTES
Colon and Rectal Surgery Progress Note          Assessment and Plan:     POD #3    Doing well    Drain output high, will send fluid for creatinine  Stop PCA  Start oral pain meds with IV backup  Low fiber diet  Remove byrnes      Bartolo Phillips MD FACS FASCRS  Colorectal Surgeon       [facility]  [address], [address]  [place] [address]  T: [phone]  F: [phone]  Pager: [phone]  [email or web address]                 Interval History:     Doing well. Stoma functioning              Physical Exam:   Vitals were reviewed  Patient Vitals for the past 24 hrs:   BP Temp Temp src Pulse Resp SpO2   03/05/22 0804 (!) 141/78 98.7  F (37.1  C) Oral 68 16 99 %   03/05/22 0650 -- -- -- -- 16 --   03/05/22 0612 139/73 98.3  F (36.8  C) Oral 74 16 98 %   03/04/22 2336 139/72 98  F (36.7  C) Oral 80 15 97 %   03/04/22 2013 -- 97.8  F (36.6  C) -- -- -- --   03/04/22 1622 125/72 97  F (36.1  C) Oral 85 16 96 %   03/04/22 1600 -- -- -- -- 16 --   03/04/22 1415 -- -- -- -- 16 --   03/04/22 1401 -- -- -- -- 16 --   03/04/22 1131 123/72 99.2  F (37.3  C) Oral 61 18 96 %         Intake/Output Summary (Last 24 hours) at 3/5/2022 0936  Last data filed at 3/5/2022 0900  Gross per 24 hour   Intake 915 ml   Output 4940 ml   Net -4025 ml       Head - Nomocephalic atraumatic  Sclera anicteric  Extremities warm and well perfused  Lungs - breathing non labored,   Abdomen - soft, flat, stoma pink, drain output serous  Rectal exam - deferred  Skin - no rash  Psych - affect appropriate  Neuro - no focal deficits             Data:   All laboratory and imaging data in the past 24 hours reviewed    CBC  Lab Results   Component Value Date    WBC 12.8 (H) 03/03/2022    WBC 6.6 02/11/2022    WBC 6.6 01/11/2022    HGB 13.4 03/03/2022    HGB 13.6 03/02/2022    HGB 13.6 02/11/2022    HCT 43.4 03/03/2022    HCT 47.9 02/11/2022    HCT 39.2 (L) 01/11/2022     03/05/2022     03/03/2022     03/02/2022        BMP  Recent Labs   Lab Test 03/05/22  0656 03/04/22  0606 03/03/22  0733   *  --  135   POTASSIUM 4.4  --  3.9   CHLORIDE 96  --  102   CO2 30  --  29   ANIONGAP 4  --  4   * 105* 98   BUN 10  --  14   CR 0.98  --  1.12   MADIE 9.2  --  8.4*       Liver Function Studies -   Recent Labs   Lab Test 09/17/21  0603   PROTTOTAL 5.6*   ALBUMIN 1.5*   BILITOTAL 0.4   ALKPHOS 142   AST 25   ALT 93*                    Medications:     Current Facility-Administered Medications Ordered in Epic   Medication Dose Route Frequency Last Rate Last Admin     acetaminophen (TYLENOL) tablet 650 mg  650 mg Oral Q4H PRN         acetaminophen (TYLENOL) tablet 975 mg  975 mg Oral Q8H   975 mg at 03/05/22 0613     benzocaine-menthol (CHLORASEPTIC) 6-10 MG lozenge 1 lozenge  1 lozenge Buccal Q1H PRN   1 lozenge at 03/03/22 0845     ceFAZolin (ANCEF) 1 g vial to attach to  ml bag for ADULT or 50 ml bag for PEDS  1 g Intravenous Q8H   1 g at 03/05/22 0742     diazepam (VALIUM) injection 2 mg  2 mg Intravenous Q6H PRN   2 mg at 03/05/22 0614     diphenhydrAMINE (BENADRYL) injection 25 mg  25 mg Intravenous Q6H PRN   25 mg at 03/02/22 2000     enoxaparin ANTICOAGULANT (LOVENOX) injection 40 mg  40 mg Subcutaneous Q24H   40 mg at 03/04/22 1039     lactated ringers infusion   Intravenous Continuous 50 mL/hr at 03/05/22 0614 New Bag at 03/05/22 0614     lidocaine (LMX4) cream   Topical Q1H PRN         lidocaine 1 % 0.1-1 mL  0.1-1 mL Other Q1H PRN         metroNIDAZOLE (FLAGYL) infusion 500 mg  500 mg Intravenous Q12H   500 mg at 03/05/22 0902     naloxone (NARCAN) injection 0.2 mg  0.2 mg Intravenous Q2 Min PRN        Or     naloxone (NARCAN) injection 0.4 mg  0.4 mg Intravenous Q2 Min PRN        Or     naloxone (NARCAN) injection 0.2 mg  0.2 mg Intramuscular Q2 Min PRN        Or     naloxone (NARCAN) injection 0.4 mg  0.4 mg Intramuscular Q2 Min PRN         ondansetron (ZOFRAN-ODT) ODT tab 4 mg  4 mg Oral Q6H PRN        Or      ondansetron (ZOFRAN) injection 4 mg  4 mg Intravenous Q6H PRN   4 mg at 03/04/22 0938     oxyCODONE (ROXICODONE) tablet 5 mg  5 mg Oral Q4H PRN        Or     oxyCODONE (ROXICODONE) tablet 10 mg  10 mg Oral Q4H PRN         prochlorperazine (COMPAZINE) injection 10 mg  10 mg Intravenous Q6H PRN   10 mg at 03/04/22 1319     sodium chloride (PF) 0.9% PF flush 3 mL  3 mL Intracatheter Q8H   3 mL at 03/04/22 1039     sodium chloride (PF) 0.9% PF flush 3 mL  3 mL Intracatheter q1 min prn   3 mL at 03/03/22 1156     No current [facility]-ordered outpatient medications on file.

## 2022-03-05 NOTE — PLAN OF CARE
[id number]    Jb had a good evening. Able to sleep between cares overnight. A&Ox4. VSS on RA. Pain rated 5-6/10 managed with ice, positional changes, PRN Tylenol, Valium and IV Dilaudid PCA pump. Abdomen remains flat, hypoactive sounds, surgical incisions MARGIE and approximated with dermabond. Ileostomy remains intact, draining brown-to-green drainage and gas evident. Jacob remains patent, draining adequate amounts of clear, yellow urine. MIRZA drain draining serosanguinous fluid, dressing C/D/I. Penrose drain in rectum draining scant drainage. IVF and IV Abx continue, tolerating well. Ambulated in room with SBA. Parents at bedside to visit in evening, highly involved and need frequent reassurance regarding plan of care/interventions. Anticipate discharge when cleared. Will continue to monitor.

## 2022-03-05 NOTE — PROGRESS NOTES
A&Ox4. VSS on RA. Independent mobility. Diet advanced to low fiber diet, pt tolerating well. Denies N/V/D. IVF infusing at 50 ml/hr. IV Abx Flagyl and Ancef administered. Pain managed well with scheduled Tylenol; PCA Dilaudid pump discontinued at 1000. MIRZA x 1 to L abdomen with moderate serous output, creatinine specimen obtained this shift. Ileostomy w/ moderate watery green output, emptied frequently. Penrose rectal drain w/ scant drainage - mesh underwear used. Pt ambulates halls and in room, voiding OK - catheter removed this shift. Good uop, Discharge pending pain management & tolerating of oral medications & food. Pt's parents supportive and at the bedside most of the shift.

## 2022-03-06 LAB
GLUCOSE BLDC GLUCOMTR-MCNC: 105 MG/DL (ref 70–99)
GLUCOSE BLDC GLUCOMTR-MCNC: 123 MG/DL (ref 70–99)

## 2022-03-06 PROCEDURE — 250N000011 HC RX IP 250 OP 636: Performed by: COLON & RECTAL SURGERY

## 2022-03-06 PROCEDURE — 258N000003 HC RX IP 258 OP 636: Performed by: COLON & RECTAL SURGERY

## 2022-03-06 PROCEDURE — 120N000001 HC R&B MED SURG/OB

## 2022-03-06 PROCEDURE — 250N000013 HC RX MED GY IP 250 OP 250 PS 637: Performed by: COLON & RECTAL SURGERY

## 2022-03-06 RX ADMIN — ACETAMINOPHEN 650 MG: 325 TABLET, FILM COATED ORAL at 03:55

## 2022-03-06 RX ADMIN — ACETAMINOPHEN 650 MG: 325 TABLET, FILM COATED ORAL at 12:43

## 2022-03-06 RX ADMIN — ENOXAPARIN SODIUM 40 MG: 40 INJECTION SUBCUTANEOUS at 11:06

## 2022-03-06 RX ADMIN — ACETAMINOPHEN 650 MG: 325 TABLET, FILM COATED ORAL at 16:37

## 2022-03-06 RX ADMIN — ACETAMINOPHEN 650 MG: 325 TABLET, FILM COATED ORAL at 08:47

## 2022-03-06 RX ADMIN — DIAZEPAM 2 MG: 5 INJECTION INTRAMUSCULAR; INTRAVENOUS at 16:19

## 2022-03-06 RX ADMIN — SODIUM CHLORIDE, POTASSIUM CHLORIDE, SODIUM LACTATE AND CALCIUM CHLORIDE: 600; 310; 30; 20 INJECTION, SOLUTION INTRAVENOUS at 03:58

## 2022-03-06 RX ADMIN — ACETAMINOPHEN 650 MG: 325 TABLET, FILM COATED ORAL at 20:38

## 2022-03-06 RX ADMIN — DIAZEPAM 2 MG: 5 INJECTION INTRAMUSCULAR; INTRAVENOUS at 22:36

## 2022-03-06 ASSESSMENT — ACTIVITIES OF DAILY LIVING (ADL)
ADLS_ACUITY_SCORE: 7

## 2022-03-06 NOTE — PLAN OF CARE
Goal Outcome Evaluation:          Orientation: A&Ox4  VS/ O2/ IV: VSS on RA, STACI LR @50mL/hr  Resp: LS clear  GI: BS present, passing flatus in colostomy pouch  Mobility: SBA  Pain mgmt: prn tylenol  Diet: tolerating low fiber, denies N/V  Bowel/ Bladder: ileostomy with brown/green drainage, rectal tube with scant drainage, voiding in bathroom  Drains/ Devices: colostomy, rectal tube, MIRZA  Skin: surgical incisions MARGIE and approximated  CMS: intact  Consults/ Providers: colorectal surgery  Discharge/ Plan: discharge pending  Other:  Parents at bedside for support throughout most of shift, pt sitting in chair as well for most of evening, mesh underwear for scant rectal tube drainage

## 2022-03-06 NOTE — PLAN OF CARE
Goal Outcome Evaluation:    Date/Time: March 6, 2022 1454-1054  Reason for Admission:  UC     Cognitive/Mentation: AOx4  Neuros/CMS: CMS intact   VS: VSS, on RA.  B/P: 118/65, T: 98, P: 86, R: 16     GI: denies n/v.   : Rectal tube in place with scant drainage. Ilesotomy in place, high output.   Pain: Pt denied pain during this shift.     Drains: MIRZA drain in place, good output.   Skin: Abdominal incision, CDI.  Activity: Independent   Diet: Low fiber      Discharge: Pending.     End of shift summary: No other events overnight.

## 2022-03-06 NOTE — PROGRESS NOTES
Colon and Rectal Surgery Progress Note          Assessment and Plan:     27 yo M s/p completion proctectomy, IPAA, and DLI    Doing well.  No major changes today.  Will leave drains in and likely can come out tomorrow.  Likely discharge tomorrow      Bartolo Phillips MD FACS FASCRS  Colorectal Surgeon       [facility]  [address], [address]  [place] [address]  T: [phone]  F: [phone]  Pager: [phone]  [email or web address]                 Interval History:     Doing well.  Tolerating diet              Physical Exam:   Vitals were reviewed  Patient Vitals for the past 24 hrs:   BP Temp Temp src Pulse Resp SpO2   03/06/22 0804 123/69 98.2  F (36.8  C) Oral 73 16 96 %   03/06/22 0016 118/65 98  F (36.7  C) Oral 86 16 100 %   03/05/22 1539 122/75 98  F (36.7  C) Oral 77 16 98 %         Intake/Output Summary (Last 24 hours) at 3/6/2022 0855  Last data filed at 3/6/2022 0800  Gross per 24 hour   Intake 243 ml   Output 4615 ml   Net -4372 ml       Head - Nomocephalic atraumatic  Sclera anicteric  Extremities warm and well perfused  Lungs - breathing non labored,   Abdomen - soft, flat, stoma output less watery  Rectal exam - deferred  Skin - no rash  Psych - affect appropriate  Neuro - no focal deficits             Data:   All laboratory and imaging data in the past 24 hours reviewed    CBC  Lab Results   Component Value Date    WBC 12.8 (H) 03/03/2022    WBC 6.6 02/11/2022    WBC 6.6 01/11/2022    HGB 13.4 03/03/2022    HGB 13.6 03/02/2022    HGB 13.6 02/11/2022    HCT 43.4 03/03/2022    HCT 47.9 02/11/2022    HCT 39.2 (L) 01/11/2022     03/05/2022     03/03/2022     03/02/2022       BMP  Recent Labs   Lab Test 03/06/22  0549 03/06/22  0150 03/05/22  0656 03/04/22  0606 03/03/22  0733   NA  --   --  130*  --  135   POTASSIUM  --   --  4.4  --  3.9   CHLORIDE  --   --  96  --  102   CO2  --   --  30  --  29   ANIONGAP  --   --  4  --  4   * 123* 112*   < > 98    BUN  --   --  10  --  14   CR  --   --  0.98  --  1.12   MADIE  --   --  9.2  --  8.4*    < > = values in this interval not displayed.       Liver Function Studies -   Recent Labs   Lab Test 09/17/21  0603   PROTTOTAL 5.6*   ALBUMIN 1.5*   BILITOTAL 0.4   ALKPHOS 142   AST 25   ALT 93*                      Medications:     Current Facility-Administered Medications Ordered in Epic   Medication Dose Route Frequency Last Rate Last Admin     acetaminophen (TYLENOL) tablet 650 mg  650 mg Oral Q4H PRN   650 mg at 03/06/22 0847     benzocaine-menthol (CHLORASEPTIC) 6-10 MG lozenge 1 lozenge  1 lozenge Buccal Q1H PRN   1 lozenge at 03/03/22 0845     diazepam (VALIUM) injection 2 mg  2 mg Intravenous Q6H PRN   2 mg at 03/05/22 0614     diphenhydrAMINE (BENADRYL) injection 25 mg  25 mg Intravenous Q6H PRN   25 mg at 03/02/22 2000     enoxaparin ANTICOAGULANT (LOVENOX) injection 40 mg  40 mg Subcutaneous Q24H   40 mg at 03/05/22 1014     HYDROmorphone (PF) (DILAUDID) injection 0.3-0.5 mg  0.3-0.5 mg Intravenous Q3H PRN         lactated ringers infusion   Intravenous Continuous 50 mL/hr at 03/06/22 0358 New Bag at 03/06/22 0358     lidocaine (LMX4) cream   Topical Q1H PRN         lidocaine 1 % 0.1-1 mL  0.1-1 mL Other Q1H PRN         naloxone (NARCAN) injection 0.2 mg  0.2 mg Intravenous Q2 Min PRN        Or     naloxone (NARCAN) injection 0.4 mg  0.4 mg Intravenous Q2 Min PRN        Or     naloxone (NARCAN) injection 0.2 mg  0.2 mg Intramuscular Q2 Min PRN        Or     naloxone (NARCAN) injection 0.4 mg  0.4 mg Intramuscular Q2 Min PRN         ondansetron (ZOFRAN-ODT) ODT tab 4 mg  4 mg Oral Q6H PRN        Or     ondansetron (ZOFRAN) injection 4 mg  4 mg Intravenous Q6H PRN   4 mg at 03/04/22 0938     oxyCODONE (ROXICODONE) tablet 5 mg  5 mg Oral Q4H PRN        Or     oxyCODONE (ROXICODONE) tablet 10 mg  10 mg Oral Q4H PRN         prochlorperazine (COMPAZINE) injection 10 mg  10 mg Intravenous Q6H PRN   10 mg at 03/04/22 3453      sodium chloride (PF) 0.9% PF flush 3 mL  3 mL Intracatheter Q8H   3 mL at 03/04/22 1039     sodium chloride (PF) 0.9% PF flush 3 mL  3 mL Intracatheter q1 min prn   3 mL at 03/03/22 1156     No current [facility]-ordered outpatient medications on file.

## 2022-03-06 NOTE — PROVIDER NOTIFICATION
.MD Notification    Notified Person: MD    Notified Person Name: Wilfrido     Notification Date/Time: 3/6/22 at 12:30 am     Notification Interaction: text page     Purpose of Notification: fyi, pt requesting medication for sleep.    Orders Received:    Comments:

## 2022-03-07 VITALS
SYSTOLIC BLOOD PRESSURE: 130 MMHG | TEMPERATURE: 98 F | RESPIRATION RATE: 16 BRPM | DIASTOLIC BLOOD PRESSURE: 79 MMHG | HEART RATE: 78 BPM | WEIGHT: 165.3 LBS | HEIGHT: 73 IN | OXYGEN SATURATION: 97 % | BODY MASS INDEX: 21.91 KG/M2

## 2022-03-07 PROCEDURE — 250N000011 HC RX IP 250 OP 636: Performed by: COLON & RECTAL SURGERY

## 2022-03-07 PROCEDURE — 250N000013 HC RX MED GY IP 250 OP 250 PS 637: Performed by: COLON & RECTAL SURGERY

## 2022-03-07 PROCEDURE — G0463 HOSPITAL OUTPT CLINIC VISIT: HCPCS

## 2022-03-07 RX ORDER — OXYCODONE HYDROCHLORIDE 5 MG/1
5 TABLET ORAL EVERY 6 HOURS PRN
Qty: 24 TABLET | Refills: 0 | Status: SHIPPED | OUTPATIENT
Start: 2022-03-07 | End: 2022-04-13

## 2022-03-07 RX ADMIN — ENOXAPARIN SODIUM 40 MG: 40 INJECTION SUBCUTANEOUS at 09:40

## 2022-03-07 RX ADMIN — ACETAMINOPHEN 650 MG: 325 TABLET, FILM COATED ORAL at 07:27

## 2022-03-07 RX ADMIN — ACETAMINOPHEN 650 MG: 325 TABLET, FILM COATED ORAL at 00:51

## 2022-03-07 ASSESSMENT — ACTIVITIES OF DAILY LIVING (ADL)
ADLS_ACUITY_SCORE: 5
ADLS_ACUITY_SCORE: 7
ADLS_ACUITY_SCORE: 5
ADLS_ACUITY_SCORE: 7

## 2022-03-07 NOTE — PROGRESS NOTES
VSS. On RA. A/Ox4. Up independently. MIRZA patent, clear serous fluid. Penrose rectal drain in place. Ileostomy intact, adequate loose output. Pain controlled w/ tylenol, anxiety controlled w/ valium. PIV SL. Tolerating reg diet. Consult placed for spiritual health. Discharge likely tomorrow.

## 2022-03-07 NOTE — DISCHARGE SUMMARY
[facility] Discharge Summary      Luther Wood MRN# [id number]   Age: 26 year old YOB: 1995     Date of Admission:  3/2/2022  Date of Discharge::  3/7/2022  Admitting Physician:  Soledad Flaherty MD  Discharge Physician:  Soledad Flaherty MD     PCP:  Marko Rubalcava    Disposition: Patient discharged from [facility] to home in stable condition.        Primary Diagnosis:   Fulminant ulcerative colitis status post total abdominal colectomy with end ileostomy with desire for restoration of continuity            Discharge Medications:     Current Discharge Medication List      START taking these medications    Details   enoxaparin ANTICOAGULANT (LOVENOX) 40 MG/0.4ML syringe Inject 0.4 mLs (40 mg) Subcutaneous every 24 hours for 24 days  Qty: 9.6 mL, Refills: 0    Associated Diagnoses: Ileostomy in place (H)         CONTINUE these medications which have NOT CHANGED    Details   ferrous sulfate (FEROSUL) 325 (65 Fe) MG tablet Take 1 tablet (325 mg) by mouth daily (with breakfast)         STOP taking these medications       metroNIDAZOLE (FLAGYL) 500 MG tablet Comments:   Reason for Stopping:         neomycin (MYCIFRADIN) 500 MG tablet Comments:   Reason for Stopping:                      Follow Up, Special Instructions:     Discharge diet: Advance to a regular diet as tolerated   Discharge activity: Lifting restricted to 10-15 pounds   Discharge follow-up: Follow up with Dr. Flaherty in 4 weeks   Wound care: May get incision wet in shower but do not soak or scrub              Procedures:     Procedure(s): Cystoscopy with bilateral ureteral stent placement, takedown of end ileostomy, robotic-assisted completion proctectomy with ileal pouch-anal anastomosis, diverting loop ileostomy       No other procedures performed during this admission            Consultations:   Urology (cystoscopy with stent placements)          Brief Hospital Summary:     Patient is a 26 year  old man who underwent the above listed procedures on 3/2/2022 by Dr. Flaherty.   There were no immediate complications during this procedure.    Please refer to the full operative summary for details.  The patient's hospital course was unremarkable.  Pain was controlled on oral pain regimen.  He was tolerating a low fiber diet.  Bowel function had returned prior to discharge.  Jb recovered as anticipated and experienced no post-operative complications.         Attestation:  I have reviewed today's vital signs, notes, medications, labs and imaging.    Mikala Quintana  Colon & Rectal Surgery Fellow    [facility]  [address] [address]  T: [phone]  F: [phone]            ADDENDUM:  Length of stay: 6 days  Indicate Y or N for the following:  UTI  No  C diff  No  PNA  No  SSI No  DVT No  PE  No  CVA No  MI No  Enterocutaneous fistula  No  Peripheral nerve injury  No  Abscess (not adjacent to anastomosis)  No  Leak No    Treated with:   Antibiotics N/A   Drain  N/A   Reoperation  N/A  Death within 30 days   Reintubation  No  Reoperation  No   Procedure N/A

## 2022-03-07 NOTE — PROGRESS NOTES
Colon and Rectal Surgery Progress Note          Assessment and Plan:     25 yo M s/p completion proctectomy, IPAA, and DLI    Doing well, not requiring narcotics for pain at this point. Both drains removed this morning. Plan for discharge this morning.    Mikala Quintana MD  Colon & Rectal Surgery Fellow  [facility]  [phone]                 Interval History:     Doing well.  Tolerating diet. Pain well-controlled.              Physical Exam:   Vitals were reviewed  Patient Vitals for the past 24 hrs:   BP Temp Temp src Pulse Resp SpO2   03/06/22 2245 116/69 -- -- 76 17 95 %   03/06/22 2038 118/67 97.6  F (36.4  C) Oral 64 14 99 %   03/06/22 0804 123/69 98.2  F (36.8  C) Oral 73 16 96 %         Intake/Output Summary (Last 24 hours) at 3/6/2022 0855  Last data filed at 3/6/2022 0800  Gross per 24 hour   Intake 243 ml   Output 4615 ml   Net -4372 ml       Head - Nomocephalic atraumatic  Sclera anicteric  Extremities warm and well perfused  Lungs - breathing non labored,   Abdomen - soft, flat, stoma output less watery  Rectal exam - drain removed  Skin - no rash  Psych - affect appropriate  Neuro - no focal deficits             Data:   All laboratory and imaging data in the past 24 hours reviewed    CBC  Lab Results   Component Value Date    WBC 12.8 (H) 03/03/2022    WBC 6.6 02/11/2022    WBC 6.6 01/11/2022    HGB 13.4 03/03/2022    HGB 13.6 03/02/2022    HGB 13.6 02/11/2022    HCT 43.4 03/03/2022    HCT 47.9 02/11/2022    HCT 39.2 (L) 01/11/2022     03/05/2022     03/03/2022     03/02/2022       BMP  Recent Labs   Lab Test 03/06/22  0549 03/06/22  0150 03/05/22  0656 03/04/22  0606 03/03/22  0733   NA  --   --  130*  --  135   POTASSIUM  --   --  4.4  --  3.9   CHLORIDE  --   --  96  --  102   CO2  --   --  30  --  29   ANIONGAP  --   --  4  --  4   * 123* 112*   < > 98   BUN  --   --  10  --  14   CR  --   --  0.98  --  1.12   MADIE  --   --  9.2  --  8.4*     < > = values in this interval not displayed.       Liver Function Studies -   Recent Labs   Lab Test 09/17/21  0603   PROTTOTAL 5.6*   ALBUMIN 1.5*   BILITOTAL 0.4   ALKPHOS 142   AST 25   ALT 93*                      Medications:     Current Facility-Administered Medications Ordered in Epic   Medication Dose Route Frequency Last Rate Last Admin     acetaminophen (TYLENOL) tablet 650 mg  650 mg Oral Q4H PRN   650 mg at 03/07/22 0727     benzocaine-menthol (CHLORASEPTIC) 6-10 MG lozenge 1 lozenge  1 lozenge Buccal Q1H PRN   1 lozenge at 03/03/22 0845     diazepam (VALIUM) injection 2 mg  2 mg Intravenous Q6H PRN   2 mg at 03/06/22 2236     diphenhydrAMINE (BENADRYL) injection 25 mg  25 mg Intravenous Q6H PRN   25 mg at 03/02/22 2000     enoxaparin ANTICOAGULANT (LOVENOX) injection 40 mg  40 mg Subcutaneous Q24H   40 mg at 03/06/22 1106     HYDROmorphone (PF) (DILAUDID) injection 0.3-0.5 mg  0.3-0.5 mg Intravenous Q3H PRN         lactated ringers infusion   Intravenous Continuous   Stopped at 03/06/22 1405     lidocaine (LMX4) cream   Topical Q1H PRN         lidocaine 1 % 0.1-1 mL  0.1-1 mL Other Q1H PRN         naloxone (NARCAN) injection 0.2 mg  0.2 mg Intravenous Q2 Min PRN        Or     naloxone (NARCAN) injection 0.4 mg  0.4 mg Intravenous Q2 Min PRN        Or     naloxone (NARCAN) injection 0.2 mg  0.2 mg Intramuscular Q2 Min PRN        Or     naloxone (NARCAN) injection 0.4 mg  0.4 mg Intramuscular Q2 Min PRN         ondansetron (ZOFRAN-ODT) ODT tab 4 mg  4 mg Oral Q6H PRN        Or     ondansetron (ZOFRAN) injection 4 mg  4 mg Intravenous Q6H PRN   4 mg at 03/04/22 0938     oxyCODONE (ROXICODONE) tablet 5 mg  5 mg Oral Q4H PRN        Or     oxyCODONE (ROXICODONE) tablet 10 mg  10 mg Oral Q4H PRN         prochlorperazine (COMPAZINE) injection 10 mg  10 mg Intravenous Q6H PRN   10 mg at 03/04/22 1319     sodium chloride (PF) 0.9% PF flush 3 mL  3 mL Intracatheter Q8H   3 mL at 03/07/22 0237     sodium  chloride (PF) 0.9% PF flush 3 mL  3 mL Intracatheter q1 min prn   3 mL at 03/03/22 1156     No current [facility]-ordered outpatient medications on file.             Colon and Rectal Surgery Staff  I performed a history and physical examination of the patient and discussed their management with the physician assistant. I reviewed the physician assistants note and agree with the documented findings and plan of care.     Discharge to home today.   Shahrzad Flaherty MD, FACS    [facility]  [address] [address]  T: [phone]  F: [phone]

## 2022-03-07 NOTE — PLAN OF CARE
Patient AOX4. Ind in room. Denies pain. Penrose drain and MIRZA drain removed. Patient seen by [facility] nurse and ileostomy pouch changed.     Patient will discharge home this morning with Dad. Dad will be doing lovenox injections. He has done them in the past for his son. Reeducated on the process of administering lovenox.     All belongings and Rx sent home with patient. Patient and Dad aware of discharge instructions. All questions and concerns were addressed.

## 2022-03-07 NOTE — PROGRESS NOTES
"SPIRITUAL HEALTH SERVICES: Tele-Encounter  Patient Location:  2nd Floor General Surgery  Spoke with: Patient    Referral Source: Responded to an on-call page; staff requesting emotional support for pt Jb.    Summary: Called Jb's bedside phone and introduced myself.  Jb welcomed a call from a .  Provided reflective conversation to facilitate the processing of thoughts and feelings.      Illness Narrative - Jb reported that he has ulcerative colitis and had a total colectomy; he just underwent the second \"reversal surgery\" during this admission.      Distress - He shared that he is feeling anxious this evening because he is not able to be outside, where he finds strength and peace.  Jb added that he is discharging home tomorrow and feels ready to do so.  He reported later in the conversation that it was supportive to talk about his anxiety.        Coping -   o He named his parents, extended family, and a [place] of friends as being part of his support network.  o Jb is Denominational but his spirituality is grounded in being in nature: \"I pray in nature.\"  He welcomed prayer.      Meaning-Making - Jb described his love of being in outside, where he enjoys hunting, fishing, snow mobiling, and outdoor sports.    PLAN:  No further plans as pt anticipates discharging home tomorrow.    Chaplain Billy Aldana M.Div., [facility]  Staff   Pager [phone]  Phone [phone]    ______________________________    Type of service:  Telephone Visit     has received verbal consent for a TelephoneVisit from the patient? Yes    Distance Provider Location: designated [place] office or home office (secure setting)    Mode of Communication: telephone (via Skys[employer] phone or PAK tele-call-number ([phone]))  "

## 2022-03-07 NOTE — PLAN OF CARE
Pt alert and oriented x4. VSS on RA. C/o some minor pain on the MIRZA site on the left abd, managed with Tylenol. Denies N/V. Took Diazepam for anxiety. Reports unable to sleep. Ambulated hallways. Voiding appropriately in bathroom. Up indep in room. Continue to monitor pending discharge.

## 2022-03-07 NOTE — PROGRESS NOTES
"[facility] Nurse Inpatient Ostomy Assessment     Assessment of new loop Ileostomy     Surgery date:  3/2/22  Surgeon:  Dr. Kay Finney  Procedure:  Diverting Loop Ilestomy  Related diagnosis:  Ulcerative Colitis    WO Assessment & Physical Exam:        Current status: Alert. Pleasant      Date of last photo: 3/3      Stoma location: RLQ    Stoma size: 1 1/8\", os pointing to 6 o clock    Stoma appearance: pink-red, round and moist    Mucocutaneous junction:  intact    Peristomal complication(s): none     Abdominal assessment: soft    Surgical site(s): open to air    NG still in place? No    Output: green and liquid     Pain: Denies    Is patient still on a PCA? No      Current pouching system: Coloplast 1 piece    Pouch last changed:  3/2, 3/3, 3/7    Reason for pouch change today: ostomy education and routine schedule    Learning and comprehension: Patient on 2nd stage of IPAA, understands ileostomy and supplies and how to change. Discussed procedure of 2nd step and a loop vs end ileostomy. Discussed that may need convexity in future. Patient assist with pouch change      Psychosocial: Accepting of stoma, excited for next step. Supportive mother and father.      WOC Plan:         Pouching product plan: Coloplast 1 piece. Added Brava 4.2mm ring    Comments: Patient with allergies to deborah products, may need convexity in future    Frequency of pouch changes: 2-3x weekly      Pt support system on discharge: Parents    WO recommend home care?  No      WOC follow-up plan: daily M-F      Objective Data:     Patient history according to medical record: Appropriate recovery after robotic completion proctectomy and ileal pouch anal anastomosis with diverting ileostomy           Current Diet/Nutrition:   Orders Placed This Encounter      Low Fiber Diet      Diet       Output:  I/O last 3 completed shifts:  In: 2020 [P.O.:2020]  Out: 1050 [Drains:50; Stool:1000]      Labs:   Recent Labs "   Lab 03/03/22  0733   HGB 13.4   WBC 12.8*         Jun Risk Assessment:   Sensory Perception: 4-->no impairment  Moisture: 4-->rarely moist  Activity: 4-->walks frequently  Mobility: 4-->no limitation  Nutrition: 3-->adequate  Friction and Shear: 3-->no apparent problem  Jun Score: 22      WOC Interventions:       Patient's chart evaluated    Focus of today's visit: pouch change return demonstration, verbal instruction , diet and hydration , pouch emptying, output measurement, lifestyle adjustments and discharge instructions     Pouch changed 3/7    Participant(s) in teaching session today: patient  and parent    Change made with ostomy management today: Yes, 4.2mm ring today    Supplies: Gathered and at bedside    Educational materials: Given deborah education but patient didn't feel he needed has been managing ileostomy since August    Preparation for discharge: Discussed when to follow up with a WOC Nurse in the future, Ok to resume home supplies on discharge AVS written    Registered for supply samples? NA    Discussed plan of care with: Patient and Family    Rabia Fuchs RN, CWOCN

## 2022-03-08 ENCOUNTER — PATIENT OUTREACH (OUTPATIENT)
Dept: CARE COORDINATION | Facility: CLINIC | Age: 27
End: 2022-03-08
Payer: COMMERCIAL

## 2022-03-08 DIAGNOSIS — Z71.89 OTHER SPECIFIED COUNSELING: ICD-10-CM

## 2022-03-08 NOTE — PROGRESS NOTES
"Clinic Care Coordination Contact  [facility]: Post-Discharge Note  SITUATION                                                      Admission:    Admission Date: 03/02/22   Reason for Admission: Completion proctectomy with ileal pouch anal-anastomosis and diverting loop ileostomy  Discharge:   Discharge Date: 03/07/22  Discharge Diagnosis: Completion proctectomy with ileal pouch anal-anastomosis and diverting loop ileostomy    BACKGROUND                                                      Per hospital discharge summary and inpatient provider notes:    Patient is a 26 year old man who underwent the above listed procedures on 3/2/2022 by Dr. Flaherty.   There were no immediate complications during this procedure.    Please refer to the full operative summary for details.  The patient's hospital course was unremarkable.  Pain was controlled on oral pain regimen.  He was tolerating a low fiber diet.  Bowel function had returned prior to discharge.  Jb recovered as anticipated and experienced no post-operative complications.       ASSESSMENT      Enrollment  Primary Care Care Coordination Status: Declined    Discharge Assessment  How are you doing now that you are home?: \"I'm doing good, everything over here is good\"  How are your symptoms? (Red Flag symptoms escalate to triage hotline per guidelines): Improved  Do you feel your condition is stable enough to be safe at home until your provider visit?: Yes  Does the patient have their discharge instructions? : Yes  Does the patient have questions regarding their discharge instructions? : No  Were you started on any new medications or were there changes to any of your previous medications? : Yes  Does the patient have all of their medications?: Yes  Do you have questions regarding any of your medications? : No  Do you have all of your needed medical supplies or equipment (DME)?  (i.e. oxygen tank, CPAP, cane, etc.): Yes  Discharge follow-up appointment scheduled " within 14 calendar days? : No  Is patient agreeable to assistance with scheduling? : No                  PLAN                                                      Outpatient Plan:  Follow up with Dr. Flaherty , at [facility], within 4 weeks to evaluate after surgery. No  follow up labs or test are needed    No future appointments.      For any urgent concerns, please contact our 24 hour nurse triage line: [phone] ([phone])         Zakiya Beltre  Community Health Worker  Connected Care Resource [facility]  [phone]

## 2022-03-21 ENCOUNTER — TELEPHONE (OUTPATIENT)
Dept: WOUND CARE | Facility: CLINIC | Age: 27
End: 2022-03-21
Payer: COMMERCIAL

## 2022-03-21 NOTE — TELEPHONE ENCOUNTER
Patient has new ileostomy which doesn't protrude as much as previous stoma.  Stool not going into pouch as well.  Wants to come in to have area assessed.  Appointment made for 3/24/22 at 10AM.

## 2022-03-24 ENCOUNTER — HOSPITAL ENCOUNTER (OUTPATIENT)
Dept: WOUND CARE | Facility: CLINIC | Age: 27
Discharge: HOME | End: 2022-03-24
Attending: COLON & RECTAL SURGERY | Admitting: COLON & RECTAL SURGERY
Payer: COMMERCIAL

## 2022-03-24 ENCOUNTER — MYC MEDICAL ADVICE (OUTPATIENT)
Dept: FAMILY MEDICINE | Facility: CLINIC | Age: 27
End: 2022-03-24
Payer: COMMERCIAL

## 2022-03-24 DIAGNOSIS — D50.8 OTHER IRON DEFICIENCY ANEMIA: Primary | ICD-10-CM

## 2022-03-24 PROCEDURE — 17250 CHEM CAUT OF GRANLTJ TISSUE: CPT

## 2022-03-24 PROCEDURE — G0463 HOSPITAL OUTPT CLINIC VISIT: HCPCS | Mod: 25

## 2022-03-24 NOTE — PROGRESS NOTES
OUTPATIENT OSTOMY ASSESSMENT    INTAKE  Type of Stoma: Temporary Ileostomy  Anticipated date of takedown: 8 weeks    Diagnosis Pertinent to Stoma: Ulcerative Colitis   Type of Surgery: Ileo   Surgery Date: 3/2/22  Surgeon:[facility]: [place]    Purpose of this visit: Peristomal Complications    Pertinent Information: Having some skin irritation    Present for Teaching Session: Patient and Family Member   Present: NA      Current Equipment:    Product # Brand Description   Pouch 78597 Coloplast 1 pc CTF flat         Ring/Paste 87588 Coloplast Brava           Liquid Skin Barrier 3344 3M Cavilon No Sting                 Pouch Change Frequency: twice a week  Provider of Care: Emptying: self Pouch Change: self    ASSESSMENT  Stoma Size: Round 1 1/4 inches  Protrusion: 0.5cm  Stoma Appearance: Pink, hypergranulation tissue at 5-6 o'clock  Mucocutaneous Juncture: Separation from 11-1 o'clock extending out 0.3cm   Peristomal Skin: Intact            TREATMENT  Silver Nitrate to : hypergranulation tissue # of Sticks used: 1, Applied Stoma Powder and Applied No Sting Skin barrier    INTERVENTIONS  Refitting done, Educated on peristomal skin treatment and Educated on pouch change procedure    INSTRUCTIONS GIVEN  WOC Role, Anatomy, Clothing, Fluids: Drink 6-8 glasses of fluids daily , Pouching Products: Showed pouching system  and Ordering supplies    PLAN  Continue same Pouching System and New Pouching Procedure: See Outpatient Ostomy Instructions      Total Time Spent with Patient: 35 minutes

## 2022-03-24 NOTE — DISCHARGE INSTRUCTIONS
OUTPATIENT OSTOMY INSTRUCTIONS                                                                        Type of Stoma: Ileostomy         Supplier: [employer]      Equipment:    Product # Brand Description   Pouch 45069 Coloplast 1 pc cut to fit flat         Paste 73460 Coloplast Protective Seal   Powder 8006 [employer]   Liquid Skin Barrier 3344 Coloplast Cavilon No Sting                                Procedure:  Close the bottom of the pouch.  If needed cut the opening of your pouch to the correct size (follow pattern or 1/8 inch larger than stoma) and then remove backings from adhesive surfaces.  Remove the soiled pouch and discard.  Wash skin with water and dry.    Then: Apply powder to open areas only, brush off excess powder. and Apply No-Sting over powdered area.  Then: Apply Ring/Paste: Around stoma.               Then: Apply pouching system to stoma site and hold in place for 2-5 minutes.     ______________________________________________________________________________    Pouch Change: Twice weekly       WOC Nurse Specialist: Levy Tam RN CWOCN        Questions: [facility] [phone] ()      Follow-up Appointment: as needed. Please call [facility] [phone] () to request an appointment.

## 2022-03-24 NOTE — TELEPHONE ENCOUNTER
See [employer] message, routed to ZB, please review and advise  Obdulia Ortez RN, BSN  [facility]

## 2022-03-28 DIAGNOSIS — K51.90 ULCERATIVE COLITIS (H): Primary | ICD-10-CM

## 2022-03-30 ENCOUNTER — LAB (OUTPATIENT)
Dept: LAB | Facility: CLINIC | Age: 27
End: 2022-03-30
Payer: COMMERCIAL

## 2022-03-30 DIAGNOSIS — Z13.1 SCREENING FOR DIABETES MELLITUS: ICD-10-CM

## 2022-03-30 DIAGNOSIS — D50.8 OTHER IRON DEFICIENCY ANEMIA: ICD-10-CM

## 2022-03-30 DIAGNOSIS — K51.90 ULCERATIVE COLITIS (H): ICD-10-CM

## 2022-03-30 LAB
ERYTHROCYTE [DISTWIDTH] IN BLOOD BY AUTOMATED COUNT: 19.8 % (ref 10–15)
HCT VFR BLD AUTO: 49.1 % (ref 40–53)
HGB BLD-MCNC: 15.4 G/DL (ref 13.3–17.7)
MCH RBC QN AUTO: 25.2 PG (ref 26.5–33)
MCHC RBC AUTO-ENTMCNC: 31.4 G/DL (ref 31.5–36.5)
MCV RBC AUTO: 80 FL (ref 78–100)
PLATELET # BLD AUTO: 298 10E3/UL (ref 150–450)
RBC # BLD AUTO: 6.11 10E6/UL (ref 4.4–5.9)
WBC # BLD AUTO: 5.9 10E3/UL (ref 4–11)

## 2022-03-30 PROCEDURE — 85027 COMPLETE CBC AUTOMATED: CPT

## 2022-03-30 PROCEDURE — 80048 BASIC METABOLIC PNL TOTAL CA: CPT

## 2022-03-30 PROCEDURE — 36415 COLL VENOUS BLD VENIPUNCTURE: CPT

## 2022-03-31 LAB
ANION GAP SERPL CALCULATED.3IONS-SCNC: 3 MMOL/L (ref 3–14)
BUN SERPL-MCNC: 11 MG/DL (ref 7–30)
CALCIUM SERPL-MCNC: 9.7 MG/DL (ref 8.5–10.1)
CHLORIDE BLD-SCNC: 105 MMOL/L (ref 94–109)
CO2 SERPL-SCNC: 29 MMOL/L (ref 20–32)
CREAT SERPL-MCNC: 1.15 MG/DL (ref 0.66–1.25)
FASTING STATUS PATIENT QL REPORTED: YES
GFR SERPL CREATININE-BSD FRML MDRD: >90 ML/MIN/1.73M2
GLUCOSE BLD-MCNC: 75 MG/DL (ref 70–99)
GLUCOSE BLD-MCNC: 75 MG/DL (ref 70–99)
POTASSIUM BLD-SCNC: 4.2 MMOL/L (ref 3.4–5.3)
SODIUM SERPL-SCNC: 137 MMOL/L (ref 133–144)

## 2022-04-08 DIAGNOSIS — Z11.59 ENCOUNTER FOR SCREENING FOR OTHER VIRAL DISEASES: Primary | ICD-10-CM

## 2022-04-12 ENCOUNTER — TELEPHONE (OUTPATIENT)
Dept: WOUND CARE | Facility: CLINIC | Age: 27
End: 2022-04-12
Payer: COMMERCIAL

## 2022-04-12 ENCOUNTER — MYC MEDICAL ADVICE (OUTPATIENT)
Dept: FAMILY MEDICINE | Facility: CLINIC | Age: 27
End: 2022-04-12
Payer: COMMERCIAL

## 2022-04-12 NOTE — TELEPHONE ENCOUNTER
Patient having discomfort at 9 o'clock position next to stoma.  Doesn't think area is open.  Would like to be seen.  Appointment scheduled for 4/14/22.

## 2022-04-13 ENCOUNTER — OFFICE VISIT (OUTPATIENT)
Dept: FAMILY MEDICINE | Facility: CLINIC | Age: 27
End: 2022-04-13
Payer: COMMERCIAL

## 2022-04-13 VITALS
WEIGHT: 154.8 LBS | BODY MASS INDEX: 20.52 KG/M2 | OXYGEN SATURATION: 96 % | TEMPERATURE: 97.3 F | DIASTOLIC BLOOD PRESSURE: 73 MMHG | HEART RATE: 58 BPM | HEIGHT: 73 IN | SYSTOLIC BLOOD PRESSURE: 115 MMHG

## 2022-04-13 DIAGNOSIS — Z01.818 PREOP GENERAL PHYSICAL EXAM: Primary | ICD-10-CM

## 2022-04-13 DIAGNOSIS — Z93.2 ILEOSTOMY IN PLACE (H): ICD-10-CM

## 2022-04-13 LAB
ERYTHROCYTE [DISTWIDTH] IN BLOOD BY AUTOMATED COUNT: 17.6 % (ref 10–15)
HCT VFR BLD AUTO: 47.4 % (ref 40–53)
HGB BLD-MCNC: 15 G/DL (ref 13.3–17.7)
MCH RBC QN AUTO: 25.8 PG (ref 26.5–33)
MCHC RBC AUTO-ENTMCNC: 31.6 G/DL (ref 31.5–36.5)
MCV RBC AUTO: 81 FL (ref 78–100)
PLATELET # BLD AUTO: 298 10E3/UL (ref 150–450)
RBC # BLD AUTO: 5.82 10E6/UL (ref 4.4–5.9)
WBC # BLD AUTO: 6.4 10E3/UL (ref 4–11)

## 2022-04-13 PROCEDURE — 85027 COMPLETE CBC AUTOMATED: CPT | Performed by: PHYSICIAN ASSISTANT

## 2022-04-13 PROCEDURE — 99214 OFFICE O/P EST MOD 30 MIN: CPT | Performed by: PHYSICIAN ASSISTANT

## 2022-04-13 PROCEDURE — 36415 COLL VENOUS BLD VENIPUNCTURE: CPT | Performed by: PHYSICIAN ASSISTANT

## 2022-04-13 RX ORDER — ONDANSETRON 4 MG/1
TABLET, ORALLY DISINTEGRATING ORAL
COMMUNITY
Start: 2022-02-28 | End: 2022-04-13

## 2022-04-13 NOTE — PROGRESS NOTES
[facility]  [id number] [place] [id number]  Phone: [phone]  Primary Provider: Arlene Green  Pre-op Performing Provider: ARLENE GREEN      PREOPERATIVE EVALUATION:  Today's date: 4/13/2022    Luther Wood is a 26 year old male who presents for a preoperative evaluation.    Surgical Information:  Surgery/Procedure: ILEOSTOMY CLOSURE LOOP  Surgery Location: [facility]  Surgeon: Soledad Flaherty MD  Surgery Date: 5/4/2022  Time of Surgery: 1:20 PM  Where patient plans to recover: At home with family  Fax number for surgical facility: Note does not need to be faxed, will be available electronically in Epic.    Type of Anesthesia Anticipated: General    Assessment & Plan     The proposed surgical procedure is considered INTERMEDIATE risk.    Preop general physical exam  Stable exam.   - CBC with platelets; Future  - CBC with platelets    Ileostomy in place (H)             Risks and Recommendations:  The patient has the following additional risks and recommendations for perioperative complications:   - No identified additional risk factors other than previously addressed    Medication Instructions:  Patient is to take all scheduled medications on the day of surgery    RECOMMENDATION:  APPROVAL GIVEN to proceed with proposed procedure, without further diagnostic evaluation.        Subjective     HPI related to upcoming procedure: history of ulcerative colitis s/p ileostomy placement. The above procedure was deemed the next best step in management.     Preop Questions 4/13/2022   1. Have you ever had a heart attack or stroke? No   2. Have you ever had surgery on your heart or blood vessels, such as a stent placement, a coronary artery bypass, or surgery on an artery in your head, neck, heart, or legs? No   3. Do you have chest pain with activity? No   4. Do you have a history of  heart failure? No   5. Do you currently have a  cold, bronchitis or symptoms of other infection? No   6. Do you have a cough, shortness of breath, or wheezing? No   7. Do you or anyone in your family have previous history of blood clots? No   8. Do you or does anyone in your family have a serious bleeding problem such as prolonged bleeding following surgeries or cuts? No   9. Have you ever had problems with anemia or been told to take iron pills? YES - currently taking.    10. Have you had any abnormal blood loss such as black, tarry or bloody stools? No   11. Have you ever had a blood transfusion? YES -    11a. Have you ever had a transfusion reaction? No   12. Are you willing to have a blood transfusion if it is medically needed before, during, or after your surgery? Yes   13. Have you or any of your relatives ever had problems with anesthesia? No   14. Do you have sleep apnea, excessive snoring or daytime drowsiness? No   15. Do you have any artifical heart valves or other implanted medical devices like a pacemaker, defibrillator, or continuous glucose monitor? No   16. Do you have artificial joints? No   17. Are you allergic to latex? No       Health Care Directive:  Patient does not have a Health Care Directive or Living Will: Discussed advance care planning with patient; information given to patient to review.    Preoperative Review of :   reviewed - no record of controlled substances prescribed. none currently taken.       Status of Chronic Conditions:  See problem list for active medical problems.  Problems all longstanding and stable, except as noted/documented.  See ROS for pertinent symptoms related to these conditions.    ANEMIA - Patient has a recent history of moderate-severe anemia, which has not been symptomatic. Work up to date has revealed   Hemoglobin   Date Value Ref Range Status   03/30/2022 15.4 13.3 - 17.7 g/dL Final   12/07/2009 13.7 11.7 - 15.7 g/dL Final   ]. Treatment has been continued.       Review of Systems  CONSTITUTIONAL:  NEGATIVE for fever, chills, change in weight  INTEGUMENTARY/SKIN: NEGATIVE for worrisome rashes, moles or lesions  EYES: NEGATIVE for vision changes or irritation  ENT/MOUTH: NEGATIVE for ear, mouth and throat problems  RESP: NEGATIVE for significant cough or SOB  CV: NEGATIVE for chest pain, palpitations or peripheral edema  GI: NEGATIVE for nausea, abdominal pain, heartburn, or change in bowel habits  : NEGATIVE for frequency, dysuria, or hematuria  MUSCULOSKELETAL: NEGATIVE for significant arthralgias or myalgia  NEURO: NEGATIVE for weakness, dizziness or paresthesias  ENDOCRINE: NEGATIVE for temperature intolerance, skin/hair changes  HEME: NEGATIVE for bleeding problems  PSYCHIATRIC: NEGATIVE for changes in mood or affect    Patient Active Problem List    Diagnosis Date Noted     Ulcerative colitis (H) 03/02/2022     Priority: Medium     Ileostomy in place (H) 02/11/2022     Priority: Medium     Other iron deficiency anemia 01/03/2022     Priority: Medium     Hyponatremia 09/11/2021     Priority: Medium     Anemia due to blood loss, acute 09/11/2021     Priority: Medium     Ulcerative colitis with rectal bleeding, unspecified location (H) 09/11/2021     Priority: Medium     Ulcerative colitis with complication, unspecified location (H) 09/02/2021     Priority: Medium     Acne vulgaris 07/07/2019     Priority: Medium     Tear of right acetabular labrum 07/07/2019     Priority: Medium     Attention deficit disorder 07/07/2019     Priority: Medium      Past Medical History:   Diagnosis Date     Colitis 2019     History of blood transfusion 09/12/2021     Past Surgical History:   Procedure Laterality Date     COLONOSCOPY       COMBINED CYSTOSCOPY, INSERT CATHETER URETER Bilateral 3/2/2022    Procedure: CYSTOSCOPY, PLACEMENT OF BILATERAL URETERAL CATHETERS;  Surgeon: Aron Gallego MD;  Location:  OR     DAVINCI ASSISTED COMPLETION PROCTOCOLECTOMY N/A 3/2/2022    Procedure: ROBOTIC COMPLETION PROCTOCOLECTOMY,  "ILEAL POUCH ANAL ANASTOMOSIS, LAPAROSCOPIC LYSIS OF ADHESION, DIVERTING LOOP ILLEOSTOMY,AND RIDGID PROTOCTOMY;  Surgeon: Soledad Flaherty MD;  Location:  OR     ENT SURGERY  2016    wisdom teeth     LAPAROSCOPIC ASSISTED COLECTOMY N/A 9/17/2021    Procedure: laparoscopic total abdominal colectomy with end ileostomy;  Surgeon: Soledad Flaherty MD;  Location:  OR     LAPAROSCOPIC ILEOSTOMY N/A 9/17/2021    Procedure: Laparoscopic Ileostomy;  Surgeon: Soledad Flaherty MD;  Location:  OR     SIGMOIDOSCOPY FLEXIBLE N/A 9/16/2021    Procedure: SIGMOIDOSCOPY, FLEXIBLE;  Surgeon: Anderson Cardona MD;  Location:  GI     TONSILLECTOMY & ADENOIDECTOMY  2006     Current Outpatient Medications   Medication Sig Dispense Refill     ferrous sulfate (FEROSUL) 325 (65 Fe) MG tablet Take 1 tablet (325 mg) by mouth daily (with breakfast)         Allergies   Allergen Reactions     Dairy Digestive         Social History     Tobacco Use     Smoking status: Never Smoker     Smokeless tobacco: Never Used   Substance Use Topics     Alcohol use: Not Currently     Comment: occassional     Family History   Problem Relation Age of Onset     Diabetes Father         type I     Colon Cancer No family hx of      History   Drug Use Unknown         Objective     /73   Pulse 58   Temp 97.3  F (36.3  C) (Oral)   Ht 1.854 m (6' 1\")   Wt 70.2 kg (154 lb 12.8 oz)   SpO2 96%   BMI 20.42 kg/m      Physical Exam    GENERAL APPEARANCE: healthy, alert and no distress     EYES: EOMI,  PERRL     HENT: ear canals and TM's normal and nose and mouth without ulcers or lesions     NECK: no adenopathy, no asymmetry, masses, or scars and thyroid normal to palpation     RESP: lungs clear to auscultation - no rales, rhonchi or wheezes     CV: regular rates and rhythm, normal S1 S2, no S3 or S4 and no murmur, click or rub     ABDOMEN:  soft, nontender, no HSM or masses and bowel sounds normal     MS: extremities normal- no gross " deformities noted, no evidence of inflammation in joints, FROM in all extremities.     SKIN: no suspicious lesions or rashes     NEURO: Normal strength and tone, sensory exam grossly normal, mentation intact and speech normal     PSYCH: mentation appears normal. and affect normal/bright     LYMPHATICS: No cervical adenopathy    Recent Labs   Lab Test 03/30/22  1055 03/30/22  1054 03/05/22  0656 03/03/22  0733 09/18/21  0724 09/17/21  0603 09/12/21  0603 09/11/21  1918   HGB  --  15.4  --  13.4   < > 8.1*   < > 7.9*   PLT  --  298 400 311   < > 565*   < > 529*   INR  --   --   --   --   --  1.33*  --  1.34*     --  130* 135   < > 132*  131*   < >  --    POTASSIUM 4.2  --  4.4 3.9   < > 3.9  3.8   < >  --    CR 1.15  --  0.98 1.12   < > 0.71  0.67   < >  --     < > = values in this interval not displayed.        Diagnostics:  Labs pending at this time.  Results will be reviewed when available.   No EKG required, no history of coronary heart disease, significant arrhythmia, peripheral arterial disease or other structural heart disease.    Revised Cardiac Risk Index (RCRI):  The patient has the following serious cardiovascular risks for perioperative complications:   - No serious cardiac risks = 0 points     RCRI Interpretation: 0 points: Class I (very low risk - 0.4% complication rate)           Signed Electronically by: Marko Rubalcava PA-C  Copy of this evaluation report is provided to requesting physician.

## 2022-04-13 NOTE — PATIENT INSTRUCTIONS
Preparing for Your Surgery  Getting started  A nurse will call you to review your health history and instructions. They will give you an arrival time based on your scheduled surgery time. Please be ready to share:    Your doctor's clinic name and phone number    Your medical, surgical and anesthesia history    A list of allergies and sensitivities    A list of medicines, including herbal treatments and over-the-counter drugs    Whether the patient has a legal guardian (ask how to send us the papers in advance)  Please tell us if you're pregnant--or if there's any chance you might be pregnant. Some surgeries may injure a fetus (unborn baby), so they require a pregnancy test. Surgeries that are safe for a fetus don't always need a test, and you can choose whether to have one.   If you have a child who's having surgery, please ask for a copy of Preparing for Your Child's Surgery.    Preparing for surgery    Within 30 days of surgery: Have a pre-op exam (sometimes called an H&P, or History and Physical). This can be done at a clinic or pre-operative center.  ? If you're having a , you may not need this exam. Talk to your care team.    At your pre-op exam, talk to your care team about all medicines you take. If you need to stop any medicines before surgery, ask when to start taking them again.  ? We do this for your safety. Many medicines can make you bleed too much during surgery. Some change how well surgery (anesthesia) drugs work.    Call your insurance company to let them know you're having surgery. (If you don't have insurance, call [phone].)    Call your clinic if there's any change in your health. This includes signs of a cold or flu (sore throat, runny nose, cough, rash, fever). It also includes a scrape or scratch near the surgery site.    If you have questions on the day of surgery, call your hospital or surgery center.  COVID testing  You may need to be tested for COVID-19 before having  surgery. If so, your surgical team will give you instructions for scheduling this test, separate from your preoperative history and physical.  Eating and drinking guidelines  For your safety: Unless your surgeon tells you otherwise, follow the guidelines below.    Eat and drink as usual until 8 hours before surgery. After that, no food or milk.    Drink clear liquids until 2 hours before surgery. These are liquids you can see through, like water, Gatorade and Propel Water. You may also have black coffee and tea (no cream or milk).    Nothing by mouth within 2 hours of surgery. This includes gum, candy and breath mints.    If you drink alcohol: Stop drinking it the night before surgery.    If your care team tells you to take medicine on the morning of surgery, it's okay to take it with a sip of water.  Preventing infection    Shower or bathe the night before and morning of your surgery. Follow the instructions your clinic gave you. (If no instructions, use regular soap.)    Don't shave or clip hair near your surgery site. We'll remove the hair if needed.    Don't smoke or vape the morning of surgery. You may chew nicotine gum up to 2 hours before surgery. A nicotine patch is okay.  ? Note: Some surgeries require you to completely quit smoking and nicotine. Check with your surgeon.    Your care team will make every effort to keep you safe from infection. We will:  ? Clean our hands often with soap and water (or an alcohol-based hand rub).  ? Clean the skin at your surgery site with a special soap that kills germs.  ? Give you a special gown to keep you warm. (Cold raises the risk of infection.)  ? Wear special hair covers, masks, gowns and gloves during surgery.  ? Give antibiotic medicine, if prescribed. Not all surgeries need antibiotics.  What to bring on the day of surgery    Photo ID and insurance card    Copy of your health care directive, if you have one    Glasses and hearing aides (bring cases)  ? You can't  wear contacts during surgery    Inhaler and eye drops, if you use them (tell us about these when you arrive)    CPAP machine or breathing device, if you use them    A few personal items, if spending the night    If you have . . .  ? A pacemaker, ICD (cardiac defibrillator) or other implant: Bring the ID card.  ? An implanted stimulator: Bring the remote control.  ? A legal guardian: Bring a copy of the certified (court-stamped) guardianship papers.  Please remove any jewelry, including body piercings. Leave jewelry and other valuables at home.  If you're going home the day of surgery    You must have a responsible adult drive you home. They should stay with you overnight as well.    If you don't have someone to stay with you, and you aren't safe to go home alone, we may keep you overnight. Insurance often won't pay for this.  After surgery  If it's hard to control your pain or you need more pain medicine, please call your surgeon's office.  Questions?   If you have any questions for your care team, list them here: _________________________________________________________________________________________________________________________________________________________________________ ____________________________________ ____________________________________ ____________________________________  For informational purposes only. Not to replace the advice of your health care provider. Copyright   2003, 2019 [facility]. All rights reserved. Clinically reviewed by Margie Waller MD. [employer] [id number] - REV 07/21.

## 2022-04-14 ENCOUNTER — HOSPITAL ENCOUNTER (OUTPATIENT)
Dept: WOUND CARE | Facility: CLINIC | Age: 27
Discharge: HOME | End: 2022-04-14
Attending: COLON & RECTAL SURGERY | Admitting: COLON & RECTAL SURGERY
Payer: COMMERCIAL

## 2022-04-14 PROCEDURE — 17250 CHEM CAUT OF GRANLTJ TISSUE: CPT

## 2022-04-14 NOTE — PROGRESS NOTES
OUTPATIENT OSTOMY ASSESSMENT    INTAKE  Type of Stoma: Temporary Ileostomy  Anticipated date of takedown: 3 week    Diagnosis Pertinent to Stoma: Ulcerative Colitis      Surgery Date: 3/2/22  Surgeon:[facility]: [place]    Purpose of this visit: Peristomal Complications    Pertinent Information: Patient having pain at 9 o'clock position, thinks there may be a suture still there    Present for Teaching Session: Patient   Present: NA      Current Equipment:    Product # Brand Description   Pouch [id number] Coloplast 1 pc cut to fit flat         Ring/Paste [id number] Coloplast Protective Seal                               Pouch Change Frequency: twice a week  Provider of Care: Emptying: self Pouch Change: self    ASSESSMENT  Stoma Size: Oval (slight) 1 x 1 1/8 inches  Protrusion: 0.5cm  Stoma Appearance: Pink and Granulomas  Mucocutaneous Juncture: Intact   Peristomal Skin: Intact, 1 suture still present at 9 o'clock, is dissolvable suture.  Discussed with patient if area becomes red and inflamed go see colorectal, otherwise ok to just watch as it seems to be improving and takedown is in 3 weeks.             TREATMENT  Silver Nitrate to : granulomas # of Sticks used: 1    INTERVENTIONS  Refitting done    INSTRUCTIONS GIVEN  WOC Role, Activity, Anatomy and Fluids: Drink 6-8 glasses of fluids daily     PLAN  Continue same Pouching System      Total Time Spent with Patient: 25 minutes

## 2022-04-14 NOTE — DISCHARGE INSTRUCTIONS
OUTPATIENT OSTOMY INSTRUCTIONS                                                                        Type of Stoma: Ileostomy         Supplier:  [employer]       Equipment:    Product # Brand Description   Pouch [id number] Coloplast 1 pc cut to fit flat         Paste 54973 Coloplast Protective Seal                                            Procedure:  Close the bottom of the pouch.  If needed cut the opening of your pouch to the correct size (follow pattern or 1/8 inch larger than stoma) and then remove backings from adhesive surfaces.  Remove the soiled pouch and discard.  Wash skin with water and dry.    Then: Apply Ring/Paste: Around stoma.               Then: Apply pouching system to stoma site and hold in place for 2-5 minutes.     ______________________________________________________________________________    Pouch Change: Twice weekly       WOC Nurse Specialist: Levy Tam RN CWOCN         Questions: Cristiane [phone] ()      Follow-up Appointment: as needed. Please call Cristiane [phone] () to request an appointment.

## 2022-04-21 DIAGNOSIS — Z11.59 ENCOUNTER FOR SCREENING FOR OTHER VIRAL DISEASES: Primary | ICD-10-CM

## 2022-04-26 ENCOUNTER — HOSPITAL ENCOUNTER (OUTPATIENT)
Dept: GENERAL RADIOLOGY | Facility: CLINIC | Age: 27
Discharge: HOME | End: 2022-04-26
Attending: COLON & RECTAL SURGERY | Admitting: COLON & RECTAL SURGERY
Payer: COMMERCIAL

## 2022-04-26 ENCOUNTER — LAB (OUTPATIENT)
Dept: LAB | Facility: CLINIC | Age: 27
End: 2022-04-26
Attending: COLON & RECTAL SURGERY
Payer: COMMERCIAL

## 2022-04-26 DIAGNOSIS — K51.90 ULCERATIVE COLITIS (H): ICD-10-CM

## 2022-04-26 DIAGNOSIS — Z11.59 ENCOUNTER FOR SCREENING FOR OTHER VIRAL DISEASES: ICD-10-CM

## 2022-04-26 PROCEDURE — U0003 INFECTIOUS AGENT DETECTION BY NUCLEIC ACID (DNA OR RNA); SEVERE ACUTE RESPIRATORY SYNDROME CORONAVIRUS 2 (SARS-COV-2) (CORONAVIRUS DISEASE [COVID-19]), AMPLIFIED PROBE TECHNIQUE, MAKING USE OF HIGH THROUGHPUT TECHNOLOGIES AS DESCRIBED BY CMS-2020-01-R: HCPCS

## 2022-04-26 PROCEDURE — 74283 THER NMA RDCTJ INTUS/OBSTRCJ: CPT

## 2022-04-26 PROCEDURE — U0005 INFEC AGEN DETEC AMPLI PROBE: HCPCS

## 2022-04-26 RX ADMIN — DIATRIZOATE MEGLUMINE AND DIATRIZOATE SODIUM 480 ML: 660; 100 SOLUTION ORAL; RECTAL at 10:23

## 2022-04-27 LAB — SARS-COV-2 RNA RESP QL NAA+PROBE: NEGATIVE

## 2022-04-27 RX ORDER — LIDOCAINE 40 MG/G
CREAM TOPICAL
Status: CANCELLED | OUTPATIENT
Start: 2022-04-27

## 2022-04-27 RX ORDER — ONDANSETRON 2 MG/ML
4 INJECTION INTRAMUSCULAR; INTRAVENOUS
Status: CANCELLED | OUTPATIENT
Start: 2022-04-27

## 2022-04-28 ENCOUNTER — HOSPITAL ENCOUNTER (OUTPATIENT)
Facility: CLINIC | Age: 27
Discharge: HOME | End: 2022-04-28
Attending: COLON & RECTAL SURGERY | Admitting: COLON & RECTAL SURGERY
Payer: COMMERCIAL

## 2022-04-28 VITALS
DIASTOLIC BLOOD PRESSURE: 85 MMHG | HEART RATE: 60 BPM | SYSTOLIC BLOOD PRESSURE: 133 MMHG | HEIGHT: 73 IN | BODY MASS INDEX: 20.41 KG/M2 | WEIGHT: 154 LBS | RESPIRATION RATE: 16 BRPM | OXYGEN SATURATION: 99 %

## 2022-04-28 LAB — PROVATION GI EXAM: NORMAL

## 2022-04-28 PROCEDURE — 44386 ENDOSCOPY BOWEL POUCH/BIOP: CPT | Performed by: COLON & RECTAL SURGERY

## 2022-04-28 PROCEDURE — 88305 TISSUE EXAM BY PATHOLOGIST: CPT | Mod: TC | Performed by: COLON & RECTAL SURGERY

## 2022-04-28 PROCEDURE — 88305 TISSUE EXAM BY PATHOLOGIST: CPT | Mod: 26

## 2022-04-28 NOTE — H&P
Pre-Endoscopy History and Physical     Luther Wood MRN# [id number]   YOB: 1995 Age: 26 year old     Date of Procedure: (Not on file)  Primary care provider: Marko Rubalcava  Type of Endoscopy: Colonoscopy  Reason for Procedure: H/O ulcerative colitis s/p completion proctectomy and IPAA, assess pouch  Type of Anesthesia Anticipated: Moderate Sedation    HPI:    Luther is a 26 year old male who will be undergoing the above procedure.      A history and physical has been performed. The patient's medications and allergies have been reviewed. The risks and benefits of the procedure and the sedation options and risks were discussed with the patient.  All questions were answered and informed consent was obtained.      He denies a personal or family history of anesthesia complications or bleeding disorders.     Allergies   Allergen Reactions     Dairy Digestive         No current facility-administered medications on file prior to encounter.  ferrous sulfate (FEROSUL) 325 (65 Fe) MG tablet, Take 1 tablet (325 mg) by mouth daily (with breakfast)        Patient Active Problem List   Diagnosis     Acne vulgaris     Tear of right acetabular labrum     Attention deficit disorder     Ulcerative colitis with complication, unspecified location (H)     Hyponatremia     Anemia due to blood loss, acute     Ulcerative colitis with rectal bleeding, unspecified location (H)     Other iron deficiency anemia     Ileostomy in place (H)     Ulcerative colitis (H)        Past Medical History:   Diagnosis Date     Colitis 2019     History of blood transfusion 09/12/2021        Past Surgical History:   Procedure Laterality Date     COLONOSCOPY       COMBINED CYSTOSCOPY, INSERT CATHETER URETER Bilateral 3/2/2022    Procedure: CYSTOSCOPY, PLACEMENT OF BILATERAL URETERAL CATHETERS;  Surgeon: Aron Gallego MD;  Location: [facility] OR     DAVINCI ASSISTED COMPLETION PROCTOCOLECTOMY N/A 3/2/2022    Procedure: ROBOTIC COMPLETION  "PROCTOCOLECTOMY, ILEAL POUCH ANAL ANASTOMOSIS, LAPAROSCOPIC LYSIS OF ADHESION, DIVERTING LOOP ILLEOSTOMY,AND RIDGID PROTOCTOMY;  Surgeon: Soledad Flaherty MD;  Location:  OR     ENT SURGERY  2016    wisdom teeth     LAPAROSCOPIC ASSISTED COLECTOMY N/A 9/17/2021    Procedure: laparoscopic total abdominal colectomy with end ileostomy;  Surgeon: Soledad Flaherty MD;  Location:  OR     LAPAROSCOPIC ILEOSTOMY N/A 9/17/2021    Procedure: Laparoscopic Ileostomy;  Surgeon: Soledad Flaherty MD;  Location:  OR     SIGMOIDOSCOPY FLEXIBLE N/A 9/16/2021    Procedure: SIGMOIDOSCOPY, FLEXIBLE;  Surgeon: Anderson Cardona MD;  Location:  GI     TONSILLECTOMY & ADENOIDECTOMY  2006       Social History     Tobacco Use     Smoking status: Never Smoker     Smokeless tobacco: Never Used   Substance Use Topics     Alcohol use: Not Currently     Comment: occassional       Family History   Problem Relation Age of Onset     Diabetes Father         type I     Colon Cancer No family hx of        REVIEW OF SYSTEMS:     5 point ROS negative except as noted above in HPI, including Gen., Resp., CV, GI &  system review.      PHYSICAL EXAM:   There were no vitals taken for this visit. Estimated body mass index is 20.42 kg/m  as calculated from the following:    Height as of 4/13/22: 1.854 m (6' 1\").    Weight as of 4/13/22: 70.2 kg (154 lb 12.8 oz).   GENERAL APPEARANCE: healthy and alert  MENTAL STATUS: alert  AIRWAY EXAM: Mallampatti Class I (visualization of the soft palate, fauces, uvula, anterior and posterior pillars)  RESP: lungs clear to auscultation - no rales, rhonchi or wheezes  CV: regular rates and rhythm      IMPRESSION   ASA Class 2 - Mild systemic disease        PLAN:     Plan for colonoscopy. We discussed the risks, benefits and alternatives and the patient wished to proceed.    The above has been forwarded to the consulting provider.      Shahrzad Flaherty MD  [facility]  Phone: " [phone]  Fax: [phone]  April 28, 2022

## 2022-04-28 NOTE — H&P (VIEW-ONLY)
Pre-Endoscopy History and Physical     Luther Wood MRN# [id number]   YOB: 1995 Age: 26 year old     Date of Procedure: (Not on file)  Primary care provider: Marko Rubalcava  Type of Endoscopy: Colonoscopy  Reason for Procedure: H/O ulcerative colitis s/p completion proctectomy and IPAA, assess pouch  Type of Anesthesia Anticipated: Moderate Sedation    HPI:    Luther is a 26 year old male who will be undergoing the above procedure.      A history and physical has been performed. The patient's medications and allergies have been reviewed. The risks and benefits of the procedure and the sedation options and risks were discussed with the patient.  All questions were answered and informed consent was obtained.      He denies a personal or family history of anesthesia complications or bleeding disorders.     Allergies   Allergen Reactions     Dairy Digestive         No current facility-administered medications on file prior to encounter.  ferrous sulfate (FEROSUL) 325 (65 Fe) MG tablet, Take 1 tablet (325 mg) by mouth daily (with breakfast)        Patient Active Problem List   Diagnosis     Acne vulgaris     Tear of right acetabular labrum     Attention deficit disorder     Ulcerative colitis with complication, unspecified location (H)     Hyponatremia     Anemia due to blood loss, acute     Ulcerative colitis with rectal bleeding, unspecified location (H)     Other iron deficiency anemia     Ileostomy in place (H)     Ulcerative colitis (H)        Past Medical History:   Diagnosis Date     Colitis 2019     History of blood transfusion 09/12/2021        Past Surgical History:   Procedure Laterality Date     COLONOSCOPY       COMBINED CYSTOSCOPY, INSERT CATHETER URETER Bilateral 3/2/2022    Procedure: CYSTOSCOPY, PLACEMENT OF BILATERAL URETERAL CATHETERS;  Surgeon: Aron Gallego MD;  Location: [facility] OR     DAVINCI ASSISTED COMPLETION PROCTOCOLECTOMY N/A 3/2/2022    Procedure: ROBOTIC COMPLETION  "PROCTOCOLECTOMY, ILEAL POUCH ANAL ANASTOMOSIS, LAPAROSCOPIC LYSIS OF ADHESION, DIVERTING LOOP ILLEOSTOMY,AND RIDGID PROTOCTOMY;  Surgeon: Soledad Flaherty MD;  Location:  OR     ENT SURGERY  2016    wisdom teeth     LAPAROSCOPIC ASSISTED COLECTOMY N/A 9/17/2021    Procedure: laparoscopic total abdominal colectomy with end ileostomy;  Surgeon: Soledad Flaherty MD;  Location:  OR     LAPAROSCOPIC ILEOSTOMY N/A 9/17/2021    Procedure: Laparoscopic Ileostomy;  Surgeon: Soledad Flaherty MD;  Location:  OR     SIGMOIDOSCOPY FLEXIBLE N/A 9/16/2021    Procedure: SIGMOIDOSCOPY, FLEXIBLE;  Surgeon: Anderson Cardona MD;  Location:  GI     TONSILLECTOMY & ADENOIDECTOMY  2006       Social History     Tobacco Use     Smoking status: Never Smoker     Smokeless tobacco: Never Used   Substance Use Topics     Alcohol use: Not Currently     Comment: occassional       Family History   Problem Relation Age of Onset     Diabetes Father         type I     Colon Cancer No family hx of        REVIEW OF SYSTEMS:     5 point ROS negative except as noted above in HPI, including Gen., Resp., CV, GI &  system review.      PHYSICAL EXAM:   There were no vitals taken for this visit. Estimated body mass index is 20.42 kg/m  as calculated from the following:    Height as of 4/13/22: 1.854 m (6' 1\").    Weight as of 4/13/22: 70.2 kg (154 lb 12.8 oz).   GENERAL APPEARANCE: healthy and alert  MENTAL STATUS: alert  AIRWAY EXAM: Mallampatti Class I (visualization of the soft palate, fauces, uvula, anterior and posterior pillars)  RESP: lungs clear to auscultation - no rales, rhonchi or wheezes  CV: regular rates and rhythm      IMPRESSION   ASA Class 2 - Mild systemic disease        PLAN:     Plan for colonoscopy. We discussed the risks, benefits and alternatives and the patient wished to proceed.    The above has been forwarded to the consulting provider.      Shahrzad Flaherty MD  [facility]  Phone: " [phone]  Fax: [phone]  April 28, 2022

## 2022-04-30 ENCOUNTER — LAB (OUTPATIENT)
Dept: URGENT CARE | Facility: URGENT CARE | Age: 27
End: 2022-04-30
Attending: COLON & RECTAL SURGERY
Payer: COMMERCIAL

## 2022-04-30 DIAGNOSIS — Z11.59 ENCOUNTER FOR SCREENING FOR OTHER VIRAL DISEASES: ICD-10-CM

## 2022-04-30 PROCEDURE — U0005 INFEC AGEN DETEC AMPLI PROBE: HCPCS

## 2022-04-30 PROCEDURE — U0003 INFECTIOUS AGENT DETECTION BY NUCLEIC ACID (DNA OR RNA); SEVERE ACUTE RESPIRATORY SYNDROME CORONAVIRUS 2 (SARS-COV-2) (CORONAVIRUS DISEASE [COVID-19]), AMPLIFIED PROBE TECHNIQUE, MAKING USE OF HIGH THROUGHPUT TECHNOLOGIES AS DESCRIBED BY CMS-2020-01-R: HCPCS

## 2022-05-01 LAB — SARS-COV-2 RNA RESP QL NAA+PROBE: NEGATIVE

## 2022-05-04 ENCOUNTER — HOSPITAL ENCOUNTER (INPATIENT)
Facility: CLINIC | Age: 27
LOS: 2 days | Discharge: HOME | DRG: 331 | End: 2022-05-06
Attending: COLON & RECTAL SURGERY | Admitting: COLON & RECTAL SURGERY
Payer: COMMERCIAL

## 2022-05-04 ENCOUNTER — ANESTHESIA (OUTPATIENT)
Dept: SURGERY | Facility: CLINIC | Age: 27
DRG: 331 | End: 2022-05-04
Payer: COMMERCIAL

## 2022-05-04 ENCOUNTER — ANESTHESIA EVENT (OUTPATIENT)
Dept: SURGERY | Facility: CLINIC | Age: 27
DRG: 331 | End: 2022-05-04
Payer: COMMERCIAL

## 2022-05-04 DIAGNOSIS — K51.919 ULCERATIVE COLITIS WITH COMPLICATION, UNSPECIFIED LOCATION (H): Primary | ICD-10-CM

## 2022-05-04 LAB
CREAT SERPL-MCNC: 1.14 MG/DL (ref 0.66–1.25)
GFR SERPL CREATININE-BSD FRML MDRD: >90 ML/MIN/1.73M2
PLATELET # BLD AUTO: 305 10E3/UL (ref 150–450)

## 2022-05-04 PROCEDURE — 88307 TISSUE EXAM BY PATHOLOGIST: CPT | Mod: 26 | Performed by: PATHOLOGY

## 2022-05-04 PROCEDURE — 258N000003 HC RX IP 258 OP 636: Performed by: ANESTHESIOLOGY

## 2022-05-04 PROCEDURE — 120N000001 HC R&B MED SURG/OB

## 2022-05-04 PROCEDURE — 250N000009 HC RX 250: Performed by: COLON & RECTAL SURGERY

## 2022-05-04 PROCEDURE — 250N000011 HC RX IP 250 OP 636: Performed by: COLON & RECTAL SURGERY

## 2022-05-04 PROCEDURE — 258N000003 HC RX IP 258 OP 636: Performed by: NURSE ANESTHETIST, CERTIFIED REGISTERED

## 2022-05-04 PROCEDURE — 250N000009 HC RX 250: Performed by: ANESTHESIOLOGY

## 2022-05-04 PROCEDURE — 250N000025 HC SEVOFLURANE, PER MIN: Performed by: COLON & RECTAL SURGERY

## 2022-05-04 PROCEDURE — 710N000009 HC RECOVERY PHASE 1, LEVEL 1, PER MIN: Performed by: COLON & RECTAL SURGERY

## 2022-05-04 PROCEDURE — 250N000009 HC RX 250: Performed by: NURSE ANESTHETIST, CERTIFIED REGISTERED

## 2022-05-04 PROCEDURE — 88307 TISSUE EXAM BY PATHOLOGIST: CPT | Mod: TC | Performed by: COLON & RECTAL SURGERY

## 2022-05-04 PROCEDURE — 85049 AUTOMATED PLATELET COUNT: CPT | Performed by: COLON & RECTAL SURGERY

## 2022-05-04 PROCEDURE — 250N000011 HC RX IP 250 OP 636: Performed by: ANESTHESIOLOGY

## 2022-05-04 PROCEDURE — 250N000013 HC RX MED GY IP 250 OP 250 PS 637: Performed by: COLON & RECTAL SURGERY

## 2022-05-04 PROCEDURE — 360N000077 HC SURGERY LEVEL 4, PER MIN: Performed by: COLON & RECTAL SURGERY

## 2022-05-04 PROCEDURE — 250N000011 HC RX IP 250 OP 636: Performed by: NURSE ANESTHETIST, CERTIFIED REGISTERED

## 2022-05-04 PROCEDURE — 370N000017 HC ANESTHESIA TECHNICAL FEE, PER MIN: Performed by: COLON & RECTAL SURGERY

## 2022-05-04 PROCEDURE — 36415 COLL VENOUS BLD VENIPUNCTURE: CPT | Performed by: COLON & RECTAL SURGERY

## 2022-05-04 PROCEDURE — 0DBB0ZZ EXCISION OF ILEUM, OPEN APPROACH: ICD-10-PCS | Performed by: COLON & RECTAL SURGERY

## 2022-05-04 PROCEDURE — 272N000001 HC OR GENERAL SUPPLY STERILE: Performed by: COLON & RECTAL SURGERY

## 2022-05-04 PROCEDURE — 82565 ASSAY OF CREATININE: CPT | Performed by: COLON & RECTAL SURGERY

## 2022-05-04 PROCEDURE — 258N000003 HC RX IP 258 OP 636: Performed by: COLON & RECTAL SURGERY

## 2022-05-04 PROCEDURE — 999N000141 HC STATISTIC PRE-PROCEDURE NURSING ASSESSMENT: Performed by: COLON & RECTAL SURGERY

## 2022-05-04 RX ORDER — LIDOCAINE 40 MG/G
CREAM TOPICAL
Status: DISCONTINUED | OUTPATIENT
Start: 2022-05-04 | End: 2022-05-06 | Stop reason: HOSPADM

## 2022-05-04 RX ORDER — ONDANSETRON 2 MG/ML
INJECTION INTRAMUSCULAR; INTRAVENOUS PRN
Status: DISCONTINUED | OUTPATIENT
Start: 2022-05-04 | End: 2022-05-04

## 2022-05-04 RX ORDER — HYDROMORPHONE HCL IN WATER/PF 6 MG/30 ML
0.4 PATIENT CONTROLLED ANALGESIA SYRINGE INTRAVENOUS
Status: DISCONTINUED | OUTPATIENT
Start: 2022-05-04 | End: 2022-05-06 | Stop reason: HOSPADM

## 2022-05-04 RX ORDER — HYDRALAZINE HYDROCHLORIDE 20 MG/ML
2.5-5 INJECTION INTRAMUSCULAR; INTRAVENOUS EVERY 10 MIN PRN
Status: DISCONTINUED | OUTPATIENT
Start: 2022-05-04 | End: 2022-05-04 | Stop reason: HOSPADM

## 2022-05-04 RX ORDER — PROPOFOL 10 MG/ML
INJECTION, EMULSION INTRAVENOUS PRN
Status: DISCONTINUED | OUTPATIENT
Start: 2022-05-04 | End: 2022-05-04

## 2022-05-04 RX ORDER — FENTANYL CITRATE 50 UG/ML
50 INJECTION, SOLUTION INTRAMUSCULAR; INTRAVENOUS EVERY 5 MIN PRN
Status: DISCONTINUED | OUTPATIENT
Start: 2022-05-04 | End: 2022-05-04 | Stop reason: HOSPADM

## 2022-05-04 RX ORDER — ACETAMINOPHEN 325 MG/1
975 TABLET ORAL EVERY 8 HOURS
Status: DISCONTINUED | OUTPATIENT
Start: 2022-05-04 | End: 2022-05-06 | Stop reason: HOSPADM

## 2022-05-04 RX ORDER — NALOXONE HYDROCHLORIDE 0.4 MG/ML
0.4 INJECTION, SOLUTION INTRAMUSCULAR; INTRAVENOUS; SUBCUTANEOUS
Status: DISCONTINUED | OUTPATIENT
Start: 2022-05-04 | End: 2022-05-06 | Stop reason: HOSPADM

## 2022-05-04 RX ORDER — LIDOCAINE HYDROCHLORIDE 20 MG/ML
INJECTION, SOLUTION INFILTRATION; PERINEURAL PRN
Status: DISCONTINUED | OUTPATIENT
Start: 2022-05-04 | End: 2022-05-04

## 2022-05-04 RX ORDER — ONDANSETRON 4 MG/1
4 TABLET, ORALLY DISINTEGRATING ORAL EVERY 6 HOURS PRN
Status: DISCONTINUED | OUTPATIENT
Start: 2022-05-04 | End: 2022-05-06 | Stop reason: HOSPADM

## 2022-05-04 RX ORDER — ACETAMINOPHEN 325 MG/1
975 TABLET ORAL ONCE
Status: COMPLETED | OUTPATIENT
Start: 2022-05-04 | End: 2022-05-04

## 2022-05-04 RX ORDER — MAGNESIUM HYDROXIDE 1200 MG/15ML
LIQUID ORAL PRN
Status: DISCONTINUED | OUTPATIENT
Start: 2022-05-04 | End: 2022-05-04 | Stop reason: HOSPADM

## 2022-05-04 RX ORDER — HYDROMORPHONE HCL IN WATER/PF 6 MG/30 ML
0.2 PATIENT CONTROLLED ANALGESIA SYRINGE INTRAVENOUS
Status: DISCONTINUED | OUTPATIENT
Start: 2022-05-04 | End: 2022-05-06 | Stop reason: HOSPADM

## 2022-05-04 RX ORDER — GLYCOPYRROLATE 0.2 MG/ML
INJECTION, SOLUTION INTRAMUSCULAR; INTRAVENOUS PRN
Status: DISCONTINUED | OUTPATIENT
Start: 2022-05-04 | End: 2022-05-04

## 2022-05-04 RX ORDER — ONDANSETRON 4 MG/1
4 TABLET, ORALLY DISINTEGRATING ORAL EVERY 30 MIN PRN
Status: DISCONTINUED | OUTPATIENT
Start: 2022-05-04 | End: 2022-05-04 | Stop reason: HOSPADM

## 2022-05-04 RX ORDER — METRONIDAZOLE 500 MG/100ML
500 INJECTION, SOLUTION INTRAVENOUS EVERY 12 HOURS
Status: COMPLETED | OUTPATIENT
Start: 2022-05-05 | End: 2022-05-05

## 2022-05-04 RX ORDER — NALOXONE HYDROCHLORIDE 0.4 MG/ML
0.2 INJECTION, SOLUTION INTRAMUSCULAR; INTRAVENOUS; SUBCUTANEOUS
Status: DISCONTINUED | OUTPATIENT
Start: 2022-05-04 | End: 2022-05-06 | Stop reason: HOSPADM

## 2022-05-04 RX ORDER — SODIUM CHLORIDE, SODIUM LACTATE, POTASSIUM CHLORIDE, CALCIUM CHLORIDE 600; 310; 30; 20 MG/100ML; MG/100ML; MG/100ML; MG/100ML
INJECTION, SOLUTION INTRAVENOUS CONTINUOUS
Status: DISCONTINUED | OUTPATIENT
Start: 2022-05-04 | End: 2022-05-04 | Stop reason: HOSPADM

## 2022-05-04 RX ORDER — ONDANSETRON 2 MG/ML
4 INJECTION INTRAMUSCULAR; INTRAVENOUS EVERY 6 HOURS PRN
Status: DISCONTINUED | OUTPATIENT
Start: 2022-05-04 | End: 2022-05-06 | Stop reason: HOSPADM

## 2022-05-04 RX ORDER — DIPHENHYDRAMINE HYDROCHLORIDE 50 MG/ML
25 INJECTION INTRAMUSCULAR; INTRAVENOUS EVERY 6 HOURS PRN
Status: DISCONTINUED | OUTPATIENT
Start: 2022-05-04 | End: 2022-05-06 | Stop reason: HOSPADM

## 2022-05-04 RX ORDER — ACETAMINOPHEN 325 MG/1
650 TABLET ORAL EVERY 4 HOURS PRN
Status: DISCONTINUED | OUTPATIENT
Start: 2022-05-07 | End: 2022-05-06 | Stop reason: HOSPADM

## 2022-05-04 RX ORDER — OXYCODONE HYDROCHLORIDE 5 MG/1
10 TABLET ORAL EVERY 4 HOURS PRN
Status: DISCONTINUED | OUTPATIENT
Start: 2022-05-04 | End: 2022-05-06 | Stop reason: HOSPADM

## 2022-05-04 RX ORDER — OXYCODONE HYDROCHLORIDE 5 MG/1
5 TABLET ORAL EVERY 4 HOURS PRN
Status: DISCONTINUED | OUTPATIENT
Start: 2022-05-04 | End: 2022-05-06 | Stop reason: HOSPADM

## 2022-05-04 RX ORDER — SODIUM CHLORIDE, SODIUM LACTATE, POTASSIUM CHLORIDE, CALCIUM CHLORIDE 600; 310; 30; 20 MG/100ML; MG/100ML; MG/100ML; MG/100ML
INJECTION, SOLUTION INTRAVENOUS CONTINUOUS
Status: DISCONTINUED | OUTPATIENT
Start: 2022-05-04 | End: 2022-05-04

## 2022-05-04 RX ORDER — OXYCODONE HYDROCHLORIDE 5 MG/1
5 TABLET ORAL EVERY 4 HOURS PRN
Status: DISCONTINUED | OUTPATIENT
Start: 2022-05-04 | End: 2022-05-04 | Stop reason: HOSPADM

## 2022-05-04 RX ORDER — SODIUM CHLORIDE, SODIUM LACTATE, POTASSIUM CHLORIDE, CALCIUM CHLORIDE 600; 310; 30; 20 MG/100ML; MG/100ML; MG/100ML; MG/100ML
INJECTION, SOLUTION INTRAVENOUS CONTINUOUS
Status: DISCONTINUED | OUTPATIENT
Start: 2022-05-04 | End: 2022-05-06 | Stop reason: HOSPADM

## 2022-05-04 RX ORDER — ONDANSETRON 2 MG/ML
4 INJECTION INTRAMUSCULAR; INTRAVENOUS EVERY 30 MIN PRN
Status: DISCONTINUED | OUTPATIENT
Start: 2022-05-04 | End: 2022-05-04 | Stop reason: HOSPADM

## 2022-05-04 RX ORDER — BUPIVACAINE HYDROCHLORIDE 5 MG/ML
INJECTION, SOLUTION PERINEURAL PRN
Status: DISCONTINUED | OUTPATIENT
Start: 2022-05-04 | End: 2022-05-04 | Stop reason: HOSPADM

## 2022-05-04 RX ORDER — DEXAMETHASONE SODIUM PHOSPHATE 4 MG/ML
INJECTION, SOLUTION INTRA-ARTICULAR; INTRALESIONAL; INTRAMUSCULAR; INTRAVENOUS; SOFT TISSUE PRN
Status: DISCONTINUED | OUTPATIENT
Start: 2022-05-04 | End: 2022-05-04

## 2022-05-04 RX ORDER — LABETALOL HYDROCHLORIDE 5 MG/ML
10 INJECTION, SOLUTION INTRAVENOUS
Status: DISCONTINUED | OUTPATIENT
Start: 2022-05-04 | End: 2022-05-04 | Stop reason: HOSPADM

## 2022-05-04 RX ORDER — METRONIDAZOLE 500 MG/100ML
500 INJECTION, SOLUTION INTRAVENOUS
Status: COMPLETED | OUTPATIENT
Start: 2022-05-04 | End: 2022-05-04

## 2022-05-04 RX ORDER — HYDROMORPHONE HCL IN WATER/PF 6 MG/30 ML
0.2 PATIENT CONTROLLED ANALGESIA SYRINGE INTRAVENOUS EVERY 5 MIN PRN
Status: DISCONTINUED | OUTPATIENT
Start: 2022-05-04 | End: 2022-05-04 | Stop reason: HOSPADM

## 2022-05-04 RX ORDER — ENOXAPARIN SODIUM 100 MG/ML
40 INJECTION SUBCUTANEOUS EVERY 24 HOURS
Status: DISCONTINUED | OUTPATIENT
Start: 2022-05-05 | End: 2022-05-06 | Stop reason: HOSPADM

## 2022-05-04 RX ORDER — HEPARIN SODIUM 5000 [USP'U]/.5ML
5000 INJECTION, SOLUTION INTRAVENOUS; SUBCUTANEOUS
Status: COMPLETED | OUTPATIENT
Start: 2022-05-04 | End: 2022-05-04

## 2022-05-04 RX ORDER — CEFAZOLIN SODIUM 1 G/3ML
1 INJECTION, POWDER, FOR SOLUTION INTRAMUSCULAR; INTRAVENOUS EVERY 8 HOURS
Status: COMPLETED | OUTPATIENT
Start: 2022-05-04 | End: 2022-05-05

## 2022-05-04 RX ORDER — NEOSTIGMINE METHYLSULFATE 1 MG/ML
VIAL (ML) INJECTION PRN
Status: DISCONTINUED | OUTPATIENT
Start: 2022-05-04 | End: 2022-05-04

## 2022-05-04 RX ORDER — CEFAZOLIN SODIUM/WATER 2 G/20 ML
2 SYRINGE (ML) INTRAVENOUS
Status: COMPLETED | OUTPATIENT
Start: 2022-05-04 | End: 2022-05-04

## 2022-05-04 RX ORDER — CEFAZOLIN SODIUM/WATER 2 G/20 ML
2 SYRINGE (ML) INTRAVENOUS SEE ADMIN INSTRUCTIONS
Status: DISCONTINUED | OUTPATIENT
Start: 2022-05-04 | End: 2022-05-04

## 2022-05-04 RX ORDER — ONDANSETRON 2 MG/ML
4 INJECTION INTRAMUSCULAR; INTRAVENOUS ONCE
Status: DISCONTINUED | OUTPATIENT
Start: 2022-05-04 | End: 2022-05-04

## 2022-05-04 RX ORDER — FENTANYL CITRATE 50 UG/ML
INJECTION, SOLUTION INTRAMUSCULAR; INTRAVENOUS PRN
Status: DISCONTINUED | OUTPATIENT
Start: 2022-05-04 | End: 2022-05-04

## 2022-05-04 RX ORDER — PROPOFOL 10 MG/ML
INJECTION, EMULSION INTRAVENOUS CONTINUOUS PRN
Status: DISCONTINUED | OUTPATIENT
Start: 2022-05-04 | End: 2022-05-04

## 2022-05-04 RX ADMIN — ROCURONIUM BROMIDE 50 MG: 50 INJECTION, SOLUTION INTRAVENOUS at 14:55

## 2022-05-04 RX ADMIN — SODIUM CHLORIDE, POTASSIUM CHLORIDE, SODIUM LACTATE AND CALCIUM CHLORIDE: 600; 310; 30; 20 INJECTION, SOLUTION INTRAVENOUS at 19:16

## 2022-05-04 RX ADMIN — NEOSTIGMINE METHYLSULFATE 3.5 MG: 1 INJECTION, SOLUTION INTRAVENOUS at 16:46

## 2022-05-04 RX ADMIN — ROCURONIUM BROMIDE 20 MG: 50 INJECTION, SOLUTION INTRAVENOUS at 15:19

## 2022-05-04 RX ADMIN — FENTANYL CITRATE 50 MCG: 50 INJECTION, SOLUTION INTRAMUSCULAR; INTRAVENOUS at 17:21

## 2022-05-04 RX ADMIN — FENTANYL CITRATE 50 MCG: 50 INJECTION, SOLUTION INTRAMUSCULAR; INTRAVENOUS at 17:30

## 2022-05-04 RX ADMIN — METRONIDAZOLE 500 MG: 500 INJECTION, SOLUTION INTRAVENOUS at 14:01

## 2022-05-04 RX ADMIN — ACETAMINOPHEN 975 MG: 325 TABLET ORAL at 21:33

## 2022-05-04 RX ADMIN — LIDOCAINE HYDROCHLORIDE 0.5 ML: 10 INJECTION, SOLUTION EPIDURAL; INFILTRATION; INTRACAUDAL; PERINEURAL at 13:48

## 2022-05-04 RX ADMIN — SODIUM CHLORIDE, POTASSIUM CHLORIDE, SODIUM LACTATE AND CALCIUM CHLORIDE: 600; 310; 30; 20 INJECTION, SOLUTION INTRAVENOUS at 23:02

## 2022-05-04 RX ADMIN — LIDOCAINE HYDROCHLORIDE 100 MG: 20 INJECTION, SOLUTION INFILTRATION; PERINEURAL at 14:55

## 2022-05-04 RX ADMIN — HEPARIN SODIUM 5000 UNITS: 5000 INJECTION, SOLUTION INTRAVENOUS; SUBCUTANEOUS at 13:02

## 2022-05-04 RX ADMIN — PROPOFOL 30 MCG/KG/MIN: 10 INJECTION, EMULSION INTRAVENOUS at 15:00

## 2022-05-04 RX ADMIN — ONDANSETRON 4 MG: 2 INJECTION INTRAMUSCULAR; INTRAVENOUS at 16:43

## 2022-05-04 RX ADMIN — ACETAMINOPHEN 975 MG: 500 TABLET, FILM COATED ORAL at 13:01

## 2022-05-04 RX ADMIN — Medication 2 G: at 14:48

## 2022-05-04 RX ADMIN — PROPOFOL 200 MG: 10 INJECTION, EMULSION INTRAVENOUS at 14:55

## 2022-05-04 RX ADMIN — HYDROMORPHONE HYDROCHLORIDE 0.2 MG: 0.2 INJECTION, SOLUTION INTRAMUSCULAR; INTRAVENOUS; SUBCUTANEOUS at 17:45

## 2022-05-04 RX ADMIN — SODIUM CHLORIDE, POTASSIUM CHLORIDE, SODIUM LACTATE AND CALCIUM CHLORIDE: 600; 310; 30; 20 INJECTION, SOLUTION INTRAVENOUS at 13:48

## 2022-05-04 RX ADMIN — FENTANYL CITRATE 100 MCG: 50 INJECTION, SOLUTION INTRAMUSCULAR; INTRAVENOUS at 14:55

## 2022-05-04 RX ADMIN — GLYCOPYRROLATE 0.5 MG: 0.2 INJECTION, SOLUTION INTRAMUSCULAR; INTRAVENOUS at 16:46

## 2022-05-04 RX ADMIN — ONDANSETRON 4 MG: 2 INJECTION INTRAMUSCULAR; INTRAVENOUS at 21:45

## 2022-05-04 RX ADMIN — CEFAZOLIN 1 G: 1 INJECTION, POWDER, FOR SOLUTION INTRAMUSCULAR; INTRAVENOUS at 23:01

## 2022-05-04 RX ADMIN — MIDAZOLAM 2 MG: 1 INJECTION INTRAMUSCULAR; INTRAVENOUS at 14:45

## 2022-05-04 RX ADMIN — PHENYLEPHRINE HYDROCHLORIDE 100 MCG: 10 INJECTION INTRAVENOUS at 15:17

## 2022-05-04 RX ADMIN — SODIUM CHLORIDE, POTASSIUM CHLORIDE, SODIUM LACTATE AND CALCIUM CHLORIDE: 600; 310; 30; 20 INJECTION, SOLUTION INTRAVENOUS at 16:06

## 2022-05-04 RX ADMIN — ROCURONIUM BROMIDE 20 MG: 50 INJECTION, SOLUTION INTRAVENOUS at 16:01

## 2022-05-04 RX ADMIN — OXYCODONE HYDROCHLORIDE 10 MG: 5 TABLET ORAL at 23:35

## 2022-05-04 RX ADMIN — DEXAMETHASONE SODIUM PHOSPHATE 4 MG: 4 INJECTION, SOLUTION INTRA-ARTICULAR; INTRALESIONAL; INTRAMUSCULAR; INTRAVENOUS; SOFT TISSUE at 15:05

## 2022-05-04 RX ADMIN — HYDROMORPHONE HYDROCHLORIDE 0.2 MG: 0.2 INJECTION, SOLUTION INTRAMUSCULAR; INTRAVENOUS; SUBCUTANEOUS at 18:17

## 2022-05-04 ASSESSMENT — ACTIVITIES OF DAILY LIVING (ADL)
ADLS_ACUITY_SCORE: 5
ADLS_ACUITY_SCORE: 3
ADLS_ACUITY_SCORE: 5
ADLS_ACUITY_SCORE: 5
ADLS_ACUITY_SCORE: 3
ADLS_ACUITY_SCORE: 5
ADLS_ACUITY_SCORE: 3
ADLS_ACUITY_SCORE: 12

## 2022-05-04 NOTE — ANESTHESIA CARE TRANSFER NOTE
Patient: Luther Wood    Procedure: Procedure(s):  ILEOSTOMY CLOSURE LOOP       Diagnosis: Ulcerative colitis (H) [K51.90]  Diagnosis Additional Information: No value filed.    Anesthesia Type:   General     Note:    Oropharynx: oropharynx clear of all foreign objects  Level of Consciousness: awake  Oxygen Supplementation: face mask    Independent Airway: airway patency satisfactory and stable  Dentition: dentition unchanged  Vital Signs Stable: post-procedure vital signs reviewed and stable  Report to RN Given: handoff report given  Patient transferred to: PACU    Handoff Report: Identifed the Patient, Identified the Reponsible Provider, Reviewed the pertinent medical history, Discussed the surgical course, Reviewed Intra-OP anesthesia mangement and issues during anesthesia, Set expectations for post-procedure period and Allowed opportunity for questions and acknowledgement of understanding      Vitals:  Vitals Value Taken Time   /75 05/04/22 1710   Temp     Pulse 71 05/04/22 1712   Resp 8 05/04/22 1712   SpO2 100 % 05/04/22 1712   Vitals shown include unvalidated device data.    Electronically Signed By: JACQUES Lord CRNA  May 4, 2022  5:13 PM

## 2022-05-04 NOTE — PROGRESS NOTES
Handoff report received from LIZBET Davis assumed at 1400. Preop antibiotic started at 1401 by RN in alignment with orders and protocol.

## 2022-05-04 NOTE — ANESTHESIA PROCEDURE NOTES
Airway       Patient location during procedure: OR  Staff -        Anesthesiologist:  Salvatore Carmona MD       CRNA: Erika Jason       Performed By: CRNAIndications and Patient Condition       Indications for airway management: oliver-procedural       Induction type:intravenous       Mask difficulty assessment: 2 - vent by mask + OA or adjuvant +/- NMBA    Final Airway Details       Final airway type: endotracheal airway       Successful airway: ETT - single  Endotracheal Airway Details        ETT size (mm): 8.0       Cuffed: yes       Successful intubation technique: direct laryngoscopy       DL Blade Type: Cleaning 2       Grade View of Cords: 1       Adjucts: stylet       Position: Right       Measured from: gums/teeth       Secured at (cm): 23       Bite block used: None    Post intubation assessment        Placement verified by: capnometry, equal breath sounds and chest rise        Number of attempts at approach: 1       Number of other approaches attempted: 0       Secured with: pink tape       Ease of procedure: easy       Dentition: Intact and Unchanged

## 2022-05-04 NOTE — ANESTHESIA PREPROCEDURE EVALUATION
Anesthesia Pre-Procedure Evaluation    Patient: Luther Wood   MRN: [id number] : 1995        Procedure : Procedure(s):  ILEOSTOMY CLOSURE LOOP          Past Medical History:   Diagnosis Date     Colitis 2019     History of blood transfusion 2021      Past Surgical History:   Procedure Laterality Date     COLONOSCOPY       COMBINED CYSTOSCOPY, INSERT CATHETER URETER Bilateral 3/2/2022    Procedure: CYSTOSCOPY, PLACEMENT OF BILATERAL URETERAL CATHETERS;  Surgeon: Aron Gallego MD;  Location:  OR     DAVINCI ASSISTED COMPLETION PROCTOCOLECTOMY N/A 3/2/2022    Procedure: ROBOTIC COMPLETION PROCTOCOLECTOMY, ILEAL POUCH ANAL ANASTOMOSIS, LAPAROSCOPIC LYSIS OF ADHESION, DIVERTING LOOP ILLEOSTOMY,AND RIDGID PROTOCTOMY;  Surgeon: Soledad Flaherty MD;  Location:  OR     ENT SURGERY  2016    wisdom teeth     LAPAROSCOPIC ASSISTED COLECTOMY N/A 2021    Procedure: laparoscopic total abdominal colectomy with end ileostomy;  Surgeon: Soledad Flaherty MD;  Location:  OR     LAPAROSCOPIC ILEOSTOMY N/A 2021    Procedure: Laparoscopic Ileostomy;  Surgeon: Soledad Flaherty MD;  Location:  OR     POUCHOSCOPY, BIOPSY N/A 2022    Procedure: ENDOSCOPY, POUCH, DIAGNOSTIC, WITH BIOPSY;  Surgeon: Soledad Flaherty MD;  Location:  GI     SIGMOIDOSCOPY FLEXIBLE N/A 2021    Procedure: SIGMOIDOSCOPY, FLEXIBLE;  Surgeon: Anderson Cardona MD;  Location:  GI     TONSILLECTOMY & ADENOIDECTOMY  2006      Allergies   Allergen Reactions     Dairy Digestive       Social History     Tobacco Use     Smoking status: Never Smoker     Smokeless tobacco: Never Used   Substance Use Topics     Alcohol use: Not Currently     Comment: occassional      Wt Readings from Last 1 Encounters:   22 69.3 kg (152 lb 11.2 oz)        Anesthesia Evaluation   Pt has had prior anesthetic.     No history of anesthetic complications       ROS/MED HX  ENT/Pulmonary:  - neg pulmonary ROS  (-) sleep  apnea   Neurologic:  - neg neurologic ROS     Cardiovascular:  - neg cardiovascular ROS  (-) hypertension   METS/Exercise Tolerance:     Hematologic:     (+) anemia,     Musculoskeletal:       GI/Hepatic: Comment: Ulcerative colitis s/p colectomy   (-) GERD and liver disease   Renal/Genitourinary:    (-) renal disease   Endo:  - neg endo ROS     Psychiatric/Substance Use: Comment: ADHD    (+) psychiatric history other (comment)     Infectious Disease:       Malignancy:       Other:            Physical Exam    Airway        Mallampati: II   TM distance: > 3 FB   Neck ROM: full   Mouth opening: > 3 cm    Respiratory Devices and Support         Dental  no notable dental history         Cardiovascular   cardiovascular exam normal          Pulmonary   pulmonary exam normal                OUTSIDE LABS:  CBC:   Lab Results   Component Value Date    WBC 6.4 04/13/2022    WBC 5.9 03/30/2022    HGB 15.0 04/13/2022    HGB 15.4 03/30/2022    HCT 47.4 04/13/2022    HCT 49.1 03/30/2022     04/13/2022     03/30/2022     BMP:   Lab Results   Component Value Date     03/30/2022     (L) 03/05/2022    POTASSIUM 4.2 03/30/2022    POTASSIUM 4.4 03/05/2022    CHLORIDE 105 03/30/2022    CHLORIDE 96 03/05/2022    CO2 29 03/30/2022    CO2 30 03/05/2022    BUN 11 03/30/2022    BUN 10 03/05/2022    CR 1.15 03/30/2022    CR 0.98 03/05/2022    GLC 75 03/30/2022    GLC 75 03/30/2022     COAGS:   Lab Results   Component Value Date    INR 1.33 (H) 09/17/2021     POC: No results found for: BGM, HCG, HCGS  HEPATIC:   Lab Results   Component Value Date    ALBUMIN 1.5 (L) 09/17/2021    PROTTOTAL 5.6 (L) 09/17/2021    ALT 93 (H) 09/17/2021    AST 25 09/17/2021    ALKPHOS 142 09/17/2021    BILITOTAL 0.4 09/17/2021     OTHER:   Lab Results   Component Value Date    MADIE 9.7 03/30/2022    PHOS 2.8 03/03/2022    MAG 2.0 03/03/2022    LIPASE 64 (L) 09/11/2021    TSH 1.35 12/31/2021    .0 (H) 09/15/2021    SED 68 (H)  09/11/2021       Anesthesia Plan    ASA Status:  2   NPO Status:  NPO Appropriate    Anesthesia Type: General.     - Airway: ETT   Induction: Intravenous, Propofol.   Maintenance: Balanced.        Consents    Anesthesia Plan(s) and associated risks, benefits, and realistic alternatives discussed. Questions answered and patient/representative(s) expressed understanding.    - Discussed:     - Discussed with:  Patient         Postoperative Care    Pain management: Multi-modal analgesia.   PONV prophylaxis: Ondansetron (or other 5HT-3), Background Propofol Infusion     Comments:                Salvatore Carmona MD

## 2022-05-04 NOTE — BRIEF OP NOTE
[facility]    Brief Operative Note    Pre-operative diagnosis: Ulcerative colitis (H) [K51.90]  Post-operative diagnosis Same as pre-operative diagnosis    Procedure: Procedure(s):  ILEOSTOMY CLOSURE LOOP  Surgeon: Surgeon(s) and Role:     * Soledad Flaherty MD - Primary     * Naomi Vera PA-C - Assisting  Anesthesia: General   Estimated Blood Loss: 10 mL    Drains: None  Specimens:   ID Type Source Tests Collected by Time Destination   1 : ILEOSTOMY TRIM Tissue Stoma SURGICAL PATHOLOGY EXAM Soledad Flaherty MD 5/4/2022  3:58 PM      Findings:   Mild thickening of small bowel..  Complications: None.  Implants: * No implants in log *      ADDENDUM:    PATIENT DATA  Indicate Y or N:  Home O2 No  Hemodialysis No  Transplant patient No  Cirrhosis No  Steroids in last 30 days No  Immunomodulators in last 30 days No  Anticoagulation at time of surgery No   List medication n/a  Prior abdominal surgery Yes  Pelvic irradiation No    Albumin within 30 days if known n/a  Hgb within 30 days if known 15.0  Cr within 30 days if known n/a  Body mass index is 20.15 kg/m .    OR DATA  Emergent No   <24 hours No   <1 week No  Bowel Prep (Y or N) No  Antibiotics (Y or N) Yes  DVT prophylaxis    Heparin Yes   SCD Yes   None No  Drain No  ASA (1,2,3,4,5 if unknown) 2  OR time (min) 81  Stents No  Transfuse >/= 2U No  Anastomosis   Stapled Yes   Handsewn No  Leak Test (pos, neg, not done) N/A

## 2022-05-05 LAB
ANION GAP SERPL CALCULATED.3IONS-SCNC: 8 MMOL/L (ref 3–14)
BUN SERPL-MCNC: 9 MG/DL (ref 7–30)
CALCIUM SERPL-MCNC: 8.5 MG/DL (ref 8.5–10.1)
CHLORIDE BLD-SCNC: 103 MMOL/L (ref 94–109)
CO2 SERPL-SCNC: 26 MMOL/L (ref 20–32)
CREAT SERPL-MCNC: 1.1 MG/DL (ref 0.66–1.25)
ERYTHROCYTE [DISTWIDTH] IN BLOOD BY AUTOMATED COUNT: 14.4 % (ref 10–15)
GFR SERPL CREATININE-BSD FRML MDRD: >90 ML/MIN/1.73M2
GLUCOSE BLD-MCNC: 87 MG/DL (ref 70–99)
GLUCOSE BLD-MCNC: 87 MG/DL (ref 70–99)
GLUCOSE BLDC GLUCOMTR-MCNC: 84 MG/DL (ref 70–99)
GLUCOSE BLDC GLUCOMTR-MCNC: 95 MG/DL (ref 70–99)
HCT VFR BLD AUTO: 45.2 % (ref 40–53)
HGB BLD-MCNC: 14.6 G/DL (ref 13.3–17.7)
MAGNESIUM SERPL-MCNC: 1.7 MG/DL (ref 1.6–2.3)
MCH RBC QN AUTO: 26.7 PG (ref 26.5–33)
MCHC RBC AUTO-ENTMCNC: 32.3 G/DL (ref 31.5–36.5)
MCV RBC AUTO: 83 FL (ref 78–100)
PHOSPHATE SERPL-MCNC: 4 MG/DL (ref 2.5–4.5)
PLATELET # BLD AUTO: 292 10E3/UL (ref 150–450)
POTASSIUM BLD-SCNC: 3.8 MMOL/L (ref 3.4–5.3)
RBC # BLD AUTO: 5.47 10E6/UL (ref 4.4–5.9)
SODIUM SERPL-SCNC: 137 MMOL/L (ref 133–144)
WBC # BLD AUTO: 14.1 10E3/UL (ref 4–11)

## 2022-05-05 PROCEDURE — 250N000013 HC RX MED GY IP 250 OP 250 PS 637: Performed by: COLON & RECTAL SURGERY

## 2022-05-05 PROCEDURE — 84100 ASSAY OF PHOSPHORUS: CPT | Performed by: COLON & RECTAL SURGERY

## 2022-05-05 PROCEDURE — 36415 COLL VENOUS BLD VENIPUNCTURE: CPT | Performed by: COLON & RECTAL SURGERY

## 2022-05-05 PROCEDURE — 120N000001 HC R&B MED SURG/OB

## 2022-05-05 PROCEDURE — 258N000003 HC RX IP 258 OP 636: Performed by: COLON & RECTAL SURGERY

## 2022-05-05 PROCEDURE — 250N000011 HC RX IP 250 OP 636: Performed by: COLON & RECTAL SURGERY

## 2022-05-05 PROCEDURE — 83735 ASSAY OF MAGNESIUM: CPT | Performed by: COLON & RECTAL SURGERY

## 2022-05-05 PROCEDURE — 85027 COMPLETE CBC AUTOMATED: CPT | Performed by: COLON & RECTAL SURGERY

## 2022-05-05 PROCEDURE — 80048 BASIC METABOLIC PNL TOTAL CA: CPT | Performed by: COLON & RECTAL SURGERY

## 2022-05-05 RX ADMIN — ACETAMINOPHEN 975 MG: 325 TABLET ORAL at 21:53

## 2022-05-05 RX ADMIN — METRONIDAZOLE 500 MG: 500 INJECTION, SOLUTION INTRAVENOUS at 02:23

## 2022-05-05 RX ADMIN — ACETAMINOPHEN 975 MG: 325 TABLET ORAL at 06:59

## 2022-05-05 RX ADMIN — METRONIDAZOLE 500 MG: 500 INJECTION, SOLUTION INTRAVENOUS at 15:10

## 2022-05-05 RX ADMIN — ACETAMINOPHEN 975 MG: 325 TABLET ORAL at 14:00

## 2022-05-05 RX ADMIN — OXYCODONE HYDROCHLORIDE 10 MG: 5 TABLET ORAL at 04:17

## 2022-05-05 RX ADMIN — CEFAZOLIN 1 G: 1 INJECTION, POWDER, FOR SOLUTION INTRAMUSCULAR; INTRAVENOUS at 14:34

## 2022-05-05 RX ADMIN — ENOXAPARIN SODIUM 40 MG: 40 INJECTION SUBCUTANEOUS at 12:47

## 2022-05-05 RX ADMIN — CEFAZOLIN 1 G: 1 INJECTION, POWDER, FOR SOLUTION INTRAMUSCULAR; INTRAVENOUS at 07:00

## 2022-05-05 RX ADMIN — SODIUM CHLORIDE, POTASSIUM CHLORIDE, SODIUM LACTATE AND CALCIUM CHLORIDE: 600; 310; 30; 20 INJECTION, SOLUTION INTRAVENOUS at 14:02

## 2022-05-05 ASSESSMENT — ACTIVITIES OF DAILY LIVING (ADL)
ADLS_ACUITY_SCORE: 5

## 2022-05-05 NOTE — PROGRESS NOTES
SPIRITUAL HEALTH SERVICES Progress Note  [facility]     Admissions Request.    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    He told of enjoying fishing and spending time outside, at lakes, and in the sun. Jb said his Mu-ism piper is important to him, and he feels it has helped him cope with his health challenges and to heal quickly.    I offered reflective listening and emotional support, and said a prayer.     remains available.    Rev Mandy Ascencio  Associate   Spiritual Health Phone Line [phone]  Spiritual Health Pager [phone]

## 2022-05-05 NOTE — PROGRESS NOTES
COLON & RECTAL SURGERY  PROGRESS NOTE    May 5, 2022  Post-op Day # 1    SUBJECTIVE:  Pain manageable with oral pain medications. Denies flatus. No nausea or vomiting. Tolerating clear liquids.    OBJECTIVE:  Temp:  [97.6  F (36.4  C)-98.7  F (37.1  C)] 98.7  F (37.1  C)  Pulse:  [62-88] 68  Resp:  [10-21] 16  BP: (110-138)/(66-89) 130/77  SpO2:  [95 %-100 %] 97 %    Intake/Output Summary (Last 24 hours) at 5/5/2022 0731  Last data filed at 5/5/2022 0700  Gross per 24 hour   Intake 1680 ml   Output 2255 ml   Net -575 ml       GENERAL:  Awake, alert, no acute distress  HEAD: Normocephalic atraumatic  SCLERA: Anicteric  EXTREMITIES: Warm and well perfused  ABDOMEN:  Soft, appropriately tender, non-distended. No guarding, rigidity, or peritoneal signs.   INCISION:  C/d/i, re-packed with corner of 4x4.     LABS:  Recent Labs   Lab 05/04/22 2120        Recent Labs   Lab 05/05/22  0600 05/04/22 2120   GLC 84  --    CR  --  1.14   GFRESTIMATED  --  >90     Postop labs from today pending.      ASSESSMENT/PLAN: POD #1 from loop ileostomy reversal after prior J pouch for UC. Recovering appropriately. Awaiting return of bowel function    1. Full liquid diet  2. PRN pain  3. Continue IVF  4. Discontinue Byrnes  5. OOB, ambulate  6. Await path  7. Lovenox for ppx. No indication for extended course at discharge.      For questions/paging, please contact the CRS office at [phone].    Tawana Branch MD   Colorectal Surgery Fellow  [facility]    Colon and Rectal Surgery Staff  I performed a history and physical examination of the patient and discussed their management with the physician assistant. I reviewed the physician assistants note and agree with the documented findings and plan of care.     Doing well. No issues overnight. No n/v.  No flatus or BM    Cont fulls  Remove byrnes    Shahrzad Flaherty MD, FACS    [facility]  [address] [place] [address]  T: [phone]   F: [phone]

## 2022-05-05 NOTE — ANESTHESIA POSTPROCEDURE EVALUATION
Patient: Luther Wood    Procedure: Procedure(s):  ILEOSTOMY CLOSURE LOOP       Anesthesia Type:  General    Note:  Disposition: Admission   Postop Pain Control: Uneventful            Sign Out: Well controlled pain   PONV: No   Neuro/Psych: Uneventful            Sign Out: Acceptable/Baseline neuro status   Airway/Respiratory: Uneventful            Sign Out: Acceptable/Baseline resp. status   CV/Hemodynamics: Uneventful            Sign Out: Acceptable CV status   Other NRE:    DID A NON-ROUTINE EVENT OCCUR? No           Last vitals:  Vitals Value Taken Time   /82 05/04/22 1840   Temp 36.4  C (97.6  F) 05/04/22 1710   Pulse 81 05/04/22 1840   Resp 15 05/04/22 1840   SpO2 100 % 05/04/22 1842   Vitals shown include unvalidated device data.    Electronically Signed By: Evelyne Belcher  May 4, 2022  9:17 PM

## 2022-05-05 NOTE — OP NOTE
DATE OF SURGERY: May 4, 2022    OPERATIVE REPORT     PREOPERATIVE DIAGNOSIS: Diverting loop ileostomy.     POSTOPERATIVE DIAGNOSIS: Diverting loop ileostomy.     OPERATION PERFORMED: Ileostomy takedown.     SURGEON: Shahrzad Flaherty MD     ASSISTANT:   RAOUL Osorio MD ([facility] Colorectal Surgery Fellowship)     ANESTHESIA: General.     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  He underwent a preoperative evaluation including a Gastrografin enema and endoscopy of the pouch which confirmed that the pouch had healed and there is no evidence of leak.  He will now undergo ileostomy takedown.  The risks of surgery including the risks of bleeding, infection, anastomotic leak, injury to adjacent structures, need for further surgery, and complications of anesthesia were discussed.  He wishes to proceed    OPERATIVE FINDINGS:  There were minimal intra-abdominal adhesions and both limbs of the ileostomy were easily mobilized from rectum muscles and surrounding connective tissue.     PROCEDURE IN DETAIL: After informed consent was obtained, the patient was brought to the operating room and placed in the supine position on the operating room table. Sequential compression devices were placed in the lower extremities prior to induction, and general anesthesia was induced without difficulty. IV antibiotics were administered and a byrnes catheter was inserted.  The abdomen and ileostomy were sterilely prepped and draped in the usual fashion.     A circumferential incision was made in the skin surrounding the ileostomy using needle Bovie electrocautery, taking care to stay as close to the ileostomy as possible. The incision was carried down through the  subcutaneous tissue and fat with Bovie electrocautery. Dissection continued along the ileostomy down to the fascia.  The ileum was densely adherent to the rectus muscles or connective tissue. Eventually, the ileum was completely freed from the surrounding tissue and both limbs of the ileum were elevated out of the abdominal cavity for a side-to-side anastomosis.  Prior to creating the anastomosis, we did inject Betadine and insufflated both limbs of the ileostomy and did not identify any evidence of serosal tear.  The intervening mesentery between the 2 limbs of the ileum at the site of the planned resection was taken down and divided between 0 Vicryl ties. The intervening loop segment was resected during the takedown.     Proximal and distal resection points were identified and the mesentery was cleared with cautery.  The distal transection point was divided using a blue load of the CARLEEN-75 mm stapler.  This was repeated for the proximal transection point.  The specimen was then passed off the field to be sent to Pathology.  The specimen measured 4 cm in length.  The corner staple corner of the staple line was excised using  curved Alvarenga scissors creating an enterotomy in each limb of the ileum.  A side-to-side functional end-to-end anastomosis was constructed using the CARLEEN-75 mm stapler through the enterotomies.  A suture of 3-0 Vicryl was placed at the crotch of the staple line to avoid tension. The common enterotomy was then closed using a TA-90 linear stapler. The stoma was removed from the operative field and submitted for pathological analysis. The staple line was oversewn and inverted using interrupted 3-0 Vicryl Lembert sutures. The blood supply was excellent on both sides of the anastomosis, and there was no tension.        The ileum and the anastomosis were returned to the abdominal cavity and the wound was irrigated with a copious amount of saline solution. Hemostasis was evident.     The subcutaneous  tissue was cleaned off of the underlying fascia with Bovie electrocautery.  The posterior fascia was closed with a running 0-Vicryl suture.  The anterior fascia was closed using a running looped 0 PDS suture from either end. The wound was irrigated with saline solution, and the skin was partially closed using a 2-0 Vicryl subcuticular suture to create an 8-mm round defect. A corner of Betadine soaked 4x4 guaze was placed into the wound.     The patient tolerated the procedure well without complications. Estimated blood loss was 10 mL. I was present and scrubbed for the entire duration of the operation. The patient was extubated in the operating room and brought to the recovery room in stable condition.     Shahrzad Flaherty MD

## 2022-05-05 NOTE — PLAN OF CARE
Goal Outcome Evaluation:      Pt AO x4. VSS on RA. Jacob removed this AM, voiding spontaneously. Frequently ambulating unit independently. Tolerating liquids. Lung sounds diminished. BS audible, BM x1. Dressing with dry drainage, marked and extended. Pain improved with Tylenol. Continue to monitor.

## 2022-05-05 NOTE — PLAN OF CARE
Goal Outcome Evaluation:  [id number]   POD 0 Ileostomy Closure Loop. A/O x4. VSS on RA. Pt reporting abdominal cramping reporting pain 6/10 managed with galina tylenol and prn oxy x2, ice pack. Pt reported nausea, prn zofran x1. Tolerating full liquid diet. Dressing to previous stoma site - shadowing noted to 4x4 dressing marked. Bowel sounds hypoactive. Denies passing gas. Jacob removed this morning at 0700. Good UOP Continue to monitor. . Pt wt shifting while in bed. Colorectal following

## 2022-05-06 VITALS
WEIGHT: 152.7 LBS | BODY MASS INDEX: 20.24 KG/M2 | HEART RATE: 60 BPM | TEMPERATURE: 98.4 F | HEIGHT: 73 IN | DIASTOLIC BLOOD PRESSURE: 74 MMHG | SYSTOLIC BLOOD PRESSURE: 135 MMHG | RESPIRATION RATE: 18 BRPM | OXYGEN SATURATION: 98 %

## 2022-05-06 LAB
PATH REPORT.COMMENTS IMP SPEC: NORMAL
PATH REPORT.COMMENTS IMP SPEC: NORMAL
PATH REPORT.FINAL DX SPEC: NORMAL
PATH REPORT.GROSS SPEC: NORMAL
PATH REPORT.MICROSCOPIC SPEC OTHER STN: NORMAL
PATH REPORT.RELEVANT HX SPEC: NORMAL
PHOTO IMAGE: NORMAL

## 2022-05-06 PROCEDURE — 250N000011 HC RX IP 250 OP 636: Performed by: COLON & RECTAL SURGERY

## 2022-05-06 PROCEDURE — 250N000013 HC RX MED GY IP 250 OP 250 PS 637: Performed by: COLON & RECTAL SURGERY

## 2022-05-06 RX ORDER — OXYCODONE HYDROCHLORIDE 5 MG/1
5 TABLET ORAL EVERY 6 HOURS PRN
Qty: 15 TABLET | Refills: 0 | Status: SHIPPED | OUTPATIENT
Start: 2022-05-06 | End: 2022-08-21

## 2022-05-06 RX ADMIN — ENOXAPARIN SODIUM 40 MG: 40 INJECTION SUBCUTANEOUS at 13:43

## 2022-05-06 RX ADMIN — ACETAMINOPHEN 975 MG: 325 TABLET ORAL at 06:19

## 2022-05-06 RX ADMIN — ACETAMINOPHEN 975 MG: 325 TABLET ORAL at 13:43

## 2022-05-06 ASSESSMENT — ACTIVITIES OF DAILY LIVING (ADL)
ADLS_ACUITY_SCORE: 5

## 2022-05-06 NOTE — DISCHARGE SUMMARY
[facility] Discharge Summary      Luther Wood MRN# [id number]   Age: 26 year old YOB: 1995     Date of Admission:  5/4/2022  Date of Discharge::  5/6/2022  Admitting Physician:  Soledad Flaherty MD  Discharge Physician:  Soledad Flaherty MD     PCP:  Marko Rubalcava    Disposition: Patient discharged from [facility] to home in stable condition.        Primary Diagnosis:   Diverting loop ileostomy            Discharge Medications:     Current Discharge Medication List      START taking these medications    Details   oxyCODONE (ROXICODONE) 5 MG tablet Take 1 tablet (5 mg) by mouth every 6 hours as needed for moderate to severe pain  Qty: 15 tablet, Refills: 0    Associated Diagnoses: Ulcerative colitis with complication, unspecified location (H)         CONTINUE these medications which have NOT CHANGED    Details   ferrous sulfate (FEROSUL) 325 (65 Fe) MG tablet Take 1 tablet (325 mg) by mouth daily (with breakfast)                    Follow Up, Special Instructions:     Discharge diet: Low residue   Discharge activity: Lifting restricted to <15 pounds for 6 weeks   Discharge follow-up: Follow up with Dr. Flaherty in 4 weeks   Wound care: May get incision wet in shower but do not soak or scrub, cover stoma site with gauze and change once daily until healed              Procedures:     Procedure(s): Ileostomy takedown               Consultations:   Critical access hospital Summary:     Patient is a 26 year old male who underwent an ileostomy takedown on 5/4/22 by Dr. Flaherty.   There were no immediate complications during this procedure.    Please refer to the full operative summary for details.  The patient's hospital course was unremarkable.  Pain was controlled on oral pain regimen.  He was tolerating a low fiber diet.  Bowel function had returned prior to discharge.  He recovered as anticipated and experienced no post-operative complications.          Attestation:  I have reviewed today's vital signs, notes, medications, labs and imaging.    Campos Del Angel PA-C  Colorectal Physician Assistant    [facility]  [address] [address]  T: [phone]  F: [phone]            ADDENDUM:  Length of stay: 2 days  Indicate Y or N for the following:  UTI  No  C diff  No  PNA  No  SSI No  DVT No  PE  No  CVA No  MI No  Enterocutaneous fistula  No  Peripheral nerve injury  No  Abscess (not adjacent to anastomosis)  No  Leak No    Treated with:   Antibiotics N/A   Drain  N/A   Reoperation  N/A  Death within 30 days No  Reintubation  No  Reoperation  No   Procedure N/A

## 2022-05-06 NOTE — PROGRESS NOTES
Pt discharged home with transportation provided by father. Pt refused to have prescription of oxycodone 5mg from pharmacy since he still has some from last visit and doesn't think he'll use them. Packing was removed and new dressing placed per MD orders. Pt sent home with dressing supplies. AVS was given and all questions were answered. PIV removed. Pt stable at time of discharge.

## 2022-05-06 NOTE — PROGRESS NOTES
Problem: Ileostomy closure loop  Vitals and oxygen: VSS  Orientation/Neuro: A&Ox4  Activity Level: independent  Diet: full liq. Tolerated well  Comorbidity: ADD, ulcerative colitis  Labs: WBC 14.1  IV Fluids/Drains/Tubes: IV to L arm saline locked  Pain Management: patient has reported minimal to no pain well managed with scheduled APAP  Skin: previous ostomy to R abd covered by dressing with moderate serosang drainage that was marked by previous shift with no extension.   GI/: previous shift reports loose/soft stools with red streaks. No BM this shift. BS normoactive.   Respiratory: LS diminished throughout, IS use encouraged.   Cardio: WDL  Plan: probable discharge today

## 2022-05-06 NOTE — PROGRESS NOTES
COLON & RECTAL SURGERY  PROGRESS NOTE    May 6 2022  Post-op Day # 2    SUBJECTIVE:  Doing well.  Multiple BMs yesterday.  First two with blood but none since.  Good control.  Tolerating diet. Minimal pain.    OBJECTIVE:  Temp:  [97.6  F (36.4  C)-98.6  F (37  C)] 98.4  F (36.9  C)  Pulse:  [54-68] 60  Resp:  [16-18] 18  BP: (120-135)/(74-83) 135/74  SpO2:  [98 %-100 %] 98 %      Intake/Output Summary (Last 24 hours) at 5/6/2022 0744  Last data filed at 5/5/2022 1402  Gross per 24 hour   Intake 480 ml   Output --   Net 480 ml       GENERAL:  Awake, alert, no acute distress  HEAD: Normocephalic atraumatic  SCLERA: Anicteric  EXTREMITIES: Warm and well perfused  ABDOMEN:  Soft, appropriately tender, non-distended. No guarding, rigidity, or peritoneal signs.   INCISION:  C/d/i, re-packed with corner of 4x4.     LABS:  Recent Labs   Lab 05/05/22  0730 05/04/22 2120   WBC 14.1*  --    HGB 14.6  --    HCT 45.2  --    MCV 83  --     305     Recent Labs   Lab 05/05/22 2205 05/05/22  0730 05/05/22  0600 05/04/22  2120   NA  --  137  --   --    POTASSIUM  --  3.8  --   --    CHLORIDE  --  103  --   --    CO2  --  26  --   --    ANIONGAP  --  8  --   --    GLC 95 87  87 84  --    BUN  --  9  --   --    CR  --  1.10  --  1.14   GFRESTIMATED  --  >90  --  >90   MADIE  --  8.5  --   --      Postop labs from today pending.      ASSESSMENT/PLAN: POD #2 from loop ileostomy reversal after prior J pouch for UC. Recovering appropriately. Return of bowel function    1. Low fiber diet  2. PRN pain  3. TKO IVFs  4. OOB, ambulate  5. Await path  6. Lovenox for ppx. No indication for extended course at discharge.      For questions/paging, please contact the CRS office at [phone].    Shahrzad Flaherty MD, FACS  Colorectal Surgery     [facility]  [address] [facility] [address]  [place] [address]  T: [phone]  F: [phone]

## 2022-05-07 ENCOUNTER — PATIENT OUTREACH (OUTPATIENT)
Dept: CARE COORDINATION | Facility: CLINIC | Age: 27
End: 2022-05-07
Payer: COMMERCIAL

## 2022-05-07 DIAGNOSIS — Z71.89 OTHER SPECIFIED COUNSELING: ICD-10-CM

## 2022-05-07 NOTE — PROGRESS NOTES
Clinic Care Coordination Contact  [facility]/Voicemail       Clinical Data: Care Coordinator Outreach  Reason for referral: TCM outreach  Outreach attempted x 1.  Left message on patient's voicemail with call back information and requested return call.  Plan:  Care Coordinator will try to reach patient again in 1-2 business days.       Kay Wood  Community Health Worker  [facility]  Ph: [phone]

## 2022-05-08 NOTE — PROGRESS NOTES
Clinic Care Coordination Contact  [facility]: Post-Discharge Note  SITUATION                                                      Admission:    Admission Date: 05/04/22   Reason for Admission: ileostomy takedown  Discharge:   Discharge Date: 05/06/22  Discharge Diagnosis: Diverting loop ileostomy    BACKGROUND                                                      Per hospital discharge summary and inpatient provider notes:    Luther Wood is a 26 year old male who underwent an ileostomy takedown on 5/4/22 by Dr. Flaherty.  There were no immediate complications during this procedure.  Please refer to the full operative summary for details.  The patient's hospital course was unremarkable.  Pain was controlled on oral pain regimen.  He was tolerating a low fiber diet.  Bowel function had returned prior to discharge.  He recovered as anticipated and experienced no post-operative complications.    ASSESSMENT      Enrollment  Primary Care Care Coordination Status: Declined    Discharge Assessment  How are you doing now that you are home?: Everything has been good. The transition is smooth. I  How are your symptoms? (Red Flag symptoms escalate to triage hotline per guidelines): Improved  Do you feel your condition is stable enough to be safe at home until your provider visit?: Yes  Does the patient have their discharge instructions? : Yes  Does the patient have questions regarding their discharge instructions? : No  Were you started on any new medications or were there changes to any of your previous medications? : Yes  Does the patient have all of their medications?: Yes  Do you have questions regarding any of your medications? : No  Do you have all of your needed medical supplies or equipment (DME)?  (i.e. oxygen tank, CPAP, cane, etc.): Yes  Discharge follow-up appointment scheduled within 14 calendar days? : Yes  Discharge Follow Up Appointment Date: 05/31/22  Discharge Follow Up Appointment Scheduled with?:  Specialty Care Provider  Is patient agreeable to assistance with scheduling? : No (Patient is planning on messaging his primary provider through [email or web address])    Post-op ([facility] Only)  If the patient had a surgery or procedure, do they have any questions for a nurse?: No    PLAN                                                      Outpatient Plan:      Follow up with Dr. Flaherty in 4 weeks.    No future appointments.      For any urgent concerns, please contact our 24 hour nurse triage line: [phone] ([phone])       Kay Wood  Community Health Worker  [facility]  Ph: [phone]

## 2022-05-10 NOTE — PLAN OF CARE
"Goal Outcome Evaluation:    [id number] 25 yo male with ulcertaive colitis. Received a robotic proctocolectomy and diverting loop ileostomy. POD#1. Dilaudid PCA pump for pain. Pt reports \"it's definitely still there\". Has byrnes- austin/red, good output. MIRZA drain with red drainage, and penrose drain via rectum, no drainage at this time. 3 abd incisions open to air, WNL. Clear liquid to advace to full liquid when tolerated. Ambulates with assist of 1. Rash noted upon transfer to floor on chest, neck, and face. Pt unbothered and not itchy. Family very concerned, reported to oncall provider and gave IV benadryl with improvement. Repositioning performed during shift.       " Froy mattie [facility]  Wound Care    Patient Name:  Odette Hart   MRN:  [id number]  Date: 5/10/2022  Diagnosis: Calculus of gallbladder with cholecystitis without biliary obstruction    History:     Past Medical History:   Diagnosis Date    Acute coronary syndrome     Allergy     Arthritis     Cardiomyopathy     CHF (congestive heart failure)     Coronary artery disease     Coronary artery disease of native artery of native heart with stable angina pectoris 2021: [facility]: Cath: LAD: Proximal stent patent. LCX: Proximal 80%. LCX: Dominant. Moderate disease. LCX: HEVER 2.75 x 18 mm. Distal dissection. HEVER 2.75 x 22 mm.     Diverticulitis     Diverticulosis     Familial hypercholesterolemia 2022    Former smoker 2022    Heart attack     Heart disease     History of myocardial infarction 2022    Hyperlipidemia     Hypertension     Hypertension 2022    ICD (implantable cardioverter-defibrillator) in place     Non-ST elevation myocardial infarction (NSTEMI) 2019       Social History     Socioeconomic History    Marital status:    Tobacco Use    Smoking status: Former Smoker     Packs/day: 0.50     Start date: 1976     Quit date: 2012     Years since quittin.4    Smokeless tobacco: Never Used   Substance and Sexual Activity    Alcohol use: No    Drug use: No       Precautions:     Allergies as of 2022 - Reviewed 2022   Allergen Reaction Noted    Augmentin [amoxicillin-pot clavulanate] Swelling 2018    Lisinopril Other (See Comments) 2018    Losartan Other (See Comments) 2018    Shellfish containing products Anaphylaxis 09/10/2018    Levaquin [levofloxacin] Other (See Comments) 2018    Clindamycin Palpitations 2019       [facility] Assessment Details/Treatment        05/10/22 0908        Colostomy 22 1100 Descending/sigmoid LLQ   Placement Date/Time: 22 1100   Inserted by: MD Watkins  Type: Descending/sigmoid  Location: LLQ   Wound Image    Stomal Appliance 1 piece   Stoma Appearance rosebud appearance   Stoma Function bowel sweat       Ostomy care consult received from MD for ostomy teaching  #1 and education. Met with patient at the bedside to discuss goals of education to include; pouch emptying, stoma and renata-stomal care, food choices and obtaining ostomy supplies. No stool output noted at present only bowel sweat noted in pouch, Reading material provided for continued patient and family education. Will follow up with review lesson and pouch changing. Ostomy supplies at bedside.      05/10/2022

## 2022-05-17 ENCOUNTER — MYC MEDICAL ADVICE (OUTPATIENT)
Dept: FAMILY MEDICINE | Facility: CLINIC | Age: 27
End: 2022-05-17
Payer: COMMERCIAL

## 2022-05-17 DIAGNOSIS — D50.8 OTHER IRON DEFICIENCY ANEMIA: Primary | ICD-10-CM

## 2022-05-17 NOTE — TELEPHONE ENCOUNTER
Please see [employer] message.    Should pt restart iron supplements? Labs needed?    Routing to provider to review and advise.    Stefania Zavala RN

## 2022-06-21 ENCOUNTER — LAB (OUTPATIENT)
Dept: LAB | Facility: CLINIC | Age: 27
End: 2022-06-21
Payer: COMMERCIAL

## 2022-06-21 DIAGNOSIS — D50.8 OTHER IRON DEFICIENCY ANEMIA: ICD-10-CM

## 2022-06-21 LAB
ERYTHROCYTE [DISTWIDTH] IN BLOOD BY AUTOMATED COUNT: 13.9 % (ref 10–15)
HCT VFR BLD AUTO: 48.7 % (ref 40–53)
HGB BLD-MCNC: 15.8 G/DL (ref 13.3–17.7)
MCH RBC QN AUTO: 28.4 PG (ref 26.5–33)
MCHC RBC AUTO-ENTMCNC: 32.4 G/DL (ref 31.5–36.5)
MCV RBC AUTO: 88 FL (ref 78–100)
PLATELET # BLD AUTO: 353 10E3/UL (ref 150–450)
RBC # BLD AUTO: 5.56 10E6/UL (ref 4.4–5.9)
WBC # BLD AUTO: 6.5 10E3/UL (ref 4–11)

## 2022-06-21 PROCEDURE — 85027 COMPLETE CBC AUTOMATED: CPT

## 2022-06-21 PROCEDURE — 36415 COLL VENOUS BLD VENIPUNCTURE: CPT

## 2022-08-15 ENCOUNTER — MYC MEDICAL ADVICE (OUTPATIENT)
Dept: FAMILY MEDICINE | Facility: CLINIC | Age: 27
End: 2022-08-15

## 2022-08-15 DIAGNOSIS — Z11.1 TUBERCULOSIS SCREENING: Primary | ICD-10-CM

## 2022-08-15 NOTE — TELEPHONE ENCOUNTER
See patient's [employer] message regarding his request for a TB test for going back to school.   - Sent [employer] message back to the patient.   - Patient completed a Quantiferon Gold on 8/31/2021  - Pended Quantiferon Gold lab order   - Please order as appropriate and let us know if we need to assist the patient in scheduling the appointment     Kailey COSTELLO RN   Patient Advocate Liaison (PAL)  [facility]

## 2022-08-21 ENCOUNTER — OFFICE VISIT (OUTPATIENT)
Dept: URGENT CARE | Facility: URGENT CARE | Age: 27
End: 2022-08-21
Payer: COMMERCIAL

## 2022-08-21 VITALS
SYSTOLIC BLOOD PRESSURE: 119 MMHG | HEART RATE: 64 BPM | DIASTOLIC BLOOD PRESSURE: 70 MMHG | OXYGEN SATURATION: 100 % | BODY MASS INDEX: 20.05 KG/M2 | WEIGHT: 152 LBS | TEMPERATURE: 98.9 F

## 2022-08-21 DIAGNOSIS — R05.9 COUGH: Primary | ICD-10-CM

## 2022-08-21 PROCEDURE — 99213 OFFICE O/P EST LOW 20 MIN: CPT | Performed by: PHYSICIAN ASSISTANT

## 2022-08-21 NOTE — PROGRESS NOTES
SUBJECTIVE:  Luther Wood is a 26 year old male who comes in with a 5-day history of mild cough.  Patient did have a negative COVID test.  Does have mild nasal congestion also.  He believes that this is related to postnasal drainage but wants to make sure.  Denies any shortness of breath or chest pains.  Ears and throat are normal.  Has not been taking anything for this.  He does not have any underlying asthma or heart issues.  No fevers have been noted.  He is otherwise baseline    Past Medical History:   Diagnosis Date     Colitis 2019     History of blood transfusion 09/12/2021     No current outpatient medications on file.     No current facility-administered medications for this visit.     Social History     Socioeconomic History     Marital status: Single     Spouse name: Not on file     Number of children: Not on file     Years of education: Not on file     Highest education level: Not on file   Occupational History     Not on file   Tobacco Use     Smoking status: Never Smoker     Smokeless tobacco: Never Used   Substance and Sexual Activity     Alcohol use: Not Currently     Comment: occassional     Drug use: Never     Sexual activity: Yes     Partners: Female     Birth control/protection: Condom   Other Topics Concern     Parent/sibling w/ CABG, MI or angioplasty before 65F 55M? No   Social History Narrative     Not on file     Social Determinants of Health     Financial Resource Strain: Low Risk      Difficulty of Paying Living Expenses: Not hard at all   Food Insecurity: No Food Insecurity     Worried About Running Out of Food in the Last Year: Never true     Ran Out of Food in the Last Year: Never true   Transportation Needs: No Transportation Needs     Lack of Transportation (Medical): No     Lack of Transportation (Non-Medical): No   Physical Activity: Sufficiently Active     Days of Exercise per Week: 7 days     Minutes of Exercise per Session: 110 min   Stress: No Stress Concern Present      Feeling of Stress : Not at all   Social Connections: Moderately Isolated     Frequency of Communication with Friends and Family: Never     Frequency of Social Gatherings with Friends and Family: More than three times a week     Attends Judaism Services: Never     Active Member of Clubs or Organizations: Yes     Attends Club or Organization Meetings: Not on file     Marital Status: Never    Intimate Partner Violence: Not on file   Housing Stability: Unknown     Unable to Pay for Housing in the Last Year: No     Number of Places Lived in the Last Year: Not on file     Unstable Housing in the Last Year: No     ROS  Negative other than stated above    Exam:  GENERAL APPEARANCE: healthy, alert and no distress  EYES: EOMI,  PERRL  HENT: ear canals and TM's normal and nose and mouth without ulcers or lesions  NECK: no adenopathy, no asymmetry, masses, or scars and thyroid normal to palpation  RESP: lungs clear to auscultation - no rales, rhonchi or wheezes  CV: regular rates and rhythm, normal S1 S2, no S3 or S4 and no murmur, click or rub -  SKIN: no suspicious lesions or rashes    assessment/plan:  (R05.9) Cough  (primary encounter diagnosis)  Comment:   Plan: Patient with mild cough for the past 5 days.  Vitals are reassuring at this time and exam is normal.  Possible postnasal drainage versus viral process.  Patient is reassured at this time.  Will use over-the-counter medication as needed for symptomatic relief.  Red flag signs were discussed and will follow-up if symptoms worsen

## 2022-08-24 ENCOUNTER — LAB (OUTPATIENT)
Dept: LAB | Facility: CLINIC | Age: 27
End: 2022-08-24
Payer: COMMERCIAL

## 2022-08-24 DIAGNOSIS — Z11.1 TUBERCULOSIS SCREENING: ICD-10-CM

## 2022-08-24 PROCEDURE — 86481 TB AG RESPONSE T-CELL SUSP: CPT

## 2022-08-24 PROCEDURE — 36415 COLL VENOUS BLD VENIPUNCTURE: CPT

## 2022-08-27 LAB
GAMMA INTERFERON BACKGROUND BLD IA-ACNC: 0 IU/ML
M TB IFN-G BLD-IMP: NEGATIVE
M TB IFN-G CD4+ BCKGRND COR BLD-ACNC: 5.21 IU/ML
MITOGEN IGNF BCKGRD COR BLD-ACNC: 0.01 IU/ML
MITOGEN IGNF BCKGRD COR BLD-ACNC: 0.02 IU/ML
QUANTIFERON MITOGEN: 5.21 IU/ML
QUANTIFERON NIL TUBE: 0 IU/ML
QUANTIFERON TB1 TUBE: 0.01 IU/ML
QUANTIFERON TB2 TUBE: 0.02

## 2022-09-29 ENCOUNTER — MYC MEDICAL ADVICE (OUTPATIENT)
Dept: FAMILY MEDICINE | Facility: CLINIC | Age: 27
End: 2022-09-29

## 2022-11-07 ENCOUNTER — MYC MEDICAL ADVICE (OUTPATIENT)
Dept: FAMILY MEDICINE | Facility: CLINIC | Age: 27
End: 2022-11-07

## 2022-11-20 ENCOUNTER — HEALTH MAINTENANCE LETTER (OUTPATIENT)
Age: 27
End: 2022-11-20

## 2022-11-21 ENCOUNTER — MYC MEDICAL ADVICE (OUTPATIENT)
Dept: FAMILY MEDICINE | Facility: CLINIC | Age: 27
End: 2022-11-21

## 2022-11-25 ENCOUNTER — VIRTUAL VISIT (OUTPATIENT)
Dept: FAMILY MEDICINE | Facility: CLINIC | Age: 27
End: 2022-11-25
Payer: COMMERCIAL

## 2022-11-25 DIAGNOSIS — N52.9 ERECTILE DYSFUNCTION, UNSPECIFIED ERECTILE DYSFUNCTION TYPE: Primary | ICD-10-CM

## 2022-11-25 PROCEDURE — 99213 OFFICE O/P EST LOW 20 MIN: CPT | Mod: GT | Performed by: PHYSICIAN ASSISTANT

## 2022-11-25 RX ORDER — SILDENAFIL CITRATE 20 MG/1
TABLET ORAL
Qty: 90 TABLET | Refills: 3 | Status: SHIPPED | OUTPATIENT
Start: 2022-11-25 | End: 2023-05-16

## 2022-11-25 NOTE — PROGRESS NOTES
"Jb is a 27 year old who is being evaluated via a billable video visit.      How would you like to obtain your AVS? MyChart  If the video visit is dropped, the invitation should be resent by: Text to cell phone: [phone]  Will anyone else be joining your video visit? No        Assessment & Plan     Erectile dysfunction, unspecified erectile dysfunction type  Since surgery. Possible post surgical given this and young age. Vascular vs neurologic vs psychosomatic. Recommending  Consult with urology. Prn sildenafil. Can be sent to different pharmacy if needed (via [employer] rx shows coupon for 90 tabs for $11.30 at [employer]).   - sildenafil (REVATIO) 20 MG tablet; Take 1-5 tabs ( mg) 30 minutes to 4 hours  Prior to intercourse.  - Adult Urology  Referral; Future  -Medication use and side effects discussed with the patient. Patient is in complete understanding and agreement with plan.     No follow-ups on file.   Follow-up Visit   Expected date:  Feb 25, 2023 (Approximate)      Follow Up Appointment Details:     Follow-up with whom?: Me    Follow-Up for what?: Adult Preventive    How?: In Person                    RAOUL Harvey [facility]    Subjective   Jb is a 27 year old accompanied by his self, presenting for the following health issues:  Issues after Surgery      HPI     ENDOSCOPY, POUCH, DIAGNOSTIC, WITH BIOPSY    Pt reports Erectile Dysfunction after surgery. Can obtain partial erection, but cannot maintain. Denies any symptoms prior to surgery. No pain with erection or urination. No decreased libido. No treatments tried.         Review of Systems   Constitutional, HEENT, cardiovascular, pulmonary, GI, , musculoskeletal, neuro, skin, endocrine and psych systems are negative, except as otherwise noted.      Objective    Vitals - Patient Reported  Weight (Patient Reported): 68 kg (150 lb)  Height (Patient Reported): 185.4 cm (6' 1\")  BMI (Based on Pt Reported " Ht/Wt): 19.79      Vitals:  No vitals were obtained today due to virtual visit.    Physical Exam   GENERAL: Healthy, alert and no distress  EYES: Eyes grossly normal to inspection.  No discharge or erythema, or obvious scleral/conjunctival abnormalities.  RESP: No audible wheeze, cough, or visible cyanosis.  No visible retractions or increased work of breathing.    SKIN: Visible skin clear. No significant rash, abnormal pigmentation or lesions.  NEURO: Cranial nerves grossly intact.  Mentation and speech appropriate for age.  PSYCH: Mentation appears normal, affect normal/bright, judgement and insight intact, normal speech and appearance well-groomed.          Video-Visit Details    Video Start Time: 9:06    Type of service:  Video Visit    Video End Time:9:26 AM    Originating Location (pt. Location): Home      Distant Location (provider location):  Off-site    Platform used for Video Visit: [employer] [place]

## 2022-11-28 ENCOUNTER — MYC MEDICAL ADVICE (OUTPATIENT)
Dept: FAMILY MEDICINE | Facility: CLINIC | Age: 27
End: 2022-11-28

## 2022-11-30 NOTE — TELEPHONE ENCOUNTER
See patient's Ser[employer]ch[employer] message     - Patient wondering if he could try taking sildenafil on his own to see if he experiences any side effects or symptoms   - Patient states that he contacted the urology doctors and scheduled a  Video visit appointment for 2/15/2022   - Patient wondering if he is able to get in any sooner with urology     Parveen Rubalcava PA-C, please review and advise.     Kailey COSTELLO RN   Patient Advocate Liaison (PAL)  [facility]

## 2023-01-12 ENCOUNTER — NURSE TRIAGE (OUTPATIENT)
Dept: FAMILY MEDICINE | Facility: CLINIC | Age: 28
End: 2023-01-12

## 2023-01-12 NOTE — TELEPHONE ENCOUNTER
Reason for Call:  Other call back    Detailed comments: patient called and has another heat rash under left armpit after exercise.    Wants to know if needs an appointment or Urgent Care or if Marko MAZARIEGOS at  [facility] can prescribed anti fungal medication.    Please contact patient.  Thank you.    Phone Number Patient can be reached at: Cell number on file:    Telephone Information:   Mobile [phone]   Mobile [phone]       Best Time: any    Can we leave a detailed message on this number? YES    Call taken on 1/12/2023 at 10:23 AM by Domitila David

## 2023-01-12 NOTE — TELEPHONE ENCOUNTER
Nurse Triage SBAR    Is this a 2nd Level Triage? NO    Situation: underarm rash- right armpit    Background: Pt has had similar rash 2-3 years ago and used clotrimazole and rash resolved. Pt developed similar rash under right underarm about two days ago and has been using clotrimazole cream. Redness is improving but rash is still present.    Assessment: 40 light red colored bumps the size of pencil tips under right underarm. Pt denies any itching or pain associated with rash. Pt has been using clotrimazole for past two days and redness is improving but rash is still present.    Denies fever, spreading redness, warmth/tender to touch, itching, pain    Protocol Recommended Disposition:   Home Care    Recommendation: Reviewed care advice under care tab with pt. Instructed pt to call back if new or worsening symptoms or if rash persists longer than 7 days.      Patient was given an opportunity to ask questions, verbalized understanding of plan, and is agreeable.    Stefania Zavala RN         Routed to provider    Does the patient meet one of the following criteria for ADS visit consideration? 16+ years old, with an [facility] PCP     TIP  Providers, please consider if this condition is appropriate for management at one of our Acute and Diagnostic Services sites.     If patient is a good candidate, please use dotphrase <dot>triageresponse and select Refer to ADS to document.    Reason for Disposition    Mild localized rash    Additional Information    Negative: Sounds like a life-threatening emergency to the triager    Negative: Athlete's Foot suspected (i.e., itchy rash between the toes)    Negative: Insect bite(s) suspected    Negative: Jock Itch suspected (i.e., itchy rash on inner thighs near genital area)    Negative: Localized lump (or swelling) without redness or rash    Negative: Poison ivy, oak, or sumac contact suspected    Negative: Rash of female genital area (vulva)    Negative: Rash of male genital area (penis  "or scrotum)    Negative: Redness of immunization site    Negative: Shingles suspected (i.e., painful rash, multiple small blisters grouped together in one area of body; dermatomal distribution)    Negative: Small spot, skin growth, or mole    Negative: Wound infection suspected (i.e., pain, spreading redness, or pus; in a cut, puncture, scrape or sutured wound)    Negative: Fever and localized purple or blood-colored spots or dots that are not from injury or friction    Negative: Fever and localized rash is very painful    Negative: Patient sounds very sick or weak to the triager    Negative: Looks like a boil, infected sore, deep ulcer, or other infected rash (spreading redness, pus)    Negative: Painful rash with multiple small blisters grouped together (i.e., dermatomal distribution or 'band' or 'stripe')    Negative: Localized rash is very painful (no fever)    Negative: Localized purple or blood-colored spots or dots that are not from injury or friction (no fever)    Negative: Lyme disease suspected (e.g., bull's-eye rash or tick bite / exposure)    Negative: Patient wants to be seen    Negative: Tender bumps in armpits    Negative: Pimples (localized) and no improvement after using CARE ADVICE    Negative: SEVERE local itching persists after 2 days of steroid cream    Negative: Applying cream or ointment and it causes severe itch, burning, or pain    Negative: Medication patch causing local rash or itching    Negative: Localized rash present > 7 days    Negative: Red, moist, irritated area between skin folds (or under larger breasts)    Negative: Localized area of skin darkening or thickening on lower legs or ankles and has NOT been evaluated by a doctor (or NP/PA)    Answer Assessment - Initial Assessment Questions  1. APPEARANCE of RASH: \"Describe the rash.\"   Lighter red  2. LOCATION: \"Where is the rash located?\"       Under right underarm  3. NUMBER: \"How many spots are there?\"       Small bumps around " "40  4. SIZE: \"How big are the spots?\" (Inches, centimeters or compare to size of a coin)       Size of  A pencil tip  5. ONSET: \"When did the rash start?\"       A couple days ago  Been using clotrimazole (helped with redness)  Had once before and the clotrimazole resolved it (2 or 3 years ago)  6. ITCHING: \"Does the rash itch?\" If Yes, ask: \"How bad is the itch?\"  (Scale 0-10; or none, mild, moderate, severe)      no  7. PAIN: \"Does the rash hurt?\" If Yes, ask: \"How bad is the pain?\"  (Scale 0-10; or none, mild, moderate, severe)     - NONE (0): no pain     - MILD (1-3): doesn't interfere with normal activities      - MODERATE (4-7): interferes with normal activities or awakens from sleep      - SEVERE (8-10): excruciating pain, unable to do any normal activities      no  8. OTHER SYMPTOMS: \"Do you have any other symptoms?\" (e.g., fever)      Denies fever, other symptoms  9. PREGNANCY: \"Is there any chance you are pregnant?\" \"When was your last menstrual period?\"      no    Protocols used: RASH OR REDNESS - [id number]    "

## 2023-02-07 ENCOUNTER — MYC MEDICAL ADVICE (OUTPATIENT)
Dept: FAMILY MEDICINE | Facility: CLINIC | Age: 28
End: 2023-02-07
Payer: COMMERCIAL

## 2023-02-08 NOTE — TELEPHONE ENCOUNTER
Susanna Morris  You Just now (9:04 AM)     PS  Pt was referred within the [facility] system.  Looks like pt does have open access insurance.  Pt should check with insurance to see where pt can go.  Lm for pt to call referrals back to get more information.     Jenifer-Referral Coordinator

## 2023-02-09 ENCOUNTER — MYC MEDICAL ADVICE (OUTPATIENT)
Dept: FAMILY MEDICINE | Facility: CLINIC | Age: 28
End: 2023-02-09
Payer: COMMERCIAL

## 2023-03-06 ENCOUNTER — TELEPHONE (OUTPATIENT)
Dept: GASTROENTEROLOGY | Facility: CLINIC | Age: 28
End: 2023-03-06
Payer: COMMERCIAL

## 2023-03-06 NOTE — TELEPHONE ENCOUNTER
[facility] / [facility] Coordinator Name: WICHO  Return call number: [phone]    Insurance:   We do not accept [employer] patients.    SCHEDULED PROVIDER:  AUGIE PROVIDERS: DAFNE  No orders needed if scheduled with their provider.    DATE: 05/23/2023   TIME 850AM  LOCATION: [facility]  PROCEDURE: FLEXIBLE SIGMOIDOSCOPY [Flex Sig]   DIAGNOSIS : K51.90   SEDATION: CS      Details/Prep Information -  [facility]   Confirm who is responsible for sending prep details.    ADDITIONAL INFORMATION:

## 2023-05-23 ENCOUNTER — HOSPITAL ENCOUNTER (OUTPATIENT)
Facility: CLINIC | Age: 28
Discharge: HOME | End: 2023-05-23
Attending: COLON & RECTAL SURGERY | Admitting: COLON & RECTAL SURGERY
Payer: COMMERCIAL

## 2023-05-23 VITALS
DIASTOLIC BLOOD PRESSURE: 55 MMHG | RESPIRATION RATE: 28 BRPM | HEIGHT: 73 IN | WEIGHT: 165 LBS | OXYGEN SATURATION: 100 % | HEART RATE: 68 BPM | TEMPERATURE: 96.4 F | BODY MASS INDEX: 21.87 KG/M2 | SYSTOLIC BLOOD PRESSURE: 101 MMHG

## 2023-05-23 LAB — PROVATION GI EXAM: NORMAL

## 2023-05-23 PROCEDURE — 88305 TISSUE EXAM BY PATHOLOGIST: CPT | Mod: TC | Performed by: COLON & RECTAL SURGERY

## 2023-05-23 PROCEDURE — 44386 ENDOSCOPY BOWEL POUCH/BIOP: CPT | Performed by: COLON & RECTAL SURGERY

## 2023-05-23 PROCEDURE — 258N000003 HC RX IP 258 OP 636: Performed by: COLON & RECTAL SURGERY

## 2023-05-23 PROCEDURE — 88305 TISSUE EXAM BY PATHOLOGIST: CPT | Mod: 26 | Performed by: PATHOLOGY

## 2023-05-23 RX ORDER — NALOXONE HYDROCHLORIDE 0.4 MG/ML
0.2 INJECTION, SOLUTION INTRAMUSCULAR; INTRAVENOUS; SUBCUTANEOUS
Status: DISCONTINUED | OUTPATIENT
Start: 2023-05-23 | End: 2023-05-23 | Stop reason: HOSPADM

## 2023-05-23 RX ORDER — NALOXONE HYDROCHLORIDE 0.4 MG/ML
0.4 INJECTION, SOLUTION INTRAMUSCULAR; INTRAVENOUS; SUBCUTANEOUS
Status: DISCONTINUED | OUTPATIENT
Start: 2023-05-23 | End: 2023-05-23 | Stop reason: HOSPADM

## 2023-05-23 RX ORDER — FENTANYL CITRATE 50 UG/ML
50-100 INJECTION, SOLUTION INTRAMUSCULAR; INTRAVENOUS EVERY 5 MIN PRN
Status: DISCONTINUED | OUTPATIENT
Start: 2023-05-23 | End: 2023-05-23 | Stop reason: HOSPADM

## 2023-05-23 RX ORDER — ATROPINE SULFATE 0.1 MG/ML
1 INJECTION INTRAVENOUS
Status: DISCONTINUED | OUTPATIENT
Start: 2023-05-23 | End: 2023-05-23 | Stop reason: HOSPADM

## 2023-05-23 RX ORDER — ONDANSETRON 2 MG/ML
4 INJECTION INTRAMUSCULAR; INTRAVENOUS EVERY 6 HOURS PRN
Status: DISCONTINUED | OUTPATIENT
Start: 2023-05-23 | End: 2023-05-23 | Stop reason: HOSPADM

## 2023-05-23 RX ORDER — FLUMAZENIL 0.1 MG/ML
0.2 INJECTION, SOLUTION INTRAVENOUS
Status: DISCONTINUED | OUTPATIENT
Start: 2023-05-23 | End: 2023-05-23 | Stop reason: HOSPADM

## 2023-05-23 RX ORDER — PROCHLORPERAZINE MALEATE 10 MG
10 TABLET ORAL EVERY 6 HOURS PRN
Status: DISCONTINUED | OUTPATIENT
Start: 2023-05-23 | End: 2023-05-23 | Stop reason: HOSPADM

## 2023-05-23 RX ORDER — EPINEPHRINE 1 MG/ML
0.1 INJECTION, SOLUTION INTRAMUSCULAR; SUBCUTANEOUS
Status: DISCONTINUED | OUTPATIENT
Start: 2023-05-23 | End: 2023-05-23 | Stop reason: HOSPADM

## 2023-05-23 RX ORDER — LIDOCAINE 40 MG/G
CREAM TOPICAL
Status: DISCONTINUED | OUTPATIENT
Start: 2023-05-23 | End: 2023-05-23 | Stop reason: HOSPADM

## 2023-05-23 RX ORDER — ONDANSETRON 4 MG/1
4 TABLET, ORALLY DISINTEGRATING ORAL EVERY 6 HOURS PRN
Status: DISCONTINUED | OUTPATIENT
Start: 2023-05-23 | End: 2023-05-23 | Stop reason: HOSPADM

## 2023-05-23 RX ORDER — DIPHENHYDRAMINE HYDROCHLORIDE 50 MG/ML
25-50 INJECTION INTRAMUSCULAR; INTRAVENOUS
Status: DISCONTINUED | OUTPATIENT
Start: 2023-05-23 | End: 2023-05-23 | Stop reason: HOSPADM

## 2023-05-23 RX ORDER — ONDANSETRON 2 MG/ML
4 INJECTION INTRAMUSCULAR; INTRAVENOUS
Status: DISCONTINUED | OUTPATIENT
Start: 2023-05-23 | End: 2023-05-23 | Stop reason: HOSPADM

## 2023-05-23 RX ORDER — SIMETHICONE 40MG/0.6ML
133 SUSPENSION, DROPS(FINAL DOSAGE FORM)(ML) ORAL
Status: DISCONTINUED | OUTPATIENT
Start: 2023-05-23 | End: 2023-05-23 | Stop reason: HOSPADM

## 2023-05-23 RX ADMIN — SODIUM CHLORIDE 500 ML: 9 INJECTION, SOLUTION INTRAVENOUS at 08:31

## 2023-05-23 ASSESSMENT — ACTIVITIES OF DAILY LIVING (ADL): ADLS_ACUITY_SCORE: 35

## 2023-05-23 NOTE — H&P
Pre-Endoscopy History and Physical     Luther Wood MRN# [id number]   YOB: 1995 Age: 27 year old     Date of Procedure: 5/23/2023  Primary care provider: Marko Rubalcava  Type of Endoscopy: Colonoscopy  Reason for Procedure: H/O UC s/p TPC with IPAA  Type of Anesthesia Anticipated: Moderate Sedation    HPI:    Luther is a 27 year old male who will be undergoing the above procedure.      A history and physical has been performed. The patient's medications and allergies have been reviewed. The risks and benefits of the procedure and the sedation options and risks were discussed with the patient.  All questions were answered and informed consent was obtained.      He denies a personal or family history of anesthesia complications or bleeding disorders.     Allergies   Allergen Reactions     Dairy Digestive         No current facility-administered medications on file prior to encounter.  No current outpatient medications on file prior to encounter.      Patient Active Problem List   Diagnosis     Acne vulgaris     Tear of right acetabular labrum     Attention deficit disorder     Ulcerative colitis with complication, unspecified location (H)     Hyponatremia     Anemia due to blood loss, acute     Ulcerative colitis with rectal bleeding, unspecified location (H)     Other iron deficiency anemia     Ileostomy in place (H)     Ulcerative colitis (H)        Past Medical History:   Diagnosis Date     Colitis 2019     History of blood transfusion 09/12/2021        Past Surgical History:   Procedure Laterality Date     COLONOSCOPY       COMBINED CYSTOSCOPY, INSERT CATHETER URETER Bilateral 3/2/2022    Procedure: CYSTOSCOPY, PLACEMENT OF BILATERAL URETERAL CATHETERS;  Surgeon: Aron Gallego MD;  Location: [facility] OR     DAVINCI ASSISTED COMPLETION PROCTOCOLECTOMY N/A 3/2/2022    Procedure: ROBOTIC COMPLETION PROCTOCOLECTOMY, ILEAL POUCH ANAL ANASTOMOSIS, LAPAROSCOPIC LYSIS OF ADHESION, DIVERTING LOOP  "ILLEOSTOMY,AND RIDGID PROTOCTOMY;  Surgeon: Soledad Flaherty MD;  Location:  OR     ENT SURGERY  2016    wisdom teeth     LAPAROSCOPIC ASSISTED COLECTOMY N/A 9/17/2021    Procedure: laparoscopic total abdominal colectomy with end ileostomy;  Surgeon: Soledad Flaherty MD;  Location:  OR     LAPAROSCOPIC ILEOSTOMY N/A 9/17/2021    Procedure: Laparoscopic Ileostomy;  Surgeon: Soledad Flaherty MD;  Location:  OR     POUCHOSCOPY, BIOPSY N/A 4/28/2022    Procedure: ENDOSCOPY, POUCH, DIAGNOSTIC, WITH BIOPSY;  Surgeon: Soledad Flaherty MD;  Location:  GI     SIGMOIDOSCOPY FLEXIBLE N/A 9/16/2021    Procedure: SIGMOIDOSCOPY, FLEXIBLE;  Surgeon: Anderson Cardona MD;  Location:  GI     TAKEDOWN ILEOSTOMY N/A 5/4/2022    Procedure: ILEOSTOMY CLOSURE LOOP;  Surgeon: Soledad Flaherty MD;  Location:  OR     TONSILLECTOMY & ADENOIDECTOMY  2006       Social History     Tobacco Use     Smoking status: Never     Smokeless tobacco: Never   Vaping Use     Vaping status: Never Used   Substance Use Topics     Alcohol use: Yes     Comment: occassional       Family History   Problem Relation Age of Onset     Diabetes Father         type I     Colon Cancer No family hx of        REVIEW OF SYSTEMS:     5 point ROS negative except as noted above in HPI, including Gen., Resp., CV, GI &  system review.      PHYSICAL EXAM:   Ht 1.854 m (6' 1\")   Wt 74.8 kg (165 lb)   BMI 21.77 kg/m   Estimated body mass index is 21.77 kg/m  as calculated from the following:    Height as of this encounter: 1.854 m (6' 1\").    Weight as of this encounter: 74.8 kg (165 lb).   GENERAL APPEARANCE: healthy and alert  MENTAL STATUS: alert  AIRWAY EXAM: Mallampatti Class I (visualization of the soft palate, fauces, uvula, anterior and posterior pillars)  RESP: lungs clear to auscultation - no rales, rhonchi or wheezes  CV: regular rates and rhythm      IMPRESSION   ASA Class 2 - Mild systemic disease        PLAN:     Plan for " colonoscopy. We discussed the risks, benefits and alternatives and the patient wished to proceed.    The above has been forwarded to the consulting provider.      Shahrzad Flaherty MD  [facility]  Phone: [phone]  Fax: [phone]  May 23, 2023

## 2023-09-22 ENCOUNTER — TELEPHONE (OUTPATIENT)
Dept: FAMILY MEDICINE | Facility: CLINIC | Age: 28
End: 2023-09-22
Payer: COMMERCIAL

## 2023-09-22 NOTE — TELEPHONE ENCOUNTER
Called pt to try to schedule. NO openings in AV clinic today and none within the pt time frame next week.  Pt needs to get iron rechecked and is wondering if he could talk to [facility] nurse team to see if can get lab work ordered. Transferred to nurse team.  Corrie Rodriguez, TC

## 2023-09-22 NOTE — TELEPHONE ENCOUNTER
Reason for Call:  Other appointment    Detailed comments: CLINIC CANCELED checkup appt, pt trying to meet 9/26 insurance deadline. Made 9/22 appt a month ago, dropped off parent's insurance when he turned 28, needs checkup before plan ends at the end of September. Can you get him in ASAP?    Phone Number Patient can be reached at: Cell number on file:    Telephone Information:   Mobile [phone]   Mobile [phone]       Best Time: ASAP    Can we leave a detailed message on this number? YES Pt ok'd details.    Call taken on 9/22/2023 at 11:11 AM by Laura Kanavel

## 2023-09-23 ENCOUNTER — MYC MEDICAL ADVICE (OUTPATIENT)
Dept: FAMILY MEDICINE | Facility: CLINIC | Age: 28
End: 2023-09-23
Payer: COMMERCIAL

## 2023-09-25 ENCOUNTER — E-VISIT (OUTPATIENT)
Dept: FAMILY MEDICINE | Facility: CLINIC | Age: 28
End: 2023-09-25
Payer: COMMERCIAL

## 2023-09-25 DIAGNOSIS — D50.8 OTHER IRON DEFICIENCY ANEMIA: Primary | ICD-10-CM

## 2023-09-25 PROCEDURE — 99421 OL DIG E/M SVC 5-10 MIN: CPT | Performed by: PHYSICIAN ASSISTANT

## 2023-09-26 ENCOUNTER — LAB (OUTPATIENT)
Dept: LAB | Facility: CLINIC | Age: 28
End: 2023-09-26
Payer: COMMERCIAL

## 2023-09-26 DIAGNOSIS — D50.8 OTHER IRON DEFICIENCY ANEMIA: ICD-10-CM

## 2023-09-26 LAB
BASOPHILS # BLD AUTO: 0.1 10E3/UL (ref 0–0.2)
BASOPHILS NFR BLD AUTO: 1 %
EOSINOPHIL # BLD AUTO: 0.2 10E3/UL (ref 0–0.7)
EOSINOPHIL NFR BLD AUTO: 3 %
ERYTHROCYTE [DISTWIDTH] IN BLOOD BY AUTOMATED COUNT: 12.3 % (ref 10–15)
FERRITIN SERPL-MCNC: 28 NG/ML (ref 31–409)
HCT VFR BLD AUTO: 47.5 % (ref 40–53)
HGB BLD-MCNC: 15.2 G/DL (ref 13.3–17.7)
IMM GRANULOCYTES # BLD: 0 10E3/UL
IMM GRANULOCYTES NFR BLD: 0 %
IRON BINDING CAPACITY (ROCHE): 403 UG/DL (ref 240–430)
IRON SATN MFR SERPL: 14 % (ref 15–46)
IRON SERPL-MCNC: 56 UG/DL (ref 61–157)
LYMPHOCYTES # BLD AUTO: 1.9 10E3/UL (ref 0.8–5.3)
LYMPHOCYTES NFR BLD AUTO: 22 %
MCH RBC QN AUTO: 28.4 PG (ref 26.5–33)
MCHC RBC AUTO-ENTMCNC: 32 G/DL (ref 31.5–36.5)
MCV RBC AUTO: 89 FL (ref 78–100)
MONOCYTES # BLD AUTO: 0.7 10E3/UL (ref 0–1.3)
MONOCYTES NFR BLD AUTO: 8 %
NEUTROPHILS # BLD AUTO: 5.8 10E3/UL (ref 1.6–8.3)
NEUTROPHILS NFR BLD AUTO: 66 %
NRBC # BLD AUTO: 0 10E3/UL
NRBC BLD AUTO-RTO: 0 /100
PLATELET # BLD AUTO: 303 10E3/UL (ref 150–450)
RBC # BLD AUTO: 5.36 10E6/UL (ref 4.4–5.9)
WBC # BLD AUTO: 8.7 10E3/UL (ref 4–11)

## 2023-09-26 PROCEDURE — 82728 ASSAY OF FERRITIN: CPT

## 2023-09-26 PROCEDURE — 85025 COMPLETE CBC W/AUTO DIFF WBC: CPT

## 2023-09-26 PROCEDURE — 83540 ASSAY OF IRON: CPT

## 2023-09-26 PROCEDURE — 83550 IRON BINDING TEST: CPT

## 2023-09-26 PROCEDURE — 36415 COLL VENOUS BLD VENIPUNCTURE: CPT

## 2024-01-12 NOTE — CONSULTS
GASTROENTEROLOGY CONSULTATION      Luther Wood  [address] [facility] [id number]  25 year old male     Admission Date/Time: 9/11/2021  Primary Care Provider: No Ref-Primary, Physician     We were asked to see the patient in consultation by Dr. Caroline Galvan PA for evaluation of severe ulcerative colitis.    ASSESSMENT:    1.  Severe ulcerative colitis-it is concerning that he has back in the hospital this quickly after his initial Inflectra dose, though there may be some optimism that he did have some subjective clinical response after the first dose.  We will pursue another Inflectra infusion, which will basically give him a 10 mg/kg dose for his first induction dose.  I did discuss with the patient and his parents that if he does not respond to this over the next few days, then we will need to consult colorectal surgery, Dr. Lemus, typically covers [facility].  We did discuss that if they feel that a second opinion is needed, then this can be arranged by our service either at the [facility] or [facility].  We discussed that there are limited options for salvage therapy in the setting of severe ulcerative colitis.  -C. difficile negative on admit, enteric pathogens pending  -Covid testing negative on admit    RECOMMENDATIONS:  1.  Inflectra 5 mg/kg today.  2.  Change to Solu-Medrol 20 mg IV every 8 hours  3.  Continue regular diet  4.  Pursue DVT prophylaxis with Lovenox, discussed with his primary service.  These patients are at high risk for DVT and PE given the level of inflammation.  5.  Monitor stool output  6.  If no improvement in 1-2 days, pursue colorectal surgery consult.   7.  Agree with blood transfusion  8.  Agree with plan to change patient to inpatient status, he will be in the hospital for a few days.     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 Writer given ETA of 14:00 on 1/12/2024.    25 year old male who has a history of extensive ulcerative colitis.  He was recently hospitalized from September 2 to September 9, 2021 with severe flare of ulcerative colitis after failing outpatient prednisone.  He underwent C. difficile testing which was negative.  Flexible sigmoidoscopy was done which confirmed severe ulcerative colitis and biopsies were negative for CMV.  He clinically failed IV steroids, thus was given Inflectra 5 mg/kg on September 8, 2021.  Clinically had some improvement the day after and was subsequently discharged.  He had called our office yesterday with concern for a syncopal episode, fatigue and possible anemia.  Yesterday during the day he had 5 stools with less blood.  Since admission, he has actually had 14 stools yesterday all of which contain blood.  He did have a hemoglobin of 7.9, which decreased to 6.9 with IV fluids.  Currently has a blood transfusion pending.  He did get 1 iron infusion last week.  He denies any abdominal pain.  No significant nausea or vomiting.  He did have a fever to 101 overnight..       Previous work-up is detailed below:    ENDOSCOPIES    -Colonoscopy, May 2019: Normal ileum.  Continuous inflammation from the rectum to transverse colon with patchy inflammation in the cecum and ascending colon, possibly moderate to severe based on description.  Right random colon biopsies showed mild active chronic colitis consistent with ulcerative colitis.  Left random colon biopsies showed mild active chronic colitis consistent with ulcerative colitis.    -Flexible sigmoidoscopy, September 6, 2021: Exam was to the splenic flexure, severe, Alvarenga score 3 disease consistent with ulcerative colitis.  Biopsies showed severe active colitis with ulceration, negative for dysplasia.  CMV negative.      IMAGING    -Abd/Pelvic CT with contrast 9/3/21    IMPRESSION:   1.  Mild inflammatory changes involving the sigmoid colon and rectum  compatible with proctocolitis. No abscess or  "free air. Trace free  fluid in the pelvis.    TREATMENT    -Clinically failed balsalazide, budesonide and prednisone.  -No prior immunomodulators.  -Inflectra 5 mg/kg given September 8, 2021 with evidence of initial clinical response, but rapid recurrence of symptoms.    ROS: A comprehensive ten point review of systems was negative aside from those in mentioned in the HPI.      PAST MEDICAL HISTORY:  Past Medical History:   Diagnosis Date     Colitis 2019     SOCIAL HISTORY:  Social History     Tobacco Use     Smoking status: Never Smoker     Smokeless tobacco: Never Used   Substance Use Topics     Alcohol use: None     Drug use: None     FAMILY HISTORY:  Family History   Problem Relation Age of Onset     Diabetes Father         type I     ALLERGIES: No Known Allergies  MEDICATIONS:   Prior to Admission medications    Medication Sig Start Date End Date Taking? Authorizing Provider   predniSONE (DELTASONE) 10 MG tablet Prednisone 40 mg per day for 2 weeks, then 20 mg per day for 2 weeks, then 15 mg per day for 2 weeks, then 10 mg per day for 2 weeks, then 5 mg per day for 2 weeks, then stop 9/9/21  Yes Chirag Turk MD   balsalazide (COLAZAL) 750 MG capsule Take 2,250 mg by mouth 2 times daily    Unknown, Entered By History   CLARAVIS 20 MG capsule Take 20 mg by mouth daily with food 8/3/21   Unknown, Entered By History     PHYSICAL EXAM:   /62 (BP Location: Left arm)   Pulse 110   Temp 100  F (37.8  C) (Oral)   Resp 16   Ht 1.854 m (6' 1\")   Wt 61.2 kg (135 lb)   SpO2 94%   BMI 17.81 kg/m     GEN: Alert, oriented x3, NAD.  NIXON: AT, anicteric, OP without erythema, exudate, or ulcers.    NECK: Supple.    LYMPH: No LAD noted.  HRT: RRR, no VITO  LUNGS: CTA  ABD: +BS, non-tender, non-distended, no rebound or guarding.  SKIN: No rash, jaundice   NEURO: MS intact       ADDITIONAL DATA:   I reviewed the patient's new clinical lab test results.   Recent Labs   Lab Test 09/12/21 0603 09/11/21 1918 " 09/09/21  0630   WBC 15.4* 19.0* 21.1*   HGB 6.9* 7.9* 8.7*   MCV 88 86 89   * 529* 436   INR  --  1.34*  --      Recent Labs   Lab Test 09/12/21  0603 09/11/21  1850 09/09/21  0630   * 130* 135   POTASSIUM 3.5 4.0 3.6   CHLORIDE 99 94 101   CO2 27 31 32   BUN 9 10 13   CR 0.93 1.00 1.03   ANIONGAP 5 5 2*   MADIE 7.5* 8.3* 8.1*   GLC 92 130* 88     Recent Labs   Lab Test 09/11/21  1850 09/03/21  0637 09/02/21  1146   ALBUMIN 1.9* 2.7* 2.8*   BILITOTAL 0.4 0.6 0.5   ALT 60 18 21   AST 23 8 11   LIPASE 64*  --  79        I reviewed the patient's new imaging results - none.

## 2024-01-28 ENCOUNTER — HEALTH MAINTENANCE LETTER (OUTPATIENT)
Age: 29
End: 2024-01-28

## 2024-06-09 ENCOUNTER — OFFICE VISIT (OUTPATIENT)
Dept: URGENT CARE | Facility: URGENT CARE | Age: 29
End: 2024-06-09
Payer: COMMERCIAL

## 2024-06-09 VITALS — WEIGHT: 163 LBS | TEMPERATURE: 97.5 F | HEIGHT: 73 IN | BODY MASS INDEX: 21.6 KG/M2

## 2024-06-09 DIAGNOSIS — R21 RASH OF GENITAL AREA: ICD-10-CM

## 2024-06-09 DIAGNOSIS — Z11.3 SCREEN FOR STD (SEXUALLY TRANSMITTED DISEASE): Primary | ICD-10-CM

## 2024-06-09 LAB
HBV SURFACE AB SERPL IA-ACNC: <3.5 M[IU]/ML
HBV SURFACE AB SERPL IA-ACNC: NONREACTIVE M[IU]/ML
HBV SURFACE AG SERPL QL IA: NONREACTIVE
HCV AB SERPL QL IA: NONREACTIVE
HIV 1+2 AB+HIV1 P24 AG SERPL QL IA: NONREACTIVE

## 2024-06-09 PROCEDURE — 86706 HEP B SURFACE ANTIBODY: CPT | Performed by: PREVENTIVE MEDICINE

## 2024-06-09 PROCEDURE — 87340 HEPATITIS B SURFACE AG IA: CPT | Performed by: PREVENTIVE MEDICINE

## 2024-06-09 PROCEDURE — 87529 HSV DNA AMP PROBE: CPT | Performed by: PREVENTIVE MEDICINE

## 2024-06-09 PROCEDURE — 87591 N.GONORRHOEAE DNA AMP PROB: CPT | Performed by: PREVENTIVE MEDICINE

## 2024-06-09 PROCEDURE — 99213 OFFICE O/P EST LOW 20 MIN: CPT | Performed by: PREVENTIVE MEDICINE

## 2024-06-09 PROCEDURE — 86803 HEPATITIS C AB TEST: CPT | Performed by: PREVENTIVE MEDICINE

## 2024-06-09 PROCEDURE — 36415 COLL VENOUS BLD VENIPUNCTURE: CPT | Performed by: PREVENTIVE MEDICINE

## 2024-06-09 PROCEDURE — 87389 HIV-1 AG W/HIV-1&-2 AB AG IA: CPT | Performed by: PREVENTIVE MEDICINE

## 2024-06-09 PROCEDURE — 87491 CHLMYD TRACH DNA AMP PROBE: CPT | Performed by: PREVENTIVE MEDICINE

## 2024-06-09 PROCEDURE — 86780 TREPONEMA PALLIDUM: CPT | Performed by: PREVENTIVE MEDICINE

## 2024-06-09 NOTE — PROGRESS NOTES
Assessment & Plan     (Z11.3) Screen for STD (sexually transmitted disease)  (primary encounter diagnosis)  Plan: Chlamydia trachomatis/Neisseria gonorrhoeae by         PCR - Clinic Collect, HIV Antigen Antibody         Combo, Treponema Abs w Reflex to RPR and Titer,        Hepatitis B surface antigen, Hepatitis C         antibody, Hepatitis B Surface Antibody, Herpes         Simplex Virus 1&2 by PCR (other site, no         default specimen type/source)    (R21) Rash of genital area    Possible herpes - pcr swab done today  Also screen for hiv, hep b/c, syphillis, chlamydia, gonorrhea.    Advised to always use condoms.         31 minutes spent by me on the date of the encounter doing chart review, history and exam, documentation and further activities per the note        No follow-ups on file.    Martin Galvan MD  [facility]    Subjective     Luther Wood is a 28 year old year old male who presents to clinic today for the following health issues:    Patient presents with:  Urgent Care: Requesting testing for HSV 1 and 2 primarily, and STI screening. Would like genital area examined for possible active lesions.     This is a 29 yo man whose female partner was seen this morning and diagnosed with likely genital herpes.  Patient has no known history of std.  Has one sore on the left side of his penis currently.  No penile discharge.  Feels fine. No pain in genitals.      Patient Active Problem List   Diagnosis    Acne vulgaris    Tear of right acetabular labrum    Attention deficit disorder    Ulcerative colitis with complication, unspecified location (H)    Hyponatremia    Anemia due to blood loss, acute    Ulcerative colitis with rectal bleeding, unspecified location (H)    Other iron deficiency anemia    Ileostomy in place (H)    Ulcerative colitis (H)       No current outpatient medications on file.     No current facility-administered medications for this visit.       Past  "Medical History:   Diagnosis Date    Colitis 2019    History of blood transfusion 09/12/2021       Social History   reports that he has never smoked. He has never used smokeless tobacco. He reports current alcohol use. He reports that he does not use drugs.    Family History   Problem Relation Age of Onset    Diabetes Father         type I    Colon Cancer No family hx of        Review of Systems  Constitutional, HEENT, cardiovascular, pulmonary, GI, , musculoskeletal, neuro, skin, endocrine and psych systems are negative, except as otherwise noted.      Objective    Temp 97.5  F (36.4  C) (Tympanic)   Ht 1.854 m (6' 1\")   Wt 73.9 kg (163 lb)   BMI 21.51 kg/m    Physical Exam   GENERAL: alert and no distress  EYES: Eyes grossly normal to inspection, PERRL and conjunctivae and sclerae normal  HENT: ear canals and TM's normal, nose and mouth without ulcers or lesions  NECK: no adenopathy, no asymmetry, masses, or scars  RESP: lungs clear to auscultation - no rales, rhonchi or wheezes  CV: regular rate and rhythm, normal S1 S2, no S3 or S4, no murmur, click or rub, no peripheral edema  ABDOMEN: soft, nontender, no hepatosplenomegaly, no masses and bowel sounds normal  MS: no gross musculoskeletal defects noted, no edema  SKIN: no suspicious lesions or rashes  NEURO: Normal strength and tone, mentation intact and speech normal  PSYCH: mentation appears normal, affect normal/bright   - small papulovesicle on left side of penis shaft.  No lad, no redness, warmth, other rash.  No ttp.      "

## 2024-06-10 DIAGNOSIS — N48.9 PENILE LESION: Primary | ICD-10-CM

## 2024-06-10 LAB
C TRACH DNA SPEC QL PROBE+SIG AMP: NEGATIVE
HSV1 DNA SPEC QL NAA+PROBE: DETECTED
HSV2 DNA SPEC QL NAA+PROBE: NOT DETECTED
N GONORRHOEA DNA SPEC QL NAA+PROBE: NEGATIVE
T PALLIDUM AB SER QL: NONREACTIVE

## 2024-06-10 RX ORDER — VALACYCLOVIR HYDROCHLORIDE 1 G/1
1000 TABLET, FILM COATED ORAL 2 TIMES DAILY
Qty: 20 TABLET | Refills: 0 | Status: SHIPPED | OUTPATIENT
Start: 2024-06-10 | End: 2024-06-20

## 2024-06-11 ENCOUNTER — MYC MEDICAL ADVICE (OUTPATIENT)
Dept: FAMILY MEDICINE | Facility: CLINIC | Age: 29
End: 2024-06-11
Payer: COMMERCIAL

## 2024-06-13 ENCOUNTER — OFFICE VISIT (OUTPATIENT)
Dept: FAMILY MEDICINE | Facility: CLINIC | Age: 29
End: 2024-06-13
Payer: COMMERCIAL

## 2024-06-13 VITALS
TEMPERATURE: 97.5 F | HEIGHT: 73 IN | DIASTOLIC BLOOD PRESSURE: 59 MMHG | OXYGEN SATURATION: 100 % | WEIGHT: 164 LBS | HEART RATE: 56 BPM | BODY MASS INDEX: 21.74 KG/M2 | SYSTOLIC BLOOD PRESSURE: 111 MMHG | RESPIRATION RATE: 16 BRPM

## 2024-06-13 DIAGNOSIS — K51.919 ULCERATIVE COLITIS WITH COMPLICATION, UNSPECIFIED LOCATION (H): ICD-10-CM

## 2024-06-13 DIAGNOSIS — D50.8 OTHER IRON DEFICIENCY ANEMIA: ICD-10-CM

## 2024-06-13 DIAGNOSIS — B00.9 HSV (HERPES SIMPLEX VIRUS) INFECTION: Primary | ICD-10-CM

## 2024-06-13 PROBLEM — Z93.2 ILEOSTOMY IN PLACE (H): Status: RESOLVED | Noted: 2022-02-11 | Resolved: 2024-06-13

## 2024-06-13 LAB
ERYTHROCYTE [DISTWIDTH] IN BLOOD BY AUTOMATED COUNT: 12 % (ref 10–15)
HCT VFR BLD AUTO: 46.5 % (ref 40–53)
HGB BLD-MCNC: 15 G/DL (ref 13.3–17.7)
MCH RBC QN AUTO: 28.4 PG (ref 26.5–33)
MCHC RBC AUTO-ENTMCNC: 32.3 G/DL (ref 31.5–36.5)
MCV RBC AUTO: 88 FL (ref 78–100)
PLATELET # BLD AUTO: 281 10E3/UL (ref 150–450)
RBC # BLD AUTO: 5.28 10E6/UL (ref 4.4–5.9)
WBC # BLD AUTO: 6.4 10E3/UL (ref 4–11)

## 2024-06-13 PROCEDURE — 83540 ASSAY OF IRON: CPT | Performed by: PHYSICIAN ASSISTANT

## 2024-06-13 PROCEDURE — 85027 COMPLETE CBC AUTOMATED: CPT | Performed by: PHYSICIAN ASSISTANT

## 2024-06-13 PROCEDURE — 83550 IRON BINDING TEST: CPT | Performed by: PHYSICIAN ASSISTANT

## 2024-06-13 PROCEDURE — 82728 ASSAY OF FERRITIN: CPT | Performed by: PHYSICIAN ASSISTANT

## 2024-06-13 PROCEDURE — 36415 COLL VENOUS BLD VENIPUNCTURE: CPT | Performed by: PHYSICIAN ASSISTANT

## 2024-06-13 PROCEDURE — 99214 OFFICE O/P EST MOD 30 MIN: CPT | Performed by: PHYSICIAN ASSISTANT

## 2024-06-13 RX ORDER — FERROUS SULFATE 325(65) MG
325 TABLET ORAL
COMMUNITY
Start: 2024-06-13

## 2024-06-13 NOTE — PROGRESS NOTES
Assessment & Plan     HSV (herpes simplex virus) infection  Educated on etiology of diagnosis as well as treatment options.  Typically recommending only as needed treatments unless flares are occurring 3 or more times a year.  Currently on active treatment and no further management needed at this time.  Educated on barrier protection as well as avoiding sexual contact during active flare.  All questions patient had were answered and he was satisfied with responses of his visit.    Ulcerative colitis with complication, unspecified location (H)  Followed by GI.  No ongoing symptoms at this time.  His iron levels were slightly reduced at last check and he does continue on iron supplements.  Will recheck iron studies given this has been 9 months.  If stable continue to check annually.    Other iron deficiency anemia    - CBC with platelets; Future  - Iron and iron binding capacity; Future  - Ferritin; Future  - CBC with platelets  - Iron and iron binding capacity  - Ferritin      32 minutes. Greater than 50% of the time was spent face to face counseling regarding his conditions and treatment options as described above.         Alyson Muhammad is a 28 year old, presenting for the following health issues:  Herpes Simplex Follow Up (Discuss recent diagnosis of HSV)        6/13/2024     6:57 AM   Additional Questions   Roomed by ANGEL VAZQUEZ   Accompanied by -     History of Present Illness       Reason for visit:  Discuss Recent medical diagnosis and next steps  Symptom onset:  1-3 days ago  Symptom intensity:  Mild  Symptom progression:  Improving  Had these symptoms before:  No    He eats 4 or more servings of fruits and vegetables daily.He consumes 0 sweetened beverage(s) daily.He exercises with enough effort to increase his heart rate 60 or more minutes per day.  He exercises with enough effort to increase his heart rate 7 days per week.   He is taking medications regularly.     In summary patient is a 28-year-old male with  "a past history of ulcerative colitis status postsurgical fixation and no chronic medication use for this, iron deficient anemia, and recent HSV-1 infection diagnosis.  He presents today to discuss further his HSV-1 infection diagnosis.    In summary, last weekend both him and his girlfriend engaged in intercourse during a wedding they both attended and then the next day his girlfriend started suffering from severe swelling and pain in her vaginal region.  She was seen at urgent care and diagnosed with a yeast infection and also concerns for HSV.  She was tested for this though at time of visit today with current patient, his girlfriend's HSV labs have yet to return.    Patient then did an exam of himself and saw a single lesion with no tenderness on the shaft of his penis and presented to urgent care.  This lesion was swabbed and checked for HSV 1 and 2 via PCR testing and returned with evidence of HSV 1.  He was started on Valtrex 1000 mg twice daily for 7 days.    Today he would like to discuss how this impacts his future in terms of sexual activity with his partner, his ulcerative colitis history, and any future protective options he may have.  Of note, patient states in the past he did have an area on his lip months ago which she thought was a pimple and resolved on its own.            Objective    /59   Pulse 56   Temp 97.5  F (36.4  C)   Resp 16   Ht 1.854 m (6' 1\")   Wt 74.4 kg (164 lb)   SpO2 100%   BMI 21.64 kg/m    Body mass index is 21.64 kg/m .  Physical Exam   GENERAL: alert and no distress   (male): Single approximate 1 mm mildly erythematous papular lesion on left mid shaft of penis.  No other lesions present.  No edema or tenderness to palpation  PSYCH: mentation appears normal, affect normal/bright          Signed Electronically by: Marko Rubalcava PA-C    "

## 2024-06-14 LAB
FERRITIN SERPL-MCNC: 33 NG/ML (ref 31–409)
IRON BINDING CAPACITY (ROCHE): 310 UG/DL (ref 240–430)
IRON SATN MFR SERPL: 13 % (ref 15–46)
IRON SERPL-MCNC: 39 UG/DL (ref 61–157)

## 2024-11-04 ENCOUNTER — E-VISIT (OUTPATIENT)
Dept: FAMILY MEDICINE | Facility: CLINIC | Age: 29
End: 2024-11-04
Payer: COMMERCIAL

## 2024-11-04 DIAGNOSIS — L73.9 FOLLICULITIS: Primary | ICD-10-CM

## 2024-11-04 PROCEDURE — 99421 OL DIG E/M SVC 5-10 MIN: CPT | Performed by: PHYSICIAN ASSISTANT

## 2025-01-01 ENCOUNTER — OFFICE VISIT (OUTPATIENT)
Dept: URGENT CARE | Facility: URGENT CARE | Age: 30
End: 2025-01-01
Payer: COMMERCIAL

## 2025-01-01 VITALS
RESPIRATION RATE: 16 BRPM | TEMPERATURE: 97.9 F | SYSTOLIC BLOOD PRESSURE: 124 MMHG | DIASTOLIC BLOOD PRESSURE: 79 MMHG | HEART RATE: 81 BPM | OXYGEN SATURATION: 99 %

## 2025-01-01 DIAGNOSIS — R05.1 ACUTE COUGH: ICD-10-CM

## 2025-01-01 DIAGNOSIS — J10.1 INFLUENZA B: Primary | ICD-10-CM

## 2025-01-01 LAB
FLUAV AG SPEC QL IA: NEGATIVE
FLUBV AG SPEC QL IA: POSITIVE

## 2025-01-01 RX ORDER — AZITHROMYCIN 250 MG/1
TABLET, FILM COATED ORAL
Qty: 6 TABLET | Refills: 0 | Status: SHIPPED | OUTPATIENT
Start: 2025-01-01 | End: 2025-01-06

## 2025-01-01 RX ORDER — BENZONATATE 200 MG/1
200 CAPSULE ORAL 3 TIMES DAILY PRN
Qty: 21 CAPSULE | Refills: 3 | Status: SHIPPED | OUTPATIENT
Start: 2025-01-01

## 2025-01-01 NOTE — PROGRESS NOTES
SUBJECTIVE:   Luther Wood is a 29 year old male presenting with a chief complaint of four days of severe frequent cough (dry cough at night and phlegmmy cough (a ticklish sensation in the throat triggers the cough)  (productive of yellow-green phlegm) during the day), stuffy nose for the past two days.     Onset of symptoms was three days ago..    Severity severe and disturbing his sleep  Current and Associated symptoms:     No shortness of breath  No headache  No body aches  There has been fatigue  No sore throat  There has been runny nose  No vomiting  No diarrhea  No loss of smell/taste  No bluish lips/toes/fingers  Treatment measures tried include Nyquil, Tessalon Perles, Cepacol cough drops and Mucinex.  .  Predisposing factors include his mom and dad had similar symptoms around December 24, 2024.  His mom tested positive for COVID-19 on [place] Day this year.    No known close contacts with pneumonia/influenza/pertussis    His at-home COVID-19 test was negative today.  .    Past Medical History:   Diagnosis Date    Colitis 2019    History of blood transfusion 09/12/2021     Current Outpatient Medications   Medication Sig Dispense Refill    ferrous sulfate (FEROSUL) 325 (65 Fe) MG tablet Take 1 tablet (325 mg) by mouth daily (with breakfast) (Patient not taking: Reported on 1/1/2025)      valACYclovir (VALTREX) 1000 mg tablet Take 1 tablet (1,000 mg) by mouth 2 times daily for 10 days 20 tablet 0     Social History     Tobacco Use    Smoking status: Never    Smokeless tobacco: Never   Substance Use Topics    Alcohol use: Yes     Comment: occassional       ROS:  CONSTITUTIONAL:NEGATIVE  for fevers.   ENT/MOUTH:  positive for nasal congestion  RESP: positive for cough.      OBJECTIVE:  /79 (BP Location: Right arm, Patient Position: Sitting, Cuff Size: Adult Regular)   Pulse 81   Temp 97.9  F (36.6  C) (Tympanic)   Resp 16   SpO2 99%   GENERAL APPEARANCE: healthy, alert and no distress.  Patient has  no respiratory distress.    HENT: nasal turbinates erythematous, swollen and oropharynx is mildly erythematous.    NECK: supple, nontender, no lymphadenopathy  RESP: lungs clear to auscultation - no rales, rhonchi or wheezes  CV: regular rates and rhythm, normal S1 S2, no murmur noted  SKIN: no cyanosis/pallor.      LAB:    Results for orders placed or performed in visit on 01/01/25   Influenza A & B Antigen - Clinic Collect     Status: Abnormal    Specimen: Nose; Swab   Result Value Ref Range    Influenza A antigen Negative Negative    Influenza B antigen Positive (A) Negative    Narrative    Test results must be correlated with clinical data. If necessary, results should be confirmed by a molecular assay or viral culture.       ASSESSMENT:  Acute Cough  Influenza Type B    PLAN:  Get plenty of rest.      Drink plenty of fluids.      Fill the printed prescription for Azithromycin if the pertussis test is positive.      Go to the emergency room if you develop worsening severe shortness of breath, worsening fevers, severe lightheadedness.      Follow up in one week if not better.      Patient will be in [place] on a fishing trip starting tomorrow.  I gave him a written Rx for Azithromycin.  Fill this Rx if the Pertussis PCR test is positive.      Rx:  Tessalon Perles    Pending lab:  Pertussis PCR Test.      Nicholas Bee MD

## 2025-01-01 NOTE — PATIENT INSTRUCTIONS
Get plenty of rest.      Drink plenty of fluids.      Fill the printed prescription for Azithromycin if the pertussis test is positive.      Go to the emergency room if you develop worsening severe shortness of breath, worsening fevers, severe lightheadedness.      Follow up in one week if not better.

## 2025-01-02 LAB
B PARAPERT DNA SPEC QL NAA+PROBE: NOT DETECTED
B PERT DNA SPEC QL NAA+PROBE: NOT DETECTED

## 2025-01-05 ENCOUNTER — OFFICE VISIT (OUTPATIENT)
Dept: URGENT CARE | Facility: URGENT CARE | Age: 30
End: 2025-01-05
Payer: COMMERCIAL

## 2025-01-05 VITALS
SYSTOLIC BLOOD PRESSURE: 112 MMHG | DIASTOLIC BLOOD PRESSURE: 70 MMHG | OXYGEN SATURATION: 99 % | BODY MASS INDEX: 21 KG/M2 | HEART RATE: 69 BPM | WEIGHT: 159.2 LBS | RESPIRATION RATE: 14 BRPM | TEMPERATURE: 97.4 F

## 2025-01-05 DIAGNOSIS — J10.1 INFLUENZA B: Primary | ICD-10-CM

## 2025-01-05 DIAGNOSIS — R05.8 POST-VIRAL COUGH SYNDROME: ICD-10-CM

## 2025-01-05 PROCEDURE — 99213 OFFICE O/P EST LOW 20 MIN: CPT | Performed by: PREVENTIVE MEDICINE

## 2025-01-05 RX ORDER — CODEINE PHOSPHATE AND GUAIFENESIN 10; 100 MG/5ML; MG/5ML
1 SOLUTION ORAL EVERY 6 HOURS PRN
Qty: 118 ML | Refills: 0 | Status: SHIPPED | OUTPATIENT
Start: 2025-01-05

## 2025-01-05 RX ORDER — IPRATROPIUM BROMIDE 42 UG/1
2 SPRAY, METERED NASAL 4 TIMES DAILY PRN
Qty: 15 ML | Refills: 0 | Status: SHIPPED | OUTPATIENT
Start: 2025-01-05

## 2025-01-05 NOTE — PROGRESS NOTES
Assessment & Plan     (J10.1) Influenza B  (primary encounter diagnosis)  (R05.8) Post-viral cough syndrome  Plan: guaiFENesin-codeine (ROBITUSSIN AC) 100-10         MG/5ML solution, ipratropium (ATROVENT) 0.06 %         nasal spray    Suspect cough is due to post nasal drip and post viral.  Atrovent nasal spray to help with post nasal drip  Robitussin ac to help with post viral cough    Follow up in 10-14 days if not improving or sooner as needed                     No follow-ups on file.    Martin Galvan MD  [facility]    Subjective     Luther Wood is a 29 year old year old male who presents to clinic today for the following health issues:    Patient presents with:  Urgent Care: Patient presents with cough.  He tested positive for Influenza B in Urgent Care on 1/1/25 and has lingering cough that keeps him up at night.  He is taking benzonatate for cough and it is not helping.     This is a 30 yo male who has influenza b infection diagnosed 4 days ago.  Has been sick for 9 days.  Cough is improving but still lingering.  Mostly dry now but persistent.  Some congestion and post nasal drip.  No fever, sob, cp, wheezing.      Patient Active Problem List   Diagnosis    Acne vulgaris    Tear of right acetabular labrum    Attention deficit disorder    Ulcerative colitis with complication, unspecified location (H)    Hyponatremia    Anemia due to blood loss, acute    Ulcerative colitis with rectal bleeding, unspecified location (H)    Other iron deficiency anemia    Ulcerative colitis (H)    HSV (herpes simplex virus) infection       Current Outpatient Medications   Medication Sig Dispense Refill    benzonatate (TESSALON) 200 MG capsule Take 1 capsule (200 mg) by mouth 3 times daily as needed for cough. 21 capsule 3    azithromycin (ZITHROMAX) 250 MG tablet Take 2 tablets (500 mg) by mouth daily for 1 day, THEN 1 tablet (250 mg) daily for 4 days. (Patient not taking: Reported on  1/5/2025) 6 tablet 0    ferrous sulfate (FEROSUL) 325 (65 Fe) MG tablet Take 1 tablet (325 mg) by mouth daily (with breakfast) (Patient not taking: Reported on 1/5/2025)      valACYclovir (VALTREX) 1000 mg tablet Take 1 tablet (1,000 mg) by mouth 2 times daily for 10 days 20 tablet 0     No current facility-administered medications for this visit.       Past Medical History:   Diagnosis Date    Colitis 2019    History of blood transfusion 09/12/2021       Social History   reports that he has never smoked. He has never used smokeless tobacco. He reports current alcohol use. He reports that he does not use drugs.    Family History   Problem Relation Age of Onset    Diabetes Father         type I    Colon Cancer No family hx of        Review of Systems  Constitutional, HEENT, cardiovascular, pulmonary, GI, , musculoskeletal, neuro, skin, endocrine and psych systems are negative, except as otherwise noted.      Objective    /70   Pulse 69   Temp 97.4  F (36.3  C) (Tympanic)   Resp 14   Wt 72.2 kg (159 lb 3.2 oz)   SpO2 99%   BMI 21.00 kg/m    Physical Exam   GENERAL: alert and no distress  EYES: Eyes grossly normal to inspection, PERRL and conjunctivae and sclerae normal  HENT: ear canals and TM's normal, nose and mouth without ulcers or lesions  NECK: no adenopathy, no asymmetry, masses, or scars  RESP: lungs clear to auscultation - no rales, rhonchi or wheezes  CV: regular rate and rhythm, normal S1 S2, no S3 or S4, no murmur, click or rub, no peripheral edema  ABDOMEN: soft, nontender, no hepatosplenomegaly, no masses and bowel sounds normal  MS: no gross musculoskeletal defects noted, no edema  SKIN: no suspicious lesions or rashes  NEURO: Normal strength and tone, mentation intact and speech normal  PSYCH: mentation appears normal, affect normal/bright

## 2025-02-02 ENCOUNTER — HEALTH MAINTENANCE LETTER (OUTPATIENT)
Age: 30
End: 2025-02-02

## 2025-06-02 ENCOUNTER — TRANSFERRED RECORDS (OUTPATIENT)
Dept: HEALTH INFORMATION MANAGEMENT | Facility: CLINIC | Age: 30
End: 2025-06-02
Payer: COMMERCIAL

## 2025-06-08 ENCOUNTER — OFFICE VISIT (OUTPATIENT)
Dept: URGENT CARE | Facility: URGENT CARE | Age: 30
End: 2025-06-08
Payer: COMMERCIAL

## 2025-06-08 VITALS
HEART RATE: 54 BPM | BODY MASS INDEX: 20.98 KG/M2 | WEIGHT: 158.3 LBS | HEIGHT: 73 IN | DIASTOLIC BLOOD PRESSURE: 76 MMHG | RESPIRATION RATE: 14 BRPM | SYSTOLIC BLOOD PRESSURE: 110 MMHG | TEMPERATURE: 97 F | OXYGEN SATURATION: 100 %

## 2025-06-08 DIAGNOSIS — Z71.1 WORRIED WELL: Primary | ICD-10-CM

## 2025-06-08 PROCEDURE — 99213 OFFICE O/P EST LOW 20 MIN: CPT | Performed by: PHYSICIAN ASSISTANT

## 2025-06-08 NOTE — PROGRESS NOTES
Urgent Care Clinic Visit    Chief Complaint   Patient presents with    Derm Problem     Patient presents with rash on penis after accidentally zipping it in pants x 2 weeks.  Most of it healed except one area.  He has history of HSV-1 but this doesn't feel like that.                 6/8/2025     9:07 AM   Additional Questions   Roomed by Lilliam

## 2025-06-08 NOTE — PROGRESS NOTES
"Assessment & Plan     Worried well  Reassurance.  No evidence of HSV lesion or cellulitis or abscess.  Normal exam.  Follow-up as needed.  Patient agrees with the plan.      Return in about 3 months (around 9/8/2025) for Routine Visit.    Lucila Guido PA-C  [facility] MARIBEL Muhammad is a 29 year old male who presents to clinic today for the following health issues:  Chief Complaint   Patient presents with    Derm Problem     Patient presents with rash on penis after accidentally zipping it in pants x 2 weeks.  Most of it healed except one area.  He has history of HSV-1 but this doesn't feel like that.  He does not have a refill of his valtrex but would like one          6/8/2025     9:07 AM   Additional Questions   Roomed by Lilliam     HPI    Patient is presenting to urgent care today with a complaint of penile irritation.  He notes he was zipping his pants a couple weeks ago and accidentally caught the tip of his penis in the zipper.  Affected area has healed, however there is an area that has a slight bump still noticeable.  He notes he has a history of HSV but this does not feel similar.  He denies any fever, pain, burning sensation.  No abnormal penile discharge.  No dysuria.  He is traveling soon and wanted to have this checked prior to leaving.      Review of Systems  Constitutional, HEENT, cardiovascular, pulmonary, GI, , musculoskeletal, neuro, skin, endocrine and psych systems are negative, except as otherwise noted.      Objective    /76   Pulse 54   Temp 97  F (36.1  C) (Tympanic)   Resp 14   Ht 1.854 m (6' 1\")   Wt 71.8 kg (158 lb 4.8 oz)   SpO2 100%   BMI 20.89 kg/m    Physical Exam   GENERAL: alert and no distress   (male): normal circumcised male genitalia without lesions or urethral discharge, no hernia, groin normal without rash  MS: no gross musculoskeletal defects noted, no edema          "

## (undated) DEVICE — STPL LINEAR CUT 30X3.5MM TX30B

## (undated) DEVICE — SPONGE LAP 18X18" X8435

## (undated) DEVICE — ESU GROUND PAD UNIVERSAL W/O CORD

## (undated) DEVICE — KIT PATIENT POSITIONING PIGAZZI LATEX FREE 40580

## (undated) DEVICE — GLOVE PROTEXIS W/NEU-THERA 6.0  2D73TE60

## (undated) DEVICE — GLOVE PROTEXIS BLUE W/NEU-THERA 6.5  2D73EB65

## (undated) DEVICE — EVAC SYSTEM CLEAR FLOW SC082500

## (undated) DEVICE — SUCTION CANISTER MEDIVAC LINER 3000ML W/LID 65651-530

## (undated) DEVICE — KIT ENDO TURNOVER/PROCEDURE W/CLEAN A SCOPE LINERS 103888

## (undated) DEVICE — SPONGE BALL KERLIX ROUND XL W/O STRING LATEX 4935

## (undated) DEVICE — SU MONOCRYL 4-0 PS-2 27" UND Y426H

## (undated) DEVICE — ENDO TROCAR SLEEVE KII Z-THREADED 05X100MM CTS02

## (undated) DEVICE — SOL NACL 0.9% IRRIG 3000ML BAG 2B7477

## (undated) DEVICE — SYR BULB IRRIG 50ML LATEX FREE 0035280

## (undated) DEVICE — SUCTION TIP POOLE K770

## (undated) DEVICE — LINEN POUCH DBL 5427

## (undated) DEVICE — SU PDS II 3-0 SH 27" Z316H

## (undated) DEVICE — SU VICRYL 2-0 SH 27" J317H

## (undated) DEVICE — ESU ELEC BLADE HEX-LOCKING 2.5" E1450X

## (undated) DEVICE — ESU LIGASURE LAPAROSCOPIC BLUNT TIP SEALER 5MMX37CM LF1837

## (undated) DEVICE — ENDO FORCEP ENDOJAW BIOPSY 2.8MMX230CM FB-220U

## (undated) DEVICE — DEVICE SUTURE GRASPER TROCAR CLOSURE 14GA PMITCSG

## (undated) DEVICE — DRSG TEGADERM 4X10" 1627

## (undated) DEVICE — LINEN ORTHO ACL PACK 5447

## (undated) DEVICE — DAVINCI XI DRAPE COLUMN 470341

## (undated) DEVICE — SU VICRYL 3-0 SH CR 8X18" J774

## (undated) DEVICE — DAVINCI XI RETR ENDOWRIST SMALL GRAPTOR 470318

## (undated) DEVICE — SOL NACL 0.9% INJ 1000ML BAG 2B1324X

## (undated) DEVICE — GUIDEWIRE URO STR STIFF .035"X150CM NITINOL 150NSS35

## (undated) DEVICE — Device

## (undated) DEVICE — DRAIN JACKSON PRATT RESERVOIR 100ML SU130-1305

## (undated) DEVICE — LINEN TOWEL PACK X5 5464

## (undated) DEVICE — TUBING CONMED AIRSEAL SMOKE EVAC INSUFFLATION ASM-EVAC

## (undated) DEVICE — PAD CHUX UNDERPAD 23X24" 7136

## (undated) DEVICE — DAVINCI HOT SHEARS TIP COVER  400180

## (undated) DEVICE — ESU ELEC BLADE 2.75" COATED/INSULATED E1455

## (undated) DEVICE — NDL COUNTER 40CT  31142311

## (undated) DEVICE — SU VICRYL 2-0 TIE 12X18" J905T

## (undated) DEVICE — STPL LINEAR CUT 75MM TLC75

## (undated) DEVICE — DRAPE BREAST/CHEST 29420

## (undated) DEVICE — DAVINCI XI MONOPOLAR SCISSORS HOT SHEARS 8MM 470179

## (undated) DEVICE — DAVINCI SEAL CANNULA AND STPL 12MM 470380

## (undated) DEVICE — CATH TRAY FOLEY SURESTEP 16FR DRAIN BAG STATOCK A899916

## (undated) DEVICE — ENDO TROCAR FIRST ENTRY KII FIOS Z-THRD 05X100MM CTF03

## (undated) DEVICE — SU VICRYL 3-0 SH 27" UND J416H

## (undated) DEVICE — ESU PENCIL W/HOLSTER E2350H

## (undated) DEVICE — STPL RELOAD LINEAR 30X3.5MM XR30B

## (undated) DEVICE — PACK MAJOR SBA15MAFSI

## (undated) DEVICE — ESU CORD MONOPOLAR 10'  E0510

## (undated) DEVICE — SYSTEM CLEARIFY VISUALIZATION 21-345

## (undated) DEVICE — SOL WATER IRRIG 1000ML BOTTLE 2F7114

## (undated) DEVICE — TUBING SUCTION 12"X1/4" N612

## (undated) DEVICE — SUCTION MANIFOLD NEPTUNE 2 SYS 4 PORT 0702-020-000

## (undated) DEVICE — DRSG GAUZE 4X4" TRAY

## (undated) DEVICE — SU PDS II 0 CT 36" Z358T

## (undated) DEVICE — NDL INSUFFLATION 13GA 120MM C2201

## (undated) DEVICE — STPL LINEAR 90 X 3.5MM TA9035S

## (undated) DEVICE — DAVINCI XI REDUCER 8-12MM 470381

## (undated) DEVICE — SOL NACL 0.9% IRRIG 1000ML BOTTLE 2F7124

## (undated) DEVICE — LINEN HALF SHEET 5512

## (undated) DEVICE — LINEN FULL SHEET 5511

## (undated) DEVICE — STPL RELOAD LINEAR CUT 100X3.8MM TCR10

## (undated) DEVICE — SU ETHILON 2-0 PS 18" 585H

## (undated) DEVICE — PEN MARKING SKIN

## (undated) DEVICE — JELLY LUBRICATING SURGILUBE 2OZ TUBE

## (undated) DEVICE — ESU GROUND PAD ADULT W/CORD E7507

## (undated) DEVICE — GLOVE PROTEXIS MICRO 6.5  2D73PM65

## (undated) DEVICE — BAG CLEAR TRASH 1.3M 39X33" P4040C

## (undated) DEVICE — SYR 50ML CATH TIP W/O NDL 309620

## (undated) DEVICE — CATH MALECOT 30FR LATEX 86030

## (undated) DEVICE — PREP CHLORAPREP 26ML TINTED ORANGE  260815

## (undated) DEVICE — SYR 10ML LL W/O NDL 302995

## (undated) DEVICE — STPL RELOAD LINEAR CUT 75MM TCR75

## (undated) DEVICE — GOWN LG DISP 9515

## (undated) DEVICE — GLOVE PROTEXIS MICRO 6.0  2D73PM60

## (undated) DEVICE — DRAPE LAP W/ARMBOARD 29410

## (undated) DEVICE — PITCHER STERILE 1000ML  SSK9004A

## (undated) DEVICE — TUBING IRRIG TUR Y TYPE 96" LF 6543-01

## (undated) DEVICE — PACK CYSTOSCOPY SBA15CYFSI

## (undated) DEVICE — SU ETHILON 2-0 FS 18" 664G

## (undated) DEVICE — SU VICRYL 3-0 SH 8X18" UND J864D

## (undated) DEVICE — DAVINCI XI OBTURATOR BLADELESS 8MM 470359

## (undated) DEVICE — STPL CONTOUR CUT CVD GREEN CS40G

## (undated) DEVICE — BAG DRAIN URO FOR SIEMENS 8MM ADAPTER NS CC164NS-A

## (undated) DEVICE — SU PROLENE 2-0 SHDA 48" 8533H

## (undated) DEVICE — ENDO ACCESS PLATFORM GELPOINT MINI CNGL3

## (undated) DEVICE — SUCTION TIP YANKAUER W/O VENT K86

## (undated) DEVICE — SYR 10ML FINGER CONTROL W/O NDL 309695

## (undated) DEVICE — SU PDS II 1 CT MONOFIL Z353H

## (undated) DEVICE — TUBING CYSTO/BLADDER IRRIG SET 80" 06544-01

## (undated) DEVICE — SU VICRYL 0 UR-6 27" J603H

## (undated) DEVICE — SU VICRYL 0 TIE 12X18" J906G

## (undated) DEVICE — LIGHT HANDLE X2

## (undated) DEVICE — SU VICRYL 3-0 SH 27" J316H

## (undated) DEVICE — SUCTION IRR STRYKERFLOW II W/TIP 250-070-520

## (undated) DEVICE — SU MONOCRYL 4-0 PS-2 18" UND Y496G

## (undated) DEVICE — DRSG GAUZE 4X4" 8044

## (undated) DEVICE — SOL WATER IRRIG 3000ML BAG 2B7117

## (undated) DEVICE — GLOVE PROTEXIS MICRO 7.0  2D73PM70

## (undated) DEVICE — SPONGE RAY-TEC 4X8" 7318

## (undated) DEVICE — DRAIN JACKSON PRATT ROUND SIL 19FR W/TROCAR LF JP-2232

## (undated) DEVICE — DRSG ABDOMINAL 07 1/2X8" 7197D

## (undated) DEVICE — DRSG KERLIX FLUFFS X5

## (undated) DEVICE — SU PDS II 0 CTX 60" Z990G

## (undated) DEVICE — DRAIN PENROSE 0.75"X18" LATEX FREE GR205

## (undated) DEVICE — DRAPE IOBAN INCISE 23X17" 6650EZ

## (undated) DEVICE — STPL LINEAR CUT 100MM TLC10

## (undated) DEVICE — DRAIN PENROSE 3/4X1/2X18" LATEX 8888515205

## (undated) DEVICE — BLADE CLIPPER 3M 9670

## (undated) DEVICE — CONNECTOR URETERAL CATH 14FR GOLDBERG 050049

## (undated) DEVICE — DAVINCI XI NDL DRIVER LARGE 470006

## (undated) DEVICE — GLOVE PROTEXIS BLUE W/NEU-THERA 7.0  2D73EB70

## (undated) DEVICE — TAPE CLOTH ADHESIVE 3" LATEX FREE

## (undated) DEVICE — DAVINCI XI SEAL UNIVERSAL 5-8MM 470361

## (undated) DEVICE — DAVINCI XI GRASPER FENESTRATED TIP UP 8MM 470347

## (undated) DEVICE — STPL RELOAD CONTOUR CUT CVD THICK  CR40G

## (undated) DEVICE — PACK DAVINCI UROLOGY SBA15UDFSG

## (undated) DEVICE — SU VICRYL 0 CT-1 27" UND J260H

## (undated) DEVICE — ENDO TROCAR BLUNT TIP KII BALLOON 12X100MM C0R47

## (undated) DEVICE — DAVINCI XI DRAPE ARM 470015

## (undated) DEVICE — CATH TRAY FOLEY COUDE 16FR SILVER W/URINE METER 350ML 304716

## (undated) DEVICE — DRAPE SLEEVE 599

## (undated) RX ORDER — FENTANYL CITRATE 50 UG/ML
INJECTION, SOLUTION INTRAMUSCULAR; INTRAVENOUS
Status: DISPENSED
Start: 2021-09-17

## (undated) RX ORDER — KETAMINE HCL IN NACL, ISO-OSM 100MG/10ML
SYRINGE (ML) INJECTION
Status: DISPENSED
Start: 2022-03-02

## (undated) RX ORDER — LIDOCAINE HYDROCHLORIDE 20 MG/ML
INJECTION, SOLUTION EPIDURAL; INFILTRATION; INTRACAUDAL; PERINEURAL
Status: DISPENSED
Start: 2022-03-02

## (undated) RX ORDER — VECURONIUM BROMIDE 1 MG/ML
INJECTION, POWDER, LYOPHILIZED, FOR SOLUTION INTRAVENOUS
Status: DISPENSED
Start: 2022-03-02

## (undated) RX ORDER — ONDANSETRON 2 MG/ML
INJECTION INTRAMUSCULAR; INTRAVENOUS
Status: DISPENSED
Start: 2022-03-02

## (undated) RX ORDER — PROPOFOL 10 MG/ML
INJECTION, EMULSION INTRAVENOUS
Status: DISPENSED
Start: 2022-03-02

## (undated) RX ORDER — FENTANYL CITRATE 0.05 MG/ML
INJECTION, SOLUTION INTRAMUSCULAR; INTRAVENOUS
Status: DISPENSED
Start: 2022-05-04

## (undated) RX ORDER — ACETAMINOPHEN 500 MG
TABLET ORAL
Status: DISPENSED
Start: 2022-05-04

## (undated) RX ORDER — CEFAZOLIN SODIUM 1 G/3ML
INJECTION, POWDER, FOR SOLUTION INTRAMUSCULAR; INTRAVENOUS
Status: DISPENSED
Start: 2021-09-17

## (undated) RX ORDER — PROPOFOL 10 MG/ML
INJECTION, EMULSION INTRAVENOUS
Status: DISPENSED
Start: 2022-05-04

## (undated) RX ORDER — LIDOCAINE HYDROCHLORIDE 10 MG/ML
INJECTION, SOLUTION EPIDURAL; INFILTRATION; INTRACAUDAL; PERINEURAL
Status: DISPENSED
Start: 2022-05-04

## (undated) RX ORDER — HYDROMORPHONE HYDROCHLORIDE 1 MG/ML
INJECTION, SOLUTION INTRAMUSCULAR; INTRAVENOUS; SUBCUTANEOUS
Status: DISPENSED
Start: 2022-03-02

## (undated) RX ORDER — FENTANYL CITRATE-0.9 % NACL/PF 10 MCG/ML
PLASTIC BAG, INJECTION (ML) INTRAVENOUS
Status: DISPENSED
Start: 2021-09-17

## (undated) RX ORDER — HEPARIN SODIUM 5000 [USP'U]/.5ML
INJECTION, SOLUTION INTRAVENOUS; SUBCUTANEOUS
Status: DISPENSED
Start: 2022-05-04

## (undated) RX ORDER — HYDROMORPHONE HCL IN WATER/PF 6 MG/30 ML
PATIENT CONTROLLED ANALGESIA SYRINGE INTRAVENOUS
Status: DISPENSED
Start: 2022-05-04

## (undated) RX ORDER — FENTANYL CITRATE 0.05 MG/ML
INJECTION, SOLUTION INTRAMUSCULAR; INTRAVENOUS
Status: DISPENSED
Start: 2022-03-02

## (undated) RX ORDER — CEFAZOLIN SODIUM/WATER 2 G/20 ML
SYRINGE (ML) INTRAVENOUS
Status: DISPENSED
Start: 2021-09-17

## (undated) RX ORDER — CEFAZOLIN SODIUM 1 G/3ML
INJECTION, POWDER, FOR SOLUTION INTRAMUSCULAR; INTRAVENOUS
Status: DISPENSED
Start: 2022-03-02

## (undated) RX ORDER — DEXAMETHASONE SODIUM PHOSPHATE 4 MG/ML
INJECTION, SOLUTION INTRA-ARTICULAR; INTRALESIONAL; INTRAMUSCULAR; INTRAVENOUS; SOFT TISSUE
Status: DISPENSED
Start: 2021-09-17

## (undated) RX ORDER — FENTANYL CITRATE 50 UG/ML
INJECTION, SOLUTION INTRAMUSCULAR; INTRAVENOUS
Status: DISPENSED
Start: 2022-05-04

## (undated) RX ORDER — BUPIVACAINE HYDROCHLORIDE 5 MG/ML
INJECTION, SOLUTION EPIDURAL; INTRACAUDAL
Status: DISPENSED
Start: 2021-09-17

## (undated) RX ORDER — HEPARIN SODIUM 5000 [USP'U]/.5ML
INJECTION, SOLUTION INTRAVENOUS; SUBCUTANEOUS
Status: DISPENSED
Start: 2022-03-02

## (undated) RX ORDER — FENTANYL CITRATE 50 UG/ML
INJECTION, SOLUTION INTRAMUSCULAR; INTRAVENOUS
Status: DISPENSED
Start: 2021-09-06

## (undated) RX ORDER — INDOCYANINE GREEN AND WATER 25 MG
KIT INJECTION
Status: DISPENSED
Start: 2021-09-17

## (undated) RX ORDER — BUPIVACAINE HYDROCHLORIDE AND EPINEPHRINE 5; 5 MG/ML; UG/ML
INJECTION, SOLUTION EPIDURAL; INTRACAUDAL; PERINEURAL
Status: DISPENSED
Start: 2021-09-17

## (undated) RX ORDER — ACETAMINOPHEN 325 MG/1
TABLET ORAL
Status: DISPENSED
Start: 2022-03-02

## (undated) RX ORDER — GLYCOPYRROLATE 0.2 MG/ML
INJECTION INTRAMUSCULAR; INTRAVENOUS
Status: DISPENSED
Start: 2021-09-17

## (undated) RX ORDER — FENTANYL CITRATE 50 UG/ML
INJECTION, SOLUTION INTRAMUSCULAR; INTRAVENOUS
Status: DISPENSED
Start: 2021-09-16

## (undated) RX ORDER — NEOSTIGMINE METHYLSULFATE 1 MG/ML
VIAL (ML) INJECTION
Status: DISPENSED
Start: 2021-09-17

## (undated) RX ORDER — ACETAMINOPHEN 325 MG/1
TABLET ORAL
Status: DISPENSED
Start: 2021-09-17

## (undated) RX ORDER — DEXAMETHASONE SODIUM PHOSPHATE 4 MG/ML
INJECTION, SOLUTION INTRA-ARTICULAR; INTRALESIONAL; INTRAMUSCULAR; INTRAVENOUS; SOFT TISSUE
Status: DISPENSED
Start: 2022-05-04

## (undated) RX ORDER — DEXAMETHASONE SODIUM PHOSPHATE 4 MG/ML
INJECTION, SOLUTION INTRA-ARTICULAR; INTRALESIONAL; INTRAMUSCULAR; INTRAVENOUS; SOFT TISSUE
Status: DISPENSED
Start: 2022-03-02

## (undated) RX ORDER — PROPOFOL 10 MG/ML
INJECTION, EMULSION INTRAVENOUS
Status: DISPENSED
Start: 2021-09-17

## (undated) RX ORDER — INDOCYANINE GREEN AND WATER 25 MG
KIT INJECTION
Status: DISPENSED
Start: 2022-03-02

## (undated) RX ORDER — LIDOCAINE HYDROCHLORIDE 10 MG/ML
INJECTION, SOLUTION EPIDURAL; INFILTRATION; INTRACAUDAL; PERINEURAL
Status: DISPENSED
Start: 2021-09-17

## (undated) RX ORDER — FENTANYL CITRATE 50 UG/ML
INJECTION, SOLUTION INTRAMUSCULAR; INTRAVENOUS
Status: DISPENSED
Start: 2023-05-23

## (undated) RX ORDER — GLYCOPYRROLATE 0.2 MG/ML
INJECTION, SOLUTION INTRAMUSCULAR; INTRAVENOUS
Status: DISPENSED
Start: 2022-05-04

## (undated) RX ORDER — ONDANSETRON 2 MG/ML
INJECTION INTRAMUSCULAR; INTRAVENOUS
Status: DISPENSED
Start: 2021-09-17

## (undated) RX ORDER — ALBUMIN, HUMAN INJ 5% 5 %
SOLUTION INTRAVENOUS
Status: DISPENSED
Start: 2021-09-17

## (undated) RX ORDER — FENTANYL CITRATE 50 UG/ML
INJECTION, SOLUTION INTRAMUSCULAR; INTRAVENOUS
Status: DISPENSED
Start: 2022-03-02

## (undated) RX ORDER — BUPIVACAINE HYDROCHLORIDE 5 MG/ML
INJECTION, SOLUTION EPIDURAL; INTRACAUDAL
Status: DISPENSED
Start: 2022-03-02

## (undated) RX ORDER — HEPARIN SODIUM 5000 [USP'U]/.5ML
INJECTION, SOLUTION INTRAVENOUS; SUBCUTANEOUS
Status: DISPENSED
Start: 2021-09-17

## (undated) RX ORDER — METRONIDAZOLE 500 MG/100ML
INJECTION, SOLUTION INTRAVENOUS
Status: DISPENSED
Start: 2022-05-04

## (undated) RX ORDER — SIMETHICONE 40MG/0.6ML
SUSPENSION, DROPS(FINAL DOSAGE FORM)(ML) ORAL
Status: DISPENSED
Start: 2021-09-06

## (undated) RX ORDER — ONDANSETRON 2 MG/ML
INJECTION INTRAMUSCULAR; INTRAVENOUS
Status: DISPENSED
Start: 2022-05-04

## (undated) RX ORDER — HYDROMORPHONE HCL IN WATER/PF 6 MG/30 ML
PATIENT CONTROLLED ANALGESIA SYRINGE INTRAVENOUS
Status: DISPENSED
Start: 2022-03-02